# Patient Record
Sex: FEMALE | Race: WHITE | Employment: UNEMPLOYED | URBAN - METROPOLITAN AREA
[De-identification: names, ages, dates, MRNs, and addresses within clinical notes are randomized per-mention and may not be internally consistent; named-entity substitution may affect disease eponyms.]

---

## 2018-11-22 ENCOUNTER — HOSPITAL ENCOUNTER (EMERGENCY)
Age: 68
Discharge: HOME | End: 2018-11-22
Attending: EMERGENCY MEDICINE
Payer: MEDICARE

## 2018-11-22 VITALS
OXYGEN SATURATION: 97 % | RESPIRATION RATE: 16 BRPM | HEART RATE: 89 BPM | DIASTOLIC BLOOD PRESSURE: 88 MMHG | BODY MASS INDEX: 27.97 KG/M2 | SYSTOLIC BLOOD PRESSURE: 142 MMHG | WEIGHT: 152 LBS | HEIGHT: 62 IN | TEMPERATURE: 98.4 F

## 2018-11-22 DIAGNOSIS — S39.012A STRAIN OF LUMBAR REGION, INITIAL ENCOUNTER: Primary | ICD-10-CM

## 2018-11-22 DIAGNOSIS — S76.212A GROIN STRAIN, LEFT, INITIAL ENCOUNTER: ICD-10-CM

## 2018-11-22 PROCEDURE — 99283 EMERGENCY DEPT VISIT LOW MDM: CPT

## 2018-11-22 PROCEDURE — 6360000002 HC RX W HCPCS: Performed by: EMERGENCY MEDICINE

## 2018-11-22 PROCEDURE — 96372 THER/PROPH/DIAG INJ SC/IM: CPT

## 2018-11-22 PROCEDURE — 6370000000 HC RX 637 (ALT 250 FOR IP): Performed by: EMERGENCY MEDICINE

## 2018-11-22 RX ORDER — KETOROLAC TROMETHAMINE 10 MG/1
10 TABLET, FILM COATED ORAL EVERY 6 HOURS PRN
Qty: 20 TABLET | Refills: 0 | Status: SHIPPED | OUTPATIENT
Start: 2018-11-22 | End: 2021-03-30

## 2018-11-22 RX ORDER — PREDNISONE 20 MG/1
60 TABLET ORAL ONCE
Status: COMPLETED | OUTPATIENT
Start: 2018-11-22 | End: 2018-11-22

## 2018-11-22 RX ORDER — KETOROLAC TROMETHAMINE 30 MG/ML
30 INJECTION, SOLUTION INTRAMUSCULAR; INTRAVENOUS ONCE
Status: COMPLETED | OUTPATIENT
Start: 2018-11-22 | End: 2018-11-22

## 2018-11-22 RX ORDER — METHYLPREDNISOLONE 4 MG/1
TABLET ORAL
Qty: 1 KIT | Refills: 0 | Status: SHIPPED | OUTPATIENT
Start: 2018-11-22 | End: 2018-11-28

## 2018-11-22 RX ADMIN — KETOROLAC TROMETHAMINE 30 MG: 30 INJECTION, SOLUTION INTRAMUSCULAR at 13:31

## 2018-11-22 RX ADMIN — PREDNISONE 60 MG: 20 TABLET ORAL at 13:31

## 2018-11-22 ASSESSMENT — PAIN DESCRIPTION - ORIENTATION
ORIENTATION: LEFT
ORIENTATION: LEFT

## 2018-11-22 ASSESSMENT — PAIN DESCRIPTION - LOCATION
LOCATION: BACK;GROIN;LEG
LOCATION: BACK;LEG

## 2018-11-22 ASSESSMENT — PAIN SCALES - GENERAL
PAINLEVEL_OUTOF10: 8

## 2018-11-22 ASSESSMENT — PAIN DESCRIPTION - DESCRIPTORS: DESCRIPTORS: CONSTANT

## 2018-11-22 ASSESSMENT — PAIN DESCRIPTION - PAIN TYPE
TYPE: ACUTE PAIN
TYPE: ACUTE PAIN

## 2018-11-22 ASSESSMENT — PAIN DESCRIPTION - ONSET: ONSET: PROGRESSIVE

## 2018-11-22 ASSESSMENT — PAIN DESCRIPTION - PROGRESSION
CLINICAL_PROGRESSION: NOT CHANGED
CLINICAL_PROGRESSION: GRADUALLY WORSENING

## 2018-11-22 NOTE — ED PROVIDER NOTES
patients available past medical records and past encounters were reviewed. Physical exam:  Constitutional:  The patient is comfortable, well appearing, non toxic in NAD. Patient is alert and oriented x3. Head: Head is atraumatic and normocephalic. Eyes: There is no discharge from the eyes. Sclerae are normal.    Neck: Normal range of motion of the neck is present there is no JVD present no meningeal signs are present. Respiratory/chest: The chest is nontender breath sounds are normal  Cardiovascular: Regular rate and rhythm is noted. No murmurs. No rubs or gallops. Skin: Skin is warm and dry, Skin exam normal  Neurologic exam: The patient's [place] Coma Scale is 15. No focal motor deficits. There are no focal sensory deficits. Deep tendon reflexes are intact and present bilaterally in the lower extremities. Babinski is absent. Gait is normal. Symmetric strength and sensation in the lower extremities bilaterally. Back exam: The patient has reproducible tenderness to palpation in the paravertebral lumbar region on the left sacroiliac area and the left inguinal area. There is no evidence of localized erythema nor is there any focal warmth to the back. The patient has no evidence of single vertebral tenderness or any single area of interspace tenderness. There are no palpable deformities, lacerations, abrasions, or stepoffs. No midline cervical, thoracic, or lumbar spine tenderness.           -------------------------------------------------- RESULTS -------------------------------------------------  I have personally reviewed all laboratory and imaging results for this patient. Results are listed below. LABS:  No results found for this visit on 11/22/18.     RADIOLOGY:  Interpreted by Radiologist.  No orders to display           Medical decision making:   Mechanical lumbosacral strain without evidence of fracture or neurologic compromise.     Plan:  Review of past medical records for appropriate

## 2021-03-30 ENCOUNTER — HOSPITAL ENCOUNTER (INPATIENT)
Age: 71
LOS: 3 days | Discharge: HOME | DRG: 291 | End: 2021-04-02
Attending: EMERGENCY MEDICINE | Admitting: INTERNAL MEDICINE
Payer: MEDICARE

## 2021-03-30 ENCOUNTER — APPOINTMENT (OUTPATIENT)
Dept: GENERAL RADIOLOGY | Age: 71
DRG: 291 | End: 2021-03-30
Payer: MEDICARE

## 2021-03-30 DIAGNOSIS — J44.1 COPD EXACERBATION (HCC): Primary | ICD-10-CM

## 2021-03-30 DIAGNOSIS — E83.42 HYPOMAGNESEMIA: ICD-10-CM

## 2021-03-30 DIAGNOSIS — E87.6 HYPOKALEMIA: ICD-10-CM

## 2021-03-30 DIAGNOSIS — E87.1 HYPONATREMIA: ICD-10-CM

## 2021-03-30 PROBLEM — I50.9 CONGESTIVE HEART FAILURE DUE TO CARDIOMYOPATHY (HCC): Status: ACTIVE | Noted: 2021-03-30

## 2021-03-30 PROBLEM — R09.02 HYPOXIA: Status: ACTIVE | Noted: 2021-03-30

## 2021-03-30 PROBLEM — I42.9 CONGESTIVE HEART FAILURE DUE TO CARDIOMYOPATHY (HCC): Status: ACTIVE | Noted: 2021-03-30

## 2021-03-30 PROBLEM — I50.9 CONGESTIVE HEART FAILURE OF UNKNOWN ETIOLOGY (HCC): Status: ACTIVE | Noted: 2021-03-30

## 2021-03-30 LAB
ACETAMINOPHEN LEVEL: <5 MCG/ML (ref 10–30)
ALBUMIN SERPL-MCNC: 4 G/DL (ref 3.5–5.2)
ALP BLD-CCNC: 187 U/L (ref 35–104)
ALT SERPL-CCNC: 41 U/L (ref 0–32)
ANION GAP SERPL CALCULATED.3IONS-SCNC: 16 MMOL/L (ref 7–16)
ANION GAP SERPL CALCULATED.3IONS-SCNC: 16 MMOL/L (ref 7–16)
ANISOCYTOSIS: ABNORMAL
AST SERPL-CCNC: 66 U/L (ref 0–31)
BASOPHILS ABSOLUTE: 0 E9/L (ref 0–0.2)
BASOPHILS RELATIVE PERCENT: 0.1 % (ref 0–2)
BILIRUB SERPL-MCNC: 1 MG/DL (ref 0–1.2)
BUN BLDV-MCNC: 5 MG/DL (ref 8–23)
BUN BLDV-MCNC: 6 MG/DL (ref 8–23)
CALCIUM SERPL-MCNC: 8.9 MG/DL (ref 8.6–10.2)
CALCIUM SERPL-MCNC: 8.9 MG/DL (ref 8.6–10.2)
CHLORIDE BLD-SCNC: 73 MMOL/L (ref 98–107)
CHLORIDE BLD-SCNC: 75 MMOL/L (ref 98–107)
CO2: 28 MMOL/L (ref 22–29)
CO2: 32 MMOL/L (ref 22–29)
CREAT SERPL-MCNC: 0.6 MG/DL (ref 0.5–1)
CREAT SERPL-MCNC: 0.7 MG/DL (ref 0.5–1)
EKG ATRIAL RATE: 99 BPM
EKG P AXIS: 69 DEGREES
EKG P-R INTERVAL: 174 MS
EKG Q-T INTERVAL: 428 MS
EKG QRS DURATION: 104 MS
EKG QTC CALCULATION (BAZETT): 549 MS
EKG R AXIS: 62 DEGREES
EKG T AXIS: 95 DEGREES
EKG VENTRICULAR RATE: 99 BPM
EOSINOPHILS ABSOLUTE: 0 E9/L (ref 0.05–0.5)
EOSINOPHILS RELATIVE PERCENT: 0.1 % (ref 0–6)
ETHANOL: <10 MG/DL (ref 0–0.08)
GFR AFRICAN AMERICAN: >60
GFR AFRICAN AMERICAN: >60
GFR NON-AFRICAN AMERICAN: >60 ML/MIN/1.73
GFR NON-AFRICAN AMERICAN: >60 ML/MIN/1.73
GLUCOSE BLD-MCNC: 115 MG/DL (ref 74–99)
GLUCOSE BLD-MCNC: 123 MG/DL (ref 74–99)
HCT VFR BLD CALC: 42.6 % (ref 34–48)
HEMOGLOBIN: 15.3 G/DL (ref 11.5–15.5)
LACTIC ACID: 2 MMOL/L (ref 0.5–2.2)
LYMPHOCYTES ABSOLUTE: 0.32 E9/L (ref 1.5–4)
LYMPHOCYTES RELATIVE PERCENT: 3.4 % (ref 20–42)
MAGNESIUM: 1.3 MG/DL (ref 1.6–2.6)
MCH RBC QN AUTO: 30.7 PG (ref 26–35)
MCHC RBC AUTO-ENTMCNC: 35.9 % (ref 32–34.5)
MCV RBC AUTO: 85.5 FL (ref 80–99.9)
MONOCYTES ABSOLUTE: 0.52 E9/L (ref 0.1–0.95)
MONOCYTES RELATIVE PERCENT: 5.2 % (ref 2–12)
NEUTROPHILS ABSOLUTE: 9.56 E9/L (ref 1.8–7.3)
NEUTROPHILS RELATIVE PERCENT: 91.4 % (ref 43–80)
OSMOLALITY: 254 MOSM/KG (ref 285–310)
PDW BLD-RTO: 13.4 FL (ref 11.5–15)
PLATELET # BLD: 246 E9/L (ref 130–450)
PMV BLD AUTO: 9.5 FL (ref 7–12)
POLYCHROMASIA: ABNORMAL
POTASSIUM REFLEX MAGNESIUM: 2.5 MMOL/L (ref 3.5–5)
POTASSIUM SERPL-SCNC: 3.1 MMOL/L (ref 3.5–5)
PRO-BNP: 1387 PG/ML (ref 0–125)
RBC # BLD: 4.98 E12/L (ref 3.5–5.5)
REASON FOR REJECTION: NORMAL
REJECTED TEST: NORMAL
SALICYLATE, SERUM: <0.3 MG/DL (ref 0–30)
SODIUM BLD-SCNC: 119 MMOL/L (ref 132–146)
SODIUM BLD-SCNC: 121 MMOL/L (ref 132–146)
TOTAL PROTEIN: 6.8 G/DL (ref 6.4–8.3)
TRICYCLIC ANTIDEPRESSANTS SCREEN SERUM: NEGATIVE NG/ML
TROPONIN: <0.01 NG/ML (ref 0–0.03)
WBC # BLD: 10.5 E9/L (ref 4.5–11.5)

## 2021-03-30 PROCEDURE — 96374 THER/PROPH/DIAG INJ IV PUSH: CPT

## 2021-03-30 PROCEDURE — 6370000000 HC RX 637 (ALT 250 FOR IP): Performed by: EMERGENCY MEDICINE

## 2021-03-30 PROCEDURE — 82077 ASSAY SPEC XCP UR&BREATH IA: CPT

## 2021-03-30 PROCEDURE — 94640 AIRWAY INHALATION TREATMENT: CPT

## 2021-03-30 PROCEDURE — 2060000000 HC ICU INTERMEDIATE R&B

## 2021-03-30 PROCEDURE — 80053 COMPREHEN METABOLIC PANEL: CPT

## 2021-03-30 PROCEDURE — 83880 ASSAY OF NATRIURETIC PEPTIDE: CPT

## 2021-03-30 PROCEDURE — 80143 DRUG ASSAY ACETAMINOPHEN: CPT

## 2021-03-30 PROCEDURE — 85025 COMPLETE CBC W/AUTO DIFF WBC: CPT

## 2021-03-30 PROCEDURE — 87040 BLOOD CULTURE FOR BACTERIA: CPT

## 2021-03-30 PROCEDURE — 6370000000 HC RX 637 (ALT 250 FOR IP): Performed by: FAMILY MEDICINE

## 2021-03-30 PROCEDURE — 6360000002 HC RX W HCPCS: Performed by: INTERNAL MEDICINE

## 2021-03-30 PROCEDURE — 93005 ELECTROCARDIOGRAM TRACING: CPT | Performed by: INTERNAL MEDICINE

## 2021-03-30 PROCEDURE — 6360000002 HC RX W HCPCS: Performed by: FAMILY MEDICINE

## 2021-03-30 PROCEDURE — 71045 X-RAY EXAM CHEST 1 VIEW: CPT

## 2021-03-30 PROCEDURE — 80179 DRUG ASSAY SALICYLATE: CPT

## 2021-03-30 PROCEDURE — 6360000002 HC RX W HCPCS: Performed by: EMERGENCY MEDICINE

## 2021-03-30 PROCEDURE — 99285 EMERGENCY DEPT VISIT HI MDM: CPT

## 2021-03-30 PROCEDURE — 2500000003 HC RX 250 WO HCPCS: Performed by: FAMILY MEDICINE

## 2021-03-30 PROCEDURE — 83735 ASSAY OF MAGNESIUM: CPT

## 2021-03-30 PROCEDURE — 94664 DEMO&/EVAL PT USE INHALER: CPT

## 2021-03-30 PROCEDURE — 80048 BASIC METABOLIC PNL TOTAL CA: CPT

## 2021-03-30 PROCEDURE — 83605 ASSAY OF LACTIC ACID: CPT

## 2021-03-30 PROCEDURE — 84484 ASSAY OF TROPONIN QUANT: CPT

## 2021-03-30 PROCEDURE — 83930 ASSAY OF BLOOD OSMOLALITY: CPT

## 2021-03-30 PROCEDURE — 96375 TX/PRO/DX INJ NEW DRUG ADDON: CPT

## 2021-03-30 PROCEDURE — 2700000000 HC OXYGEN THERAPY PER DAY

## 2021-03-30 PROCEDURE — 80307 DRUG TEST PRSMV CHEM ANLYZR: CPT

## 2021-03-30 PROCEDURE — 2580000003 HC RX 258: Performed by: FAMILY MEDICINE

## 2021-03-30 PROCEDURE — 6360000002 HC RX W HCPCS

## 2021-03-30 PROCEDURE — 94660 CPAP INITIATION&MGMT: CPT

## 2021-03-30 RX ORDER — FLUTICASONE PROPIONATE 50 MCG
1 SPRAY, SUSPENSION (ML) NASAL DAILY
Status: DISCONTINUED | OUTPATIENT
Start: 2021-03-30 | End: 2021-03-30

## 2021-03-30 RX ORDER — SERTRALINE HYDROCHLORIDE 100 MG/1
100 TABLET, FILM COATED ORAL DAILY
Status: DISCONTINUED | OUTPATIENT
Start: 2021-03-30 | End: 2021-04-02 | Stop reason: HOSPADM

## 2021-03-30 RX ORDER — CLONAZEPAM 0.5 MG/1
1 TABLET ORAL DAILY PRN
Status: DISCONTINUED | OUTPATIENT
Start: 2021-03-30 | End: 2021-04-02 | Stop reason: HOSPADM

## 2021-03-30 RX ORDER — METOPROLOL SUCCINATE 25 MG/1
25 TABLET, EXTENDED RELEASE ORAL DAILY
Status: ON HOLD | COMMUNITY
End: 2022-09-26 | Stop reason: HOSPADM

## 2021-03-30 RX ORDER — FUROSEMIDE 10 MG/ML
20 INJECTION INTRAMUSCULAR; INTRAVENOUS ONCE
Status: COMPLETED | OUTPATIENT
Start: 2021-03-30 | End: 2021-03-30

## 2021-03-30 RX ORDER — CLONAZEPAM 1 MG/1
0.5 TABLET ORAL DAILY PRN
COMMUNITY

## 2021-03-30 RX ORDER — OMEPRAZOLE 20 MG/1
20 CAPSULE, DELAYED RELEASE ORAL DAILY
COMMUNITY

## 2021-03-30 RX ORDER — METHYLPREDNISOLONE SODIUM SUCCINATE 125 MG/2ML
125 INJECTION, POWDER, LYOPHILIZED, FOR SOLUTION INTRAMUSCULAR; INTRAVENOUS ONCE
Status: COMPLETED | OUTPATIENT
Start: 2021-03-30 | End: 2021-03-30

## 2021-03-30 RX ORDER — LEVOTHYROXINE SODIUM 0.1 MG/1
100 TABLET ORAL DAILY
Status: DISCONTINUED | OUTPATIENT
Start: 2021-03-30 | End: 2021-03-30

## 2021-03-30 RX ORDER — IPRATROPIUM BROMIDE AND ALBUTEROL SULFATE 2.5; .5 MG/3ML; MG/3ML
3 SOLUTION RESPIRATORY (INHALATION) ONCE
Status: COMPLETED | OUTPATIENT
Start: 2021-03-30 | End: 2021-03-30

## 2021-03-30 RX ORDER — BUSPIRONE HYDROCHLORIDE 10 MG/1
10 TABLET ORAL 3 TIMES DAILY
Status: DISCONTINUED | OUTPATIENT
Start: 2021-03-30 | End: 2021-04-02 | Stop reason: HOSPADM

## 2021-03-30 RX ORDER — AMLODIPINE BESYLATE 10 MG/1
10 TABLET ORAL DAILY
Status: DISCONTINUED | OUTPATIENT
Start: 2021-03-30 | End: 2021-04-02 | Stop reason: HOSPADM

## 2021-03-30 RX ORDER — POTASSIUM CHLORIDE 20 MEQ/1
40 TABLET, EXTENDED RELEASE ORAL ONCE
Status: COMPLETED | OUTPATIENT
Start: 2021-03-30 | End: 2021-03-30

## 2021-03-30 RX ORDER — ONDANSETRON 2 MG/ML
INJECTION INTRAMUSCULAR; INTRAVENOUS
Status: COMPLETED
Start: 2021-03-30 | End: 2021-03-30

## 2021-03-30 RX ORDER — TRAZODONE HYDROCHLORIDE 100 MG/1
100 TABLET ORAL NIGHTLY
COMMUNITY
End: 2022-09-01

## 2021-03-30 RX ORDER — SODIUM CHLORIDE 0.9 % (FLUSH) 0.9 %
10 SYRINGE (ML) INJECTION PRN
Status: DISCONTINUED | OUTPATIENT
Start: 2021-03-30 | End: 2021-04-02 | Stop reason: HOSPADM

## 2021-03-30 RX ORDER — IPRATROPIUM BROMIDE AND ALBUTEROL SULFATE 2.5; .5 MG/3ML; MG/3ML
3 SOLUTION RESPIRATORY (INHALATION)
Status: DISCONTINUED | OUTPATIENT
Start: 2021-03-30 | End: 2021-03-30

## 2021-03-30 RX ORDER — AMLODIPINE AND OLMESARTAN MEDOXOMIL 5; 20 MG/1; MG/1
1 TABLET ORAL DAILY
Status: DISCONTINUED | OUTPATIENT
Start: 2021-03-30 | End: 2021-03-30

## 2021-03-30 RX ORDER — ACETAMINOPHEN 650 MG/1
650 SUPPOSITORY RECTAL EVERY 6 HOURS PRN
Status: DISCONTINUED | OUTPATIENT
Start: 2021-03-30 | End: 2021-04-02 | Stop reason: HOSPADM

## 2021-03-30 RX ORDER — TRAZODONE HYDROCHLORIDE 50 MG/1
100 TABLET ORAL NIGHTLY
Status: DISCONTINUED | OUTPATIENT
Start: 2021-03-30 | End: 2021-04-02 | Stop reason: HOSPADM

## 2021-03-30 RX ORDER — LOSARTAN POTASSIUM 50 MG/1
100 TABLET ORAL DAILY
Status: DISCONTINUED | OUTPATIENT
Start: 2021-03-30 | End: 2021-04-02 | Stop reason: HOSPADM

## 2021-03-30 RX ORDER — BUDESONIDE AND FORMOTEROL FUMARATE DIHYDRATE 160; 4.5 UG/1; UG/1
2 AEROSOL RESPIRATORY (INHALATION) 2 TIMES DAILY
Status: DISCONTINUED | OUTPATIENT
Start: 2021-03-30 | End: 2021-03-30

## 2021-03-30 RX ORDER — GUAIFENESIN 100 MG/5ML
200 SOLUTION ORAL 3 TIMES DAILY PRN
Status: DISCONTINUED | OUTPATIENT
Start: 2021-03-30 | End: 2021-04-02 | Stop reason: HOSPADM

## 2021-03-30 RX ORDER — TELMISARTAN 80 MG/1
80 TABLET ORAL DAILY
Status: ON HOLD | COMMUNITY
End: 2021-04-01 | Stop reason: HOSPADM

## 2021-03-30 RX ORDER — ONDANSETRON 2 MG/ML
4 INJECTION INTRAMUSCULAR; INTRAVENOUS EVERY 6 HOURS PRN
Status: DISCONTINUED | OUTPATIENT
Start: 2021-03-30 | End: 2021-03-31

## 2021-03-30 RX ORDER — MAGNESIUM SULFATE IN WATER 40 MG/ML
2000 INJECTION, SOLUTION INTRAVENOUS ONCE
Status: COMPLETED | OUTPATIENT
Start: 2021-03-30 | End: 2021-03-30

## 2021-03-30 RX ORDER — AMLODIPINE BESYLATE 5 MG/1
5 TABLET ORAL 2 TIMES DAILY
Status: ON HOLD | COMMUNITY
End: 2022-09-26 | Stop reason: HOSPADM

## 2021-03-30 RX ORDER — POTASSIUM CHLORIDE 7.45 MG/ML
10 INJECTION INTRAVENOUS ONCE
Status: COMPLETED | OUTPATIENT
Start: 2021-03-30 | End: 2021-03-30

## 2021-03-30 RX ORDER — BUMETANIDE 0.25 MG/ML
0.5 INJECTION, SOLUTION INTRAMUSCULAR; INTRAVENOUS DAILY
Status: DISCONTINUED | OUTPATIENT
Start: 2021-03-30 | End: 2021-04-02 | Stop reason: HOSPADM

## 2021-03-30 RX ORDER — BUSPIRONE HYDROCHLORIDE 10 MG/1
10 TABLET ORAL 3 TIMES DAILY
COMMUNITY
End: 2022-09-01

## 2021-03-30 RX ORDER — FUROSEMIDE 20 MG/1
20 TABLET ORAL DAILY PRN
Status: ON HOLD | COMMUNITY
End: 2021-04-01 | Stop reason: HOSPADM

## 2021-03-30 RX ORDER — SODIUM CHLORIDE 0.9 % (FLUSH) 0.9 %
10 SYRINGE (ML) INJECTION EVERY 12 HOURS SCHEDULED
Status: DISCONTINUED | OUTPATIENT
Start: 2021-03-30 | End: 2021-04-02 | Stop reason: HOSPADM

## 2021-03-30 RX ORDER — ONDANSETRON 2 MG/ML
4 INJECTION INTRAMUSCULAR; INTRAVENOUS ONCE
Status: COMPLETED | OUTPATIENT
Start: 2021-03-30 | End: 2021-03-30

## 2021-03-30 RX ORDER — ACETAMINOPHEN 325 MG/1
650 TABLET ORAL EVERY 6 HOURS PRN
Status: DISCONTINUED | OUTPATIENT
Start: 2021-03-30 | End: 2021-04-02 | Stop reason: HOSPADM

## 2021-03-30 RX ORDER — METOPROLOL SUCCINATE 25 MG/1
100 TABLET, EXTENDED RELEASE ORAL DAILY
Status: DISCONTINUED | OUTPATIENT
Start: 2021-03-30 | End: 2021-03-30

## 2021-03-30 RX ORDER — PANTOPRAZOLE SODIUM 40 MG/1
40 TABLET, DELAYED RELEASE ORAL
Status: DISCONTINUED | OUTPATIENT
Start: 2021-03-31 | End: 2021-04-02 | Stop reason: HOSPADM

## 2021-03-30 RX ORDER — ROSUVASTATIN CALCIUM 10 MG/1
10 TABLET, COATED ORAL DAILY
Status: DISCONTINUED | OUTPATIENT
Start: 2021-03-30 | End: 2021-03-30

## 2021-03-30 RX ORDER — METOPROLOL SUCCINATE 25 MG/1
25 TABLET, EXTENDED RELEASE ORAL DAILY
Status: DISCONTINUED | OUTPATIENT
Start: 2021-03-30 | End: 2021-04-02 | Stop reason: HOSPADM

## 2021-03-30 RX ORDER — SODIUM CHLORIDE 9 MG/ML
25 INJECTION, SOLUTION INTRAVENOUS PRN
Status: DISCONTINUED | OUTPATIENT
Start: 2021-03-30 | End: 2021-04-02 | Stop reason: HOSPADM

## 2021-03-30 RX ADMIN — POTASSIUM CHLORIDE 40 MEQ: 1500 TABLET, EXTENDED RELEASE ORAL at 13:02

## 2021-03-30 RX ADMIN — ENOXAPARIN SODIUM 40 MG: 100 INJECTION SUBCUTANEOUS at 18:36

## 2021-03-30 RX ADMIN — IPRATROPIUM BROMIDE AND ALBUTEROL SULFATE 3 AMPULE: .5; 3 SOLUTION RESPIRATORY (INHALATION) at 09:16

## 2021-03-30 RX ADMIN — Medication 10 ML: at 21:09

## 2021-03-30 RX ADMIN — FUROSEMIDE 20 MG: 20 INJECTION, SOLUTION INTRAMUSCULAR; INTRAVENOUS at 10:32

## 2021-03-30 RX ADMIN — METHYLPREDNISOLONE SODIUM SUCCINATE 125 MG: 125 INJECTION, POWDER, FOR SOLUTION INTRAMUSCULAR; INTRAVENOUS at 09:40

## 2021-03-30 RX ADMIN — METOPROLOL SUCCINATE 25 MG: 25 TABLET, FILM COATED, EXTENDED RELEASE ORAL at 21:18

## 2021-03-30 RX ADMIN — TRAZODONE HYDROCHLORIDE 100 MG: 50 TABLET ORAL at 21:11

## 2021-03-30 RX ADMIN — POTASSIUM CHLORIDE 40 MEQ: 1500 TABLET, EXTENDED RELEASE ORAL at 18:36

## 2021-03-30 RX ADMIN — AMLODIPINE BESYLATE 10 MG: 10 TABLET ORAL at 20:00

## 2021-03-30 RX ADMIN — POTASSIUM CHLORIDE 10 MEQ: 10 INJECTION, SOLUTION INTRAVENOUS at 13:03

## 2021-03-30 RX ADMIN — SERTRALINE 100 MG: 100 TABLET, FILM COATED ORAL at 20:00

## 2021-03-30 RX ADMIN — ONDANSETRON HYDROCHLORIDE 4 MG: 2 SOLUTION INTRAMUSCULAR; INTRAVENOUS at 09:44

## 2021-03-30 RX ADMIN — BUMETANIDE 0.5 MG: 0.25 INJECTION INTRAMUSCULAR; INTRAVENOUS at 18:37

## 2021-03-30 RX ADMIN — BUSPIRONE HYDROCHLORIDE 10 MG: 10 TABLET ORAL at 21:11

## 2021-03-30 RX ADMIN — LOSARTAN POTASSIUM 100 MG: 50 TABLET, FILM COATED ORAL at 20:00

## 2021-03-30 RX ADMIN — MAGNESIUM SULFATE HEPTAHYDRATE 2000 MG: 40 INJECTION, SOLUTION INTRAVENOUS at 18:36

## 2021-03-30 RX ADMIN — ONDANSETRON 4 MG: 2 INJECTION INTRAMUSCULAR; INTRAVENOUS at 09:44

## 2021-03-30 NOTE — ED NOTES
Bed: 19  Expected date:   Expected time:   Means of arrival:   Comments:  ODELL Alcazar RN  03/30/21 8000

## 2021-03-30 NOTE — H&P
[address] [facility]  History and Physical      CHIEF COMPLAINT: Shortness of breath       Patient of Elizabethhenrigabi DO Joni presents with:  Congestive heart failure    History of Present Illness:   Patient is a 74-year-old female with a past medical history of HTN, thyroid disease, and bronchitis. Patient presents to the ER for shortness of breath beginning approximately 9 to 10 days ago she states. Patient states there are no relieving factors and there are no aggravating factors. Patient states her shortness of breath is persistently getting worse. Patient states she attempted to use her breathing treatments at home without success. Patient does not have home O2. Patient is currently wearing the BiPAP. Patient denies any chest pain, fever, chills, headache, and cough. Patient is using accessory muscles to breathe. Patient has bilateral lower extremity edema. Patient's lab work shows sodium of 119, potassium 3.1, and a BNP at 1,387. Patient will be admitted to a telemetry floor for further testing and treatment. REVIEW OF SYSTEMS:  Pertinent negatives are above in HPI. 10 point ROS otherwise negative. Past Medical History:   Diagnosis Date    Bronchitis     Hypertension     Thyroid disease          Past Surgical History:   Procedure Laterality Date    OTHER SURGICAL HISTORY      states had something done right neck area per dr jaeger, might have been an abcess       Medications Prior to Admission:    Not in a hospital admission. Note that the patient's home medications were reviewed and the above list is accurate to the best of my knowledge at the time of the exam.    Allergies:    Patient has no known allergies. Social History:    reports that she has been smoking cigarettes. She has been smoking about 1.00 pack per day. She has never used smokeless tobacco. She reports that she does not drink alcohol or use drugs.     Family History:     Patient does not recall at be obtained. EKG:  I've personally reviewed the patient's EKG:  Sinus rhythm with frequent PACs, nonspecific ST abnormality    Telemetry:  I've personally reviewed the patient's telemetry:  Sinus tach    ASSESSMENT/PLAN:  Principal Problem:    Congestive heart failure of unknown etiology ([facility] [address]  Active Problems:    Hypoxia  Resolved Problems:    * No resolved hospital problems. *    63-year-old female with a past medical history of bronchitis, HTN, thyroid disease admitted for congestive heart failure. 1.  Diurese cautiously so as to avoid renal's insufficiency  2. Daily weights strict I's and O's  3. Low-sodium diet  4. Supplement O2 to maintain pulse ox duration greater than 93% wean as tolerated  5. Medications for other comorbidities continue as appropriate with dose adjustments as needed. Code status: Full  Requires inpatient level of care  Betty Dean APRN-CNP  3:43 PM  3/30/2021     Admit to tele for eval of CHF - volume overload bnp 1300   On bipap on my evaluation   Obtain eco - none on chart previously   Iv diuresis   check bnp   Monitor hyponatremia - renal following   supplement lytes     I personally saw, examined and provided care for the patient. Radiographs, labs and medication list were reviewed by me independently. The case was discussed in detail and plans for care were established. Review of [address] [place] [address], documentation was conducted and revisions were made as appropriate directly by me. I agree with the above documented exam, problem list, and plan of care.      Kayla Olivo MD  9:52 PM  3/30/2021

## 2021-03-30 NOTE — ED PROVIDER NOTES
HPI:  3/30/21, Time: 9:58 AM EDT         Celina Acuna is a 78 y.o. female presenting to the ED for SOB, beginning 9 days ago. The complaint has been persistent, moderate in severity, and worsened by light exertion. No treatment prior to arrival.    Patient has a history of bronchitis, hypertension, thyroid disease. Presents today with shortness of breath that started about 9 days ago. Reports that she has occasional nonproductive cough. Reports that she has been using her breathing treatments but symptoms do not improve. She is short of breath to the point where she is in further assistance and presented to the ED. She also reports that she noticed her legs swelling. In the ED today she was initially placed on 5 to 6 L of NC but due to her respiratory distress she was later put on BiPAP. Her /90. Blood work revealed elevated proBNP around 1400. Endorses orthopnea, PND, bilateral pedal edema. Otherwise she denies any fever, chills, chest pain, colds, palpitations, NVD C, dysuria, pyuria. Review of Systems:   Review of Systems   Constitutional: Negative for chills and fatigue. HENT: Negative for congestion. Eyes: Negative for redness. Respiratory: Positive for shortness of breath and wheezing. Cardiovascular: Positive for leg swelling. Negative for chest pain. Gastrointestinal: Negative for abdominal pain, nausea and vomiting. Genitourinary: Negative for dysuria. Musculoskeletal: Negative for arthralgias. Skin: Negative for rash. Neurological: Negative for light-headedness. Psychiatric/Behavioral: Negative for confusion. All other systems reviewed and are negative.              --------------------------------------------- PAST HISTORY ---------------------------------------------  Past Medical History:  has a past medical history of Bronchitis, Hypertension, and Thyroid disease.     Past Surgical History:  has a past surgical history that includes other surgical history. Social History:  reports that she has been smoking cigarettes. She has been smoking about 1.00 pack per day. She has never used smokeless tobacco. She reports that she does not drink alcohol or use drugs. Family History: family history is not on file. The patients home medications have been reviewed. Allergies: Patient has no known allergies.     -------------------------------------------------- RESULTS -------------------------------------------------  All laboratory and radiology results have been personally reviewed by myself   LABS:  Results for orders placed or performed during the hospital encounter of 03/30/21   CBC Auto Differential   Result Value Ref Range    WBC 10.5 4.5 - 11.5 E9/L    RBC 4.98 3.50 - 5.50 E12/L    Hemoglobin 15.3 11.5 - 15.5 g/dL    Hematocrit 42.6 34.0 - 48.0 %    MCV 85.5 80.0 - 99.9 fL    MCH 30.7 26.0 - 35.0 pg    MCHC 35.9 (H) 32.0 - 34.5 %    RDW 13.4 11.5 - 15.0 fL    Platelets 975 274 - 159 E9/L    MPV 9.5 7.0 - 12.0 fL    Neutrophils % 91.4 (H) 43.0 - 80.0 %    Lymphocytes % 3.4 (L) 20.0 - 42.0 %    Monocytes % 5.2 2.0 - 12.0 %    Eosinophils % 0.1 0.0 - 6.0 %    Basophils % 0.1 0.0 - 2.0 %    Neutrophils Absolute 9.56 (H) 1.80 - 7.30 E9/L    Lymphocytes Absolute 0.32 (L) 1.50 - 4.00 E9/L    Monocytes Absolute 0.52 0.10 - 0.95 E9/L    Eosinophils Absolute 0.00 (L) 0.05 - 0.50 E9/L    Basophils Absolute 0.00 0.00 - 0.20 E9/L    Anisocytosis 1+     Polychromasia 1+    Troponin   Result Value Ref Range    Troponin <0.01 0.00 - 0.03 ng/mL   Brain Natriuretic Peptide   Result Value Ref Range    Pro-BNP 1,387 (H) 0 - 125 pg/mL   Lactic Acid, Plasma   Result Value Ref Range    Lactic Acid 2.0 0.5 - 2.2 mmol/L   SPECIMEN REJECTION   Result Value Ref Range    Rejected Test CMPX     Reason for Rejection see below    EKG 12 Lead   Result Value Ref Range    Ventricular Rate 99 BPM    Atrial Rate 99 BPM    P-R Interval 174 ms    QRS Duration 104 ms    Q-T Interval 428 ms    QTc Calculation (Bazett) 549 ms    P Axis 69 degrees    R Axis 62 degrees    T Axis 95 degrees       RADIOLOGY:  Interpreted by Radiologist.  XR CHEST PORTABLE   Final Result   1. Cardiomegaly. There are no findings of failure   2. Right upper lobe and left lung are clear. 3. Hazy appearance of the right lung base which I believe is secondary to   overlying breast tissue. If there is high suspicion pneumonia dedicated PA   and lateral views or CT of the chest can be obtained. EKG: This EKG is signed and interpreted by me. Sinus rhythm with PACs, no ST segment elevation to meet STEMI criteria, there is nonspecific ST segment changes, FL interval 174 MS,  MS,  MS    ------------------------- NURSING NOTES AND VITALS REVIEWED ---------------------------   The nursing notes within the ED encounter and vital signs as below have been reviewed. BP (!) 148/90   Pulse 92   Temp 97.1 °F (36.2 °C) (Temporal)   Resp 26   Wt 170 lb (77.1 kg)   SpO2 98%   BMI 31.09 kg/m²   Oxygen Saturation Interpretation: Normal      ---------------------------------------------------PHYSICAL EXAM--------------------------------------      Constitutional/General: Alert and oriented x3, well appearing, non toxic in NAD  Head: Normocephalic and atraumatic  Eyes: PERRL, EOMI  Mouth: Oropharynx clear, handling secretions, no trismus  Neck: Supple, full ROM,   Pulmonary: Diffuse wheezing, rhonchi, bi basilar crackles  Cardiovascular: 1+ bilateral pitting edema, regular rate and rhythm, no murmurs, gallops, or rubs. 2+ distal pulses  Abdomen: Soft, non tender, non distended,   Extremities: Moves all extremities x 4. Warm and well perfused, there is 1+ bilateral lower extremity edema  Skin: warm and dry without rash  Neurologic: GCS 15, no gross focal neurologic deficits.   Psych: Normal Affect      ------------------------------ ED COURSE/MEDICAL DECISION MAKING----------------------  Medications ipratropium-albuterol (DUONEB) nebulizer solution 3 ampule (3 ampules Inhalation Given 3/30/21 0916)   methylPREDNISolone sodium (SOLU-MEDROL) injection 125 mg (125 mg Intravenous Given 3/30/21 0940)   ondansetron (ZOFRAN) injection 4 mg (4 mg Intravenous Given 3/30/21 0944)   furosemide (LASIX) injection 20 mg (20 mg Intravenous Given 3/30/21 1032)         ED COURSE:   Lasix 20 mg X1. DuoNeb breathing treatments. Solu-Medrol 125 mg X1. Zofran 4 mg X1. Initially on NC, later on BiPAP    Medical Decision Making:    CMP, CBC, lactic acid, serum drug screen, troponin, blood cultures, proBNP, CXR, EKG were obtained. Lactic acidosis 2.0.  proBNP elevated 1387. Troponin negative. CMP revealed hyponatremia 121 (Nephrology Dr. Jermaine Cooper group consulted), hypokalemia 2.5 replaced. Alkalemia bicarb 32. Transaminitis alk phos 197, ALT 41, AST 66. CBC WNL. Serum drug screen negative. Blood cultures pending. CXR showed cardiomegaly and hazy infiltrates. EKG normal sinus rhythm. Given her acute respiratory distress requiring BiPAP and elevated proBNP and clinical findings of bibasilar clear crackles and bilateral pitting pedal edema patient was made to admit the patient. She was administered Lasix 20 mg X1. Solu-Medrol 125 mg X1. DuoNeb breathing treatments per protocol. Discussed with Dr. Colbert Anchors group covering for Dr. Fabiano Harris. Critical care:  Critical Care: Please note that the withdrawal or failure to initiate urgent interventions for this patient would likely result in a life threatening deterioration or permanent disability. Accordingly this patient received 40  minutes of critical care time, excluding separately billable procedures. Counseling: The emergency provider has spoken with the patient and family member son-in-law and discussed todays results, in addition to providing specific details for the plan of care and counseling regarding the diagnosis and prognosis.   Questions are answered at this time and they are agreeable with the plan.      --------------------------------- IMPRESSION AND DISPOSITION ---------------------------------    IMPRESSION  1. Acute congestive heart failure, unspecified heart failure type ([address]    2. Bronchitis    3. Dyspnea and respiratory abnormalities    4. Hypoxia        DISPOSITION  Disposition: Admit to med/surg floor  Patient condition is fair      NOTE: This report was transcribed using voice recognition software. Every effort was made to ensure accuracy; however, inadvertent computerized transcription errors may be present       [address], DO  Resident  03/30/21 3639 Bianca Morgan, DO  Resident  03/30/21 3639 Bianca Morgan, DO  Resident  03/30/21 1605    ATTENDING PROVIDER ATTESTATION:     Marlene Medeiros presented to the emergency department for evaluation of Shortness of Breath (started about 1 week ago, audible wheezes noted)    I have reviewed and discussed the case, including pertinent history (medical, surgical, family and social) and exam findings with the Resident and the Nurse assigned to Marlene Mala. I have personally performed and/or participated in the history, exam, medical decision making, and procedures and agree with all pertinent clinical information. I have reviewed my findings and recommendations with Marlene Medeiros and members of family present at the time of disposition. I, Dr. Sam Lim am the primary physician of record for this patient. MDM: The patient is 78 y.o. female  with a past medical history of       Diagnosis Date    Bronchitis     Hypertension     Thyroid disease      presenting to the emergency department with a chief complaint of shortness of breath. Differential diagnosis includes but not limited to, pneumonia, CHF exacerbation, COPD exacerbation. The patient did arrive in moderate respiratory distress. The patient was placed on BiPAP.   Patient did

## 2021-03-30 NOTE — PROGRESS NOTES
Date: 3/30/2021    Time: 9:42 AM    Patient Placed On BIPAP/CPAP/ Non-Invasive Ventilation? Yes    If no must comment. Facial area red/color change? No           If YES are Blister/Lesion present? No   If yes must notify nursing staff  BIPAP/CPAP skin barrier?   Yes    Skin barrier type:mepilexlite       Comments:        Reji Hughes

## 2021-03-31 ENCOUNTER — APPOINTMENT (OUTPATIENT)
Dept: GENERAL RADIOLOGY | Age: 71
DRG: 291 | End: 2021-03-31
Payer: MEDICARE

## 2021-03-31 LAB
ANION GAP SERPL CALCULATED.3IONS-SCNC: 8 MMOL/L (ref 7–16)
BUN BLDV-MCNC: 13 MG/DL (ref 8–23)
BUN BLDV-MCNC: 18 MG/DL (ref 8–23)
BUN BLDV-MCNC: 22 MG/DL (ref 8–23)
CALCIUM SERPL-MCNC: 8.4 MG/DL (ref 8.6–10.2)
CALCIUM SERPL-MCNC: 8.9 MG/DL (ref 8.6–10.2)
CALCIUM SERPL-MCNC: 9.2 MG/DL (ref 8.6–10.2)
CHLORIDE BLD-SCNC: 81 MMOL/L (ref 98–107)
CHLORIDE BLD-SCNC: 86 MMOL/L (ref 98–107)
CHLORIDE BLD-SCNC: 90 MMOL/L (ref 98–107)
CHOLESTEROL, TOTAL: 231 MG/DL (ref 0–199)
CO2: 33 MMOL/L (ref 22–29)
CO2: 33 MMOL/L (ref 22–29)
CO2: 35 MMOL/L (ref 22–29)
CREAT SERPL-MCNC: 0.9 MG/DL (ref 0.5–1)
CREAT SERPL-MCNC: 1.1 MG/DL (ref 0.5–1)
CREAT SERPL-MCNC: 1.1 MG/DL (ref 0.5–1)
GFR AFRICAN AMERICAN: 60
GFR AFRICAN AMERICAN: 60
GFR AFRICAN AMERICAN: >60
GFR NON-AFRICAN AMERICAN: 49 ML/MIN/1.73
GFR NON-AFRICAN AMERICAN: 49 ML/MIN/1.73
GFR NON-AFRICAN AMERICAN: >60 ML/MIN/1.73
GLUCOSE BLD-MCNC: 106 MG/DL (ref 74–99)
GLUCOSE BLD-MCNC: 111 MG/DL (ref 74–99)
GLUCOSE BLD-MCNC: 80 MG/DL (ref 74–99)
HDLC SERPL-MCNC: 110 MG/DL
LDL CHOLESTEROL CALCULATED: 100 MG/DL (ref 0–99)
MAGNESIUM: 2.2 MG/DL (ref 1.6–2.6)
POTASSIUM SERPL-SCNC: 2.9 MMOL/L (ref 3.5–5)
POTASSIUM SERPL-SCNC: 3.1 MMOL/L (ref 3.5–5)
POTASSIUM SERPL-SCNC: 3.8 MMOL/L (ref 3.5–5)
SODIUM BLD-SCNC: 122 MMOL/L (ref 132–146)
SODIUM BLD-SCNC: 129 MMOL/L (ref 132–146)
SODIUM BLD-SCNC: 131 MMOL/L (ref 132–146)
TRIGL SERPL-MCNC: 106 MG/DL (ref 0–149)
VLDLC SERPL CALC-MCNC: 21 MG/DL

## 2021-03-31 PROCEDURE — 80061 LIPID PANEL: CPT

## 2021-03-31 PROCEDURE — 2500000003 HC RX 250 WO HCPCS: Performed by: FAMILY MEDICINE

## 2021-03-31 PROCEDURE — 6370000000 HC RX 637 (ALT 250 FOR IP): Performed by: FAMILY MEDICINE

## 2021-03-31 PROCEDURE — 6370000000 HC RX 637 (ALT 250 FOR IP): Performed by: INTERNAL MEDICINE

## 2021-03-31 PROCEDURE — 36415 COLL VENOUS BLD VENIPUNCTURE: CPT

## 2021-03-31 PROCEDURE — 71045 X-RAY EXAM CHEST 1 VIEW: CPT

## 2021-03-31 PROCEDURE — 83735 ASSAY OF MAGNESIUM: CPT

## 2021-03-31 PROCEDURE — 6360000002 HC RX W HCPCS: Performed by: FAMILY MEDICINE

## 2021-03-31 PROCEDURE — 2060000000 HC ICU INTERMEDIATE R&B

## 2021-03-31 PROCEDURE — 80048 BASIC METABOLIC PNL TOTAL CA: CPT

## 2021-03-31 PROCEDURE — 6360000002 HC RX W HCPCS: Performed by: INTERNAL MEDICINE

## 2021-03-31 PROCEDURE — 2700000000 HC OXYGEN THERAPY PER DAY

## 2021-03-31 PROCEDURE — 6370000000 HC RX 637 (ALT 250 FOR IP): Performed by: NURSE PRACTITIONER

## 2021-03-31 PROCEDURE — 94660 CPAP INITIATION&MGMT: CPT

## 2021-03-31 PROCEDURE — 2580000003 HC RX 258: Performed by: FAMILY MEDICINE

## 2021-03-31 RX ORDER — ATORVASTATIN CALCIUM 40 MG/1
40 TABLET, FILM COATED ORAL NIGHTLY
Status: DISCONTINUED | OUTPATIENT
Start: 2021-03-31 | End: 2021-04-02 | Stop reason: HOSPADM

## 2021-03-31 RX ORDER — POTASSIUM CHLORIDE 20 MEQ/1
40 TABLET, EXTENDED RELEASE ORAL ONCE
Status: COMPLETED | OUTPATIENT
Start: 2021-03-31 | End: 2021-03-31

## 2021-03-31 RX ORDER — METHYLPREDNISOLONE SODIUM SUCCINATE 40 MG/ML
40 INJECTION, POWDER, LYOPHILIZED, FOR SOLUTION INTRAMUSCULAR; INTRAVENOUS EVERY 8 HOURS
Status: DISCONTINUED | OUTPATIENT
Start: 2021-03-31 | End: 2021-04-01

## 2021-03-31 RX ADMIN — LOSARTAN POTASSIUM 100 MG: 50 TABLET, FILM COATED ORAL at 09:13

## 2021-03-31 RX ADMIN — BUSPIRONE HYDROCHLORIDE 10 MG: 10 TABLET ORAL at 09:14

## 2021-03-31 RX ADMIN — BUSPIRONE HYDROCHLORIDE 10 MG: 10 TABLET ORAL at 18:05

## 2021-03-31 RX ADMIN — AMLODIPINE BESYLATE 10 MG: 10 TABLET ORAL at 09:13

## 2021-03-31 RX ADMIN — TRAZODONE HYDROCHLORIDE 100 MG: 50 TABLET ORAL at 20:15

## 2021-03-31 RX ADMIN — METHYLPREDNISOLONE SODIUM SUCCINATE 40 MG: 40 INJECTION, POWDER, FOR SOLUTION INTRAMUSCULAR; INTRAVENOUS at 23:40

## 2021-03-31 RX ADMIN — BUMETANIDE 0.5 MG: 0.25 INJECTION INTRAMUSCULAR; INTRAVENOUS at 09:14

## 2021-03-31 RX ADMIN — Medication 10 ML: at 20:15

## 2021-03-31 RX ADMIN — BUSPIRONE HYDROCHLORIDE 10 MG: 10 TABLET ORAL at 20:15

## 2021-03-31 RX ADMIN — METOPROLOL SUCCINATE 25 MG: 25 TABLET, FILM COATED, EXTENDED RELEASE ORAL at 09:13

## 2021-03-31 RX ADMIN — ENOXAPARIN SODIUM 40 MG: 100 INJECTION SUBCUTANEOUS at 09:14

## 2021-03-31 RX ADMIN — SERTRALINE 100 MG: 100 TABLET, FILM COATED ORAL at 09:14

## 2021-03-31 RX ADMIN — METHYLPREDNISOLONE SODIUM SUCCINATE 40 MG: 40 INJECTION, POWDER, FOR SOLUTION INTRAMUSCULAR; INTRAVENOUS at 18:55

## 2021-03-31 RX ADMIN — PANTOPRAZOLE SODIUM 40 MG: 40 TABLET, DELAYED RELEASE ORAL at 05:22

## 2021-03-31 RX ADMIN — POTASSIUM CHLORIDE 40 MEQ: 1500 TABLET, EXTENDED RELEASE ORAL at 18:05

## 2021-03-31 RX ADMIN — ATORVASTATIN CALCIUM 40 MG: 40 TABLET, FILM COATED ORAL at 20:15

## 2021-03-31 NOTE — CARE COORDINATION
Met with pt to discuss discharge planning/transition of care. Pt lives alone in a 3 story home with basement. Pt mostly on first and second floor. Pt has no DME or HHC. Pt currently on 6LO2 at 98%, none at home. Pt inquiring about a nebulizer. Physician will need to order if appropriate. Dr. Paola Crespo is PCP and [facility] in [place] is pharmacy. Plan is home at discharge, per pt daughter will transport home. Patricia HENSLEY

## 2021-03-31 NOTE — PROGRESS NOTES
Subjective: The patient is awake and alert. She is audibly wheezing otherwise no other acute complaint    Objective:    BP (!) 119/58   Pulse 89   Temp 97.8 °F (36.6 °C) (Temporal)   Resp 22   Wt 173 lb 9 oz (78.7 kg)   SpO2 98%   BMI 31.74 kg/m²     In: 560 [P.O.:560]  Out: 0   In: 560   Out: 0     General appearance: NAD, conversant  HEENT: AT/NC, MMM  Neck: FROM, supple  Lungs: Wheeze diffusely in bilateral lung fields  CV: RRR, no MRGs  Vasc: Radial pulses 2+  Abdomen: Soft, non-tender; no masses or HSM  Extremities: No peripheral edema or digital cyanosis  Skin: no rash, lesions or ulcers  Psych: Alert and oriented to person, place and time  Neuro: Alert and interactive     Recent Labs     03/30/21  0905   WBC 10.5   HGB 15.3   HCT 42.6          Recent Labs     03/30/21  1035 03/30/21  1417 03/31/21  0605   * 119* 122*   K 2.5* 3.1* 3.1*   CL 73* 75* 81*   CO2 32* 28 33*   BUN 5* 6* 13   CREATININE 0.6 0.7 0.9   CALCIUM 8.9 8.9 8.4*       Assessment:    Principal Problem:    Congestive heart failure of unknown etiology (HCC)  Active Problems:    Hypoxia  Resolved Problems:    * No resolved hospital problems.  *      Plan:  Admit to [facility] for eval of acute hypoxemic respiratory failure with hyponatremia  CHF - volume overload bnp 1300   Off BiPAP today, feeling better  Obtain echo - none on chart previously-pending  check bnp tomorrow  Monitor hyponatremia-sodium 122 today from 119 on admission  supplement lytes   Renal evaluation pending  IV Solu-Medrol 40 every 8 hours  Duo nebs every 6 scheduled  Supplement potassium  High intensity statin for elevated cholesterol and LDL    DVT Prophylaxis   PT/OT  Discharge planning           Maida Chen MD  11:46 AM  3/31/2021

## 2021-03-31 NOTE — CONSULTS
Nephrology Consult Note  Patient's Name: Geovanny Austin  8:59 AM  3/31/2021    Nephrologist: Dr. Marleni Vigil    Reason for Consult:  Hyponatremia and CHF  Requesting Physician:  Gil CorteztDO    Chief Complaint:  SOB & leg edema    History Obtained From:  Pt & medical records    History of Present Ilness:    Geovanny Austin is a 78 y.o. female with past medical history of bronchitis, HTN, HLD, and thyroid disease who developed sob about 9 days prior to presenting to ER. She also reported leg swelling and an occasional nonproductive cough for which she has been using her breathing treatments with no improvement in symptoms. She endorses orthopnea, PND, bilateral pedal edema. She denies any fever, chills, cp, palpitations, nausea, =vomiting, diarrhea, dysuria, or pyuria. She is a smoker. Initial vital signs include: T 97.1, R 24, P 99, /117 93% on room air. She had been placed on oxygen 5-6 l for resp distress when her respiratory rate climbed to 40, and eventually placed on bipap in ER. Significant labs revealed: Na+ 119, K+ 3.1, Cl 75, CO2 28, BUN 6, Cr 0.7, AG 16, Mag 1.3, Pro-BNP 1387, osm 254. She was treated with bumex 0.5 mg iv, lovenox, lasix 20 mg iv, mag 2 gm iv, solu medrol,  potassium both iv and po. Upon assessment, pt is awake, alert, and talking on the phone. She is sob while talking and audible wheezing is noted. She states she is feeling fatigued and sob with exertion. She denies any history of asthma, but has had bronchitis. She states she has intermittent bilateral leg edema. Past Medical History:   Diagnosis Date    Bronchitis     Hypertension     Thyroid disease        Past Surgical History:   Procedure Laterality Date    OTHER SURGICAL HISTORY      states had something done right neck area per dr jaeger, might have been an abcess       History reviewed. No pertinent family history. reports that she has been smoking cigarettes.  She has been smoking about 1.00 pack Component Value Date    WBC 10.5 03/30/2021    RBC 4.98 03/30/2021    HGB 15.3 03/30/2021    HCT 42.6 03/30/2021    MCV 85.5 03/30/2021    MCH 30.7 03/30/2021    MCHC 35.9 03/30/2021    RDW 13.4 03/30/2021     03/30/2021    MPV 9.5 03/30/2021     CBC with Differential:    Lab Results   Component Value Date    WBC 10.5 03/30/2021    RBC 4.98 03/30/2021    HGB 15.3 03/30/2021    HCT 42.6 03/30/2021     03/30/2021    MCV 85.5 03/30/2021    MCH 30.7 03/30/2021    MCHC 35.9 03/30/2021    RDW 13.4 03/30/2021    LYMPHOPCT 3.4 03/30/2021    MONOPCT 5.2 03/30/2021    BASOPCT 0.1 03/30/2021    MONOSABS 0.52 03/30/2021    LYMPHSABS 0.32 03/30/2021    EOSABS 0.00 03/30/2021    BASOSABS 0.00 03/30/2021     CMP:    Lab Results   Component Value Date     03/31/2021    K 3.1 03/31/2021    K 2.5 03/30/2021    CL 81 03/31/2021    CO2 33 03/31/2021    BUN 13 03/31/2021    CREATININE 0.9 03/31/2021    GFRAA >60 03/31/2021    LABGLOM >60 03/31/2021    GLUCOSE 80 03/31/2021    PROT 6.8 03/30/2021    LABALBU 4.0 03/30/2021    CALCIUM 8.4 03/31/2021    BILITOT 1.0 03/30/2021    ALKPHOS 187 03/30/2021    AST 66 03/30/2021    ALT 41 03/30/2021     BMP:    Lab Results   Component Value Date     03/31/2021    K 3.1 03/31/2021    K 2.5 03/30/2021    CL 81 03/31/2021    CO2 33 03/31/2021    BUN 13 03/31/2021    LABALBU 4.0 03/30/2021    CREATININE 0.9 03/31/2021    CALCIUM 8.4 03/31/2021    GFRAA >60 03/31/2021    LABGLOM >60 03/31/2021    GLUCOSE 80 03/31/2021     Ionized Calcium:  No results found for: IONCA  Magnesium:    Lab Results   Component Value Date    MG 2.2 03/31/2021     Phosphorus:  No results found for: PHOS  LDH:  No results found for: LDH  Uric Acid:  No results found for: LABURIC, URICACID  PT/INR:  No results found for: PROTIME, INR  Warfarin PT/INR:  No components found for: PTPATWAR, PTINRWAR  PTT:  No results found for: APTT, PTT[APTT}  Troponin:    Lab Results   Component Value Date    TROPONINI  If there is high suspicion pneumonia dedicated PA   and lateral views or CT of the chest can be obtained. rrative   EXAMINATION:   ONE XRAY VIEW OF THE CHEST       3/30/2021 11:28 pm       COMPARISON:   March 30       HISTORY:   ORDERING SYSTEM PROVIDED HISTORY: sob   TECHNOLOGIST PROVIDED HISTORY:   Reason for exam:->sob   What reading provider will be dictating this exam?->CRC       FINDINGS:   Cardiac silhouette is enlarged and unchanged.       Mildly increased vascular congestion and interstitial prominence with septal   lines seen in the left lung base suggestive of mild interstitial pulmonary   edema.  Clinical correlation recommended.       No evidence of airspace consolidation or pleural effusions.       Patient rotated to the right.           Impression   Mildly increased vascular congestion and interstitial prominence with septal   lines best seen in the left lung base suggestive of mild interstitial   pulmonary edema.  Clinical correlation recommended.  Limited examination with   significant patient rotation. Assessment & Plan:   1. Hyponatremia  likely hypervolemic in the setting of CHF, combined with SSRI  Check Ur lytes, Ur Cr, MCR, UA, Serum osm, Ur osm  Na+121-->119-->122  Follow BMP Q 8 hrs  Follow na on diuretic  Consider vaptan    2. Hypokalemia in the setting of diuretic use  Supplement ordered for K+ 3.1 today. Follow and treat prn    3. Hypertension with CKD I-IV   BP at/near goal <130/80  Continue amlodipine 10 mg, losartan 100 mg, metoprolol succinate 25 mg  Follow    4. HLD  Total cholesterol 231, , , , VLDL 21  Primary following    5.  Fluid vol overload  Pro-BNP 1387  CXR: mild interstitial pulm edema      Thank you for allowing us to participate in care of [address] [place] [address]  8:59 AM  3/31/2021     Pt seen and examined agree with above  Hyponatremia with chf  Consider Virgilio Bonilla MD

## 2021-04-01 LAB
ANION GAP SERPL CALCULATED.3IONS-SCNC: 10 MMOL/L (ref 7–16)
ANION GAP SERPL CALCULATED.3IONS-SCNC: 9 MMOL/L (ref 7–16)
BASOPHILS ABSOLUTE: 0.01 E9/L (ref 0–0.2)
BASOPHILS RELATIVE PERCENT: 0.1 % (ref 0–2)
BUN BLDV-MCNC: 19 MG/DL (ref 8–23)
BUN BLDV-MCNC: 25 MG/DL (ref 8–23)
CALCIUM SERPL-MCNC: 9.1 MG/DL (ref 8.6–10.2)
CALCIUM SERPL-MCNC: 9.2 MG/DL (ref 8.6–10.2)
CHLORIDE BLD-SCNC: 90 MMOL/L (ref 98–107)
CHLORIDE BLD-SCNC: 91 MMOL/L (ref 98–107)
CO2: 32 MMOL/L (ref 22–29)
CO2: 34 MMOL/L (ref 22–29)
CREAT SERPL-MCNC: 0.9 MG/DL (ref 0.5–1)
CREAT SERPL-MCNC: 1 MG/DL (ref 0.5–1)
EOSINOPHILS ABSOLUTE: 0 E9/L (ref 0.05–0.5)
EOSINOPHILS RELATIVE PERCENT: 0 % (ref 0–6)
GFR AFRICAN AMERICAN: >60
GFR AFRICAN AMERICAN: >60
GFR NON-AFRICAN AMERICAN: 55 ML/MIN/1.73
GFR NON-AFRICAN AMERICAN: >60 ML/MIN/1.73
GLUCOSE BLD-MCNC: 119 MG/DL (ref 74–99)
GLUCOSE BLD-MCNC: 181 MG/DL (ref 74–99)
HCT VFR BLD CALC: 41.5 % (ref 34–48)
HEMOGLOBIN: 13.4 G/DL (ref 11.5–15.5)
IMMATURE GRANULOCYTES #: 0.09 E9/L
IMMATURE GRANULOCYTES %: 0.8 % (ref 0–5)
LYMPHOCYTES ABSOLUTE: 0.26 E9/L (ref 1.5–4)
LYMPHOCYTES RELATIVE PERCENT: 2.4 % (ref 20–42)
MAGNESIUM: 2.3 MG/DL (ref 1.6–2.6)
MCH RBC QN AUTO: 30.7 PG (ref 26–35)
MCHC RBC AUTO-ENTMCNC: 32.3 % (ref 32–34.5)
MCV RBC AUTO: 95.2 FL (ref 80–99.9)
MONOCYTES ABSOLUTE: 0.3 E9/L (ref 0.1–0.95)
MONOCYTES RELATIVE PERCENT: 2.7 % (ref 2–12)
NEUTROPHILS ABSOLUTE: 10.29 E9/L (ref 1.8–7.3)
NEUTROPHILS RELATIVE PERCENT: 94 % (ref 43–80)
PDW BLD-RTO: 14 FL (ref 11.5–15)
PLATELET # BLD: 253 E9/L (ref 130–450)
PMV BLD AUTO: 9.7 FL (ref 7–12)
POTASSIUM SERPL-SCNC: 3.7 MMOL/L (ref 3.5–5)
POTASSIUM SERPL-SCNC: 3.7 MMOL/L (ref 3.5–5)
PRO-BNP: 524 PG/ML (ref 0–125)
RBC # BLD: 4.36 E12/L (ref 3.5–5.5)
RBC # BLD: NORMAL 10*6/UL
SODIUM BLD-SCNC: 132 MMOL/L (ref 132–146)
SODIUM BLD-SCNC: 134 MMOL/L (ref 132–146)
WBC # BLD: 11 E9/L (ref 4.5–11.5)

## 2021-04-01 PROCEDURE — 83880 ASSAY OF NATRIURETIC PEPTIDE: CPT

## 2021-04-01 PROCEDURE — 2500000003 HC RX 250 WO HCPCS: Performed by: FAMILY MEDICINE

## 2021-04-01 PROCEDURE — 80048 BASIC METABOLIC PNL TOTAL CA: CPT

## 2021-04-01 PROCEDURE — 6370000000 HC RX 637 (ALT 250 FOR IP): Performed by: INTERNAL MEDICINE

## 2021-04-01 PROCEDURE — 2580000003 HC RX 258: Performed by: FAMILY MEDICINE

## 2021-04-01 PROCEDURE — 85025 COMPLETE CBC W/AUTO DIFF WBC: CPT

## 2021-04-01 PROCEDURE — 2700000000 HC OXYGEN THERAPY PER DAY

## 2021-04-01 PROCEDURE — 83735 ASSAY OF MAGNESIUM: CPT

## 2021-04-01 PROCEDURE — 6360000002 HC RX W HCPCS: Performed by: INTERNAL MEDICINE

## 2021-04-01 PROCEDURE — 2060000000 HC ICU INTERMEDIATE R&B

## 2021-04-01 PROCEDURE — 6360000002 HC RX W HCPCS: Performed by: FAMILY MEDICINE

## 2021-04-01 PROCEDURE — 6370000000 HC RX 637 (ALT 250 FOR IP): Performed by: FAMILY MEDICINE

## 2021-04-01 PROCEDURE — 36415 COLL VENOUS BLD VENIPUNCTURE: CPT

## 2021-04-01 PROCEDURE — 94660 CPAP INITIATION&MGMT: CPT

## 2021-04-01 RX ORDER — METHYLPREDNISOLONE SODIUM SUCCINATE 40 MG/ML
40 INJECTION, POWDER, LYOPHILIZED, FOR SOLUTION INTRAMUSCULAR; INTRAVENOUS EVERY 12 HOURS
Status: DISCONTINUED | OUTPATIENT
Start: 2021-04-01 | End: 2021-04-02 | Stop reason: HOSPADM

## 2021-04-01 RX ORDER — BUMETANIDE 1 MG/1
1 TABLET ORAL DAILY
Qty: 30 TABLET | Refills: 3 | Status: SHIPPED | OUTPATIENT
Start: 2021-04-01 | End: 2022-09-01

## 2021-04-01 RX ORDER — LOSARTAN POTASSIUM 100 MG/1
100 TABLET ORAL DAILY
Qty: 30 TABLET | Refills: 3 | Status: SHIPPED | OUTPATIENT
Start: 2021-04-02 | End: 2022-09-01

## 2021-04-01 RX ORDER — ATORVASTATIN CALCIUM 40 MG/1
40 TABLET, FILM COATED ORAL NIGHTLY
Qty: 30 TABLET | Refills: 3 | Status: SHIPPED | OUTPATIENT
Start: 2021-04-01 | End: 2022-09-01

## 2021-04-01 RX ORDER — PREDNISONE 20 MG/1
40 TABLET ORAL DAILY
Qty: 14 TABLET | Refills: 0 | Status: SHIPPED | OUTPATIENT
Start: 2021-04-01 | End: 2021-04-08

## 2021-04-01 RX ADMIN — PANTOPRAZOLE SODIUM 40 MG: 40 TABLET, DELAYED RELEASE ORAL at 05:50

## 2021-04-01 RX ADMIN — METHYLPREDNISOLONE SODIUM SUCCINATE 40 MG: 40 INJECTION, POWDER, FOR SOLUTION INTRAMUSCULAR; INTRAVENOUS at 20:58

## 2021-04-01 RX ADMIN — BUSPIRONE HYDROCHLORIDE 10 MG: 10 TABLET ORAL at 15:43

## 2021-04-01 RX ADMIN — BUMETANIDE 0.5 MG: 0.25 INJECTION INTRAMUSCULAR; INTRAVENOUS at 08:24

## 2021-04-01 RX ADMIN — BUSPIRONE HYDROCHLORIDE 10 MG: 10 TABLET ORAL at 20:58

## 2021-04-01 RX ADMIN — ATORVASTATIN CALCIUM 40 MG: 40 TABLET, FILM COATED ORAL at 20:58

## 2021-04-01 RX ADMIN — Medication 10 ML: at 20:59

## 2021-04-01 RX ADMIN — ENOXAPARIN SODIUM 40 MG: 100 INJECTION SUBCUTANEOUS at 08:40

## 2021-04-01 RX ADMIN — METOPROLOL SUCCINATE 25 MG: 25 TABLET, FILM COATED, EXTENDED RELEASE ORAL at 08:24

## 2021-04-01 RX ADMIN — LOSARTAN POTASSIUM 100 MG: 50 TABLET, FILM COATED ORAL at 10:45

## 2021-04-01 RX ADMIN — AMLODIPINE BESYLATE 10 MG: 10 TABLET ORAL at 10:44

## 2021-04-01 RX ADMIN — Medication 10 ML: at 08:42

## 2021-04-01 RX ADMIN — BUSPIRONE HYDROCHLORIDE 10 MG: 10 TABLET ORAL at 10:45

## 2021-04-01 RX ADMIN — TRAZODONE HYDROCHLORIDE 100 MG: 50 TABLET ORAL at 20:58

## 2021-04-01 RX ADMIN — METHYLPREDNISOLONE SODIUM SUCCINATE 40 MG: 40 INJECTION, POWDER, FOR SOLUTION INTRAMUSCULAR; INTRAVENOUS at 08:24

## 2021-04-01 RX ADMIN — SERTRALINE 100 MG: 100 TABLET, FILM COATED ORAL at 10:45

## 2021-04-01 RX ADMIN — ACETAMINOPHEN 650 MG: 325 TABLET ORAL at 08:24

## 2021-04-01 ASSESSMENT — PAIN DESCRIPTION - PAIN TYPE
TYPE: ACUTE PAIN
TYPE: ACUTE PAIN

## 2021-04-01 ASSESSMENT — ENCOUNTER SYMPTOMS
NAUSEA: 0
SHORTNESS OF BREATH: 1
EYE REDNESS: 0
VOMITING: 0
WHEEZING: 1
ABDOMINAL PAIN: 0

## 2021-04-01 ASSESSMENT — PAIN DESCRIPTION - FREQUENCY
FREQUENCY: CONTINUOUS
FREQUENCY: CONTINUOUS

## 2021-04-01 ASSESSMENT — PAIN DESCRIPTION - DESCRIPTORS
DESCRIPTORS: ACHING;CONSTANT
DESCRIPTORS: ACHING;CONSTANT

## 2021-04-01 ASSESSMENT — PAIN - FUNCTIONAL ASSESSMENT: PAIN_FUNCTIONAL_ASSESSMENT: ACTIVITIES ARE NOT PREVENTED

## 2021-04-01 ASSESSMENT — PAIN SCALES - GENERAL
PAINLEVEL_OUTOF10: 0
PAINLEVEL_OUTOF10: 8

## 2021-04-01 ASSESSMENT — PAIN DESCRIPTION - LOCATION: LOCATION: MOUTH

## 2021-04-01 ASSESSMENT — PAIN DESCRIPTION - ORIENTATION: ORIENTATION: LEFT;LOWER

## 2021-04-01 NOTE — CARE COORDINATION
Pt qualifies for home O2, met with pt, my [place] [facility]. Referral to [facility]. Aditi Pantoja LSW. The Plan for Transition of Care is related to the following treatment goals: The Patient and/or patient representative was provided with a choice of provider and agrees   with the discharge plan. [x] Yes [] No    Freedom of choice list was provided with basic dialogue that supports the patient's individualized plan of care/goals, treatment preferences and shares the quality data associated with the providers.  [x] Yes [] No

## 2021-04-01 NOTE — PROGRESS NOTES
At rest on room air 94%  Ambulating on room air, 87%  Ambulating with 2 L of O2, 95%  At rest on 2L,.  96%

## 2021-04-01 NOTE — PROGRESS NOTES
Patient refused to have the o2 left in her room, she wants it brought back tmrw. Refused to listen to the instructions.

## 2021-04-01 NOTE — PROGRESS NOTES
Nephrology Progress Note  We are following Norvel Comp for hyponatremia and chf    History of Present Ilness:    Reji Leslie is a 78 y.o. female with past medical history of bronchitis, HTN, HLD, and thyroid disease who developed sob about 9 days prior to presenting to ER. She also reported leg swelling and an occasional nonproductive cough for which she has been using her breathing treatments with no improvement in symptoms. She endorses orthopnea, PND, bilateral pedal edema. She denies any fever, chills, cp, palpitations, nausea, =vomiting, diarrhea, dysuria, or pyuria. She is a smoker. Initial vital signs include: T 97.1, R 24, P 99, /117 93% on room air. She had been placed on oxygen 5-6 l for resp distress when her respiratory rate climbed to 40, and eventually placed on bipap in ER. Significant labs revealed: Na+ 119, K+ 3.1, Cl 75, CO2 28, BUN 6, Cr 0.7, AG 16, Mag 1.3, Pro-BNP 1387, osm 254. She was treated with bumex 0.5 mg iv, lovenox, lasix 20 mg iv, mag 2 gm iv, solu medrol,  potassium both iv and po. Upon assessment, pt is awake, alert, and talking on the phone. She is sob while talking and audible wheezing is noted. She states she is feeling fatigued and sob with exertion. She denies any history of asthma, but has had bronchitis. She states she has intermittent bilateral leg edema. Subjective  4/1/21:pt resting in bed, feeling better than yesterday. Past Medical History:   Diagnosis Date    Bronchitis     Hypertension     Thyroid disease        Past Surgical History:   Procedure Laterality Date    OTHER SURGICAL HISTORY      states had something done right neck area per dr jaeger, might have been an abcess       History reviewed. No pertinent family history. reports that she has been smoking cigarettes. She has been smoking about 1.00 pack per day. She has never used smokeless tobacco. She reports that she does not drink alcohol or use drugs.     Allergies:  Patient has no known allergies. Current Medications:    trimethobenzamide (TIGAN) injection 200 mg, Q6H PRN  atorvastatin (LIPITOR) tablet 40 mg, Nightly  methylPREDNISolone sodium (SOLU-MEDROL) injection 40 mg, Q8H  guaiFENesin (ROBITUSSIN) 100 MG/5ML oral solution 200 mg, TID PRN  sodium chloride flush 0.9 % injection 10 mL, 2 times per day  sodium chloride flush 0.9 % injection 10 mL, PRN  0.9 % sodium chloride infusion, PRN  magnesium hydroxide (MILK OF MAGNESIA) 400 MG/5ML suspension 30 mL, Daily PRN  acetaminophen (TYLENOL) tablet 650 mg, Q6H PRN    Or  acetaminophen (TYLENOL) suppository 650 mg, Q6H PRN  enoxaparin (LOVENOX) injection 40 mg, Daily  bumetanide (BUMEX) injection 0.5 mg, Daily  amLODIPine (NORVASC) tablet 10 mg, Daily  busPIRone (BUSPAR) tablet 10 mg, TID  clonazePAM (KLONOPIN) tablet 1 mg, Daily PRN  metoprolol succinate (TOPROL XL) extended release tablet 25 mg, Daily  pantoprazole (PROTONIX) tablet 40 mg, QAM AC  sertraline (ZOLOFT) tablet 100 mg, Daily  losartan (COZAAR) tablet 100 mg, Daily  traZODone (DESYREL) tablet 100 mg, Nightly  perflutren lipid microspheres (DEFINITY) injection 1.65 mg, ONCE PRN        Review of Systems:   Pertinent items are noted in HPI.     Physical exam:   Constitutional:    Vitals:   VITALS:  /65   Pulse 92   Temp 97.3 °F (36.3 °C) (Temporal)   Resp 18   Wt 173 lb (78.5 kg)   SpO2 98%   BMI 31.64 kg/m²   24HR INTAKE/OUTPUT:      Intake/Output Summary (Last 24 hours) at 4/1/2021 0819  Last data filed at 4/1/2021 0802  Gross per 24 hour   Intake 620 ml   Output --   Net 620 ml     URINARY CATHETER OUTPUT (Li):     Skin: no rash, turgor wnl  Heent:  eomi, mmm  Neck: no bruits or jvd noted  Cardiovascular:  S1, S2 without m/r/g  Respiratory: Scattered wheezing noted, diminished in bases  Abdomen:  +bs, soft, nt, nd  Ext: +1 lower extremity edema  Psychiatric: mood and affect appropriate  Musculoskeletal:  Rom, muscular strength intact    Data:   Labs:  CBC: Lab Results   Component Value Date    WBC 10.5 03/30/2021    RBC 4.98 03/30/2021    HGB 15.3 03/30/2021    HCT 42.6 03/30/2021    MCV 85.5 03/30/2021    MCH 30.7 03/30/2021    MCHC 35.9 03/30/2021    RDW 13.4 03/30/2021     03/30/2021    MPV 9.5 03/30/2021     CBC with Differential:    Lab Results   Component Value Date    WBC 10.5 03/30/2021    RBC 4.98 03/30/2021    HGB 15.3 03/30/2021    HCT 42.6 03/30/2021     03/30/2021    MCV 85.5 03/30/2021    MCH 30.7 03/30/2021    MCHC 35.9 03/30/2021    RDW 13.4 03/30/2021    LYMPHOPCT 3.4 03/30/2021    MONOPCT 5.2 03/30/2021    BASOPCT 0.1 03/30/2021    MONOSABS 0.52 03/30/2021    LYMPHSABS 0.32 03/30/2021    EOSABS 0.00 03/30/2021    BASOSABS 0.00 03/30/2021     CMP:    Lab Results   Component Value Date     03/31/2021    K 3.8 03/31/2021    K 2.5 03/30/2021    CL 90 03/31/2021    CO2 33 03/31/2021    BUN 22 03/31/2021    CREATININE 1.1 03/31/2021    GFRAA 60 03/31/2021    LABGLOM 49 03/31/2021    GLUCOSE 106 03/31/2021    PROT 6.8 03/30/2021    LABALBU 4.0 03/30/2021    CALCIUM 8.9 03/31/2021    BILITOT 1.0 03/30/2021    ALKPHOS 187 03/30/2021    AST 66 03/30/2021    ALT 41 03/30/2021     BMP:    Lab Results   Component Value Date     03/31/2021    K 3.8 03/31/2021    K 2.5 03/30/2021    CL 90 03/31/2021    CO2 33 03/31/2021    BUN 22 03/31/2021    LABALBU 4.0 03/30/2021    CREATININE 1.1 03/31/2021    CALCIUM 8.9 03/31/2021    GFRAA 60 03/31/2021    LABGLOM 49 03/31/2021    GLUCOSE 106 03/31/2021     Ionized Calcium:  No results found for: IONCA  Magnesium:    Lab Results   Component Value Date    MG 2.2 03/31/2021     Phosphorus:  No results found for: PHOS  LDH:  No results found for: LDH  Uric Acid:  No results found for: LABURIC, URICACID  PT/INR:  No results found for: PROTIME, INR  Warfarin PT/INR:  No components found for: PTPATWAR, PTINRWAR  PTT:  No results found for: APTT, PTT[APTT}  Troponin:    Lab Results   Component Value Date TROPONINI <0.01 03/30/2021     Last 3 Troponin:    Lab Results   Component Value Date    TROPONINI <0.01 03/30/2021     U/A:  No results found for: NITRITE, COLORU, PROTEINU, PHUR, LABCAST, WBCUA, RBCUA, MUCUS, TRICHOMONAS, YEAST, BACTERIA, CLARITYU, SPECGRAV, LEUKOCYTESUR, UROBILINOGEN, BILIRUBINUR, BLOODU, GLUCOSEU, AMORPHOUS  ABG:  No results found for: PH, PCO2, PO2, HCO3, BE, THGB, TCO2, O2SAT  HgBA1c:  No results found for: LABA1C  Microalbumen/Creatinine ratio:  No components found for: RUCREAT  FLP:    Lab Results   Component Value Date    TRIG 106 03/31/2021     03/31/2021    LDLCALC 100 03/31/2021    LABVLDL 21 03/31/2021     TSH:  No results found for: TSH  VITAMIN B12: No components found for: B12  FOLATE:  No results found for: FOLATE  IRON:  No results found for: IRON  Iron Saturation:  No components found for: PERCENTFE  TIBC:  No results found for: TIBC  FERRITIN:  No results found for: FERRITIN  AMYLASE:  No results found for: AMYLASE  LIPASE:  No results found for: LIPASE  Urine Toxicology:  No components found for: IAMMENTA, IBARBIT, IBENZO, ICOCAINE, IMARTHC, IOPIATES, IPHENCYC     Imaging:    ONE XRAY VIEW OF THE CHEST       3/30/2021 10:17 am       COMPARISON:   None.       HISTORY:   ORDERING SYSTEM PROVIDED HISTORY: Possible sepsis   TECHNOLOGIST PROVIDED HISTORY:   Reason for exam:->Possible sepsis   What reading provider will be dictating this exam?->CRC       FINDINGS:   The cardiac silhouette is mildly enlarged.  No findings of failure.       The lungs are underinflated.  The right upper lobe and left lung clear. There is hazy opacification of the right lung base which I believe is   secondary to overlying breast tissue.       There is no right or left pleural effusion.           Impression   1. Cardiomegaly. Eulalia Minus are no findings of failure   2. Right upper lobe and left lung are clear.    3. Hazy appearance of the right lung base which I believe is secondary to   overlying

## 2021-04-01 NOTE — PROGRESS NOTES
Dr. Raymonde Curling notified of the patient hallucinating and seeing clowns in a school bus outside the window. Will continue to monitor.

## 2021-04-01 NOTE — PROGRESS NOTES
Subjective: The patient is awake and alert. She is audibly wheezing otherwise no other acute complaint    Objective:    /62   Pulse 78   Temp 97.3 °F (36.3 °C) (Temporal)   Resp 18   Ht 5' 2\" (1.575 m)   Wt 173 lb (78.5 kg)   SpO2 94%   BMI 31.64 kg/m²     In: 480 [P.O.:480]  Out: -   In: 480   Out: -     General appearance: NAD, conversant  HEENT: AT/NC, MMM  Neck: FROM, supple  Lungs: Wheeze diffusely in bilateral lung fields  CV: RRR, no MRGs  Vasc: Radial pulses 2+  Abdomen: Soft, non-tender; no masses or HSM  Extremities: No peripheral edema or digital cyanosis  Skin: no rash, lesions or ulcers  Psych: Alert and oriented to person, place and time  Neuro: Alert and interactive     Recent Labs     03/30/21  0905 04/01/21  0644   WBC 10.5 11.0   HGB 15.3 13.4   HCT 42.6 41.5    253       Recent Labs     03/31/21  1341 03/31/21  2118 04/01/21  0644   * 131* 134   K 2.9* 3.8 3.7   CL 86* 90* 90*   CO2 35* 33* 34*   BUN 18 22 19   CREATININE 1.1* 1.1* 0.9   CALCIUM 9.2 8.9 9.1       Assessment:    Principal Problem:    Congestive heart failure of unknown etiology (HCC)  Active Problems:    Hypoxia  Resolved Problems:    * No resolved hospital problems.  *      Plan:  Admit to tele for eval of acute hypoxemic respiratory failure with hyponatremia  CHF - volume overload bnp 1300   Off BiPAP feeling better  Obtain echo - none on chart previously-pending  check bnp tomorrow  Monitor hyponatremia-sodium 131 today from 119 on admission  IV Solu-Medrol 40 every 8 hours- taper off   Duo nebs every 6 scheduled  Supplement potassium  High intensity statin for elevated cholesterol and LDL  Renal following       DVT Prophylaxis   PT/OT  Discharge planning           Maida Chen MD  2:31 PM  4/1/2021

## 2021-04-02 VITALS
TEMPERATURE: 97 F | SYSTOLIC BLOOD PRESSURE: 158 MMHG | OXYGEN SATURATION: 96 % | WEIGHT: 173 LBS | RESPIRATION RATE: 18 BRPM | HEART RATE: 87 BPM | DIASTOLIC BLOOD PRESSURE: 72 MMHG | HEIGHT: 62 IN | BODY MASS INDEX: 31.83 KG/M2

## 2021-04-02 LAB
ANION GAP SERPL CALCULATED.3IONS-SCNC: 11 MMOL/L (ref 7–16)
BUN BLDV-MCNC: 18 MG/DL (ref 8–23)
CALCIUM SERPL-MCNC: 9.2 MG/DL (ref 8.6–10.2)
CHLORIDE BLD-SCNC: 92 MMOL/L (ref 98–107)
CO2: 33 MMOL/L (ref 22–29)
CREAT SERPL-MCNC: 0.9 MG/DL (ref 0.5–1)
GFR AFRICAN AMERICAN: >60
GFR NON-AFRICAN AMERICAN: >60 ML/MIN/1.73
GLUCOSE BLD-MCNC: 134 MG/DL (ref 74–99)
LV EF: 58 %
LVEF MODALITY: NORMAL
MAGNESIUM: 2.2 MG/DL (ref 1.6–2.6)
POTASSIUM SERPL-SCNC: 3.3 MMOL/L (ref 3.5–5)
SODIUM BLD-SCNC: 136 MMOL/L (ref 132–146)

## 2021-04-02 PROCEDURE — 6360000002 HC RX W HCPCS: Performed by: INTERNAL MEDICINE

## 2021-04-02 PROCEDURE — 2580000003 HC RX 258: Performed by: FAMILY MEDICINE

## 2021-04-02 PROCEDURE — 83735 ASSAY OF MAGNESIUM: CPT

## 2021-04-02 PROCEDURE — 94660 CPAP INITIATION&MGMT: CPT

## 2021-04-02 PROCEDURE — 80048 BASIC METABOLIC PNL TOTAL CA: CPT

## 2021-04-02 PROCEDURE — 6370000000 HC RX 637 (ALT 250 FOR IP): Performed by: FAMILY MEDICINE

## 2021-04-02 PROCEDURE — 6370000000 HC RX 637 (ALT 250 FOR IP): Performed by: INTERNAL MEDICINE

## 2021-04-02 PROCEDURE — 36415 COLL VENOUS BLD VENIPUNCTURE: CPT

## 2021-04-02 PROCEDURE — 6360000002 HC RX W HCPCS: Performed by: FAMILY MEDICINE

## 2021-04-02 PROCEDURE — 2700000000 HC OXYGEN THERAPY PER DAY

## 2021-04-02 PROCEDURE — 93306 TTE W/DOPPLER COMPLETE: CPT

## 2021-04-02 PROCEDURE — 2500000003 HC RX 250 WO HCPCS: Performed by: FAMILY MEDICINE

## 2021-04-02 RX ORDER — POTASSIUM CHLORIDE 20 MEQ/1
40 TABLET, EXTENDED RELEASE ORAL DAILY
Status: DISCONTINUED | OUTPATIENT
Start: 2021-04-02 | End: 2021-04-02 | Stop reason: HOSPADM

## 2021-04-02 RX ORDER — POTASSIUM CHLORIDE 20 MEQ/1
20 TABLET, EXTENDED RELEASE ORAL DAILY
Qty: 60 TABLET | Refills: 3 | Status: SHIPPED | OUTPATIENT
Start: 2021-04-02

## 2021-04-02 RX ADMIN — METHYLPREDNISOLONE SODIUM SUCCINATE 40 MG: 40 INJECTION, POWDER, FOR SOLUTION INTRAMUSCULAR; INTRAVENOUS at 10:09

## 2021-04-02 RX ADMIN — POTASSIUM CHLORIDE 40 MEQ: 1500 TABLET, EXTENDED RELEASE ORAL at 15:08

## 2021-04-02 RX ADMIN — BUSPIRONE HYDROCHLORIDE 10 MG: 10 TABLET ORAL at 15:08

## 2021-04-02 RX ADMIN — ENOXAPARIN SODIUM 40 MG: 100 INJECTION SUBCUTANEOUS at 10:11

## 2021-04-02 RX ADMIN — Medication 10 ML: at 10:06

## 2021-04-02 RX ADMIN — SERTRALINE 100 MG: 100 TABLET, FILM COATED ORAL at 09:55

## 2021-04-02 RX ADMIN — LOSARTAN POTASSIUM 100 MG: 50 TABLET, FILM COATED ORAL at 09:55

## 2021-04-02 RX ADMIN — METOPROLOL SUCCINATE 25 MG: 25 TABLET, FILM COATED, EXTENDED RELEASE ORAL at 10:03

## 2021-04-02 RX ADMIN — BUMETANIDE 0.5 MG: 0.25 INJECTION INTRAMUSCULAR; INTRAVENOUS at 10:07

## 2021-04-02 RX ADMIN — PANTOPRAZOLE SODIUM 40 MG: 40 TABLET, DELAYED RELEASE ORAL at 05:09

## 2021-04-02 RX ADMIN — AMLODIPINE BESYLATE 10 MG: 10 TABLET ORAL at 09:55

## 2021-04-02 RX ADMIN — BUSPIRONE HYDROCHLORIDE 10 MG: 10 TABLET ORAL at 09:55

## 2021-04-02 ASSESSMENT — PAIN SCALES - GENERAL: PAINLEVEL_OUTOF10: 0

## 2021-04-02 NOTE — PLAN OF CARE
Problem: Skin Integrity:  Goal: Will show no infection signs and symptoms  Description: Will show no infection signs and symptoms  4/1/2021 2225 by Augusto Mccracken RN  Outcome: Met This Shift  4/1/2021 1828 by Feliciano Cisneros RN  Outcome: Met This Shift  Goal: Absence of new skin breakdown  Description: Absence of new skin breakdown  4/1/2021 2225 by Augusto Mccracken RN  Outcome: Met This Shift  4/1/2021 1828 by Feliciano Cisneros RN  Outcome: Met This Shift

## 2021-04-02 NOTE — PROGRESS NOTES
Nephrology Progress Note  We are following Clay King for hyponatremia and chf    History of Present Ilness:    Clay King is a 78 y.o. female with past medical history of bronchitis, HTN, HLD, and thyroid disease who developed sob about 9 days prior to presenting to ER. She also reported leg swelling and an occasional nonproductive cough for which she has been using her breathing treatments with no improvement in symptoms. She endorses orthopnea, PND, bilateral pedal edema. She denies any fever, chills, cp, palpitations, nausea, =vomiting, diarrhea, dysuria, or pyuria. She is a smoker. Initial vital signs include: T 97.1, R 24, P 99, /117 93% on room air. She had been placed on oxygen 5-6 l for resp distress when her respiratory rate climbed to 40, and eventually placed on bipap in ER. Significant labs revealed: Na+ 119, K+ 3.1, Cl 75, CO2 28, BUN 6, Cr 0.7, AG 16, Mag 1.3, Pro-BNP 1387, osm 254. She was treated with bumex 0.5 mg iv, lovenox, lasix 20 mg iv, mag 2 gm iv, solu medrol,  potassium both iv and po. Upon assessment, pt is awake, alert, and talking on the phone. She is sob while talking and audible wheezing is noted. She states she is feeling fatigued and sob with exertion. She denies any history of asthma, but has had bronchitis. She states she has intermittent bilateral leg edema. Subjective  4/1/21:pt resting in bed, feeling better than yesterday. 4/2/21: pt awake, alert, going home today with oxygen. Past Medical History:   Diagnosis Date    Bronchitis     Hypertension     Thyroid disease        Past Surgical History:   Procedure Laterality Date    OTHER SURGICAL HISTORY      states had something done right neck area per dr jaeger, might have been an abcess       History reviewed. No pertinent family history. reports that she has been smoking cigarettes. She has been smoking about 1.00 pack per day.  She has never used smokeless tobacco. She reports that she does not drink alcohol or use drugs. Allergies:  Patient has no known allergies. Current Medications:    methylPREDNISolone sodium (SOLU-MEDROL) injection 40 mg, Q12H  trimethobenzamide (TIGAN) injection 200 mg, Q6H PRN  atorvastatin (LIPITOR) tablet 40 mg, Nightly  guaiFENesin (ROBITUSSIN) 100 MG/5ML oral solution 200 mg, TID PRN  sodium chloride flush 0.9 % injection 10 mL, 2 times per day  sodium chloride flush 0.9 % injection 10 mL, PRN  0.9 % sodium chloride infusion, PRN  magnesium hydroxide (MILK OF MAGNESIA) 400 MG/5ML suspension 30 mL, Daily PRN  acetaminophen (TYLENOL) tablet 650 mg, Q6H PRN    Or  acetaminophen (TYLENOL) suppository 650 mg, Q6H PRN  enoxaparin (LOVENOX) injection 40 mg, Daily  bumetanide (BUMEX) injection 0.5 mg, Daily  amLODIPine (NORVASC) tablet 10 mg, Daily  busPIRone (BUSPAR) tablet 10 mg, TID  clonazePAM (KLONOPIN) tablet 1 mg, Daily PRN  metoprolol succinate (TOPROL XL) extended release tablet 25 mg, Daily  pantoprazole (PROTONIX) tablet 40 mg, QAM AC  sertraline (ZOLOFT) tablet 100 mg, Daily  losartan (COZAAR) tablet 100 mg, Daily  traZODone (DESYREL) tablet 100 mg, Nightly  perflutren lipid microspheres (DEFINITY) injection 1.65 mg, ONCE PRN        Review of Systems:   Pertinent items are noted in HPI.     Physical exam:   Constitutional:    Vitals:   VITALS:  BP (!) 150/73   Pulse 92   Temp 96.8 °F (36 °C) (Temporal)   Resp 18   Ht 5' 2\" (1.575 m)   Wt 173 lb (78.5 kg)   SpO2 92%   BMI 31.64 kg/m²   24HR INTAKE/OUTPUT:      Intake/Output Summary (Last 24 hours) at 4/2/2021 0816  Last data filed at 4/1/2021 1831  Gross per 24 hour   Intake 480 ml   Output --   Net 480 ml     URINARY CATHETER OUTPUT (Li):     Skin: no rash, turgor wnl  Heent:  eomi, mmm  Neck: no bruits or jvd noted  Cardiovascular:  S1, S2 without m/r/g  Respiratory: Scattered wheezing noted, diminished in bases  Abdomen:  +bs, soft, nt, nd  Ext: +1 lower extremity edema  Psychiatric: mood and affect No results found for: APTT, PTT[APTT}  Troponin:    Lab Results   Component Value Date    TROPONINI <0.01 03/30/2021     Last 3 Troponin:    Lab Results   Component Value Date    TROPONINI <0.01 03/30/2021     U/A:  No results found for: NITRITE, COLORU, PROTEINU, PHUR, LABCAST, WBCUA, RBCUA, MUCUS, TRICHOMONAS, YEAST, BACTERIA, CLARITYU, SPECGRAV, LEUKOCYTESUR, UROBILINOGEN, BILIRUBINUR, BLOODU, GLUCOSEU, AMORPHOUS  ABG:  No results found for: PH, PCO2, PO2, HCO3, BE, THGB, TCO2, O2SAT  HgBA1c:  No results found for: LABA1C  Microalbumen/Creatinine ratio:  No components found for: RUCREAT  FLP:    Lab Results   Component Value Date    TRIG 106 03/31/2021     03/31/2021    LDLCALC 100 03/31/2021    LABVLDL 21 03/31/2021     TSH:  No results found for: TSH  VITAMIN B12: No components found for: B12  FOLATE:  No results found for: FOLATE  IRON:  No results found for: IRON  Iron Saturation:  No components found for: PERCENTFE  TIBC:  No results found for: TIBC  FERRITIN:  No results found for: FERRITIN  AMYLASE:  No results found for: AMYLASE  LIPASE:  No results found for: LIPASE  Urine Toxicology:  No components found for: IAMMENTA, IBARBIT, IBENZO, ICOCAINE, IMARTHC, IOPIATES, IPHENCYC     Imaging:    ONE XRAY VIEW OF THE CHEST       3/30/2021 10:17 am       COMPARISON:   None.       HISTORY:   ORDERING SYSTEM PROVIDED HISTORY: Possible sepsis   TECHNOLOGIST PROVIDED HISTORY:   Reason for exam:->Possible sepsis   What reading provider will be dictating this exam?->CRC       FINDINGS:   The cardiac silhouette is mildly enlarged.  No findings of failure.       The lungs are underinflated.  The right upper lobe and left lung clear. There is hazy opacification of the right lung base which I believe is   secondary to overlying breast tissue.       There is no right or left pleural effusion.           Impression   1. Cardiomegaly. Isma Hoyles are no findings of failure   2. Right upper lobe and left lung are clear. 3. Hazy appearance of the right lung base which I believe is secondary to   overlying breast tissue.  If there is high suspicion pneumonia dedicated PA   and lateral views or CT of the chest can be obtained. rrative   EXAMINATION:   ONE XRAY VIEW OF THE CHEST       3/30/2021 11:28 pm       COMPARISON:   March 30       HISTORY:   ORDERING SYSTEM PROVIDED HISTORY: sob   TECHNOLOGIST PROVIDED HISTORY:   Reason for exam:->sob   What reading provider will be dictating this exam?->CRC       FINDINGS:   Cardiac silhouette is enlarged and unchanged.       Mildly increased vascular congestion and interstitial prominence with septal   lines seen in the left lung base suggestive of mild interstitial pulmonary   edema.  Clinical correlation recommended.       No evidence of airspace consolidation or pleural effusions.       Patient rotated to the right.           Impression   Mildly increased vascular congestion and interstitial prominence with septal   lines best seen in the left lung base suggestive of mild interstitial   pulmonary edema.  Clinical correlation recommended.  Limited examination with   significant patient rotation. Assessment & Plan:   1. Hyponatremia  likely hypervolemic in the setting of CHF, combined with SSRI  Check Ur lytes, Ur Cr, MCR, UA, Serum osm, Ur osm  Na+121-->119-->122->129->131->134->136  Follow BMP Q 12hrs  Follow Na on diuretic    2. Hypokalemia in the setting of diuretic use  K+ 3.3-started on Klor-Con 40 meq daily. Follow and treat    3. Hypertension with CKD I-IV   BP at/near goal <130/80  Continue amlodipine 10 mg, losartan 100 mg,   metoprolol succinate 25 mg  Follow    4. HLD  Total cholesterol 231, , , , VLDL 21  Primary following    5. Fluid vol overload  Pro-BNP 1387--> 524  CXR: mild interstitial pulm edema      Thank you for allowing us to participate in care of [facility]. Pt is OK to discharge home from renal point of view.        Clyde Zaldivar Scott, FELIPA-CNP  8:16 AM  4/2/2021     Pt seen and examined agree with above  Na at 136  Follow on diuretic  Oscar Zaidi MD

## 2021-04-02 NOTE — CARE COORDINATION
Pt dischaging home today, pt accepted home O2 through [facility]. Sid HENSLEY

## 2021-04-02 NOTE — PROGRESS NOTES
CLINICAL PHARMACY NOTE: MEDS TO [address] Select Patient?: No  Total # of Prescriptions Filled: 5   The following medications were delivered to the patient:  · Potassium ER 20 mcg  · bumex 1 mg  · Prednisone 20 mg  · Losartan 100 mg  · Atorvastatin 40 mg  Total # of Interventions Completed: 3  Time Spent (min): 30    Additional Documentation:

## 2021-04-02 NOTE — DISCHARGE SUMMARY
Physician Discharge Summary     Patient ID:  Mia Diaz  [id number]  78 y.o.  1950    Admit date: 3/30/2021    Discharge date and time: 4/2/2021    Admission Diagnoses:   Patient Active Problem List   Diagnosis    Congestive [facility] failure of unknown etiology ([facility] [address]    Congestive heart failure due to cardiomyopathy ([facility] [address]    Hypoxia       Discharge Diagnoses: COPD exacerbation, mild volume overload, hyponatremia    Consults: Renal    Procedures: None    Hospital Course:     Admit to [facility] for eval of acute hypoxemic respiratory failure with hyponatremia  CHF - volume overload bnp 1300   Required BiPAP initially during inpatient stay-has been weaned off of it, does require home O2 on discharge  Obtain echo - none on chart previously-completed not yet read at the time of discharge  Monitor hyponatremia-sodium 136 within normal range at discharge  IV Solu-Medrol 40 every 8 hours-transition to p.o. steroids at discharge  Duo nebs as needed at home  Supplement potassium  High intensity statin for elevated cholesterol and LDL    Patient will be going home with oxygen  Continue prednisone at home  Bumex and potassium supplementation at discharge      Recent Labs     04/01/21  0644   WBC 11.0   HGB 13.4   HCT 41.5          Recent Labs     04/01/21  0644 04/01/21  1816 04/02/21  0746    132 136   K 3.7 3.7 3.3*   CL 90* 91* 92*   CO2 34* 32* 33*   BUN 19 25* 18   CREATININE 0.9 1.0 0.9   CALCIUM 9.1 9.2 9.2       Xr Chest Portable    Result Date: 3/31/2021  EXAMINATION: ONE XRAY VIEW OF THE CHEST 3/30/2021 11:28 pm COMPARISON: March 30 HISTORY: ORDERING SYSTEM PROVIDED HISTORY: sob TECHNOLOGIST PROVIDED HISTORY: Reason for exam:->sob What reading provider will be dictating this exam?->CRC FINDINGS: Cardiac silhouette is enlarged and unchanged. Mildly increased vascular congestion and interstitial prominence with septal lines seen in the left lung base suggestive of mild interstitial pulmonary edema.   Clinical correlation recommended. No evidence of airspace consolidation or pleural effusions. Patient rotated to the right. Mildly increased vascular congestion and interstitial prominence with septal lines best seen in the left lung base suggestive of mild interstitial pulmonary edema. Clinical correlation recommended. Limited examination with significant patient rotation. Xr Chest Portable    Result Date: 3/30/2021  EXAMINATION: ONE XRAY VIEW OF THE CHEST 3/30/2021 10:17 am COMPARISON: None. HISTORY: ORDERING SYSTEM PROVIDED HISTORY: Possible sepsis TECHNOLOGIST PROVIDED HISTORY: Reason for exam:->Possible sepsis What reading provider will be dictating this exam?->CRC FINDINGS: The cardiac silhouette is mildly enlarged. No findings of failure. The lungs are underinflated. The right upper lobe and left lung clear. There is hazy opacification of the right lung base which I believe is secondary to overlying breast tissue. There is no right or left pleural effusion. 1. Cardiomegaly. There are no findings of failure 2. Right upper lobe and left lung are clear. 3. Hazy appearance of the right lung base which I believe is secondary to overlying breast tissue. If there is high suspicion pneumonia dedicated PA and lateral views or CT of the chest can be obtained. Discharge Exam:    HEENT: NCAT,  PERRLA, No JVD  Heart:  RRR, no murmurs, gallops, or rubs.   Lungs:  CTA bilaterally, no wheeze, rales or rhonchi  Abd: bowel sounds present, nontender, nondistended, no masses  Extrem:  No clubbing, cyanosis, or edema    Disposition: home     Patient Condition at Discharge: Stable    Patient Instructions:      Medication List      START taking these medications    atorvastatin 40 MG tablet  Commonly known as: LIPITOR  Take 1 tablet by mouth nightly     bumetanide 1 MG tablet  Commonly known as: Bumex  Take 1 tablet by mouth daily     losartan 100 MG tablet  Commonly known as: COZAAR  Take 1 tablet by mouth daily  Replaces: Micardis 80 MG tablet     potassium chloride 20 MEQ extended release tablet  Commonly known as: KLOR-CON M  Take 1 tablet by mouth daily     predniSONE 20 MG tablet  Commonly known as: DELTASONE  Take 2 tablets by mouth daily for 7 days        CONTINUE taking these medications    amLODIPine 10 MG tablet  Commonly known as: NORVASC     Breo Ellipta 200-25 MCG/INH Aepb inhaler  Generic drug: Fluticasone furoate-vilanterol     busPIRone 10 MG tablet  Commonly known as: BUSPAR     clonazePAM 1 MG tablet  Commonly known as: KLONOPIN     levothyroxine 137 MCG tablet  Commonly known as: SYNTHROID     metoprolol succinate 25 MG extended release tablet  Commonly known as: TOPROL XL     omeprazole 20 MG delayed release capsule  Commonly known as: PRILOSEC     sertraline 100 MG tablet  Commonly known as: ZOLOFT     traZODone 100 MG tablet  Commonly known as: DESYREL        STOP taking these medications    furosemide 20 MG tablet  Commonly known as: LASIX     Micardis 80 MG tablet  Generic drug: telmisartan  Replaced by: losartan 100 MG tablet           Where to Get Your Medications      These medications were sent to [facility] [address] [address]    Phone: [phone]   · atorvastatin 40 MG tablet  · bumetanide 1 MG tablet  · losartan 100 MG tablet  · potassium chloride 20 MEQ extended release tablet  · predniSONE 20 MG tablet       Activity: activity as tolerated  Diet: regular diet    Pt has been advised to: Follow-up with Jesus Gage DO in 1 week.   Follow-up with consultants as recommended by them    Note that over 30 minutes was spent in preparing discharge papers, discussing discharge with patient, medication review, etc.    Signed:  Don Rojas MD  4/2/2021  2:15 PM

## 2021-04-02 NOTE — PROGRESS NOTES
[facility]   Department of Pharmacy   Pharmacist Transition of Care Services         Patient Demographics  Name:  Ajay Velasquez   Medical Record Number:  [id number]  Gender:  female   Age:  78 y.o. YOB: 1950    Readmission Risk: 14 %       Pharmacist Review and Summary of Medications     Date of last reviewed/update: 4/2/21     Category Comments   New Medication Started   Atorvastatin 40mg PO daily   Losartan 100mg PO daily   Prednisone 40mg PO daily x 7 days  bumex 1mg PO daily   Klor-Con 20mEq ER daily    Change in Outpatient Medication      Discontinued/On hold Outpatient Medication       Other          Pharmacist Patient Education:    Date  Person Educated Content of Education             Documentation of Pharmacist Interventions and Follow-up Plan:     The following Pharmacist Transition of Care Services were completed:   [x]  Reviewed and summarized medication changes  []  Entire home medication list was reviewed for accuracy  []  Home medication list was updated or corrected    [x]  Reviewed discharge medication reconciliation  []  Discharge medication list was updated or corrected    []  Added information to AVS   []  Patient education was provided on new medications  []  Patient education was provided on medication changes  []  Reviewed the AVS with patient    Additional Interventions:  []  Inpatient prescriber was contacted and the following pharmacy recommendations        were accepted: **     [] Other interventions: **        Pharmacist: Dewey Webster PharmD, [facility]  Date:  4/2/2021 1:14 PM

## 2021-04-04 LAB
BLOOD CULTURE, ROUTINE: NORMAL
CULTURE, BLOOD 2: NORMAL

## 2021-04-19 NOTE — PROGRESS NOTES
Physician Progress Note      PATIENT:               Ish Garcia  CSN #:                  [id number]  :                       1950  ADMIT DATE:       3/30/2021 8:41 AM  DISCH DATE:        2021 5:02 PM  RESPONDING  PROVIDER #:        GREG Kelley MD          QUERY TEXT:    Pt admitted has CHF documented. ECHO results now available in chart. If   possible, please document in progress notes and discharge summary further   specificity regarding the type and acuity of CHF:    The medical record reflects the following:  Risk Factors: CHF  Clinical Indicators: Per notes, with worsening SOB, nonproductive cough, and   leg swelling - \"admitted for congestive heart failure. Diurese cautiously,   Daily weights strict I's and O's, Low-sodium diet\". The DC Summary notes   \"Congestive heart failure due to cardiomyopathy. Obtain echo - none on chart   previously-completed not yet read at the time of discharge. \" ECHO results   \"Normal left ventricle size and systolic function. Ejection fraction is   visually estimated at 55-60%. 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 Mild concentric left ventricular hyp  Treatment: ECHO, CXR, BNP, IV Bumex and Lasix, I&Os, weights, low sodium diet    Thank you,  Melba Noonan RN, BSN, CCDS, Clinical Documentation Improvement  [phone]  Options provided:  -- Acute on Chronic Systolic CHF/HFrEF  -- Acute on Chronic Diastolic CHF/HFpEF  -- Acute on Chronic Systolic and Diastolic CHF  -- Acute Systolic CHF/HFrEF  -- Acute Diastolic CHF/HFpEF  -- Acute Systolic and Diastolic CHF  -- Chronic Systolic CHF/HFrEF  -- Chronic Diastolic CHF/HFpEF  -- Chronic Systolic and Diastolic CHF  -- Other - I will add my own diagnosis  -- Disagree - Not applicable / Not valid  -- Disagree - Clinically unable to determine / Unknown  -- Refer to Clinical Documentation Reviewer    PROVIDER RESPONSE TEXT:    This patient is in acute on chronic systolic CHF/HFrEF.     Query created

## 2021-09-16 ENCOUNTER — HOSPITAL ENCOUNTER (EMERGENCY)
Age: 71
Discharge: HOME | End: 2021-09-16
Attending: FAMILY MEDICINE
Payer: MEDICARE

## 2021-09-16 ENCOUNTER — APPOINTMENT (OUTPATIENT)
Dept: GENERAL RADIOLOGY | Age: 71
End: 2021-09-16
Payer: MEDICARE

## 2021-09-16 VITALS
RESPIRATION RATE: 18 BRPM | HEIGHT: 62 IN | DIASTOLIC BLOOD PRESSURE: 61 MMHG | HEART RATE: 90 BPM | OXYGEN SATURATION: 95 % | SYSTOLIC BLOOD PRESSURE: 126 MMHG | BODY MASS INDEX: 31.83 KG/M2 | TEMPERATURE: 98.6 F | WEIGHT: 173 LBS

## 2021-09-16 DIAGNOSIS — E83.42 HYPOMAGNESEMIA: ICD-10-CM

## 2021-09-16 DIAGNOSIS — E87.6 HYPOKALEMIA: Primary | ICD-10-CM

## 2021-09-16 LAB
ANION GAP SERPL CALCULATED.3IONS-SCNC: 10 MMOL/L (ref 7–16)
ANION GAP SERPL CALCULATED.3IONS-SCNC: 12 MMOL/L (ref 7–16)
BASOPHILS ABSOLUTE: 0.08 E9/L (ref 0–0.2)
BASOPHILS RELATIVE PERCENT: 1.1 % (ref 0–2)
BUN BLDV-MCNC: 7 MG/DL (ref 6–23)
BUN BLDV-MCNC: 8 MG/DL (ref 6–23)
CALCIUM SERPL-MCNC: 9 MG/DL (ref 8.6–10.2)
CALCIUM SERPL-MCNC: 9.5 MG/DL (ref 8.6–10.2)
CHLORIDE BLD-SCNC: 85 MMOL/L (ref 98–107)
CHLORIDE BLD-SCNC: 87 MMOL/L (ref 98–107)
CO2: 38 MMOL/L (ref 22–29)
CO2: 38 MMOL/L (ref 22–29)
CREAT SERPL-MCNC: 0.8 MG/DL (ref 0.5–1)
CREAT SERPL-MCNC: 0.9 MG/DL (ref 0.5–1)
EKG ATRIAL RATE: 88 BPM
EKG P AXIS: 77 DEGREES
EKG P-R INTERVAL: 150 MS
EKG Q-T INTERVAL: 374 MS
EKG QRS DURATION: 86 MS
EKG QTC CALCULATION (BAZETT): 452 MS
EKG R AXIS: 78 DEGREES
EKG T AXIS: 129 DEGREES
EKG VENTRICULAR RATE: 88 BPM
EOSINOPHILS ABSOLUTE: 0.1 E9/L (ref 0.05–0.5)
EOSINOPHILS RELATIVE PERCENT: 1.3 % (ref 0–6)
GFR AFRICAN AMERICAN: >60
GFR AFRICAN AMERICAN: >60
GFR NON-AFRICAN AMERICAN: >60 ML/MIN/1.73
GFR NON-AFRICAN AMERICAN: >60 ML/MIN/1.73
GLUCOSE BLD-MCNC: 100 MG/DL (ref 74–99)
GLUCOSE BLD-MCNC: 102 MG/DL (ref 74–99)
HCT VFR BLD CALC: 43.4 % (ref 34–48)
HEMOGLOBIN: 14.9 G/DL (ref 11.5–15.5)
IMMATURE GRANULOCYTES #: 0.05 E9/L
IMMATURE GRANULOCYTES %: 0.7 % (ref 0–5)
LYMPHOCYTES ABSOLUTE: 0.86 E9/L (ref 1.5–4)
LYMPHOCYTES RELATIVE PERCENT: 11.4 % (ref 20–42)
MAGNESIUM: 1.5 MG/DL (ref 1.6–2.6)
MCH RBC QN AUTO: 31.1 PG (ref 26–35)
MCHC RBC AUTO-ENTMCNC: 34.3 % (ref 32–34.5)
MCV RBC AUTO: 90.6 FL (ref 80–99.9)
MONOCYTES ABSOLUTE: 1.03 E9/L (ref 0.1–0.95)
MONOCYTES RELATIVE PERCENT: 13.7 % (ref 2–12)
NEUTROPHILS ABSOLUTE: 5.4 E9/L (ref 1.8–7.3)
NEUTROPHILS RELATIVE PERCENT: 71.8 % (ref 43–80)
PDW BLD-RTO: 14.2 FL (ref 11.5–15)
PLATELET # BLD: 271 E9/L (ref 130–450)
PMV BLD AUTO: 8.6 FL (ref 7–12)
POTASSIUM SERPL-SCNC: 2.6 MMOL/L (ref 3.5–5)
POTASSIUM SERPL-SCNC: 3.1 MMOL/L (ref 3.5–5)
PRO-BNP: 728 PG/ML (ref 0–125)
RBC # BLD: 4.79 E12/L (ref 3.5–5.5)
SODIUM BLD-SCNC: 135 MMOL/L (ref 132–146)
SODIUM BLD-SCNC: 135 MMOL/L (ref 132–146)
TROPONIN, HIGH SENSITIVITY: 27 NG/L (ref 0–9)
TROPONIN, HIGH SENSITIVITY: 27 NG/L (ref 0–9)
WBC # BLD: 7.5 E9/L (ref 4.5–11.5)

## 2021-09-16 PROCEDURE — 96366 THER/PROPH/DIAG IV INF ADDON: CPT

## 2021-09-16 PROCEDURE — 36415 COLL VENOUS BLD VENIPUNCTURE: CPT

## 2021-09-16 PROCEDURE — 80048 BASIC METABOLIC PNL TOTAL CA: CPT

## 2021-09-16 PROCEDURE — 99285 EMERGENCY DEPT VISIT HI MDM: CPT

## 2021-09-16 PROCEDURE — 71045 X-RAY EXAM CHEST 1 VIEW: CPT

## 2021-09-16 PROCEDURE — 84484 ASSAY OF TROPONIN QUANT: CPT

## 2021-09-16 PROCEDURE — 85025 COMPLETE CBC W/AUTO DIFF WBC: CPT

## 2021-09-16 PROCEDURE — 83735 ASSAY OF MAGNESIUM: CPT

## 2021-09-16 PROCEDURE — 83880 ASSAY OF NATRIURETIC PEPTIDE: CPT

## 2021-09-16 PROCEDURE — 6360000002 HC RX W HCPCS: Performed by: NURSE PRACTITIONER

## 2021-09-16 PROCEDURE — 96365 THER/PROPH/DIAG IV INF INIT: CPT

## 2021-09-16 PROCEDURE — 93005 ELECTROCARDIOGRAM TRACING: CPT | Performed by: NURSE PRACTITIONER

## 2021-09-16 PROCEDURE — 93010 ELECTROCARDIOGRAM REPORT: CPT | Performed by: INTERNAL MEDICINE

## 2021-09-16 PROCEDURE — 6370000000 HC RX 637 (ALT 250 FOR IP): Performed by: NURSE PRACTITIONER

## 2021-09-16 PROCEDURE — 96367 TX/PROPH/DG ADDL SEQ IV INF: CPT

## 2021-09-16 RX ORDER — POTASSIUM CHLORIDE 7.45 MG/ML
10 INJECTION INTRAVENOUS ONCE
Status: COMPLETED | OUTPATIENT
Start: 2021-09-16 | End: 2021-09-16

## 2021-09-16 RX ORDER — IPRATROPIUM BROMIDE AND ALBUTEROL SULFATE 2.5; .5 MG/3ML; MG/3ML
1 SOLUTION RESPIRATORY (INHALATION) ONCE
Status: COMPLETED | OUTPATIENT
Start: 2021-09-16 | End: 2021-09-16

## 2021-09-16 RX ORDER — MAGNESIUM SULFATE IN WATER 40 MG/ML
2000 INJECTION, SOLUTION INTRAVENOUS ONCE
Status: COMPLETED | OUTPATIENT
Start: 2021-09-16 | End: 2021-09-16

## 2021-09-16 RX ADMIN — POTASSIUM BICARBONATE 40 MEQ: 782 TABLET, EFFERVESCENT ORAL at 14:51

## 2021-09-16 RX ADMIN — MAGNESIUM SULFATE HEPTAHYDRATE 2000 MG: 40 INJECTION, SOLUTION INTRAVENOUS at 16:00

## 2021-09-16 RX ADMIN — IPRATROPIUM BROMIDE AND ALBUTEROL SULFATE 1 AMPULE: .5; 2.5 SOLUTION RESPIRATORY (INHALATION) at 13:38

## 2021-09-16 RX ADMIN — POTASSIUM BICARBONATE 40 MEQ: 782 TABLET, EFFERVESCENT ORAL at 18:46

## 2021-09-16 RX ADMIN — POTASSIUM CHLORIDE 10 MEQ: 7.46 INJECTION, SOLUTION INTRAVENOUS at 14:51

## 2021-09-16 NOTE — ED PROVIDER NOTES
ED Attending shared visit  CC: No         [address] [facility]  Department of Emergency Medicine   ED  Encounter Note  Admit Date/RoomTime: 2021 12:23 PM  ED Room: ANTONIA/ANTONIA  NAME: Bella Young  : 1950  MRN: [id number]     Chief Complaint:  Other (Sent in by PCP for low potassium. Lab was drawn yesterday.)    HISTORY OF PRESENT ILLNESS        Bella Young is a 73 y.o. female who presents to the ED by private vehicle for IV potassium infusion. Patient states she had blood drawn yesterday at her primary care appointment and was called this morning and advised to go to the emergency department for replacement. She states she was told it was low but not told the actual number. She states she felt fine yesterday but today she just does not feel well but cannot describe it. She denies any syncope, fever, chills, URI symptoms, chest pain, increasing shortness of breath, productive cough, abdominal pain, nausea, vomiting, diarrhea, UTI symptoms, back pain or increased leg swelling. She admits to longstanding shortness of breath with bronchitis and supposed to wear oxygen at home. She is not vaccinated for COVID-19 and has no known exposure or concern for COVID-19. There is no loss of taste or smell. She is asking how long she will be here as she needs to go home soon with her daughter to  the kids. The complaint has been stable and are mild in severity. ROS   Pertinent positives and negatives are stated within HPI, all other systems reviewed and are negative. Past Medical History:  has a past medical history of Bronchitis, Hypertension, and Thyroid disease. Surgical History:  has a past surgical history that includes other surgical history. Social History:  reports that she has been smoking cigarettes. She has been smoking about 1.00 pack per day. She has never used smokeless tobacco. She reports that she does not drink alcohol and does not use drugs.     Family History: family history is not on file. Allergies: Patient has no known allergies. PHYSICAL EXAM   Oxygen Saturation Interpretation: Normal on room air analysis. ED Triage Vitals [09/16/21 1224]   BP Temp Temp Source Pulse Resp SpO2 Height Weight   136/88 98.6 °F (37 °C) Temporal 94 16 96 % 5' 2\" (1.575 m) 173 lb (78.5 kg)       Physical Exam  Constitutional/General: Alert and oriented x3, well appearing, non toxic  HEENT:  NC/NT. PERRLA,  Airway patent. Oral mucosa moist. Hard of hearing. Neck: Supple, full ROM, non tender to palpation in the midline, no stridor, no crepitus, no meningeal signs  Respiratory: Lung sounds with expiratory wheezing throughout. No increased work of breathing. Patient talking in full sentences. Patient on room air and refuses O2 therapy. CV:  Regular rate. Regular rhythm. No murmurs, gallops, or rubs. 2+ distal pulses  Chest: No chest wall tenderness to palpation. GI:  Abdomen Soft, Non tender, Non distended. +BS. No rebound, guarding, or rigidity. No pulsatile masses. Musculoskeletal: Moves all extremities x 4. Warm and well perfused, no clubbing, cyanosis. Bilateral 2+ pitting edema without palpable cords or calf tenderness bilaterally. Capillary refill <3 seconds  Integument: skin warm and dry. No rashes. Lymphatic: no lymphadenopathy noted  Neurologic: GCS 15, no focal deficits, symmetric strength 5/5 in the upper and lower extremities bilaterally. Hand grasps, pushes, pulls, dorsiflexion and plantar flexion strong and equal bilaterally. No arm or leg drift. Clear speech. No facial asymmetry.   Psychiatric: Normal Affect    Lab / Imaging Results   (All laboratory and radiology results have been personally reviewed by myself)  Labs:  Results for orders placed or performed during the hospital encounter of 83/13/98   Basic Metabolic Panel   Result Value Ref Range    Sodium 135 132 - 146 mmol/L    Potassium 2.6 (LL) 3.5 - 5.0 mmol/L    Chloride 85 (L) 98 - 107 mmol/L CO2 38 (H) 22 - 29 mmol/L    Anion Gap 12 7 - 16 mmol/L    Glucose 100 (H) 74 - 99 mg/dL    BUN 8 6 - 23 mg/dL    CREATININE 0.9 0.5 - 1.0 mg/dL    GFR Non-African American >60 >=60 mL/min/1.73    GFR African American >60     Calcium 9.5 8.6 - 10.2 mg/dL   Magnesium   Result Value Ref Range    Magnesium 1.5 (L) 1.6 - 2.6 mg/dL   CBC Auto Differential   Result Value Ref Range    WBC 7.5 4.5 - 11.5 E9/L    RBC 4.79 3.50 - 5.50 E12/L    Hemoglobin 14.9 11.5 - 15.5 g/dL    Hematocrit 43.4 34.0 - 48.0 %    MCV 90.6 80.0 - 99.9 fL    MCH 31.1 26.0 - 35.0 pg    MCHC 34.3 32.0 - 34.5 %    RDW 14.2 11.5 - 15.0 fL    Platelets 486 209 - 437 E9/L    MPV 8.6 7.0 - 12.0 fL    Neutrophils % 71.8 43.0 - 80.0 %    Immature Granulocytes % 0.7 0.0 - 5.0 %    Lymphocytes % 11.4 (L) 20.0 - 42.0 %    Monocytes % 13.7 (H) 2.0 - 12.0 %    Eosinophils % 1.3 0.0 - 6.0 %    Basophils % 1.1 0.0 - 2.0 %    Neutrophils Absolute 5.40 1.80 - 7.30 E9/L    Immature Granulocytes # 0.05 E9/L    Lymphocytes Absolute 0.86 (L) 1.50 - 4.00 E9/L    Monocytes Absolute 1.03 (H) 0.10 - 0.95 E9/L    Eosinophils Absolute 0.10 0.05 - 0.50 E9/L    Basophils Absolute 0.08 0.00 - 0.20 E9/L   Troponin   Result Value Ref Range    Troponin, High Sensitivity 27 (H) 0 - 9 ng/L   Brain Natriuretic Peptide   Result Value Ref Range    Pro- (H) 0 - 125 pg/mL   Basic Metabolic Panel   Result Value Ref Range    Sodium 135 132 - 146 mmol/L    Potassium 3.1 (L) 3.5 - 5.0 mmol/L    Chloride 87 (L) 98 - 107 mmol/L    CO2 38 (H) 22 - 29 mmol/L    Anion Gap 10 7 - 16 mmol/L    Glucose 102 (H) 74 - 99 mg/dL    BUN 7 6 - 23 mg/dL    CREATININE 0.8 0.5 - 1.0 mg/dL    GFR Non-African American >60 >=60 mL/min/1.73    GFR African American >60     Calcium 9.0 8.6 - 10.2 mg/dL   Troponin   Result Value Ref Range    Troponin, High Sensitivity 27 (H) 0 - 9 ng/L   EKG 12 Lead   Result Value Ref Range    Ventricular Rate 88 BPM    Atrial Rate 88 BPM    P-R Interval 150 ms    QRS Duration 86 ms    Q-T Interval 374 ms    QTc Calculation (Bazett) 452 ms    P Axis 77 degrees    R Axis 78 degrees    T Axis 129 degrees     Imaging: All Radiology results interpreted by Radiologist unless otherwise noted. XR CHEST PORTABLE   Final Result   Borderline cardiomegaly. No acute pulmonary disease. EKG #1:  Interpreted by emergency department attending physician unless otherwise noted. 9/16/21  Time: 1232    Rhythm: normal sinus   Rate: normal  Axis: normal  Conduction: normal  ST Segments: nonspecific changes, nonacute  T Waves: Nonspecific, nonacute    Clinical Impression: Normal sinus rhythm with nonspecific ST and T wave changes with no STEMI as interpreted by Dr. Tomas Ham. Comparison to Prior tracings: Improved compared to previous on March 30, 2021 per Dr. Tomas Ham. Given to Dr. Janet Rachel for review as well. ED Course / Medical Decision Making     Medications   ipratropium-albuterol (DUONEB) nebulizer solution 1 ampule (1 ampule Inhalation Given 9/16/21 1338)   potassium bicarb-citric acid (EFFER-K) effervescent tablet 40 mEq (40 mEq Oral Given 9/16/21 1451)   magnesium sulfate 2000 mg in 50 mL IVPB premix (0 mg IntraVENous Stopped 9/16/21 1800)   potassium chloride 10 mEq/100 mL IVPB (Peripheral Line) (0 mEq IntraVENous Stopped 9/16/21 1550)   potassium bicarb-citric acid (EFFER-K) effervescent tablet 50 mEq (40 mEq Oral Given 9/16/21 1846)        Re-examination:  9/16/21       Time: 1430  Patients condition is improving after treatment and remains stable. Patient updated on lab values and pending infusions. Time: 1630  Ambulatory to the restroom with a steady gait and minimal dyspnea on exertion. [place]ounds She was able to push her own IV poles and maneuver herself without assistance. IV electrolytes infusing.   Time: 1815  Patient updated on results, need for repeat potassium oral and she is verbalizing readiness for discharge home and wanting to call her daughter to come get her. She states she does not want to stay in the hospital and she will call her PCP tomorrow for follow-up. Consult(s):   None    Procedure(s):   none    MDM:   Patient evaluated by Dr. Jose Francisco Vaughn and myself. EKG as above with 2 troponins with no change in the delta. Her original potassium was 2.6 with a magnesium of 1.5. She did increase her potassium to 3.1 after 50 mEq p.o. and 10 mEq IV potassium, therefore she was given 50 mEq of p.o. potassium and discharged home as requested. She reports her breathing to be at baseline and she has no hypoxia. Chest x-ray does show some borderline cardiomegaly but the pulmonary vasculature is with in normal limits with her BNP at 728. Plan is for continued outpatient treatment and management with her PCP and specialists as the patient prefers. She is encouraged to call tomorrow to arrange. She is aware of signs and symptoms indicative of reevaluation in the emergency department setting. Patient departed in stable condition with her daughter. Plan of Care/Counseling:  FELIPA Carcamo CNP and EM Attending Physician reviewed today's visit with the patient in addition to providing specific details for the plan of care and counseling regarding the diagnosis and prognosis. Questions are answered at this time and are agreeable with the plan. ASSESSMENT     1. Hypokalemia    2. Hypomagnesemia      PLAN   Discharged home. Patient condition is stable    New Medications     Discharge Medication List as of 9/16/2021  6:20 PM        Electronically signed by FELIPA Carcamo CNP   DD: 9/16/21  **This report was transcribed using voice recognition software. Every effort was made to ensure accuracy; however, inadvertent computerized transcription errors may be present.   END OF ED PROVIDER NOTE       FELIPA Carcamo CNP  09/16/21 1211

## 2021-11-11 PROBLEM — J41.0 SIMPLE CHRONIC BRONCHITIS (HCC): Status: ACTIVE | Noted: 2021-11-11

## 2022-03-15 NOTE — ED NOTES
Called patient no answer      Ingrid Roldan  09/16/21 7716
Called patient no answer      Ingrid Roldan  09/16/21 8756
Placed patient on monitor w/ pulse oximetry      Ingrid Roldan  09/16/21 5795
Adult

## 2022-04-25 ENCOUNTER — HOSPITAL ENCOUNTER (EMERGENCY)
Age: 72
Discharge: HOME | End: 2022-04-25
Attending: EMERGENCY MEDICINE
Payer: MEDICARE

## 2022-04-25 VITALS
TEMPERATURE: 97.8 F | SYSTOLIC BLOOD PRESSURE: 115 MMHG | DIASTOLIC BLOOD PRESSURE: 74 MMHG | WEIGHT: 160 LBS | HEIGHT: 62 IN | BODY MASS INDEX: 29.44 KG/M2 | OXYGEN SATURATION: 97 % | HEART RATE: 104 BPM | RESPIRATION RATE: 16 BRPM

## 2022-04-25 DIAGNOSIS — H66.91 RIGHT OTITIS MEDIA, UNSPECIFIED OTITIS MEDIA TYPE: Primary | ICD-10-CM

## 2022-04-25 PROCEDURE — 99283 EMERGENCY DEPT VISIT LOW MDM: CPT

## 2022-04-25 RX ORDER — AMOXICILLIN AND CLAVULANATE POTASSIUM 875; 125 MG/1; MG/1
1 TABLET, FILM COATED ORAL 2 TIMES DAILY
Qty: 20 TABLET | Refills: 0 | Status: SHIPPED | OUTPATIENT
Start: 2022-04-25 | End: 2022-05-05

## 2022-04-25 ASSESSMENT — PAIN SCALES - GENERAL: PAINLEVEL_OUTOF10: 8

## 2022-04-25 ASSESSMENT — PAIN DESCRIPTION - DESCRIPTORS: DESCRIPTORS: ACHING

## 2022-04-25 ASSESSMENT — PAIN DESCRIPTION - LOCATION: LOCATION: EAR

## 2022-04-25 ASSESSMENT — PAIN - FUNCTIONAL ASSESSMENT: PAIN_FUNCTIONAL_ASSESSMENT: 0-10

## 2022-04-25 ASSESSMENT — PAIN DESCRIPTION - ORIENTATION: ORIENTATION: RIGHT

## 2022-04-25 NOTE — ED PROVIDER NOTES
HPI:  4/25/22, Time: 10:56 AM EDT         Rebecca Mcrae is a 73 y.o. female presenting to the ED for right ear pain and muffled hearing, beginning some time ago. She reports the pain sometimes radiates into her right jaw and teeth, particularly when she lays on her right side. She had ear tubes placed back in the fall for diminished hearing which helped with the diminished hearing and drained some fluid out from her ears. She has an appointment coming up with her ENT on May 4 but cannot get in with him until then. States she also reported she has been dizziness intermittently but this predates the tympanostomy tubes and has been seen by ENT for this as well. She currently denies any dizziness. She denies drainage from the ear, headache, difficulty breathing or swallowing, fever or chills or other complaints. The complaint has been persistent, moderate in severity, and worsened by nothing. Patient denies fever/chills, sore throat, cough, congestion, chest pain, shortness of breath, edema, headache, visual disturbances, focal paresthesias, focal weakness, abdominal pain, nausea, vomiting, diarrhea, constipation, dysuria, hematuria, trauma, neck or back pain, rash or other complaints. ROS:   A complete review of systems was performed and all pertinent positives and negatives are stated within HPI, all other systems reviewed and are negative.      --------------------------------------------- PAST HISTORY ---------------------------------------------  Past Medical History:  has a past medical history of Bronchitis, Hypertension, and Thyroid disease. Past Surgical History:  has a past surgical history that includes other surgical history and Tympanostomy tube placement. Social History:  reports that she has been smoking cigarettes. She started smoking about 50 years ago. She has a 48.00 pack-year smoking history.  She has never used smokeless tobacco. She reports that she does not drink alcohol and does not use drugs. Family History: family history is not on file. The patients home medications have been reviewed. Allergies: Patient has no known allergies. ----------------------------------------PHYSICAL EXAM--------------------------------------  Constitutional:  Well developed, well nourished, no acute distress, non-toxic appearance   Eyes:  PERRL, conjunctiva normal, EOMI  HENT:  Atraumatic, external ears normal, nose normal, oropharynx moist, no pharyngeal exudates, redness or swelling. Tubes in place in both TMs. No fluid in either ear canal.  No tenderness to palpation of either pinna. No mastoid tenderness to palpation. Viscous cloudy fluid noted behind right tympanic membrane. No TMJ tenderness. Right tympanic membrane red. Neck- normal range of motion, no nuchal rigidity   Respiratory:  No respiratory distress, normal breath sounds, no rales, no wheezing   Cardiovascular:  Normal rate, normal rhythm. Radial  pulses 2+ bilaterally. Musculoskeletal:  No edema, no tenderness, no deformities. Integument:  Well hydrated, no visible rash. Adequate perfusion. Lymphatic:  No cervical lymphadenopathy noted   Neurologic:  Alert & oriented x 3, CN 2-12 normal,  no focal deficits noted. Normal gait. Psychiatric:  Speech and behavior appropriate       -------------------------------------------------- RESULTS -------------------------------------------------  I have personally reviewed all laboratory and imaging results for this patient. Results are listed below. LABS:  No results found for this visit on 04/25/22. RADIOLOGY:  Interpreted by Radiologist.  No orders to display       ------------------------- NURSING NOTES AND VITALS REVIEWED ---------------------------  The nursing notes within the ED encounter and vital signs as below have been reviewed by myself.     /74   Pulse 104   Temp 97.8 °F (36.6 °C)   Resp 16   Ht 5' 2\" (1.575 m)   Wt 160 lb (72.6 kg) SpO2 97%   BMI 29.26 kg/m²   Oxygen Saturation Interpretation: Normal      The patients available past medical records and past encounters were reviewed. ------------------------------ ED COURSE/MEDICAL DECISION MAKING----------------------  Medications - No data to display        Procedures:   none      Medical Decision Making: Will give prescription for Augmentin, states she tolerates antibiotics including penicillins. We will follow-up with ENT. She appears well, nontoxic, neurovascularly intact and ambulatory upon discharge. Patient was explicitly instructed on specific signs and symptoms on which to return to the emergency room for. Patient was instructed to return to the ER for any new or worsening symptoms. Additional discharge instructions were given verbally. All questions were answered. Patient is comfortable and agreeable with discharge plan. Patient in no acute distress and non-toxic in appearance. This patient's ED course included: a personal history and physicial eaxmination    This patient has remained hemodynamically stable during their ED course. Consultations:   none    Critical Care: none    Parminder FONSECA, , am the Primary Provider of Record    Counseling: The emergency provider has spoken with the patient and discussed todays results, in addition to providing specific details for the plan of care and counseling regarding the diagnosis and prognosis. Questions are answered at this time and they are agreeable with the plan.    --------------------------- IMPRESSION AND DISPOSITION ---------------------------------    IMPRESSION  1.  Right otitis media, unspecified otitis media type        DISPOSITION  Disposition: Discharge to home  Patient condition is stable              Eedl Tello DO  04/25/22 [id number]

## 2022-09-01 ENCOUNTER — HOSPITAL ENCOUNTER (INPATIENT)
Age: 72
LOS: 6 days | Discharge: HOME | DRG: 181 | End: 2022-09-07
Attending: EMERGENCY MEDICINE | Admitting: INTERNAL MEDICINE
Payer: MEDICARE

## 2022-09-01 ENCOUNTER — APPOINTMENT (OUTPATIENT)
Dept: CT IMAGING | Age: 72
DRG: 181 | End: 2022-09-01
Payer: MEDICARE

## 2022-09-01 ENCOUNTER — APPOINTMENT (OUTPATIENT)
Dept: GENERAL RADIOLOGY | Age: 72
DRG: 181 | End: 2022-09-01
Payer: MEDICARE

## 2022-09-01 DIAGNOSIS — E87.1 HYPONATREMIA: Primary | ICD-10-CM

## 2022-09-01 LAB
ACETAMINOPHEN LEVEL: <5 MCG/ML (ref 10–30)
ALBUMIN SERPL-MCNC: 4.4 G/DL (ref 3.5–5.2)
ALP BLD-CCNC: 131 U/L (ref 35–104)
ALT SERPL-CCNC: 6 U/L (ref 0–32)
AMPHETAMINE SCREEN, URINE: NOT DETECTED
ANION GAP SERPL CALCULATED.3IONS-SCNC: 12 MMOL/L (ref 7–16)
ANION GAP SERPL CALCULATED.3IONS-SCNC: 13 MMOL/L (ref 7–16)
AST SERPL-CCNC: 14 U/L (ref 0–31)
BACTERIA: NORMAL /HPF
BARBITURATE SCREEN URINE: NOT DETECTED
BASOPHILS ABSOLUTE: 0.05 E9/L (ref 0–0.2)
BASOPHILS RELATIVE PERCENT: 0.7 % (ref 0–2)
BENZODIAZEPINE SCREEN, URINE: NOT DETECTED
BILIRUB SERPL-MCNC: 0.3 MG/DL (ref 0–1.2)
BILIRUBIN URINE: NEGATIVE
BLOOD, URINE: NEGATIVE
BUN BLDV-MCNC: 6 MG/DL (ref 6–23)
BUN BLDV-MCNC: 6 MG/DL (ref 6–23)
CALCIUM SERPL-MCNC: 8.8 MG/DL (ref 8.6–10.2)
CALCIUM SERPL-MCNC: 9.3 MG/DL (ref 8.6–10.2)
CANNABINOID SCREEN URINE: NOT DETECTED
CHLORIDE BLD-SCNC: 85 MMOL/L (ref 98–107)
CHLORIDE BLD-SCNC: 87 MMOL/L (ref 98–107)
CHLORIDE URINE RANDOM: 26 MMOL/L
CLARITY: CLEAR
CO2: 22 MMOL/L (ref 22–29)
CO2: 26 MMOL/L (ref 22–29)
COCAINE METABOLITE SCREEN URINE: NOT DETECTED
COLOR: YELLOW
CREAT SERPL-MCNC: 0.6 MG/DL (ref 0.5–1)
CREAT SERPL-MCNC: 0.7 MG/DL (ref 0.5–1)
EKG ATRIAL RATE: 86 BPM
EKG P AXIS: 76 DEGREES
EKG P-R INTERVAL: 170 MS
EKG Q-T INTERVAL: 392 MS
EKG QRS DURATION: 104 MS
EKG QTC CALCULATION (BAZETT): 469 MS
EKG R AXIS: 89 DEGREES
EKG T AXIS: 23 DEGREES
EKG VENTRICULAR RATE: 86 BPM
EOSINOPHILS ABSOLUTE: 0.08 E9/L (ref 0.05–0.5)
EOSINOPHILS RELATIVE PERCENT: 1.2 % (ref 0–6)
ETHANOL: <10 MG/DL (ref 0–0.08)
FENTANYL SCREEN, URINE: NOT DETECTED
GFR AFRICAN AMERICAN: >60
GFR AFRICAN AMERICAN: >60
GFR NON-AFRICAN AMERICAN: >60 ML/MIN/1.73
GFR NON-AFRICAN AMERICAN: >60 ML/MIN/1.73
GLUCOSE BLD-MCNC: 168 MG/DL (ref 74–99)
GLUCOSE BLD-MCNC: 94 MG/DL (ref 74–99)
GLUCOSE URINE: NEGATIVE MG/DL
HCT VFR BLD CALC: 39.2 % (ref 34–48)
HEMOGLOBIN: 13.8 G/DL (ref 11.5–15.5)
IMMATURE GRANULOCYTES #: 0.02 E9/L
IMMATURE GRANULOCYTES %: 0.3 % (ref 0–5)
KETONES, URINE: NEGATIVE MG/DL
LEUKOCYTE ESTERASE, URINE: ABNORMAL
LYMPHOCYTES ABSOLUTE: 0.7 E9/L (ref 1.5–4)
LYMPHOCYTES RELATIVE PERCENT: 10.5 % (ref 20–42)
Lab: NORMAL
MAGNESIUM: 1.8 MG/DL (ref 1.6–2.6)
MCH RBC QN AUTO: 28.9 PG (ref 26–35)
MCHC RBC AUTO-ENTMCNC: 35.2 % (ref 32–34.5)
MCV RBC AUTO: 82.2 FL (ref 80–99.9)
METHADONE SCREEN, URINE: NOT DETECTED
MONOCYTES ABSOLUTE: 0.7 E9/L (ref 0.1–0.95)
MONOCYTES RELATIVE PERCENT: 10.5 % (ref 2–12)
NEUTROPHILS ABSOLUTE: 5.14 E9/L (ref 1.8–7.3)
NEUTROPHILS RELATIVE PERCENT: 76.8 % (ref 43–80)
NITRITE, URINE: NEGATIVE
OPIATE SCREEN URINE: NOT DETECTED
OSMOLALITY URINE: 194 MOSM/KG (ref 300–900)
OSMOLALITY: 249 MOSM/KG (ref 285–310)
OXYCODONE URINE: NOT DETECTED
PDW BLD-RTO: 14.8 FL (ref 11.5–15)
PH UA: 6.5 (ref 5–9)
PHENCYCLIDINE SCREEN URINE: NOT DETECTED
PLATELET # BLD: 278 E9/L (ref 130–450)
PMV BLD AUTO: 8.7 FL (ref 7–12)
POTASSIUM SERPL-SCNC: 3.2 MMOL/L (ref 3.5–5)
POTASSIUM SERPL-SCNC: 3.9 MMOL/L (ref 3.5–5)
POTASSIUM, UR: 15.5 MMOL/L
PROTEIN UA: NEGATIVE MG/DL
RBC # BLD: 4.77 E12/L (ref 3.5–5.5)
RBC UA: NORMAL /HPF (ref 0–2)
SALICYLATE, SERUM: 0.4 MG/DL (ref 0–30)
SODIUM BLD-SCNC: 122 MMOL/L (ref 132–146)
SODIUM BLD-SCNC: 123 MMOL/L (ref 132–146)
SODIUM URINE: 35 MMOL/L
SPECIFIC GRAVITY UA: <=1.005 (ref 1–1.03)
TOTAL PROTEIN: 6.9 G/DL (ref 6.4–8.3)
TRICYCLIC ANTIDEPRESSANTS SCREEN SERUM: NEGATIVE NG/ML
TROPONIN, HIGH SENSITIVITY: 11 NG/L (ref 0–9)
UROBILINOGEN, URINE: 0.2 E.U./DL
WBC # BLD: 6.7 E9/L (ref 4.5–11.5)
WBC UA: NORMAL /HPF (ref 0–5)

## 2022-09-01 PROCEDURE — 99285 EMERGENCY DEPT VISIT HI MDM: CPT

## 2022-09-01 PROCEDURE — 80143 DRUG ASSAY ACETAMINOPHEN: CPT

## 2022-09-01 PROCEDURE — 80048 BASIC METABOLIC PNL TOTAL CA: CPT

## 2022-09-01 PROCEDURE — 6370000000 HC RX 637 (ALT 250 FOR IP): Performed by: NURSE PRACTITIONER

## 2022-09-01 PROCEDURE — 85025 COMPLETE CBC W/AUTO DIFF WBC: CPT

## 2022-09-01 PROCEDURE — 83735 ASSAY OF MAGNESIUM: CPT

## 2022-09-01 PROCEDURE — 6360000002 HC RX W HCPCS: Performed by: INTERNAL MEDICINE

## 2022-09-01 PROCEDURE — 80053 COMPREHEN METABOLIC PANEL: CPT

## 2022-09-01 PROCEDURE — 36415 COLL VENOUS BLD VENIPUNCTURE: CPT

## 2022-09-01 PROCEDURE — 83930 ASSAY OF BLOOD OSMOLALITY: CPT

## 2022-09-01 PROCEDURE — 70450 CT HEAD/BRAIN W/O DYE: CPT

## 2022-09-01 PROCEDURE — 93005 ELECTROCARDIOGRAM TRACING: CPT | Performed by: PHYSICIAN ASSISTANT

## 2022-09-01 PROCEDURE — 80179 DRUG ASSAY SALICYLATE: CPT

## 2022-09-01 PROCEDURE — 81001 URINALYSIS AUTO W/SCOPE: CPT

## 2022-09-01 PROCEDURE — 80307 DRUG TEST PRSMV CHEM ANLYZR: CPT

## 2022-09-01 PROCEDURE — 82436 ASSAY OF URINE CHLORIDE: CPT

## 2022-09-01 PROCEDURE — 84133 ASSAY OF URINE POTASSIUM: CPT

## 2022-09-01 PROCEDURE — 94640 AIRWAY INHALATION TREATMENT: CPT

## 2022-09-01 PROCEDURE — 84300 ASSAY OF URINE SODIUM: CPT

## 2022-09-01 PROCEDURE — 2140000000 HC CCU INTERMEDIATE R&B

## 2022-09-01 PROCEDURE — 82077 ASSAY SPEC XCP UR&BREATH IA: CPT

## 2022-09-01 PROCEDURE — 83935 ASSAY OF URINE OSMOLALITY: CPT

## 2022-09-01 PROCEDURE — 71045 X-RAY EXAM CHEST 1 VIEW: CPT

## 2022-09-01 PROCEDURE — 6370000000 HC RX 637 (ALT 250 FOR IP): Performed by: INTERNAL MEDICINE

## 2022-09-01 PROCEDURE — 84484 ASSAY OF TROPONIN QUANT: CPT

## 2022-09-01 RX ORDER — CLONAZEPAM 0.5 MG/1
1 TABLET ORAL DAILY PRN
Status: DISCONTINUED | OUTPATIENT
Start: 2022-09-01 | End: 2022-09-07 | Stop reason: HOSPADM

## 2022-09-01 RX ORDER — TRAZODONE HYDROCHLORIDE 50 MG/1
100 TABLET ORAL NIGHTLY
Status: DISCONTINUED | OUTPATIENT
Start: 2022-09-01 | End: 2022-09-01

## 2022-09-01 RX ORDER — DOCUSATE SODIUM 100 MG/1
100 CAPSULE, LIQUID FILLED ORAL NIGHTLY
Status: DISCONTINUED | OUTPATIENT
Start: 2022-09-01 | End: 2022-09-07 | Stop reason: HOSPADM

## 2022-09-01 RX ORDER — FLUTICASONE FUROATE AND VILANTEROL 200; 25 UG/1; UG/1
1 POWDER RESPIRATORY (INHALATION) DAILY
Status: DISCONTINUED | OUTPATIENT
Start: 2022-09-01 | End: 2022-09-01

## 2022-09-01 RX ORDER — BUDESONIDE 0.25 MG/2ML
0.25 INHALANT ORAL 2 TIMES DAILY
Status: DISCONTINUED | OUTPATIENT
Start: 2022-09-01 | End: 2022-09-07 | Stop reason: HOSPADM

## 2022-09-01 RX ORDER — FLUTICASONE FUROATE AND VILANTEROL 100; 25 UG/1; UG/1
1 POWDER RESPIRATORY (INHALATION) DAILY
COMMUNITY

## 2022-09-01 RX ORDER — MONTELUKAST SODIUM 10 MG/1
10 TABLET ORAL NIGHTLY
Status: DISCONTINUED | OUTPATIENT
Start: 2022-09-01 | End: 2022-09-07 | Stop reason: HOSPADM

## 2022-09-01 RX ORDER — ALBUTEROL SULFATE 2.5 MG/3ML
2.5 SOLUTION RESPIRATORY (INHALATION) EVERY 4 HOURS PRN
Status: DISCONTINUED | OUTPATIENT
Start: 2022-09-01 | End: 2022-09-07 | Stop reason: HOSPADM

## 2022-09-01 RX ORDER — LOSARTAN POTASSIUM 50 MG/1
100 TABLET ORAL DAILY
Status: DISCONTINUED | OUTPATIENT
Start: 2022-09-01 | End: 2022-09-01

## 2022-09-01 RX ORDER — ENOXAPARIN SODIUM 100 MG/ML
40 INJECTION SUBCUTANEOUS DAILY
Status: DISCONTINUED | OUTPATIENT
Start: 2022-09-01 | End: 2022-09-07 | Stop reason: HOSPADM

## 2022-09-01 RX ORDER — ATORVASTATIN CALCIUM 40 MG/1
40 TABLET, FILM COATED ORAL NIGHTLY
Status: DISCONTINUED | OUTPATIENT
Start: 2022-09-01 | End: 2022-09-01

## 2022-09-01 RX ORDER — ARFORMOTEROL TARTRATE 15 UG/2ML
15 SOLUTION RESPIRATORY (INHALATION) 2 TIMES DAILY
Status: DISCONTINUED | OUTPATIENT
Start: 2022-09-01 | End: 2022-09-07 | Stop reason: HOSPADM

## 2022-09-01 RX ORDER — LEVOTHYROXINE SODIUM 137 UG/1
137 TABLET ORAL DAILY
Status: DISCONTINUED | OUTPATIENT
Start: 2022-09-01 | End: 2022-09-03

## 2022-09-01 RX ORDER — GUAIFENESIN 100 MG/5ML
200 SOLUTION ORAL EVERY 6 HOURS PRN
Status: DISCONTINUED | OUTPATIENT
Start: 2022-09-01 | End: 2022-09-07 | Stop reason: SDUPTHER

## 2022-09-01 RX ORDER — FLUTICASONE PROPIONATE 50 MCG
1 SPRAY, SUSPENSION (ML) NASAL DAILY
Status: DISCONTINUED | OUTPATIENT
Start: 2022-09-01 | End: 2022-09-07 | Stop reason: HOSPADM

## 2022-09-01 RX ORDER — BUSPIRONE HYDROCHLORIDE 10 MG/1
10 TABLET ORAL 3 TIMES DAILY
Status: DISCONTINUED | OUTPATIENT
Start: 2022-09-01 | End: 2022-09-01

## 2022-09-01 RX ORDER — GUAIFENESIN 100 MG/5ML
200 SOLUTION ORAL EVERY 4 HOURS PRN
Status: DISCONTINUED | OUTPATIENT
Start: 2022-09-01 | End: 2022-09-01

## 2022-09-01 RX ORDER — LANOLIN ALCOHOL/MO/W.PET/CERES
400 CREAM (GRAM) TOPICAL DAILY
Status: DISCONTINUED | OUTPATIENT
Start: 2022-09-01 | End: 2022-09-07 | Stop reason: HOSPADM

## 2022-09-01 RX ORDER — ONDANSETRON 2 MG/ML
4 INJECTION INTRAMUSCULAR; INTRAVENOUS EVERY 6 HOURS PRN
Status: DISCONTINUED | OUTPATIENT
Start: 2022-09-01 | End: 2022-09-07 | Stop reason: HOSPADM

## 2022-09-01 RX ORDER — ACETAMINOPHEN 325 MG/1
650 TABLET ORAL EVERY 4 HOURS PRN
Status: DISCONTINUED | OUTPATIENT
Start: 2022-09-01 | End: 2022-09-06 | Stop reason: SDUPTHER

## 2022-09-01 RX ORDER — METOPROLOL SUCCINATE 25 MG/1
25 TABLET, EXTENDED RELEASE ORAL DAILY
Status: DISCONTINUED | OUTPATIENT
Start: 2022-09-01 | End: 2022-09-07 | Stop reason: HOSPADM

## 2022-09-01 RX ORDER — PANTOPRAZOLE SODIUM 40 MG/1
40 TABLET, DELAYED RELEASE ORAL
Status: DISCONTINUED | OUTPATIENT
Start: 2022-09-02 | End: 2022-09-07 | Stop reason: HOSPADM

## 2022-09-01 RX ORDER — TRAZODONE HYDROCHLORIDE 50 MG/1
50 TABLET ORAL NIGHTLY
Status: DISCONTINUED | OUTPATIENT
Start: 2022-09-02 | End: 2022-09-07 | Stop reason: HOSPADM

## 2022-09-01 RX ORDER — AMLODIPINE BESYLATE 10 MG/1
10 TABLET ORAL DAILY
Status: DISCONTINUED | OUTPATIENT
Start: 2022-09-01 | End: 2022-09-07 | Stop reason: HOSPADM

## 2022-09-01 RX ORDER — SODIUM CHLORIDE 9 MG/ML
INJECTION, SOLUTION INTRAVENOUS CONTINUOUS
Status: DISCONTINUED | OUTPATIENT
Start: 2022-09-02 | End: 2022-09-04

## 2022-09-01 RX ADMIN — FLUTICASONE PROPIONATE 1 SPRAY: 50 SPRAY, METERED NASAL at 22:20

## 2022-09-01 RX ADMIN — BUDESONIDE 250 MCG: 0.25 SUSPENSION RESPIRATORY (INHALATION) at 21:24

## 2022-09-01 RX ADMIN — MONTELUKAST 10 MG: 10 TABLET, FILM COATED ORAL at 22:21

## 2022-09-01 RX ADMIN — DOCUSATE SODIUM 100 MG: 100 CAPSULE, LIQUID FILLED ORAL at 22:21

## 2022-09-01 RX ADMIN — GUAIFENESIN 200 MG: 200 SOLUTION ORAL at 22:21

## 2022-09-01 RX ADMIN — ARFORMOTEROL TARTRATE 15 MCG: 15 SOLUTION RESPIRATORY (INHALATION) at 21:24

## 2022-09-01 NOTE — ED NOTES
Patient states to nurse, \" I want to the doctor to know I stopped drinking alcohol 4 months ago\". Dr Shelton Members is aware. \"     Madison Hatchet, RN  09/01/22 3301

## 2022-09-01 NOTE — ED PROVIDER NOTES
Department of Emergency Medicine   ED  Provider Note  Admit Date/RoomTime: 9/1/2022 12:01 PM  ED Room: 01/01          History of Present Illness:  9/1/22, Time: 1:22 PM EDT  Chief Complaint   Patient presents with    Other     Told to come in for low sodium, states she \"feels drunk all the time\"                Dilia Anderson is a 81 y.o. female presenting to the ED for hyponatremia. Came on suddenly, nothing makes it better or worse, no associated pain. Patient was found to be hyponatremic on the outpatient. She was getting labs drawn as she states she feels drunk at all times. She cannot specify otherwise. She states she used to be an alcoholic, states has been sober for 4 months. She denies any new medications. She denies any paresthesias or weakness. Denies any fever, chills, cough, sputum, lethargy, neck pain or stiffness, or any other symptoms or complaints. Review of Systems:   Pertinent positives and negatives are stated within HPI, all other systems reviewed and are negative.        --------------------------------------------- PAST HISTORY ---------------------------------------------  Past Medical History:  has a past medical history of Bronchitis, Hypertension, and Thyroid disease. Past Surgical History:  has a past surgical history that includes other surgical history and Tympanostomy tube placement. Social History:  reports that she has been smoking cigarettes. She started smoking about 50 years ago. She has a 48.00 pack-year smoking history. She has never used smokeless tobacco. She reports that she does not drink alcohol and does not use drugs. Family History: family history is not on file. . Unless otherwise noted, family history is non contributory    The patients home medications have been reviewed. Allergies: Patient has no known allergies.         ---------------------------------------------------PHYSICAL EXAM--------------------------------------    Constitutional/General: Alert and oriented x3  Head: Normocephalic and atraumatic  Eyes: PERRL, EOMI, sclera non icteric  Mouth: Oropharynx clear, handling secretions, no trismus, no asymmetry of the posterior oropharynx or uvular edema  Neck: Supple, full ROM, no stridor, no meningeal signs  Respiratory: Lungs clear to auscultation bilaterally, no wheezes, rales, or rhonchi. Not in respiratory distress  Cardiovascular:  Regular rate. Regular rhythm. 2+ distal pulses. Equal extremity pulses. Chest: No chest wall tenderness  GI:  Abdomen Soft, Non tender, Non distended. No rebound, guarding, or rigidity. No pulsatile masses. Musculoskeletal: Moves all extremities x 4. Warm and well perfused, no clubbing, cyanosis, or edema. Capillary refill <3 seconds  Integument: skin warm and dry. No rashes. Neurologic: GCS 15, no focal deficits, symmetric strength 5/5 in the upper and lower extremities bilaterally  Psychiatric: Normal Affect          -------------------------------------------------- RESULTS -------------------------------------------------  I have personally reviewed all laboratory and imaging results for this patient. Results are listed below.      LABS: (Lab results interpreted by me)  Results for orders placed or performed during the hospital encounter of 09/01/22   CBC with Auto Differential   Result Value Ref Range    WBC 6.7 4.5 - 11.5 E9/L    RBC 4.77 3.50 - 5.50 E12/L    Hemoglobin 13.8 11.5 - 15.5 g/dL    Hematocrit 39.2 34.0 - 48.0 %    MCV 82.2 80.0 - 99.9 fL    MCH 28.9 26.0 - 35.0 pg    MCHC 35.2 (H) 32.0 - 34.5 %    RDW 14.8 11.5 - 15.0 fL    Platelets 531 690 - 927 E9/L    MPV 8.7 7.0 - 12.0 fL    Neutrophils % 76.8 43.0 - 80.0 %    Immature Granulocytes % 0.3 0.0 - 5.0 %    Lymphocytes % 10.5 (L) 20.0 - 42.0 %    Monocytes % 10.5 2.0 - 12.0 %    Eosinophils % 1.2 0.0 - 6.0 %    Basophils % 0.7 0.0 - 2.0 %    Neutrophils Absolute 5.14 1.80 - 7.30 E9/L    Immature Granulocytes # 0.02 E9/L    Lymphocytes Absolute 0.70 (L) 1.50 - 4.00 E9/L    Monocytes Absolute 0.70 0.10 - 0.95 E9/L    Eosinophils Absolute 0.08 0.05 - 0.50 E9/L    Basophils Absolute 0.05 0.00 - 0.20 E9/L   Comprehensive Metabolic Panel   Result Value Ref Range    Sodium 123 (L) 132 - 146 mmol/L    Potassium 3.9 3.5 - 5.0 mmol/L    Chloride 85 (L) 98 - 107 mmol/L    CO2 26 22 - 29 mmol/L    Anion Gap 12 7 - 16 mmol/L    Glucose 94 74 - 99 mg/dL    BUN 6 6 - 23 mg/dL    Creatinine 0.7 0.5 - 1.0 mg/dL    GFR Non-African American >60 >=60 mL/min/1.73    GFR African American >60     Calcium 9.3 8.6 - 10.2 mg/dL    Total Protein 6.9 6.4 - 8.3 g/dL    Albumin 4.4 3.5 - 5.2 g/dL    Total Bilirubin 0.3 0.0 - 1.2 mg/dL    Alkaline Phosphatase 131 (H) 35 - 104 U/L    ALT 6 0 - 32 U/L    AST 14 0 - 31 U/L   Troponin   Result Value Ref Range    Troponin, High Sensitivity 11 (H) 0 - 9 ng/L   Magnesium   Result Value Ref Range    Magnesium 1.8 1.6 - 2.6 mg/dL   Urinalysis   Result Value Ref Range    Color, UA Yellow Straw/Yellow    Clarity, UA Clear Clear    Glucose, Ur Negative Negative mg/dL    Bilirubin Urine Negative Negative    Ketones, Urine Negative Negative mg/dL    Specific Gravity, UA <=1.005 1.005 - 1.030    Blood, Urine Negative Negative    pH, UA 6.5 5.0 - 9.0    Protein, UA Negative Negative mg/dL    Urobilinogen, Urine 0.2 <2.0 E.U./dL    Nitrite, Urine Negative Negative    Leukocyte Esterase, Urine TRACE (A) Negative   Urine Drug Screen   Result Value Ref Range    Drug Screen Comment: see below    EKG 12 Lead   Result Value Ref Range    Ventricular Rate 86 BPM    Atrial Rate 86 BPM    P-R Interval 170 ms    QRS Duration 104 ms    Q-T Interval 392 ms    QTc Calculation (Bazett) 469 ms    P Axis 76 degrees    R Axis 89 degrees    T Axis 23 degrees   ,       RADIOLOGY:  Interpreted by Radiologist unless otherwise specified  CT HEAD WO CONTRAST   Final Result   No acute intracranial abnormality. Periventricular leukomalacia. Suspect bilateral mastoiditis. XR CHEST PORTABLE   Final Result   Mild cardiomegaly. Mild right pleural effusion. EKG Interpretation  Interpreted by emergency department physician, Dr. Rena Pryor     Sinus, rate 86, no STEMI, no ischemic change         ------------------------- NURSING NOTES AND VITALS REVIEWED ---------------------------   The nursing notes within the ED encounter and vital signs as below have been reviewed by myself  BP (!) 167/88   Pulse 88   Temp 98.4 °F (36.9 °C) (Oral)   Resp 22   SpO2 100%     Oxygen Saturation Interpretation: Normal    The patients available past medical records and past encounters were reviewed. ------------------------------ ED COURSE/MEDICAL DECISION MAKING----------------------  Medications - No data to display        The cardiac monitor revealed sinus with a heart rate in the 80s as interpreted by me. The cardiac monitor was ordered secondary to the patient's hyponatremia and to monitor the patient for dysrhythmia. CPT 46174         Medical Decision Making:    Labs and imaging reviewed. Patient be admitted for hyponatremia. Counseling: The emergency provider has spoken with the patient and discussed todays results, in addition to providing specific details for the plan of care and counseling regarding the diagnosis and prognosis. Questions are answered at this time and they are agreeable with the plan.       --------------------------------- IMPRESSION AND DISPOSITION ---------------------------------    IMPRESSION  1. Hyponatremia        DISPOSITION  Disposition: Admit to med/surg floor  Patient condition is stable        NOTE: This report was transcribed using voice recognition software.  Every effort was made to ensure accuracy; however, inadvertent computerized transcription errors may be present       Phoebe Da Silva MD  09/01/22 2005

## 2022-09-01 NOTE — ED NOTES
Department of Emergency Medicine  FIRST PROVIDER TRIAGE NOTE             [facility]           9/1/22  10:05 AM EDT    Date of Encounter: 9/1/22   MRN: [id number]      HPI: Gerald Cornejo is a 81 y.o. female who presents to the ED for No chief complaint on file. Patient is a 11year-old that presented to her family doctors this week and had blood work drawn. Patient says she got a call from her PCP that she had extremely low sodium and to come in. Patient states lately she is just felt like she was \"drunk all the time. \"    ROS: Negative for cp, sob, abd pain, or back pain. PE: Gen Appearance/Constitutional: alert  HEENT: NC/NT. PERRLA,  Airway patent. Neck: supple     Initial Plan of Care: All treatment areas with department are currently occupied. Plan to order/Initiate the following while awaiting opening in ED: labs, EKG, and imaging studies.   Initiate Treatment-Testing, Proceed toTreatment Area When Bed Available for ED Attending/MLP to Continue Care    Electronically signed by Ese Ugarte PA-C   DD: 9/1/22       Ese Ugarte PA-C  09/01/22 1001

## 2022-09-01 NOTE — ED NOTES
Patient ambulated to bathroom without assistance. Patient provided urine sample. Specimen sent down to lab.      Kaveh Judd RN  09/01/22 3270

## 2022-09-01 NOTE — ED NOTES
Report called to [facility]. Questions answered. Pt updated on plan of care.      Rachna Patricia RN  09/01/22 3394

## 2022-09-02 LAB
ANION GAP SERPL CALCULATED.3IONS-SCNC: 12 MMOL/L (ref 7–16)
ANION GAP SERPL CALCULATED.3IONS-SCNC: 9 MMOL/L (ref 7–16)
BUN BLDV-MCNC: 5 MG/DL (ref 6–23)
BUN BLDV-MCNC: 7 MG/DL (ref 6–23)
CALCIUM SERPL-MCNC: 9 MG/DL (ref 8.6–10.2)
CALCIUM SERPL-MCNC: 9.1 MG/DL (ref 8.6–10.2)
CHLORIDE BLD-SCNC: 87 MMOL/L (ref 98–107)
CHLORIDE BLD-SCNC: 92 MMOL/L (ref 98–107)
CO2: 22 MMOL/L (ref 22–29)
CO2: 29 MMOL/L (ref 22–29)
CREAT SERPL-MCNC: 0.6 MG/DL (ref 0.5–1)
CREAT SERPL-MCNC: 0.7 MG/DL (ref 0.5–1)
GFR AFRICAN AMERICAN: >60
GFR AFRICAN AMERICAN: >60
GFR NON-AFRICAN AMERICAN: >60 ML/MIN/1.73
GFR NON-AFRICAN AMERICAN: >60 ML/MIN/1.73
GLUCOSE BLD-MCNC: 88 MG/DL (ref 74–99)
GLUCOSE BLD-MCNC: 95 MG/DL (ref 74–99)
HCT VFR BLD CALC: 38.2 % (ref 34–48)
HEMOGLOBIN: 13.7 G/DL (ref 11.5–15.5)
MAGNESIUM: 1.6 MG/DL (ref 1.6–2.6)
MCH RBC QN AUTO: 30.2 PG (ref 26–35)
MCHC RBC AUTO-ENTMCNC: 35.9 % (ref 32–34.5)
MCV RBC AUTO: 84.3 FL (ref 80–99.9)
OSMOLALITY: 259 MOSM/KG (ref 285–310)
PDW BLD-RTO: 14.6 FL (ref 11.5–15)
PHOSPHORUS: 3.5 MG/DL (ref 2.5–4.5)
PLATELET # BLD: 271 E9/L (ref 130–450)
PMV BLD AUTO: 8.7 FL (ref 7–12)
POTASSIUM SERPL-SCNC: 3.5 MMOL/L (ref 3.5–5)
POTASSIUM SERPL-SCNC: 3.8 MMOL/L (ref 3.5–5)
RBC # BLD: 4.53 E12/L (ref 3.5–5.5)
SODIUM BLD-SCNC: 125 MMOL/L (ref 132–146)
SODIUM BLD-SCNC: 126 MMOL/L (ref 132–146)
TSH SERPL DL<=0.05 MIU/L-ACNC: 8.65 UIU/ML (ref 0.27–4.2)
WBC # BLD: 5.5 E9/L (ref 4.5–11.5)

## 2022-09-02 PROCEDURE — 80048 BASIC METABOLIC PNL TOTAL CA: CPT

## 2022-09-02 PROCEDURE — 84100 ASSAY OF PHOSPHORUS: CPT

## 2022-09-02 PROCEDURE — 2580000003 HC RX 258: Performed by: INTERNAL MEDICINE

## 2022-09-02 PROCEDURE — 2140000000 HC CCU INTERMEDIATE R&B

## 2022-09-02 PROCEDURE — 94640 AIRWAY INHALATION TREATMENT: CPT

## 2022-09-02 PROCEDURE — 6370000000 HC RX 637 (ALT 250 FOR IP): Performed by: NURSE PRACTITIONER

## 2022-09-02 PROCEDURE — 36415 COLL VENOUS BLD VENIPUNCTURE: CPT

## 2022-09-02 PROCEDURE — 97535 SELF CARE MNGMENT TRAINING: CPT

## 2022-09-02 PROCEDURE — 83930 ASSAY OF BLOOD OSMOLALITY: CPT

## 2022-09-02 PROCEDURE — 6360000002 HC RX W HCPCS: Performed by: INTERNAL MEDICINE

## 2022-09-02 PROCEDURE — 2700000000 HC OXYGEN THERAPY PER DAY

## 2022-09-02 PROCEDURE — 83735 ASSAY OF MAGNESIUM: CPT

## 2022-09-02 PROCEDURE — 97165 OT EVAL LOW COMPLEX 30 MIN: CPT

## 2022-09-02 PROCEDURE — 84443 ASSAY THYROID STIM HORMONE: CPT

## 2022-09-02 PROCEDURE — 6370000000 HC RX 637 (ALT 250 FOR IP): Performed by: INTERNAL MEDICINE

## 2022-09-02 PROCEDURE — 97161 PT EVAL LOW COMPLEX 20 MIN: CPT

## 2022-09-02 PROCEDURE — 85027 COMPLETE CBC AUTOMATED: CPT

## 2022-09-02 RX ADMIN — METOPROLOL SUCCINATE 25 MG: 25 TABLET, EXTENDED RELEASE ORAL at 08:17

## 2022-09-02 RX ADMIN — LEVOTHYROXINE SODIUM 137 MCG: 0.14 TABLET ORAL at 05:32

## 2022-09-02 RX ADMIN — SERTRALINE HYDROCHLORIDE 50 MG: 50 TABLET ORAL at 08:18

## 2022-09-02 RX ADMIN — ARFORMOTEROL TARTRATE 15 MCG: 15 SOLUTION RESPIRATORY (INHALATION) at 09:18

## 2022-09-02 RX ADMIN — Medication 400 MG: at 08:17

## 2022-09-02 RX ADMIN — MONTELUKAST 10 MG: 10 TABLET, FILM COATED ORAL at 21:10

## 2022-09-02 RX ADMIN — AMLODIPINE BESYLATE 10 MG: 10 TABLET ORAL at 08:18

## 2022-09-02 RX ADMIN — SODIUM CHLORIDE: 9 INJECTION, SOLUTION INTRAVENOUS at 00:18

## 2022-09-02 RX ADMIN — GUAIFENESIN 200 MG: 200 SOLUTION ORAL at 21:32

## 2022-09-02 RX ADMIN — ENOXAPARIN SODIUM 40 MG: 100 INJECTION SUBCUTANEOUS at 08:18

## 2022-09-02 RX ADMIN — PANTOPRAZOLE SODIUM 40 MG: 40 TABLET, DELAYED RELEASE ORAL at 05:33

## 2022-09-02 RX ADMIN — DOCUSATE SODIUM 100 MG: 100 CAPSULE, LIQUID FILLED ORAL at 21:10

## 2022-09-02 RX ADMIN — BUDESONIDE 250 MCG: 0.25 SUSPENSION RESPIRATORY (INHALATION) at 09:18

## 2022-09-02 RX ADMIN — FLUTICASONE PROPIONATE 1 SPRAY: 50 SPRAY, METERED NASAL at 08:18

## 2022-09-02 RX ADMIN — SODIUM CHLORIDE: 9 INJECTION, SOLUTION INTRAVENOUS at 14:35

## 2022-09-02 RX ADMIN — ARFORMOTEROL TARTRATE 15 MCG: 15 SOLUTION RESPIRATORY (INHALATION) at 20:30

## 2022-09-02 RX ADMIN — TRAZODONE HYDROCHLORIDE 50 MG: 50 TABLET ORAL at 21:10

## 2022-09-02 RX ADMIN — BUDESONIDE 250 MCG: 0.25 SUSPENSION RESPIRATORY (INHALATION) at 20:30

## 2022-09-02 ASSESSMENT — PAIN SCALES - GENERAL
PAINLEVEL_OUTOF10: 0
PAINLEVEL_OUTOF10: 0

## 2022-09-02 NOTE — PLAN OF CARE
Problem: Chronic Conditions and Co-morbidities  Goal: Patient's chronic conditions and co-morbidity symptoms are monitored and maintained or improved  9/2/2022 1026 by Fernandez Huizar RN  Outcome: Progressing  9/2/2022 0216 by Candance Deer, RN  Outcome: Progressing     Problem: Discharge Planning  Goal: Discharge to home or other facility with appropriate resources  9/2/2022 1026 by Fernandez Huizar RN  Outcome: Progressing  9/2/2022 0216 by Candance Deer, RN  Outcome: Progressing     Problem: Safety - Adult  Goal: Free from fall injury  9/2/2022 1026 by Fernandez Huizar RN  Outcome: Progressing  Flowsheets (Taken 9/2/2022 0820)  Free From Fall Injury: Instruct family/caregiver on patient safety  9/2/2022 0216 by Candance Deer, RN  Outcome: Progressing

## 2022-09-02 NOTE — CONSULTS
[facility] MD Gary Grant MD Ezequiel Peers, MD Deanne Rubens, FIDEL Mcqueen, ESTER  Consultation  Patient's Name: Hunter Sousa  5:02 PM  9/2/2022    Nephrologist: Gary Mccabe MD    Reason for Consult: Hyponatremia  Requesting Physician:  Kayla Cárdenas DO    Chief Complaint: Abnormal labs    History Obtained From: Patient, chart    History of Present Ilness:    Ms. Raquel Torres is a 81-year-old woman with past medical history notable for COPD and chronic bronchitis in setting of longstanding smoking, alcoholism, having become abstinent roughly 4 months ago, depression on Zoloft chronically started several months ago, diastolic heart failure (HFpEF), chronic lower extremity edema on both loop and thiazide diuretic 1 of which was stopped 1 month ago though she is not sure which one, and hypothyroidism. Over the past several days prior to presentation she notes that she felt, \"drunk all the time, \"meaning unsteady on her feet, \"fuzzy\" mentation, just not feeling well. Has had intermittent nausea and ongoing anorexia with inconsistent intake of late. Has been drinking a lot of water recently. Does not think she has lost any weight. Denies returning to drinking alcoholic beverages. No NSAIDs. No dyspnea. No chest pain. Does have intermittent wheezing and a chronic cough intermittently productive of \"phlegm,\" without hemoptysis. Denies peripheral swelling. No new skin rash or lesion. No new medications. He does still smoke. Sodium on presentation 9/1 was 123 mmol/L, which compares with 135 mmol/L on 9/16/2021. Sodium worsened as of last evening to 122. She was started on normal saline at 75 cc/h. This morning serum sodium improved to 125 mmol/L, improving again to 126 mmol/L as of 1:30 PM this afternoon.     She tells me she is feeling a little bit better since her arrival.    Past Medical History:   Diagnosis Date    Bronchitis     Hypertension Thyroid disease        Past Surgical History:   Procedure Laterality Date    OTHER SURGICAL HISTORY      states had something done right neck area per dr jaeger, might have been an abcess    TYMPANOSTOMY TUBE PLACEMENT         History reviewed. No pertinent family history. reports that she has been smoking cigarettes. She started smoking about 50 years ago. She has a 48.00 pack-year smoking history. She has never used smokeless tobacco. She reports that she does not drink alcohol and does not use drugs. Allergies:  Patient has no known allergies. Current Medications:    albuterol (PROVENTIL) nebulizer solution 2.5 mg, Q4H PRN  amLODIPine (NORVASC) tablet 10 mg, Daily  clonazePAM (KLONOPIN) tablet 1 mg, Daily PRN  fluticasone (FLONASE) 50 MCG/ACT nasal spray 1 spray, Daily  levothyroxine (SYNTHROID) tablet 137 mcg, Daily  magnesium oxide (MAG-OX) tablet 400 mg, Daily  metoprolol succinate (TOPROL XL) extended release tablet 25 mg, Daily  montelukast (SINGULAIR) tablet 10 mg, Nightly  sertraline (ZOLOFT) tablet 50 mg, Daily  docusate sodium (COLACE) capsule 100 mg, Nightly  enoxaparin (LOVENOX) injection 40 mg, Daily  pantoprazole (PROTONIX) tablet 40 mg, QAM AC  ondansetron (ZOFRAN) injection 4 mg, Q6H PRN  acetaminophen (TYLENOL) tablet 650 mg, Q4H PRN  budesonide (PULMICORT) nebulizer suspension 250 mcg, BID   And  Arformoterol Tartrate (BROVANA) nebulizer solution 15 mcg, BID  guaiFENesin (ROBITUSSIN) 100 MG/5ML oral solution 200 mg, Q6H PRN  traZODone (DESYREL) tablet 50 mg, Nightly  0.9 % sodium chloride infusion, Continuous        Review of Systems:   Pertinent items are noted in HPI.     Physical exam:   /69   Pulse 80   Temp 97.4 °F (36.3 °C) (Temporal)   Resp 18   Ht 5' 2\" (1.575 m)   Wt 161 lb (73 kg)   SpO2 96%   BMI 29.45 kg/m²   Age-appropriate white woman in no apparent distress  NC/AT EOMI sclera and conjunctiva clear and anicteric mucous membranes are moist  Neck soft supple trachea midline no bruit  Coarse breath sounds throughout all lung fields, mildly prolonged expiratory phase, occasional scattered wheeze  Regular rhythm normal S1-S2 no murmur no rub  Abdomen soft nontender nondistended normal active bowel sounds  Distal extremities are without edema  Pulses 1-2+ x4  No rash or lesion on visible portions of integument  Moves all 4 extremities  Cranial nerves II through XII grossly intact except she is hard of hearing  Awake, alert, interactive and appropriate    Data:   Labs:  CBC with Differential:    Lab Results   Component Value Date/Time    WBC 5.5 09/02/2022 05:39 AM    RBC 4.53 09/02/2022 05:39 AM    HGB 13.7 09/02/2022 05:39 AM    HCT 38.2 09/02/2022 05:39 AM     09/02/2022 05:39 AM    MCV 84.3 09/02/2022 05:39 AM    MCH 30.2 09/02/2022 05:39 AM    MCHC 35.9 09/02/2022 05:39 AM    RDW 14.6 09/02/2022 05:39 AM    LYMPHOPCT 10.5 09/01/2022 10:23 AM    MONOPCT 10.5 09/01/2022 10:23 AM    BASOPCT 0.7 09/01/2022 10:23 AM    MONOSABS 0.70 09/01/2022 10:23 AM    LYMPHSABS 0.70 09/01/2022 10:23 AM    EOSABS 0.08 09/01/2022 10:23 AM    BASOSABS 0.05 09/01/2022 10:23 AM     CMP:    Lab Results   Component Value Date/Time     09/02/2022 01:32 PM    K 3.8 09/02/2022 01:32 PM    K 2.5 03/30/2021 10:35 AM    CL 92 09/02/2022 01:32 PM    CO2 22 09/02/2022 01:32 PM    BUN 7 09/02/2022 01:32 PM    CREATININE 0.6 09/02/2022 01:32 PM    GFRAA >60 09/02/2022 01:32 PM    LABGLOM >60 09/02/2022 01:32 PM    GLUCOSE 95 09/02/2022 01:32 PM    PROT 6.9 09/01/2022 10:23 AM    LABALBU 4.4 09/01/2022 10:23 AM    CALCIUM 9.1 09/02/2022 01:32 PM    BILITOT 0.3 09/01/2022 10:23 AM    ALKPHOS 131 09/01/2022 10:23 AM    AST 14 09/01/2022 10:23 AM    ALT 6 09/01/2022 10:23 AM     Ionized Calcium:  No results found for: IONCA  Magnesium:    Lab Results   Component Value Date/Time    MG 1.6 09/02/2022 05:39 AM     Phosphorus:    Lab Results   Component Value Date/Time    PHOS 3.5 09/02/2022 05:39 AM     U/A:    Lab Results   Component Value Date/Time    COLORU Yellow 09/01/2022 12:45 PM    PHUR 6.5 09/01/2022 12:45 PM    WBCUA 1-3 09/01/2022 12:45 PM    RBCUA NONE 09/01/2022 12:45 PM    BACTERIA NONE SEEN 09/01/2022 12:45 PM    CLARITYU Clear 09/01/2022 12:45 PM    SPECGRAV <=1.005 09/01/2022 12:45 PM    LEUKOCYTESUR TRACE 09/01/2022 12:45 PM    UROBILINOGEN 0.2 09/01/2022 12:45 PM    BILIRUBINUR Negative 09/01/2022 12:45 PM    BLOODU Negative 09/01/2022 12:45 PM    GLUCOSEU Negative 09/01/2022 12:45 PM         Imaging:  CT HEAD WO CONTRAST   Final Result   No acute intracranial abnormality. Periventricular leukomalacia. Suspect bilateral mastoiditis. XR CHEST PORTABLE   Final Result   Mild cardiomegaly. Mild right pleural effusion. Assessment  Hyponatremia, hypoosmolar. [place] well below 1%, urinary indices are consistent with hypovolemic hyponatremia and serum sodium is improving with conservative-rate IV normal saline. Etiology is likely multifactorial, acutely due to poor intake and recent water load. Complicating circumstances are intrinsic lung disease due to COPD, treatment with an SSRI, likely beer drinkers [place] janel, though I would expect that to be improving with 4 months of abstinence, probably exacerbated in part by diastolic dysfunction uses a thiazide diuretic, that has also been discontinued. Recommendations  Continue IV normal saline at conservative rate  Follow labs, UO  I would not stop the Zoloft as she has been on it but in the 6 months and it seems to be helping her depression, though notably seem to some of her symptoms started not long after starting the Zoloft  Minimize use of NSAIDs, would advised none at all  Stop thiazide diuretic, use loop for edema        Thank you for the opportunity to participate in the care of your pleasant patient. We look forward to following along with you.         Electronically signed by Sanjiv Be MD on 9/2/2022

## 2022-09-02 NOTE — PROGRESS NOTES
[place] [address] [address]      Date:2022                                                  Patient Name: Francisca Merlin  MRN: [id number]  : 1950  Room: [address] [place] [address]    Evaluating OT: BIA Diaz, OTR/L  # [id number]    Referring Provider:  Anali Medina DO  Specific Provider Orders:  Philip Green and Treat\"  22    Diagnosis: Hyponatremia [E87.1]    Pt was admitted d/t abnormal labs - Low Sodium    Pertinent Medical History:  Pt has a past medical history of Bronchitis, Hypertension, and Thyroid disease.,  has a past surgical history that includes other surgical history and Tympanostomy tube placement.     Surgeries this admission: None     Precautions:  Fall Risk  [place]  2L O2    Assessment of current deficits   [x] Functional mobility  [x]ADLs  [x] Strength               []Cognition   [x] Functional transfers   [x] IADLs         [x] Safety Awareness   [x]Endurance   [] Fine Coordination              [x] Balance     [] Vision/perception   []Sensation    []Gross Motor Coordination  [] ROM  [] Delirium                  [] Motor Control       OT PLAN OF CARE   OT POC based on physician orders, patient diagnosis and results of clinical assessment    Frequency/Duration 1-3 days/wk for 2 weeks PRN   Specific OT Treatment to include:     * Instruction/training on adapted ADL techniques and AE recommendations to increase functional independence within precautions       * Training on energy conservation strategies, correct breathing pattern and techniques to improve independence/tolerance for self-care routine  * Functional transfer/mobility training/DME recommendations for increased independence, safety, and fall prevention  * Patient/Family education to increase follow through with safety techniques and functional independence  * Recommendation of environmental modifications for increased safety with functional transfers/mobility and ADLs  * Cognitive retraining/development of therapeutic activities to improve problem solving, judgement, memory, and attention for increased safety/participation in ADL/IADL tasks  * Therapeutic exercise to improve motor endurance, ROM, and functional strength for ADLs/functional transfers  * Therapeutic activities to facilitate/challenge dynamic balance, stand tolerance for increased safety and independence with ADLs  * Therapeutic activities to facilitate gross/fine motor skills for increased independence with ADLs  * Neuro-muscular re-education: facilitation of righting/equilibrium reactions  * Positioning to improve skin integrity, interaction with environment and functional independence  * Delirium prevention/treatment  * Manual techniques for edema management  Other:    Recommended Adaptive Equipment: TBD as pt progresses       Home Living:  Pt lives alone in a 2-story house, 5 [address]. Bed/bath on the 2nd floor, half bath on 1st floor.  (+) Basement. Bathroom setup:  Tub-Shower, Standard-height Commode   Equipment owned:  None    Available Family Assist:  Family members live locally - provide assist PRN    Prior Level of Function:  Pt reported being IND with all ADLs, Light IADLs, Transfers and Mobility using No AD for ambulation. Driving:  Yes - errands, outings - Family provides assist for shopping  Occupation:  None reported    Pain Level:  denied pain    Additional Complaints:  c/o Congestion    Cognition: A & O x 3 - cues for day/date   Able to Follow Multi-Step Commands w/ Min VCs   Memory:  fair (+)   Sequencing:  fair (+)   Problem solving:  fair (+)   Judgement/safety:  fair (+)  Additional Comments:  Pt was pleasant and cooperative.       Vitals/Lab Values:  Found on Room air seated EOB O2 sats prior to initiation of ax = 95%  O2 sats w/ standing ax/mobility = 88% on room air  O2 sats w/ 2L O2 = 96%        Functional Assessment:  AM-PAC Daily Activity Raw Score: 18/24     Initial Eval Status  Date: 9/2/22   Treatment Status  Date: STGs = LTGs  Time frame: 10-14 days   Feeding IND      NA   Grooming SUP/Set up    Able to complete tasks after set up w/ SUP for safety standing at sink - O2 sats 88%, no SOB    IND  Standing at the Sink   UB Dressing SUP/Set up    Mikala gilman seated EOB  Unable to tolerate item retrieval for activities    IND     LB Dressing Min A/Set up    Seated/standing EOB  Pt ed for safe/adaptive techs, use of adaptive equip    IND     Bathing NT    Pt ed re: Benefits of use of Shower chair/Tub Bench, resources for obtaining equip    Mod I      Toileting NT    Pt declined    IND     Bed Mobility  Supine to sit: NT   Sit to supine:  NT     Seated EOB    Supine to sit: IND  Sit to supine: IND     Functional Transfers Close SUP    EOB 3x  Pt ed for safety/hand placement    IND     Functional Mobility SUP w/o AD    Short distances at b/s, household distances in room bed<>Bathroom  Pt ed for safety/improved safety awareness, walker safety    IND     Balance Sitting:     Static:  IND EOB    Dynamic:  SUP w/ functional ax EOB     Standing:     Static:  Close SUP w/o AD    Dynamic:  Close SUP w/ functional ax/mobility w/o AD    Sitting:     Static:  IND    Dynamic:  IND w/ functional ax    Standing:     Static:  IND w/ AD PRN    Dynamic:  IND w/ functional ax/mobility w/ AD PRN   Activity Tolerance Fair(+)    Able to tolerate sitting EOB w/ light ax for an extended time  Able to tolerate ~ 5 mins standing ax - O2 sats 88% on room air    Good   Visual/  Perceptual    Hearing:  WNL   Glasses: Reading    [place]  Hearing Aids:  No               Hand Dominance: Right   AROM Strength Additional Info:    RUE  WFL WFL Good ;   Good FMC/dexterity noted during ADL tasks     [facility] WFL Good ;    Good FMC/dexterity noted during ADL tasks       Sensation:  Denies numbness or tingling Wojciech UEs   Tone: WFL Wojciech UEs   Edema: None Noted Wojciech UEs     Comments: Upon arrival, patient was found seated EOB, NC removed. She was agreeable to participate in therapeutic ax. No Family present during session. Received permission from RN prior to engaging pt in OT services. Educated pt on role of OT services. At the end of the session, patient requested to return to sitting EOB - declined chair. Call light and phone within reach, all lines and tubes intact. Oriented pt to call bell. Made all appropriate Environmental Modifications to facilitate pt's level of IND and safety. All needs met. Bed Alarm activated. Overall patient demonstrated decreased independence and safety during completion of ADL/functional transfer/mobility tasks. Pt would benefit from continued skilled OT to increase safety and independence with completion of ADL/IADL tasks for functional independence and quality of life. Treatment: OT treatment provided this date includes:   Instruction/training on safety and adapted techniques for completion of ADLs, use of DME/AD/Adaptive equip:    Instruction/training on safe functional mobility/transfer techniques, use of DME/AD:    Instruction/training on energy conservation techs (EC)/Pursed-Lip Breathing (PLB)/work simplification for completion of ADLs:     Neuromuscular Reeducation to facilitate balance/righting reactions for increased function with ADLs:   Activity tolerance - Sitting/Standing to improve endurance w/ functional ax   Cognitive retraining -  Oriented pt to current Date, Place and Situation; Cues for safety/safety awareness, sequencing, problem solving    Skilled monitoring of Vitals during session and pt's response to tx ax      Consulted RN    Made all appropriate Environmental Modifications to facilitate pt's level of IND and safety. Recommendations for Continued Participation in OT services during Hospitalization     Pt and/or Family verbalized/demonstrated a Fair(+) understanding of education provided. Will Review PRN. Rehab Potential: Good for established goals     Patient / Family Goal: Not stated      Patient and/or family were instructed on functional diagnosis, prognosis/goals and OT plan of care. Demonstrated Fair(+) understanding. Eval Complexity: Low    Time In: 0856  Time Out: 0919  Total Treatment Time: 8 minutes    Min Units   OT Eval Low 97165  X  1   OT Eval Medium 48906      OT Eval High 52083      OT Re-Eval [id number]       Therapeutic Ex 52395       Therapeutic Activities 03144       ADL/Self Care 93420  8  1   Orthotic Management 57694       Manual [id number]     Neuro Re-Ed 59994       Non-Billable Time              Evaluation Time additionally includes thorough review of current medical information, gathering information on past medical history/social history and prior level of function, completion of standardized testing/informal observation of tasks, assessment of data and education on plan of care and goals.             BIA Mulligan, OTR/L  # [id number]

## 2022-09-02 NOTE — PROGRESS NOTES
Physical Therapy  Physical Therapy Initial Assessment     Name: Corie Worthy  : 1950  MRN: [id number]      Date of Service: 2022    Evaluating PT:  [place], PT, DPT [id number]    Room #:  4772/8947-X  Diagnosis:  Hyponatremia [E87.1]  PMHx/PSHx:    Past Medical History:   Diagnosis Date    Bronchitis     Hypertension     Thyroid disease      Procedure/Surgery:  None  Precautions:  Falls  Equipment Needs:  None    SUBJECTIVE:    Pt lives alone in a 2 story home with 5 stairs to enter and no rail. Full flight of steps and 1 rail to bedroom. Pt ambulated without device and was independent PTA. OBJECTIVE:   Initial Evaluation  Date: 22 Treatment Short Term/ Long Term   Goals   AM-PAC 6 Clicks      Was pt agreeable to Eval/treatment? Yes     Does pt have pain?  No c/o pain     Bed Mobility  Rolling: NT  Supine to sit: NT  Sit to supine: NT  Scooting: NT  Mod Independent   Transfers Sit to stand: SBA  Stand to sit: SBA  Stand pivot: SBA no device  Independent   Ambulation   90 feet with SBA no device  >400 feet Independently   Stair negotiation: ascended and descended NT - declined  >10 steps with 1 rail Mod Independent   ROM BUE:  WFL  BLE:  WFL     Strength BUE:  WFL  BLE:  4/5  Increase by 1/3 MMT grade   Balance Sitting EOB:  Independent  Dynamic Standing:  SBA no device  Sitting EOB:  Independent  Dynamic Standing:  Independent     Pt is A & O x 4  Sensation:  No reported paresthesias  Edema:  None    Therapeutic Exercises:  NA    Patient education  Pt educated on safety    Patient response to education:   Pt verbalized understanding Pt demonstrated skill Pt requires further education in this area   x x x     ASSESSMENT:    Conditions Requiring Skilled Therapeutic Intervention:    []Decreased strength     []Decreased ROM  [x]Decreased functional mobility  [x]Decreased balance   [x]Decreased endurance   []Decreased posture  []Decreased sensation  []Decreased coordination   []Decreased vision  []Decreased safety awareness   []Increased pain       Comments:  Pt was sitting EOB upon arrival, agreeable to initial evaluation. Pt ambulated with decreased gait speed and slight unsteadiness but no LOB noted. Mild SOB noted with activity. Pt declined stair negotiation at this time. Pt was left sitting EOB, per request, with all needs met and call light in reach. Will add gait team.    Treatment:  Patient practiced and was instructed in the following treatment:    Transfer training - pt was given verbal cues to facilitate proper hand placement, technique and safety during sit to stand, stand to sit and stand pivot transfers. Gait training- pt was given verbal cues to facilitate safety during ambulation. Pt's/ family goals   1. Return home    Prognosis is good for reaching above PT goals. Patient and or family understand(s) diagnosis, prognosis, and plan of care.   yes    PHYSICAL THERAPY PLAN OF CARE:    PT POC is established based on physician order and patient diagnosis     Referring provider/PT Order:  Frida Guerrero, DO /09/01/22 1700 PT eval and treat  Diagnosis:  Hyponatremia [E87.1]  Specific instructions for next treatment:  Progress activity    Current Treatment Recommendations:     [x] Strengthening to improve independence with functional mobility   [] ROM to improve independence with functional mobility   [x] Balance Training to improve static/dynamic balance and to reduce fall risk  [x] Endurance Training to improve activity tolerance during functional mobility   [x] Transfer Training to improve safety and independence with all functional transfers   [x] Gait Training to improve gait mechanics, endurance and asses need for appropriate assistive device  [x] Stair Training in preparation for safe discharge home and/or into the community   [] Positioning to prevent skin breakdown and contractures  [x] Safety and Education Training   [x] Patient/Caregiver Education   [] HEP  [] Other     PT long term treatment goals are located in above grid    Frequency of treatments: 2-5x/week x 1-2 weeks. Time in  0738  Time out  0750    Total Treatment Time  - minutes     Evaluation Time includes thorough review of current medical information, gathering information on past medical history/social history and prior level of function, completion of standardized testing/informal observation of tasks, assessment of data and education on plan of care and goals.     CPT codes:  [x] Low Complexity PT evaluation 73604  [] Moderate Complexity PT evaluation 53785  [] High Complexity PT evaluation 21200  [] PT Re-evaluation 19607  [] Gait training 92876 - minutes  [] Manual therapy 92932 - minutes  [] Therapeutic activities 94929 -- minutes  [] Therapeutic exercises 95992 - minutes  [] Neuromuscular reeducation 15441 - minutes     Fady Ornelas, PT, DPT  [id number]

## 2022-09-02 NOTE — CARE COORDINATION
Attempted to meet with pt who was asleep, unable to awaken, will follow up later today. Marino Bains, MSW, LSW

## 2022-09-02 NOTE — H&P
[facility]  History and Physical      CHIEF COMPLAINT:    Chief Complaint   Patient presents with    Other     Told to come in for low sodium, states she \"feels drunk all the time\"        Patient of Stevie Walters DO presents with:  Hyponatremia    History of Present Illness:   Patient is a 73-year-old female with a past medical history of bronchitis, hypertension, hypothyroidism. Patient went to see years PCP due to feeling drunk all the time. He ordered outpatient lab work to be done patient received phone call from her PCPs office and told her to come into the ED as her sodium level was critically low. Patient admits to being an alcoholic however she has been sober for the past 4 months. Patient admits there are no aggravating factors or relieving factors. Patient states that she has been eating and drinking however she has been drinking a lot more water than she normally has. Patient is alert and oriented on examination. Patient denies any chest pain, shortness of breath, fever, chills, headache, dizziness, blurred vision, and abdominal pain. Patient is currently utilizing 3 L O2 and her baseline is room air at home. Patient does admit to smoking. REVIEW OF SYSTEMS:  Pertinent negatives are above in HPI. 10 point ROS otherwise negative.       Past Medical History:   Diagnosis Date    Bronchitis     Hypertension     Thyroid disease          Past Surgical History:   Procedure Laterality Date    OTHER SURGICAL HISTORY      states had something done right neck area per dr jaeger, might have been an abcess    TYMPANOSTOMY TUBE PLACEMENT         Medications Prior to Admission:    Medications Prior to Admission: fluticasone-vilanterol (BREO ELLIPTA) 100-25 MCG/INH AEPB inhaler, Inhale 1 puff into the lungs daily  albuterol (PROVENTIL) (2.5 MG/3ML) 0.083% nebulizer solution, Take 2.5 mg by nebulization 3 times daily as needed  fluticasone (FLONASE) 50 MCG/ACT nasal spray, 1 spray by Nasal route daily  montelukast (SINGULAIR) 10 MG tablet, Take 10 mg by mouth every morning  albuterol sulfate  (90 Base) MCG/ACT inhaler, Inhale 2 puffs into the lungs every 6 hours as needed for Wheezing  potassium chloride (KLOR-CON M) 20 MEQ extended release tablet, Take 1 tablet by mouth daily  sertraline (ZOLOFT) 50 MG tablet, Take 50 mg by mouth daily  metoprolol succinate (TOPROL XL) 25 MG extended release tablet, Take 25 mg by mouth daily  amLODIPine (NORVASC) 5 MG tablet, Take 5 mg by mouth in the morning and at bedtime  omeprazole (PRILOSEC) 20 MG delayed release capsule, Take 20 mg by mouth daily  clonazePAM (KLONOPIN) 1 MG tablet, Take 0.5 mg by mouth daily as needed for Anxiety. levothyroxine (SYNTHROID) 137 MCG tablet, Take 137 mcg by mouth Daily     Note that the patient's home medications were reviewed and the above list is accurate to the best of my knowledge at the time of the exam.    Allergies:    Patient has no known allergies. Social History:    reports that she has been smoking cigarettes. She started smoking about 50 years ago. She has a 48.00 pack-year smoking history. She has never used smokeless tobacco. She reports that she does not drink alcohol and does not use drugs. Family History:   Unknown to patient at this time      PHYSICAL EXAM:    Vitals:  /69   Pulse 80   Temp 97.4 °F (36.3 °C) (Temporal)   Resp 18   Ht 5' 2\" (1.575 m)   Wt 161 lb (73 kg)   SpO2 96%   BMI 29.45 kg/m²       General appearance: NAD, conversant  Eyes: Sclerae anicteric, PERRLA  HEENT: AT/NC, MMM  Neck: FROM, supple, no thyromegaly  Lymph: No cervical / supraclavicular lymphadenopathy  Lungs: Rhonchorous in the bases, 3 L O2  CV: RRR, no MRGs, no lower extremity edema  Abdomen: Soft, non-tender; no masses or HSM, +BS  Extremities: FROM without synovitis. No clubbing or cyanosis of the hands. Skin: no rash, induration, lesions, or ulcers  Psych: Calm and cooperative.   Normal judgement and insight. Normal mood and affect. Neuro: Alert and interactive, face symmetric, speech fluent. LABS:  All labs reviewed. Of note:  CBC with Differential:    Lab Results   Component Value Date/Time    WBC 5.5 09/02/2022 05:39 AM    RBC 4.53 09/02/2022 05:39 AM    HGB 13.7 09/02/2022 05:39 AM    HCT 38.2 09/02/2022 05:39 AM     09/02/2022 05:39 AM    MCV 84.3 09/02/2022 05:39 AM    MCH 30.2 09/02/2022 05:39 AM    MCHC 35.9 09/02/2022 05:39 AM    RDW 14.6 09/02/2022 05:39 AM    LYMPHOPCT 10.5 09/01/2022 10:23 AM    MONOPCT 10.5 09/01/2022 10:23 AM    BASOPCT 0.7 09/01/2022 10:23 AM    MONOSABS 0.70 09/01/2022 10:23 AM    LYMPHSABS 0.70 09/01/2022 10:23 AM    EOSABS 0.08 09/01/2022 10:23 AM    BASOSABS 0.05 09/01/2022 10:23 AM     CMP:    Lab Results   Component Value Date/Time     09/02/2022 01:32 PM    K 3.8 09/02/2022 01:32 PM    K 2.5 03/30/2021 10:35 AM    CL 92 09/02/2022 01:32 PM    CO2 22 09/02/2022 01:32 PM    BUN 7 09/02/2022 01:32 PM    CREATININE 0.6 09/02/2022 01:32 PM    GFRAA >60 09/02/2022 01:32 PM    LABGLOM >60 09/02/2022 01:32 PM    GLUCOSE 95 09/02/2022 01:32 PM    PROT 6.9 09/01/2022 10:23 AM    LABALBU 4.4 09/01/2022 10:23 AM    CALCIUM 9.1 09/02/2022 01:32 PM    BILITOT 0.3 09/01/2022 10:23 AM    ALKPHOS 131 09/01/2022 10:23 AM    AST 14 09/01/2022 10:23 AM    ALT 6 09/01/2022 10:23 AM       Imaging:  CT head: No intracranial abnormality. Periventricular leukomalacia. Suspect bilateral mastoiditis. CXR: Mild right pleural effusion    EKG:  I've personally reviewed the patient's EKG:  NSR    Telemetry:  I've personally reviewed the patient's telemetry:  NSR    ASSESSMENT/PLAN:  Principal Problem:    Hyponatremia  Resolved Problems:    * No resolved hospital problems.  *    53-year-old female with past medical history of hypertension hypothyroidism presents to the ED with abnormal labs and is admitted to telemetry unit with    Hyponatremia EKG normal status and

## 2022-09-02 NOTE — PATIENT CARE CONFERENCE
[facility] Quality Flow/Interdisciplinary Rounds Progress Note        Quality Flow Rounds held on September 2, 2022    Disciplines Attending:  Bedside Nurse, , , and Nursing Unit Leadership    Farzana Luciano was admitted on 9/1/2022 12:01 PM    Anticipated Discharge Date:  Expected Discharge Date: 09/03/22    Disposition:    Franklin Score:  Franklin Scale Score: 20    Readmission Risk              Risk of Unplanned Readmission:  13           Discussed patient goal for the day, patient clinical progression, and barriers to discharge.   The following Goal(s) of the Day/Commitment(s) have been identified:  Labs - Report Results      Uriel Cook RN  September 2, 2022

## 2022-09-03 ENCOUNTER — APPOINTMENT (OUTPATIENT)
Dept: CT IMAGING | Age: 72
DRG: 181 | End: 2022-09-03
Payer: MEDICARE

## 2022-09-03 LAB
ANION GAP SERPL CALCULATED.3IONS-SCNC: 12 MMOL/L (ref 7–16)
BUN BLDV-MCNC: 4 MG/DL (ref 6–23)
CALCIUM SERPL-MCNC: 9.2 MG/DL (ref 8.6–10.2)
CHLORIDE BLD-SCNC: 91 MMOL/L (ref 98–107)
CO2: 27 MMOL/L (ref 22–29)
CREAT SERPL-MCNC: 0.6 MG/DL (ref 0.5–1)
GFR AFRICAN AMERICAN: >60
GFR NON-AFRICAN AMERICAN: >60 ML/MIN/1.73
GLUCOSE BLD-MCNC: 85 MG/DL (ref 74–99)
POTASSIUM SERPL-SCNC: 3.7 MMOL/L (ref 3.5–5)
SODIUM BLD-SCNC: 130 MMOL/L (ref 132–146)

## 2022-09-03 PROCEDURE — 80048 BASIC METABOLIC PNL TOTAL CA: CPT

## 2022-09-03 PROCEDURE — 6360000002 HC RX W HCPCS: Performed by: INTERNAL MEDICINE

## 2022-09-03 PROCEDURE — 2140000000 HC CCU INTERMEDIATE R&B

## 2022-09-03 PROCEDURE — 71250 CT THORAX DX C-: CPT

## 2022-09-03 PROCEDURE — 6370000000 HC RX 637 (ALT 250 FOR IP): Performed by: NURSE PRACTITIONER

## 2022-09-03 PROCEDURE — 6370000000 HC RX 637 (ALT 250 FOR IP): Performed by: INTERNAL MEDICINE

## 2022-09-03 PROCEDURE — 2580000003 HC RX 258: Performed by: INTERNAL MEDICINE

## 2022-09-03 PROCEDURE — 36415 COLL VENOUS BLD VENIPUNCTURE: CPT

## 2022-09-03 RX ORDER — LEVOTHYROXINE SODIUM 0.1 MG/1
150 TABLET ORAL DAILY
Status: DISCONTINUED | OUTPATIENT
Start: 2022-09-04 | End: 2022-09-07 | Stop reason: HOSPADM

## 2022-09-03 RX ADMIN — ENOXAPARIN SODIUM 40 MG: 100 INJECTION SUBCUTANEOUS at 07:47

## 2022-09-03 RX ADMIN — LEVOTHYROXINE SODIUM 137 MCG: 0.14 TABLET ORAL at 06:50

## 2022-09-03 RX ADMIN — SODIUM CHLORIDE: 9 INJECTION, SOLUTION INTRAVENOUS at 18:40

## 2022-09-03 RX ADMIN — GUAIFENESIN 200 MG: 200 SOLUTION ORAL at 18:42

## 2022-09-03 RX ADMIN — FLUTICASONE PROPIONATE 1 SPRAY: 50 SPRAY, METERED NASAL at 07:48

## 2022-09-03 RX ADMIN — METOPROLOL SUCCINATE 25 MG: 25 TABLET, EXTENDED RELEASE ORAL at 07:47

## 2022-09-03 RX ADMIN — DOCUSATE SODIUM 100 MG: 100 CAPSULE, LIQUID FILLED ORAL at 20:38

## 2022-09-03 RX ADMIN — TRAZODONE HYDROCHLORIDE 50 MG: 50 TABLET ORAL at 20:38

## 2022-09-03 RX ADMIN — SERTRALINE HYDROCHLORIDE 50 MG: 50 TABLET ORAL at 07:47

## 2022-09-03 RX ADMIN — AMLODIPINE BESYLATE 10 MG: 10 TABLET ORAL at 07:47

## 2022-09-03 RX ADMIN — Medication 400 MG: at 07:47

## 2022-09-03 RX ADMIN — PANTOPRAZOLE SODIUM 40 MG: 40 TABLET, DELAYED RELEASE ORAL at 06:50

## 2022-09-03 RX ADMIN — MONTELUKAST 10 MG: 10 TABLET, FILM COATED ORAL at 20:38

## 2022-09-03 RX ADMIN — ACETAMINOPHEN 650 MG: 325 TABLET, FILM COATED ORAL at 06:49

## 2022-09-03 ASSESSMENT — PAIN SCALES - GENERAL
PAINLEVEL_OUTOF10: 8
PAINLEVEL_OUTOF10: 0

## 2022-09-03 ASSESSMENT — PAIN DESCRIPTION - DESCRIPTORS: DESCRIPTORS: ACHING;DISCOMFORT;SORE

## 2022-09-03 ASSESSMENT — PAIN DESCRIPTION - LOCATION: LOCATION: BACK

## 2022-09-03 ASSESSMENT — PAIN DESCRIPTION - ORIENTATION: ORIENTATION: LOWER

## 2022-09-03 NOTE — PLAN OF CARE
Problem: Chronic Conditions and Co-morbidities  Goal: Patient's chronic conditions and co-morbidity symptoms are monitored and maintained or improved  9/3/2022 0753 by Becky Mccarthy RN  Outcome: Progressing  9/2/2022 2220 by Oscar Gustafson RN  Outcome: Progressing     Problem: Discharge Planning  Goal: Discharge to home or other facility with appropriate resources  9/3/2022 0753 by Becky Mccarthy RN  Outcome: Progressing  9/2/2022 2220 by Oscar Gustafson RN  Outcome: Progressing     Problem: Safety - Adult  Goal: Free from fall injury  9/3/2022 0753 by Becky Mccarthy RN  Outcome: Progressing  Flowsheets (Taken 9/3/2022 0750)  Free From Fall Injury: Instruct family/caregiver on patient safety  9/2/2022 2220 by Oscar Gustafson RN  Outcome: Progressing     Problem: ABCDS Injury Assessment  Goal: Absence of physical injury  9/3/2022 0753 by Becky Mccarthy RN  Outcome: Progressing  Flowsheets (Taken 9/3/2022 0750)  Absence of Physical Injury: Implement safety measures based on patient assessment  9/2/2022 2220 by Oscar Gustafson RN  Outcome: Progressing

## 2022-09-03 NOTE — PATIENT CARE CONFERENCE
[facility] Quality Flow/Interdisciplinary Rounds Progress Note        Quality Flow Rounds held on September 3, 2022    Disciplines Attending:  Bedside Nurse, , , and Nursing Unit Leadership    Evan Cifuentes was admitted on 9/1/2022 12:01 PM    Anticipated Discharge Date:  Expected Discharge Date: 09/03/22    Disposition:    Franklin Score:  Franklin Scale Score: 19    Readmission Risk              Risk of Unplanned Readmission:  15           Discussed patient goal for the day, patient clinical progression, and barriers to discharge.   The following Goal(s) of the Day/Commitment(s) have been identified:  Diagnostics - Report Results and Labs - Report Results      Bethanne Cabot, RN  September 3, 2022

## 2022-09-03 NOTE — PROGRESS NOTES
Internal Medicine   Progress Note    Admit Date: 9/1/2022  Hospital day:    Hospital Day: 3  SUBJECTIVE:  Was seen at the radiology suite. Waiting to get a CT done. No new acute complaints. She states she continues to have cough. Feels weak. No chest pain or palpitations. No nausea or vomiting. No abdominal pain. No fever. Has history of tobacco use and recent weight loss. OBJECTIVE:     /62   Pulse 85   Temp (!) 96.7 °F (35.9 °C) (Temporal)   Resp 17   Ht 5' 2\" (1.575 m)   Wt 161 lb (73 kg)   SpO2 100%   BMI 29.45 kg/m²   Patient Vitals for the past 24 hrs:   BP Temp Temp src Pulse Resp SpO2   09/03/22 0751 131/62 (!) 96.7 °F (35.9 °C) Temporal 85 17 100 %   09/03/22 0315 (!) 151/86 97 °F (36.1 °C) Temporal 84 20 99 %   09/02/22 2345 129/65 97.2 °F (36.2 °C) Temporal 83 16 95 %   09/02/22 2100 139/65 (!) 96.4 °F (35.8 °C) Temporal 72 14 95 %   09/02/22 2030 -- -- -- 69 25 99 %   09/02/22 1500 136/69 97.4 °F (36.3 °C) Temporal 80 18 96 %     24HR INTAKE/OUTPUT:    Intake/Output Summary (Last 24 hours) at 9/3/2022 1119  Last data filed at 9/3/2022 0751  Gross per 24 hour   Intake 1461.22 ml   Output --   Net 1461.22 ml      GENERAL:  Alert,breathing easily, not in apparent acute distress. EYES:  No pallor, no icterus. ENT:Oral mucosa moist. Neck supple, JVD not appreciated  LUNGS: Coarse breath sounds with wheezing  CARDIOVASCULAR:  S1 and S2 regular to auscultate. ABDOMEN:  Soft, non-tender. Bowel sounds present  NEUROLOGIC:  Alert, and oriented.   Generally weak    Data      Recent Labs     09/01/22  1023 09/02/22  0539   WBC 6.7 5.5   HGB 13.8 13.7    271     Recent Labs     09/02/22  0539 09/02/22  1332 09/03/22  0421   * 126* 130*   K 3.5 3.8 3.7   CL 87* 92* 91*   CO2 29 22 27   PHOS 3.5  --   --    BUN 5* 7 4*   CREATININE 0.7 0.6 0.6   GLUCOSE 88 95 85     Recent Labs     09/01/22  1023   AST 14   ALT 6   BILITOT 0.3   ALKPHOS 131*     Lab Results   Component Value Date    TSH 8.650 (H) 09/02/2022       CT HEAD WO CONTRAST    Result Date: 9/1/2022  EXAMINATION: CT OF THE HEAD WITHOUT CONTRAST  9/1/2022 2:28 pm TECHNIQUE: CT of the head was performed without the administration of intravenous contrast. Automated exposure control, iterative reconstruction, and/or weight based adjustment of the mA/kV was utilized to reduce the radiation dose to as low as reasonably achievable. COMPARISON: None. HISTORY: ORDERING SYSTEM PROVIDED HISTORY: dizzy TECHNOLOGIST PROVIDED HISTORY: Has a \"code stroke\" or \"stroke alert\" been called? ->No Reason for exam:->dizzy Decision Support Exception - unselect if not a suspected or confirmed emergency medical condition->Emergency Medical Condition (MA) What reading provider will be dictating this exam?->CRC FINDINGS: BRAIN/VENTRICLES: There is no acute intracranial hemorrhage, mass effect or midline shift. No abnormal extra-axial fluid collection. The gray-white differentiation is maintained without evidence of an acute infarct. There is no evidence of hydrocephalus. ORBITS: The visualized portion of the orbits demonstrate no acute abnormality. SINUSES: The visualized paranasal sinuses demonstrate no acute abnormality. There is partial opacification of bilateral mastoid air cells. SOFT TISSUES/SKULL:  No acute abnormality of the visualized skull or soft tissues. No acute intracranial abnormality. Periventricular leukomalacia. Suspect bilateral mastoiditis. XR CHEST PORTABLE    Result Date: 9/1/2022  EXAMINATION: ONE XRAY VIEW OF THE CHEST 9/1/2022 1:04 pm COMPARISON: None. HISTORY: ORDERING SYSTEM PROVIDED HISTORY: Shortness of breath TECHNOLOGIST PROVIDED HISTORY: Reason for exam:->Shortness of breath What reading provider will be dictating this exam?->CRC FINDINGS: Lungs are clear. The heart is mildly enlarged. There is no pneumothorax. This blunting of the right costophrenic angle. Mild scoliosis of the thoracic spine.      Mild cardiomegaly. Mild right pleural effusion. Medications     sodium chloride 80 mL/hr at 09/02/22 1820      amLODIPine  10 mg Oral Daily    fluticasone  1 spray Each Nostril Daily    levothyroxine  137 mcg Oral Daily    magnesium oxide  400 mg Oral Daily    metoprolol succinate  25 mg Oral Daily    montelukast  10 mg Oral Nightly    sertraline  50 mg Oral Daily    docusate sodium  100 mg Oral Nightly    enoxaparin  40 mg SubCUTAneous Daily    pantoprazole  40 mg Oral QAM AC    budesonide  0.25 mg Nebulization BID    And    Arformoterol Tartrate  15 mcg Nebulization BID    traZODone  50 mg Oral Nightly      ADULT DIET; Regular    ASSESSMENT/PLAN:  *Hyponatremia, continue IV fluids  *Shortness of breath/chronic bronchitis. Continues to feel short of breath. On inhalers. Will consult pulmonary  *Hypertension blood pressure stable on current medication  *History of CHF  *Tobacco use  *GERD on omeprazole  *Hypothyroidism TSH been high. Will increase Synthroid to 150 mcg  Called regarding CT chest report which showed a lesion -had already consulted pulm  PLAN:  Renal following. Consult pulmonary  Continue nebulizers. IV fluids. Increase Synthroid as above. DVT  prophylaxis on Lovenox.   PT and OT      Electronically signed by Adalid Arreguin MD on 9/3/2022 at 11:19 AM

## 2022-09-03 NOTE — PROGRESS NOTES
expiratory phase, occasional scattered wheeze  Regular rhythm normal S1-S2 no murmur no rub  Abdomen soft nontender nondistended normal active bowel sounds  Distal extremities are without edema  Pulses 1-2+ x4  No rash or lesion on visible portions of integument  Moves all 4 extremities  Cranial nerves II through XII grossly intact except she is hard of hearing  Awake, alert, interactive and appropriate    Data:   Labs:  CBC with Differential:    Lab Results   Component Value Date/Time    WBC 5.5 09/02/2022 05:39 AM    RBC 4.53 09/02/2022 05:39 AM    HGB 13.7 09/02/2022 05:39 AM    HCT 38.2 09/02/2022 05:39 AM     09/02/2022 05:39 AM    MCV 84.3 09/02/2022 05:39 AM    MCH 30.2 09/02/2022 05:39 AM    MCHC 35.9 09/02/2022 05:39 AM    RDW 14.6 09/02/2022 05:39 AM    LYMPHOPCT 10.5 09/01/2022 10:23 AM    MONOPCT 10.5 09/01/2022 10:23 AM    BASOPCT 0.7 09/01/2022 10:23 AM    MONOSABS 0.70 09/01/2022 10:23 AM    LYMPHSABS 0.70 09/01/2022 10:23 AM    EOSABS 0.08 09/01/2022 10:23 AM    BASOSABS 0.05 09/01/2022 10:23 AM     CMP:    Lab Results   Component Value Date/Time     09/03/2022 04:21 AM    K 3.7 09/03/2022 04:21 AM    K 2.5 03/30/2021 10:35 AM    CL 91 09/03/2022 04:21 AM    CO2 27 09/03/2022 04:21 AM    BUN 4 09/03/2022 04:21 AM    CREATININE 0.6 09/03/2022 04:21 AM    GFRAA >60 09/03/2022 04:21 AM    LABGLOM >60 09/03/2022 04:21 AM    GLUCOSE 85 09/03/2022 04:21 AM    PROT 6.9 09/01/2022 10:23 AM    LABALBU 4.4 09/01/2022 10:23 AM    CALCIUM 9.2 09/03/2022 04:21 AM    BILITOT 0.3 09/01/2022 10:23 AM    ALKPHOS 131 09/01/2022 10:23 AM    AST 14 09/01/2022 10:23 AM    ALT 6 09/01/2022 10:23 AM     Ionized Calcium:  No results found for: IONCA  Magnesium:    Lab Results   Component Value Date/Time    MG 1.6 09/02/2022 05:39 AM     Phosphorus:    Lab Results   Component Value Date/Time    PHOS 3.5 09/02/2022 05:39 AM     U/A:    Lab Results   Component Value Date/Time    COLORU Yellow 09/01/2022 12:45 PM PHUR 6.5 09/01/2022 12:45 PM    WBCUA 1-3 09/01/2022 12:45 PM    RBCUA NONE 09/01/2022 12:45 PM    BACTERIA NONE SEEN 09/01/2022 12:45 PM    CLARITYU Clear 09/01/2022 12:45 PM    SPECGRAV <=1.005 09/01/2022 12:45 PM    LEUKOCYTESUR TRACE 09/01/2022 12:45 PM    UROBILINOGEN 0.2 09/01/2022 12:45 PM    BILIRUBINUR Negative 09/01/2022 12:45 PM    BLOODU Negative 09/01/2022 12:45 PM    GLUCOSEU Negative 09/01/2022 12:45 PM         Imaging:  CT CHEST WO CONTRAST   Final Result   1. Right paratracheal lymph node mass measuring 2.8 x 1.8 cm. There is a   medial right upper lobe nodular parenchymal lung opacity measuring 12 x 11   mm. There is a small right pleural effusion. Mild upper mediastinal   adenopathy. Findings are compatible with malignancy. 2.  Probable metastatic lesion in the right hepatic lobe measuring 2 cm. 3.  Small pericardial effusion. 4.  Small left kidney. 5.  Thyromegaly with nodular isodense calcified lesion in the thyroid   isthmus. Lesion measures 14 mm AP. CT HEAD WO CONTRAST   Final Result   No acute intracranial abnormality. Periventricular leukomalacia. Suspect bilateral mastoiditis. XR CHEST PORTABLE   Final Result   Mild cardiomegaly. Mild right pleural effusion. Assessment  Hyponatremia, hypoosmolar. Belenda  well below 1%, urinary indices are consistent with hypovolemic hyponatremia and serum sodium is improving with conservative-rate IV normal saline. Etiology is likely multifactorial, acutely due to poor intake and recent water load. Complicating circumstances are intrinsic lung disease due to COPD, treatment with an SSRI, likely beer drinkers [place] janel, though I would expect that to be improving with 4 months of abstinence, probably exacerbated in part by diastolic dysfunction uses a thiazide diuretic, that has also been discontinued.     Recommendations  Continue IV normal saline at conservative rate  Follow labs, UO  I would not stop the Zoloft as she has been on it but in the 6 months and it seems to be helping her depression, though notably seem to some of her symptoms started not long after starting the Zoloft  Minimize use of NSAIDs, would advised none at all  Stop thiazide diuretic, use loop for edema    BMP LATER ON TODAY IF NA >132 THEN OK TO DC WITH REPEAT BMP IN 2-3 DAYS   CONTINUE TO HOLD HCT ON D/C           Electronically signed by Morales Ahumada MD on 9/3/2022

## 2022-09-03 NOTE — PLAN OF CARE
Problem: Chronic Conditions and Co-morbidities  Goal: Patient's chronic conditions and co-morbidity symptoms are monitored and maintained or improved  9/2/2022 2220 by Srinivasan Carmichael RN  Outcome: Progressing     Problem: Discharge Planning  Goal: Discharge to home or other facility with appropriate resources  9/2/2022 2220 by Srinivasan Carmichael RN  Outcome: Progressing     Problem: Safety - Adult  Goal: Free from fall injury  9/2/2022 2220 by Srinivasan Carmichael RN  Outcome: Progressing     Problem: ABCDS Injury Assessment  Goal: Absence of physical injury  Outcome: Progressing

## 2022-09-04 LAB
ANION GAP SERPL CALCULATED.3IONS-SCNC: 11 MMOL/L (ref 7–16)
BUN BLDV-MCNC: 6 MG/DL (ref 6–23)
CALCIUM SERPL-MCNC: 9.1 MG/DL (ref 8.6–10.2)
CHLORIDE BLD-SCNC: 92 MMOL/L (ref 98–107)
CO2: 26 MMOL/L (ref 22–29)
CREAT SERPL-MCNC: 0.5 MG/DL (ref 0.5–1)
GFR AFRICAN AMERICAN: >60
GFR NON-AFRICAN AMERICAN: >60 ML/MIN/1.73
GLUCOSE BLD-MCNC: 94 MG/DL (ref 74–99)
POTASSIUM SERPL-SCNC: 3.7 MMOL/L (ref 3.5–5)
SODIUM BLD-SCNC: 129 MMOL/L (ref 132–146)

## 2022-09-04 PROCEDURE — 2700000000 HC OXYGEN THERAPY PER DAY

## 2022-09-04 PROCEDURE — 6370000000 HC RX 637 (ALT 250 FOR IP): Performed by: INTERNAL MEDICINE

## 2022-09-04 PROCEDURE — 2140000000 HC CCU INTERMEDIATE R&B

## 2022-09-04 PROCEDURE — 6360000002 HC RX W HCPCS: Performed by: INTERNAL MEDICINE

## 2022-09-04 PROCEDURE — 36415 COLL VENOUS BLD VENIPUNCTURE: CPT

## 2022-09-04 PROCEDURE — 80048 BASIC METABOLIC PNL TOTAL CA: CPT

## 2022-09-04 PROCEDURE — 2580000003 HC RX 258: Performed by: INTERNAL MEDICINE

## 2022-09-04 RX ADMIN — SODIUM CHLORIDE: 9 INJECTION, SOLUTION INTRAVENOUS at 18:00

## 2022-09-04 RX ADMIN — TRAZODONE HYDROCHLORIDE 50 MG: 50 TABLET ORAL at 20:16

## 2022-09-04 RX ADMIN — Medication 400 MG: at 08:25

## 2022-09-04 RX ADMIN — SERTRALINE HYDROCHLORIDE 50 MG: 50 TABLET ORAL at 08:26

## 2022-09-04 RX ADMIN — METOPROLOL SUCCINATE 25 MG: 25 TABLET, EXTENDED RELEASE ORAL at 08:25

## 2022-09-04 RX ADMIN — DOCUSATE SODIUM 100 MG: 100 CAPSULE, LIQUID FILLED ORAL at 20:16

## 2022-09-04 RX ADMIN — PANTOPRAZOLE SODIUM 40 MG: 40 TABLET, DELAYED RELEASE ORAL at 05:37

## 2022-09-04 RX ADMIN — FLUTICASONE PROPIONATE 1 SPRAY: 50 SPRAY, METERED NASAL at 08:26

## 2022-09-04 RX ADMIN — ENOXAPARIN SODIUM 40 MG: 100 INJECTION SUBCUTANEOUS at 08:26

## 2022-09-04 RX ADMIN — MONTELUKAST 10 MG: 10 TABLET, FILM COATED ORAL at 20:16

## 2022-09-04 RX ADMIN — SODIUM CHLORIDE: 9 INJECTION, SOLUTION INTRAVENOUS at 05:02

## 2022-09-04 RX ADMIN — LEVOTHYROXINE SODIUM 150 MCG: 100 TABLET ORAL at 05:45

## 2022-09-04 RX ADMIN — AMLODIPINE BESYLATE 10 MG: 10 TABLET ORAL at 08:26

## 2022-09-04 NOTE — PROGRESS NOTES
Appears this patient has been seen by Dr. Sav Leon in the past please change pulmonary consult     Bret Delcid [facility]

## 2022-09-04 NOTE — PLAN OF CARE
Problem: Chronic Conditions and Co-morbidities  Goal: Patient's chronic conditions and co-morbidity symptoms are monitored and maintained or improved  9/4/2022 1007 by Kayla Fisher RN  Outcome: Progressing  Flowsheets (Taken 9/4/2022 0820)  Care Plan - Patient's Chronic Conditions and Co-Morbidity Symptoms are Monitored and Maintained or Improved: Monitor and assess patient's chronic conditions and comorbid symptoms for stability, deterioration, or improvement  9/3/2022 2037 by Naye Holder RN  Outcome: Progressing     Problem: Discharge Planning  Goal: Discharge to home or other facility with appropriate resources  9/4/2022 1007 by Kayla Fisher RN  Outcome: Progressing  Flowsheets (Taken 9/4/2022 0820)  Discharge to home or other facility with appropriate resources: Identify barriers to discharge with patient and caregiver  9/3/2022 2037 by Naye Holder RN  Outcome: Progressing     Problem: Safety - Adult  Goal: Free from fall injury  9/4/2022 1007 by Kayla Fisher RN  Outcome: Progressing  [address] (Taken 9/4/2022 0820)  Free From Fall Injury: Instruct family/caregiver on patient safety  9/3/2022 2037 by Naye Holder RN  Outcome: Progressing     Problem: ABCDS Injury Assessment  Goal: Absence of physical injury  9/4/2022 1007 by Kayla Fisher RN  Outcome: Progressing  Flowsheets (Taken 9/4/2022 0820)  Absence of Physical Injury: Implement safety measures based on patient assessment  9/3/2022 2037 by Naye Holder RN  Outcome: Progressing

## 2022-09-04 NOTE — PATIENT CARE CONFERENCE
[facility] Quality Flow/Interdisciplinary Rounds Progress Note        Quality Flow Rounds held on September 4, 2022    Disciplines Attending:  Bedside Nurse, , , and Nursing Unit Leadership    Edward Santiago was admitted on 9/1/2022 12:01 PM    Anticipated Discharge Date:  Expected Discharge Date: 09/03/22    Disposition:    Franklin Score:  Franklin Scale Score: 20    Readmission Risk              Risk of Unplanned Readmission:  15           Discussed patient goal for the day, patient clinical progression, and barriers to discharge.   The following Goal(s) of the Day/Commitment(s) have been identified:  Diagnostics - Report Results and Labs - Report Results      Brodie Joaquin RN  September 4, 2022

## 2022-09-04 NOTE — PROGRESS NOTES
Internal Medicine   Progress Note    Admit Date: 9/1/2022  Hospital day:    Hospital Day: 4  SUBJECTIVE:  No new acute problems overnight. Doing OK. No chest pain. Continues to be shortness of breath on and off. No nausea or vomiting. No fever. No abdominal pain. OBJECTIVE:     BP (!) 140/63   Pulse 99   Temp 96.8 °F (36 °C) (Temporal)   Resp 20   Ht 5' 2\" (1.575 m)   Wt 161 lb (73 kg)   SpO2 97%   BMI 29.45 kg/m²   Patient Vitals for the past 24 hrs:   BP Temp Temp src Pulse Resp SpO2   09/04/22 0824 (!) 140/63 96.8 °F (36 °C) Temporal 99 20 97 %   09/04/22 0337 127/60 98.7 °F (37.1 °C) Temporal 91 21 92 %   09/03/22 2322 134/73 97.1 °F (36.2 °C) Temporal 88 21 97 %   09/03/22 1950 (!) 123/59 97.8 °F (36.6 °C) Oral 89 18 96 %   09/03/22 1501 119/61 98.1 °F (36.7 °C) Temporal 91 17 96 %   09/03/22 1145 (!) 150/65 96.9 °F (36.1 °C) Temporal 83 18 97 %     24HR INTAKE/OUTPUT:    Intake/Output Summary (Last 24 hours) at 9/4/2022 1103  Last data filed at 9/4/2022 0648  Gross per 24 hour   Intake 1804.88 ml   Output 1050 ml   Net 754.88 ml      GENERAL:  Alert,breathing easily, not in apparent acute distress. EYES:  No pallor, no icterus. ENT:Oral mucosa moist. Neck supple, JVD not appreciated  LUNGS: Scattered Rales/wheezes heard  CARDIOVASCULAR:  S1 and S2 regular to auscultate. ABDOMEN:  Soft, non-tender. Bowel sounds present  NEUROLOGIC:  Alert, and oriented.   No gross focal deficit    Data    Recent Labs     09/02/22  0539   WBC 5.5   HGB 13.7        Recent Labs     09/02/22  0539 09/02/22  1332 09/03/22  0421 09/04/22  0527   * 126* 130* 129*   K 3.5 3.8 3.7 3.7   CL 87* 92* 91* 92*   CO2 29 22 27 26   PHOS 3.5  --   --   --    BUN 5* 7 4* 6   CREATININE 0.7 0.6 0.6 0.5   GLUCOSE 88 95 85 94       CT HEAD WO CONTRAST    Result Date: 9/1/2022  EXAMINATION: CT OF THE HEAD WITHOUT CONTRAST  9/1/2022 2:28 pm TECHNIQUE: CT of the head was performed without the administration of intravenous contrast. Automated exposure control, iterative reconstruction, and/or weight based adjustment of the mA/kV was utilized to reduce the radiation dose to as low as reasonably achievable. COMPARISON: None. HISTORY: ORDERING SYSTEM PROVIDED HISTORY: dizzy TECHNOLOGIST PROVIDED HISTORY: Has a \"code stroke\" or \"stroke alert\" been called? ->No Reason for exam:->dizzy Decision Support Exception - unselect if not a suspected or confirmed emergency medical condition->Emergency Medical Condition (MA) What reading provider will be dictating this exam?->CRC FINDINGS: BRAIN/VENTRICLES: There is no acute intracranial hemorrhage, mass effect or midline shift. No abnormal extra-axial fluid collection. The gray-white differentiation is maintained without evidence of an acute infarct. There is no evidence of hydrocephalus. ORBITS: The visualized portion of the orbits demonstrate no acute abnormality. SINUSES: The visualized paranasal sinuses demonstrate no acute abnormality. There is partial opacification of bilateral mastoid air cells. SOFT TISSUES/SKULL:  No acute abnormality of the visualized skull or soft tissues. No acute intracranial abnormality. Periventricular leukomalacia. Suspect bilateral mastoiditis. XR CHEST PORTABLE    Result Date: 9/1/2022  EXAMINATION: ONE XRAY VIEW OF THE CHEST 9/1/2022 1:04 pm COMPARISON: None. HISTORY: ORDERING SYSTEM PROVIDED HISTORY: Shortness of breath TECHNOLOGIST PROVIDED HISTORY: Reason for exam:->Shortness of breath What reading provider will be dictating this exam?->CRC FINDINGS: Lungs are clear. The heart is mildly enlarged. There is no pneumothorax. This blunting of the right costophrenic angle. Mild scoliosis of the thoracic spine. Mild cardiomegaly. Mild right pleural effusion.        Medications     sodium chloride 80 mL/hr at 09/04/22 0502      levothyroxine  150 mcg Oral Daily    amLODIPine  10 mg Oral Daily    fluticasone  1 spray Each Nostril Daily magnesium oxide  400 mg Oral Daily    metoprolol succinate  25 mg Oral Daily    montelukast  10 mg Oral Nightly    sertraline  50 mg Oral Daily    docusate sodium  100 mg Oral Nightly    enoxaparin  40 mg SubCUTAneous Daily    pantoprazole  40 mg Oral QAM AC    budesonide  0.25 mg Nebulization BID    And    Arformoterol Tartrate  15 mcg Nebulization BID    traZODone  50 mg Oral Nightly      ADULT DIET; Regular    ASSESSMENT/PLAN:  *Hypernatremia, continue IV fluids. Renal following  *Shortness of breath/chronic bronchitis/recent finding on the CT chest suspicious for malignancy  *CA lung-right upper lobe lesion, with lesion on the right hepatic lobe, right paratracheal lymph node pulmonary has been consulted. *Thyromegaly on CT with nodular calcified lesion, monitor as outpatient  *Tobacco use,  *Hypothyroidism, Synthroid increased recently. Pulmonology consulted. Continue current management.   DVT prophylaxis on Lovenox    Electronically signed by Stella Mason MD on 9/4/2022 at 11:03 AM

## 2022-09-05 PROCEDURE — 2140000000 HC CCU INTERMEDIATE R&B

## 2022-09-05 PROCEDURE — 6360000002 HC RX W HCPCS: Performed by: INTERNAL MEDICINE

## 2022-09-05 PROCEDURE — 6370000000 HC RX 637 (ALT 250 FOR IP): Performed by: INTERNAL MEDICINE

## 2022-09-05 PROCEDURE — 94640 AIRWAY INHALATION TREATMENT: CPT

## 2022-09-05 PROCEDURE — 6370000000 HC RX 637 (ALT 250 FOR IP): Performed by: NURSE PRACTITIONER

## 2022-09-05 RX ADMIN — ARFORMOTEROL TARTRATE 15 MCG: 15 SOLUTION RESPIRATORY (INHALATION) at 08:52

## 2022-09-05 RX ADMIN — METOPROLOL SUCCINATE 25 MG: 25 TABLET, EXTENDED RELEASE ORAL at 08:59

## 2022-09-05 RX ADMIN — Medication 400 MG: at 08:59

## 2022-09-05 RX ADMIN — FLUTICASONE PROPIONATE 1 SPRAY: 50 SPRAY, METERED NASAL at 08:58

## 2022-09-05 RX ADMIN — AMLODIPINE BESYLATE 10 MG: 10 TABLET ORAL at 08:59

## 2022-09-05 RX ADMIN — ARFORMOTEROL TARTRATE 15 MCG: 15 SOLUTION RESPIRATORY (INHALATION) at 19:41

## 2022-09-05 RX ADMIN — MONTELUKAST 10 MG: 10 TABLET, FILM COATED ORAL at 20:33

## 2022-09-05 RX ADMIN — TRAZODONE HYDROCHLORIDE 50 MG: 50 TABLET ORAL at 20:33

## 2022-09-05 RX ADMIN — BUDESONIDE 250 MCG: 0.25 SUSPENSION RESPIRATORY (INHALATION) at 19:41

## 2022-09-05 RX ADMIN — LEVOTHYROXINE SODIUM 150 MCG: 100 TABLET ORAL at 06:14

## 2022-09-05 RX ADMIN — GUAIFENESIN 200 MG: 200 SOLUTION ORAL at 18:50

## 2022-09-05 RX ADMIN — SERTRALINE HYDROCHLORIDE 50 MG: 50 TABLET ORAL at 08:59

## 2022-09-05 RX ADMIN — BUDESONIDE 250 MCG: 0.25 SUSPENSION RESPIRATORY (INHALATION) at 08:52

## 2022-09-05 RX ADMIN — PANTOPRAZOLE SODIUM 40 MG: 40 TABLET, DELAYED RELEASE ORAL at 06:14

## 2022-09-05 NOTE — PLAN OF CARE
Problem: Chronic Conditions and Co-morbidities  Goal: Patient's chronic conditions and co-morbidity symptoms are monitored and maintained or improved  9/4/2022 2014 by Frannie Espinoza RN  Outcome: Progressing  9/4/2022 1007 by Sheila Wilkerson RN  Outcome: Progressing  Flowsheets (Taken 9/4/2022 0820)  Care Plan - Patient's Chronic Conditions and Co-Morbidity Symptoms are Monitored and Maintained or Improved: Monitor and assess patient's chronic conditions and comorbid symptoms for stability, deterioration, or improvement

## 2022-09-05 NOTE — PROGRESS NOTES
Internal Medicine   Progress Note    Admit Date: 9/1/2022  Hospital day:    Hospital Day: 5  SUBJECTIVE:  No new acute problems overnight. Doing OK. No chest pain. Shortness of breath present. No nausea or vomiting. No fever. No abdominal pain. Awaiting pulm consult  OBJECTIVE:     /63   Pulse 82   Temp 98.8 °F (37.1 °C) (Temporal)   Resp 18   Ht 5' 2\" (1.575 m)   Wt 161 lb (73 kg)   SpO2 95%   BMI 29.45 kg/m²   Patient Vitals for the past 24 hrs:   BP Temp Temp src Pulse Resp SpO2   09/04/22 2321 102/63 98.8 °F (37.1 °C) Temporal 82 18 95 %   09/04/22 2022 (!) 147/87 96.8 °F (36 °C) Temporal 88 16 97 %   09/04/22 1515 138/79 97.7 °F (36.5 °C) Oral 84 18 96 %   09/04/22 1215 (!) 140/69 97.7 °F (36.5 °C) Oral 89 18 92 %   09/04/22 0824 (!) 140/63 96.8 °F (36 °C) Temporal 99 20 97 %   09/04/22 0337 127/60 98.7 °F (37.1 °C) Temporal 91 21 92 %     24HR INTAKE/OUTPUT:    Intake/Output Summary (Last 24 hours) at 9/5/2022 0113  Last data filed at 9/4/2022 1837  Gross per 24 hour   Intake 2021.51 ml   Output 750 ml   Net 1271.51 ml      GENERAL:  Alert,breathing easily, not in apparent acute distress. LUNGS:  No obvious increased work of breathing, clear to auscultation bilaterally. Coarse breath sounds  CARDIOVASCULAR:  S1 and S2 regular to auscultate. ABDOMEN:  Soft, non-tender. Bowel sounds present  NEUROLOGIC:  Alert, and oriented.   No gross focal deficit    Data      Recent Labs     09/02/22  0539   WBC 5.5   HGB 13.7        Recent Labs     09/02/22  0539 09/02/22  1332 09/03/22  0421 09/04/22  0527   * 126* 130* 129*   K 3.5 3.8 3.7 3.7   CL 87* 92* 91* 92*   CO2 29 22 27 26   PHOS 3.5  --   --   --    BUN 5* 7 4* 6   CREATININE 0.7 0.6 0.6 0.5   GLUCOSE 88 95 85 94       CT HEAD WO CONTRAST    Result Date: 9/1/2022  EXAMINATION: CT OF THE HEAD WITHOUT CONTRAST  9/1/2022 2:28 pm TECHNIQUE: CT of the head was performed without the administration of intravenous contrast. Automated exposure control, iterative reconstruction, and/or weight based adjustment of the mA/kV was utilized to reduce the radiation dose to as low as reasonably achievable. COMPARISON: None. HISTORY: ORDERING SYSTEM PROVIDED HISTORY: dizzy TECHNOLOGIST PROVIDED HISTORY: Has a \"code stroke\" or \"stroke alert\" been called? ->No Reason for exam:->dizzy Decision Support Exception - unselect if not a suspected or confirmed emergency medical condition->Emergency Medical Condition (MA) What reading provider will be dictating this exam?->CRC FINDINGS: BRAIN/VENTRICLES: There is no acute intracranial hemorrhage, mass effect or midline shift. No abnormal extra-axial fluid collection. The gray-white differentiation is maintained without evidence of an acute infarct. There is no evidence of hydrocephalus. ORBITS: The visualized portion of the orbits demonstrate no acute abnormality. SINUSES: The visualized paranasal sinuses demonstrate no acute abnormality. There is partial opacification of bilateral mastoid air cells. SOFT TISSUES/SKULL:  No acute abnormality of the visualized skull or soft tissues. No acute intracranial abnormality. Periventricular leukomalacia. Suspect bilateral mastoiditis. XR CHEST PORTABLE    Result Date: 9/1/2022  EXAMINATION: ONE XRAY VIEW OF THE CHEST 9/1/2022 1:04 pm COMPARISON: None. HISTORY: ORDERING SYSTEM PROVIDED HISTORY: Shortness of breath TECHNOLOGIST PROVIDED HISTORY: Reason for exam:->Shortness of breath What reading provider will be dictating this exam?->CRC FINDINGS: Lungs are clear. The heart is mildly enlarged. There is no pneumothorax. This blunting of the right costophrenic angle. Mild scoliosis of the thoracic spine. Mild cardiomegaly. Mild right pleural effusion.        Medications       levothyroxine  150 mcg Oral Daily    amLODIPine  10 mg Oral Daily    fluticasone  1 spray Each Nostril Daily    magnesium oxide  400 mg Oral Daily    metoprolol succinate  25 mg Oral Daily montelukast  10 mg Oral Nightly    sertraline  50 mg Oral Daily    docusate sodium  100 mg Oral Nightly    enoxaparin  40 mg SubCUTAneous Daily    pantoprazole  40 mg Oral QAM AC    budesonide  0.25 mg Nebulization BID    And    Arformoterol Tartrate  15 mcg Nebulization BID    traZODone  50 mg Oral Nightly      ADULT DIET; Regular    ASSESSMENT/PLAN:  *Hyponatremia probably due to lung cancer, renal following. *Shortness of breath/chronic bronchitis/lung malignancy pulmonary has been consulted  *Thyromegaly follow-up, follow-up as an outpatient  *Tobacco use, has not smoked since admission  *Hypothyroidism, Synthroid increased at this admission.   Awaiting, pulmonary consult  DVT prophylaxis on Lovenox  PT and OT  Discharge planning      Electronically signed by Damon Jones MD on 9/5/2022 at 1:13 AM

## 2022-09-05 NOTE — CONSULTS
[facility]  [address] [facility], [address] [place] [address], [address] [place] [address]    Pulmonary Consultation      Reason for Consult:  lung nodule    Requesting Physician:  Eather Nissen, DO    CHIEF COMPLAINT:  dizziness/weakness    History Obtained From:  patient    HISTORY OF PRESENT ILLNESS:                The patient is a 81 y.o. female with significant past medical history of COPD who presents with feeling weak/dizzy as if \"drunk\". This had apparently been going on for the past month, (?) getting worse or not, doesn't think any change with respiratory function and cough. Went to PCP office where blood work done and was called to go to ER due to hyponatremia. Since here, feeling some better, thinks breathing may be slightly better. Supposedly compliant with medications, still smoking, inconsistent with office visits, claims not on oxygen but suppose to be using with activity and at night. On RA currently sitting up at side of bed, minimal cough/sputum production.     Past Medical History:        Diagnosis Date    Bronchitis     Hypertension     Thyroid disease        Past Surgical History:        Procedure Laterality Date    OTHER SURGICAL HISTORY      states had something done right neck area per dr jaeger, might have been an abcess    TYMPANOSTOMY TUBE PLACEMENT         Current Medications:    Current Facility-Administered Medications: levothyroxine (SYNTHROID) tablet 150 mcg, 150 mcg, Oral, Daily  albuterol (PROVENTIL) nebulizer solution 2.5 mg, 2.5 mg, Nebulization, Q4H PRN  amLODIPine (NORVASC) tablet 10 mg, 10 mg, Oral, Daily  clonazePAM (KLONOPIN) tablet 1 mg, 1 mg, Oral, Daily PRN  fluticasone (FLONASE) 50 MCG/ACT nasal spray 1 spray, 1 spray, Each Nostril, Daily  magnesium oxide (MAG-OX) tablet 400 mg, 400 mg, Oral, Daily  metoprolol succinate (TOPROL XL) extended release tablet 25 mg, 25 mg, Oral, Daily  montelukast (SINGULAIR) tablet 10 mg, 10 mg, Oral, Nightly  sertraline (ZOLOFT) tablet 50 mg, 50 mg, Oral, Daily  docusate sodium (COLACE) capsule 100 mg, 100 mg, Oral, Nightly  enoxaparin (LOVENOX) injection 40 mg, 40 mg, SubCUTAneous, Daily  pantoprazole (PROTONIX) tablet 40 mg, 40 mg, Oral, QAM AC  ondansetron (ZOFRAN) injection 4 mg, 4 mg, IntraVENous, Q6H PRN  acetaminophen (TYLENOL) tablet 650 mg, 650 mg, Oral, Q4H PRN  budesonide (PULMICORT) nebulizer suspension 250 mcg, 0.25 mg, Nebulization, BID **AND** Arformoterol Tartrate (BROVANA) nebulizer solution 15 mcg, 15 mcg, Nebulization, BID  guaiFENesin (ROBITUSSIN) 100 MG/5ML oral solution 200 mg, 200 mg, Oral, Q6H PRN  traZODone (DESYREL) tablet 50 mg, 50 mg, Oral, Nightly    Allergies:  Patient has no known allergies. Social History:    TOBACCO:   reports that she has been smoking cigarettes. She started smoking about 50 years ago. She has a 50.00 pack-year smoking history. She has never used smokeless tobacco.    Family History:   History reviewed. No pertinent family history. REVIEW OF SYSTEMS:    RESPIRATORY:  minimal sob and cough  NEUROLOGICAL:  weak and dizzy  Remainder of complete ROS is negative. PHYSICAL EXAM:      Vitals:    BP (!) 147/89   Pulse 89   Temp 97.6 °F (36.4 °C) (Temporal)   Resp 18   Ht 5' 2\" (1.575 m)   Wt 161 lb (73 kg)   SpO2 97%   BMI 29.45 kg/m²     EYES:  Lids and lashes normal, pupils equal, round and reactive to light, extra ocular muscles intact, sclera clear, conjunctiva normal  ENT:  Normocephalic, without obvious abnormality, atraumatic, sinuses nontender on palpation, external ears without lesions, oral pharynx with moist mucus membranes, tonsils without erythema or exudates, gums normal and good dentition.   LUNGS:  bilateral ronchi with cough  CARDIOVASCULAR:  Normal apical impulse, regular rate and rhythm, normal S1 and S2, no S3 or S4, and no murmur noted  ABDOMEN:  No scars, normal bowel sounds, soft, non-distended, non-tender, no masses palpated, no hepatosplenomegally  MUSCULOSKELETAL:  There is no redness, warmth, or swelling of the joints. Full range of motion noted. NEUROLOGIC:  Awake, alert, oriented to name, place and time. Cranial nerves II-XII are grossly intact. DATA:    CBC: No results for input(s): WBC, HGB, HCT, MCV, PLT in the last 72 hours. BMP:  Recent Labs     09/02/22  1332 09/03/22  0421 09/04/22  0527   * 130* 129*   K 3.8 3.7 3.7   CL 92* 91* 92*   CO2 22 27 26   BUN 7 4* 6   CREATININE 0.6 0.6 0.5    ALB:3,BILIDIR:3,BILITOT:3,ALKPHOS:3)@    PT/INR: No results for input(s): PROTIME, INR in the last 72 hours. ABG:   No results for input(s): PH, PO2, PCO2, HCO3, BE, O2SAT, METHB, O2HB, COHB, O2CON, HHB, THB in the last 72 hours. Radiology Review:  CT chest reviewed with peripheral lung nodule right upper lobe and mediastinal and (?) right hilar lymphadenopathy. IMPRESSION/RECOMMENDATIONS:      Lung mass  Hyponatremia  COPD    Will need CT biopsy of nodule, either liver or lung, whichever will be easier  Cont with nebs, observe respiratory function, can resume usual medications as out-pt  Watch fluid balance as per Renal  Need to work on smoking cessation upon discharge      Time at the bedside, reviewing labs and radiographs, reviewing notes and consultations, discussing with staff and family was more than 55 minutes. Thanks for letting us see this patient in consultation. Please contact us with any questions. Office [phone] or after hours through [employer], [phone].

## 2022-09-05 NOTE — PROGRESS NOTES
[facility] Quality Flow/Interdisciplinary Rounds Progress Note        Quality Flow Rounds held on September 5, 2022    Disciplines Attending:  Bedside Nurse and Nursing Unit Leadership    Peter Hines was admitted on 9/1/2022 12:01 PM    Anticipated Discharge Date:  Expected Discharge Date: 09/03/22    Disposition:    Franklin Score:  Franklin Scale Score: 20    Readmission Risk              Risk of Unplanned Readmission:  15           Discussed patient goal for the day, patient clinical progression, and barriers to discharge.   The following Goal(s) of the Day/Commitment(s) have been identified:  Diagnostics - Report Results and Labs - Report Results      Nichol Daniel RN  September 5, 2022

## 2022-09-06 ENCOUNTER — APPOINTMENT (OUTPATIENT)
Dept: CT IMAGING | Age: 72
DRG: 181 | End: 2022-09-06
Payer: MEDICARE

## 2022-09-06 LAB
ANION GAP SERPL CALCULATED.3IONS-SCNC: 14 MMOL/L (ref 7–16)
BUN BLDV-MCNC: 14 MG/DL (ref 6–23)
CALCIUM SERPL-MCNC: 9.5 MG/DL (ref 8.6–10.2)
CHLORIDE BLD-SCNC: 93 MMOL/L (ref 98–107)
CO2: 22 MMOL/L (ref 22–29)
CREAT SERPL-MCNC: 0.7 MG/DL (ref 0.5–1)
GFR AFRICAN AMERICAN: >60
GFR NON-AFRICAN AMERICAN: >60 ML/MIN/1.73
GLUCOSE BLD-MCNC: 159 MG/DL (ref 74–99)
HCT VFR BLD CALC: 39.6 % (ref 34–48)
HEMOGLOBIN: 13.4 G/DL (ref 11.5–15.5)
INR BLD: 1.1
MAGNESIUM: 1.7 MG/DL (ref 1.6–2.6)
MCH RBC QN AUTO: 29.5 PG (ref 26–35)
MCHC RBC AUTO-ENTMCNC: 33.8 % (ref 32–34.5)
MCV RBC AUTO: 87 FL (ref 80–99.9)
PDW BLD-RTO: 14.9 FL (ref 11.5–15)
PLATELET # BLD: 362 E9/L (ref 130–450)
PMV BLD AUTO: 8.7 FL (ref 7–12)
POTASSIUM REFLEX MAGNESIUM: 3.3 MMOL/L (ref 3.5–5)
PROTHROMBIN TIME: 12.1 SEC (ref 9.3–12.4)
RBC # BLD: 4.55 E12/L (ref 3.5–5.5)
SODIUM BLD-SCNC: 129 MMOL/L (ref 132–146)
WBC # BLD: 5.8 E9/L (ref 4.5–11.5)

## 2022-09-06 PROCEDURE — 2500000003 HC RX 250 WO HCPCS: Performed by: RADIOLOGY

## 2022-09-06 PROCEDURE — 36415 COLL VENOUS BLD VENIPUNCTURE: CPT

## 2022-09-06 PROCEDURE — 6370000000 HC RX 637 (ALT 250 FOR IP): Performed by: INTERNAL MEDICINE

## 2022-09-06 PROCEDURE — 0FB13ZX EXCISION OF RIGHT LOBE LIVER, PERCUTANEOUS APPROACH, DIAGNOSTIC: ICD-10-PCS | Performed by: INTERNAL MEDICINE

## 2022-09-06 PROCEDURE — 88342 IMHCHEM/IMCYTCHM 1ST ANTB: CPT

## 2022-09-06 PROCEDURE — 83735 ASSAY OF MAGNESIUM: CPT

## 2022-09-06 PROCEDURE — 77012 CT SCAN FOR NEEDLE BIOPSY: CPT

## 2022-09-06 PROCEDURE — 2709999900 CT NEEDLE BIOPSY LIVER PERCUTANEOUS

## 2022-09-06 PROCEDURE — 85027 COMPLETE CBC AUTOMATED: CPT

## 2022-09-06 PROCEDURE — 2140000000 HC CCU INTERMEDIATE R&B

## 2022-09-06 PROCEDURE — 80048 BASIC METABOLIC PNL TOTAL CA: CPT

## 2022-09-06 PROCEDURE — 88341 IMHCHEM/IMCYTCHM EA ADD ANTB: CPT

## 2022-09-06 PROCEDURE — 88307 TISSUE EXAM BY PATHOLOGIST: CPT

## 2022-09-06 PROCEDURE — 6360000002 HC RX W HCPCS: Performed by: RADIOLOGY

## 2022-09-06 PROCEDURE — 85610 PROTHROMBIN TIME: CPT

## 2022-09-06 RX ORDER — LIDOCAINE HYDROCHLORIDE 20 MG/ML
INJECTION, SOLUTION INFILTRATION; PERINEURAL
Status: COMPLETED | OUTPATIENT
Start: 2022-09-06 | End: 2022-09-06

## 2022-09-06 RX ORDER — MIDAZOLAM HYDROCHLORIDE 2 MG/2ML
INJECTION, SOLUTION INTRAMUSCULAR; INTRAVENOUS
Status: COMPLETED | OUTPATIENT
Start: 2022-09-06 | End: 2022-09-06

## 2022-09-06 RX ORDER — FENTANYL CITRATE 50 UG/ML
INJECTION, SOLUTION INTRAMUSCULAR; INTRAVENOUS
Status: COMPLETED | OUTPATIENT
Start: 2022-09-06 | End: 2022-09-06

## 2022-09-06 RX ORDER — ACETAMINOPHEN 325 MG/1
650 TABLET ORAL EVERY 4 HOURS PRN
Status: DISCONTINUED | OUTPATIENT
Start: 2022-09-06 | End: 2022-09-07 | Stop reason: HOSPADM

## 2022-09-06 RX ADMIN — FENTANYL CITRATE 50 MCG: 50 INJECTION, SOLUTION INTRAMUSCULAR; INTRAVENOUS at 10:23

## 2022-09-06 RX ADMIN — LEVOTHYROXINE SODIUM 150 MCG: 100 TABLET ORAL at 05:54

## 2022-09-06 RX ADMIN — MONTELUKAST 10 MG: 10 TABLET, FILM COATED ORAL at 20:19

## 2022-09-06 RX ADMIN — METOPROLOL SUCCINATE 25 MG: 25 TABLET, EXTENDED RELEASE ORAL at 08:13

## 2022-09-06 RX ADMIN — PANTOPRAZOLE SODIUM 40 MG: 40 TABLET, DELAYED RELEASE ORAL at 05:54

## 2022-09-06 RX ADMIN — FLUTICASONE PROPIONATE 1 SPRAY: 50 SPRAY, METERED NASAL at 08:13

## 2022-09-06 RX ADMIN — SERTRALINE HYDROCHLORIDE 50 MG: 50 TABLET ORAL at 08:13

## 2022-09-06 RX ADMIN — Medication 400 MG: at 08:13

## 2022-09-06 RX ADMIN — AMLODIPINE BESYLATE 10 MG: 10 TABLET ORAL at 08:13

## 2022-09-06 RX ADMIN — MIDAZOLAM HYDROCHLORIDE 1 MG: 1 INJECTION, SOLUTION INTRAMUSCULAR; INTRAVENOUS at 10:23

## 2022-09-06 RX ADMIN — LIDOCAINE HYDROCHLORIDE 10 ML: 20 INJECTION, SOLUTION INFILTRATION; PERINEURAL at 10:33

## 2022-09-06 RX ADMIN — TRAZODONE HYDROCHLORIDE 50 MG: 50 TABLET ORAL at 20:19

## 2022-09-06 NOTE — PROGRESS NOTES
[facility] MD Arin Falcon, MD Mariano Collier Ed, FIDEL Cristina, ESTER  Progress note  Patient's Name: Desiree Caal  2:14 PM  9/6/2022        9/4comfortable on RA euvolemic appear in NAD    9/5 on RA euvolemic she is telling me she is going to OR in am     9/6 stable vitals for IR guided biopsy today     History of Present Ilness:    Ms. Merida is a 44-year-old woman with past medical history notable for COPD and chronic bronchitis in setting of longstanding smoking, alcoholism, having become abstinent roughly 4 months ago, depression on Zoloft chronically started several months ago, diastolic heart failure (HFpEF), chronic lower extremity edema on both loop and thiazide diuretic 1 of which was stopped 1 month ago though she is not sure which one, and hypothyroidism. Over the past several days prior to presentation she notes that she felt, \"drunk all the time, \"meaning unsteady on her feet, \"fuzzy\" mentation, just not feeling well. Has had intermittent nausea and ongoing anorexia with inconsistent intake of late. Has been drinking a lot of water recently. Does not think she has lost any weight. Denies returning to drinking alcoholic beverages. No NSAIDs. No dyspnea. No chest pain. Does have intermittent wheezing and a chronic cough intermittently productive of \"phlegm,\" without hemoptysis. Denies peripheral swelling. No new skin rash or lesion. No new medications. He does still smoke. Sodium on presentation 9/1 was 123 mmol/L, which compares with 135 mmol/L on 9/16/2021. Sodium worsened as of last evening to 122. She was started on normal saline at 75 cc/h. This morning serum sodium improved to 125 mmol/L, improving again to 126 mmol/L as of 1:30 PM this afternoon.     She tells me she is feeling a little bit better since her arrival.    Past Medical History:   Diagnosis Date    Bronchitis     Hypertension     Thyroid disease        Past Surgical History:   Procedure Laterality Date    OTHER SURGICAL HISTORY      states had something done right neck area per dr jaeger, might have been an abcess    TYMPANOSTOMY TUBE PLACEMENT         History reviewed. No pertinent family history. reports that she has been smoking cigarettes. She started smoking about 50 years ago. She has a 50.00 pack-year smoking history. She has never used smokeless tobacco. She reports that she does not drink alcohol and does not use drugs. Allergies:  Patient has no known allergies. Current Medications:    acetaminophen (TYLENOL) tablet 650 mg, Q4H PRN  levothyroxine (SYNTHROID) tablet 150 mcg, Daily  albuterol (PROVENTIL) nebulizer solution 2.5 mg, Q4H PRN  amLODIPine (NORVASC) tablet 10 mg, Daily  clonazePAM (KLONOPIN) tablet 1 mg, Daily PRN  fluticasone (FLONASE) 50 MCG/ACT nasal spray 1 spray, Daily  magnesium oxide (MAG-OX) tablet 400 mg, Daily  metoprolol succinate (TOPROL XL) extended release tablet 25 mg, Daily  montelukast (SINGULAIR) tablet 10 mg, Nightly  sertraline (ZOLOFT) tablet 50 mg, Daily  docusate sodium (COLACE) capsule 100 mg, Nightly  [Held by provider] enoxaparin (LOVENOX) injection 40 mg, Daily  pantoprazole (PROTONIX) tablet 40 mg, QAM AC  ondansetron (ZOFRAN) injection 4 mg, Q6H PRN  budesonide (PULMICORT) nebulizer suspension 250 mcg, BID   And  Arformoterol Tartrate (BROVANA) nebulizer solution 15 mcg, BID  guaiFENesin (ROBITUSSIN) 100 MG/5ML oral solution 200 mg, Q6H PRN  traZODone (DESYREL) tablet 50 mg, Nightly      Review of Systems:   Pertinent items are noted in HPI.     Physical exam:   /88   Pulse 85   Temp 97.6 °F (36.4 °C) (Temporal)   Resp 16   Ht 5' 2\" (1.575 m)   Wt 161 lb (73 kg)   SpO2 98%   BMI 29.45 kg/m²   Age-appropriate white woman in no apparent distress  NC/AT EOMI sclera and conjunctiva clear and anicteric mucous membranes are moist  Neck soft supple trachea midline no bruit  Coarse breath sounds throughout all lung fields, mildly prolonged expiratory phase, occasional scattered wheeze  Regular rhythm normal S1-S2 no murmur no rub  Abdomen soft nontender nondistended normal active bowel sounds  Distal extremities are without edema  Pulses 1-2+ x4  No rash or lesion on visible portions of integument  Moves all 4 extremities  Cranial nerves II through XII grossly intact except she is hard of hearing  Awake, alert, interactive and appropriate    Data:   Labs:  CBC with Differential:    Lab Results   Component Value Date/Time    WBC 5.8 09/06/2022 01:00 PM    RBC 4.55 09/06/2022 01:00 PM    HGB 13.4 09/06/2022 01:00 PM    HCT 39.6 09/06/2022 01:00 PM     09/06/2022 01:00 PM    MCV 87.0 09/06/2022 01:00 PM    MCH 29.5 09/06/2022 01:00 PM    MCHC 33.8 09/06/2022 01:00 PM    RDW 14.9 09/06/2022 01:00 PM    LYMPHOPCT 10.5 09/01/2022 10:23 AM    MONOPCT 10.5 09/01/2022 10:23 AM    BASOPCT 0.7 09/01/2022 10:23 AM    MONOSABS 0.70 09/01/2022 10:23 AM    LYMPHSABS 0.70 09/01/2022 10:23 AM    EOSABS 0.08 09/01/2022 10:23 AM    BASOSABS 0.05 09/01/2022 10:23 AM     CMP:    Lab Results   Component Value Date/Time     09/04/2022 05:27 AM    K 3.7 09/04/2022 05:27 AM    K 2.5 03/30/2021 10:35 AM    CL 92 09/04/2022 05:27 AM    CO2 26 09/04/2022 05:27 AM    BUN 6 09/04/2022 05:27 AM    CREATININE 0.5 09/04/2022 05:27 AM    GFRAA >60 09/04/2022 05:27 AM    LABGLOM >60 09/04/2022 05:27 AM    GLUCOSE 94 09/04/2022 05:27 AM    PROT 6.9 09/01/2022 10:23 AM    LABALBU 4.4 09/01/2022 10:23 AM    CALCIUM 9.1 09/04/2022 05:27 AM    BILITOT 0.3 09/01/2022 10:23 AM    ALKPHOS 131 09/01/2022 10:23 AM    AST 14 09/01/2022 10:23 AM    ALT 6 09/01/2022 10:23 AM     Ionized Calcium:  No results found for: IONCA  Magnesium:    Lab Results   Component Value Date/Time    MG 1.6 09/02/2022 05:39 AM     Phosphorus:    Lab Results   Component Value Date/Time    PHOS 3.5 09/02/2022 05:39 AM     U/A:    Lab Results   Component Value Date/Time COLORU Yellow 09/01/2022 12:45 PM    PHUR 6.5 09/01/2022 12:45 PM    WBCUA 1-3 09/01/2022 12:45 PM    RBCUA NONE 09/01/2022 12:45 PM    BACTERIA NONE SEEN 09/01/2022 12:45 PM    CLARITYU Clear 09/01/2022 12:45 PM    SPECGRAV <=1.005 09/01/2022 12:45 PM    LEUKOCYTESUR TRACE 09/01/2022 12:45 PM    UROBILINOGEN 0.2 09/01/2022 12:45 PM    BILIRUBINUR Negative 09/01/2022 12:45 PM    BLOODU Negative 09/01/2022 12:45 PM    GLUCOSEU Negative 09/01/2022 12:45 PM         Imaging:  CT CHEST WO CONTRAST   Final Result   1. Right paratracheal lymph node mass measuring 2.8 x 1.8 cm. There is a   medial right upper lobe nodular parenchymal lung opacity measuring 12 x 11   mm. There is a small right pleural effusion. Mild upper mediastinal   adenopathy. Findings are compatible with malignancy. 2.  Probable metastatic lesion in the right hepatic lobe measuring 2 cm. 3.  Small pericardial effusion. 4.  Small left kidney. 5.  Thyromegaly with nodular isodense calcified lesion in the thyroid   isthmus. Lesion measures 14 mm AP. CT HEAD WO CONTRAST   Final Result   No acute intracranial abnormality. Periventricular leukomalacia. Suspect bilateral mastoiditis. XR CHEST PORTABLE   Final Result   Mild cardiomegaly. Mild right pleural effusion. CT NEEDLE BIOPSY LIVER PERCUTANEOUS    (Results Pending)   CT GUIDED NEEDLE PLACEMENT    (Results Pending)       Assessment  Hyponatremia, hypoosmolar. Virgil Ore well below 1%, urinary indices are consistent with hypovolemic hyponatremia and serum sodium is improving with conservative-rate IV normal saline. Etiology is likely multifactorial, acutely due to poor intake and recent water load.   Complicating circumstances are intrinsic lung disease due to COPD, treatment with an SSRI, likely beer drinkers Poto janel, though I would expect that to be improving with 4 months of abstinence, probably exacerbated in part by diastolic dysfunction uses a thiazide diuretic, that has also been discontinued.     2.    Lung nodule/liver nodule    Recommendations  Continue fluid restricted diet   Follow labs, UO  I would not stop the Zoloft as she has been on it but in the 6 months and it seems to be helping her depression, though notably seem to some of her symptoms started not long after starting the Zoloft  Minimize use of NSAIDs, would advised none at all  Stop thiazide diuretic, use loop for edema if diuretics needed              Electronically signed by Lisandra Valdez MD on 9/6/2022

## 2022-09-06 NOTE — CARE COORDINATION
Met with pt to discuss discharge planning/transition of care. Pt lives alone in a 3 story home, with 3 steps to enter. Pt is an active , pt has home 2L O2, cpap and nebulizer through [facility]. Plan is home at discharge, family to transport. Dr. Fariba Farr is PCP and [facility] is pharmacy. No needs/concerns for discharge, will follow for any needs. Irene Rosasr, MSW, LSW

## 2022-09-06 NOTE — PLAN OF CARE
Problem: Chronic Conditions and Co-morbidities  Goal: Patient's chronic conditions and co-morbidity symptoms are monitored and maintained or improved  9/6/2022 1034 by Mirna Nicholson RN  Outcome: Progressing  Flowsheets (Taken 9/6/2022 0810)  Care Plan - Patient's Chronic Conditions and Co-Morbidity Symptoms are Monitored and Maintained or Improved: Monitor and assess patient's chronic conditions and comorbid symptoms for stability, deterioration, or improvement  9/5/2022 2047 by Oscar Hess RN  Outcome: Progressing     Problem: Discharge Planning  Goal: Discharge to home or other facility with appropriate resources  9/6/2022 1034 by Mirna Nicholson RN  Outcome: Progressing  Flowsheets (Taken 9/6/2022 0810)  Discharge to home or other facility with appropriate resources: Identify barriers to discharge with patient and caregiver  9/5/2022 2047 by Oscar Hess RN  Outcome: Progressing     Problem: Safety - Adult  Goal: Free from fall injury  9/6/2022 1034 by Mirna Nicholson RN  Outcome: Progressing  Flowsheets (Taken 9/6/2022 0810)  Free From Fall Injury: Instruct family/caregiver on patient safety  9/5/2022 2047 by Oscar Hess RN  Outcome: Progressing     Problem: ABCDS Injury Assessment  Goal: Absence of physical injury  9/6/2022 1034 by Mirna Nicholson RN  Outcome: Progressing  Flowsheets (Taken 9/6/2022 0810)  Absence of Physical Injury: Implement safety measures based on patient assessment  9/5/2022 2047 by Oscar Hess RN  Outcome: Progressing

## 2022-09-06 NOTE — PROGRESS NOTES
[facility]  [address] [facility], [address], [address] [facility]      Pulmonary Progress Note      SUBJECTIVE:  sitting up at side of bed on RA, claims doing ok with breathing. Had biopsy of liver lesion done today.     OBJECTIVE    Medications    Continuous Infusions:    Scheduled Meds:   levothyroxine  150 mcg Oral Daily    amLODIPine  10 mg Oral Daily    fluticasone  1 spray Each Nostril Daily    magnesium oxide  400 mg Oral Daily    metoprolol succinate  25 mg Oral Daily    montelukast  10 mg Oral Nightly    sertraline  50 mg Oral Daily    docusate sodium  100 mg Oral Nightly    [Held by provider] enoxaparin  40 mg SubCUTAneous Daily    pantoprazole  40 mg Oral QAM AC    budesonide  0.25 mg Nebulization BID    And    Arformoterol Tartrate  15 mcg Nebulization BID    traZODone  50 mg Oral Nightly       PRN Meds:acetaminophen, albuterol, clonazePAM, ondansetron, guaiFENesin    Physical    VITALS:  /88   Pulse 85   Temp 97.6 °F (36.4 °C) (Temporal)   Resp 16   Ht 5' 2\" (1.575 m)   Wt 161 lb (73 kg)   SpO2 98%   BMI 29.45 kg/m²     24HR INTAKE/OUTPUT:      Intake/Output Summary (Last 24 hours) at 2022 1423  Last data filed at 2022 2307  Gross per 24 hour   Intake 600 ml   Output --   Net 600 ml       24HR PULSE OXIMETRY RANGE:    SpO2  Av %  Min: 96 %  Max: 100 %    General appearance: alert, appears stated age, and cooperative  Lungs: rhonchi bilaterally minimal  Heart: regular rate and rhythm, S1, S2 normal, no murmur, click, rub or gallop  Abdomen: soft, non-tender; bowel sounds normal; no masses,  no organomegaly  Extremities: extremities normal, atraumatic, no cyanosis or edema  Neurologic: Mental status: Alert, oriented, thought content appropriate    Data    CBC:   Recent Labs     22  1300   WBC 5.8   HGB 13.4   HCT 39.6   MCV 87.0          BMP:  Recent Labs     22  0527   *   K 3.7   CL 92*   CO2 26   BUN 6   CREATININE 0.5    ALB:3,BILIDIR:3,BILITOT:3,ALKPHOS:3)@    PT/INR:   Recent Labs     09/06/22  0429   PROTIME 12.1   INR 1.1       ABG:   No results for input(s): PH, PO2, PCO2, HCO3, BE, O2SAT, METHB, O2HB, COHB, O2CON, HHB, THB in the last 72 hours. Radiology/Other tests reviewed: none    Assessment:     Principal Problem:    Hyponatremia  Resolved Problems:    * No resolved hospital problems. *  Lung and liver nodules  COPD      Plan:       Await pathology of biopsy from liver, may come back either Friday or more likely next week  Hyponatremia as per Renal  Cont with nebs, should be able to resume usual medications as out-pt  Needs to stop smoking  Can be discharged from pulmonary pov, ffup in office in 2 weeks. Time at the bedside, reviewing labs and radiographs, reviewing notes and consultations, discussing with staff and family was more than 35 minutes. Thanks for letting us see this patient in consultation. Please contact us with any questions. Office [phone] or after hours through [employer], x [phone].

## 2022-09-06 NOTE — PATIENT CARE CONFERENCE
[facility] Quality Flow/Interdisciplinary Rounds Progress Note        Quality Flow Rounds held on September 6, 2022    Disciplines Attending:  Bedside Nurse, , , and Nursing Unit Leadership    Dilia Anderson was admitted on 9/1/2022 12:01 PM    Anticipated Discharge Date:  Expected Discharge Date: 09/03/22    Disposition:    Franklin Score:  Franklin Scale Score: 20    Readmission Risk              Risk of Unplanned Readmission:  14           Discussed patient goal for the day, patient clinical progression, and barriers to discharge.   The following Goal(s) of the Day/Commitment(s) have been identified:  Diagnostics - Report Results and Labs - Report Results      Ara Echols RN  September 6, 2022

## 2022-09-06 NOTE — PROGRESS NOTES
Called floor and spoke to RN. Confirmed that patient has been NPO and all blood thinners have been held. Will continue to keep floor updated when sending for patient for procedure.

## 2022-09-06 NOTE — PROGRESS NOTES
[facility]                                Progress note    Subjective: The patient is awake and alert. Resting comfortably in bed in no apparent acute distress  Just came back from CT guided liver lesion biopsy  Daughter at bedside, case discussed  Objective:    /88   Pulse 85   Temp 97.6 °F (36.4 °C) (Temporal)   Resp 16   Ht 5' 2\" (1.575 m)   Wt 161 lb (73 kg)   SpO2 98%   BMI 29.45 kg/m²     In: 720 [P.O.:720]  Out: -   In: 720   Out: -     General appearance: NAD, conversant  HEENT: AT/NC, MMM  Neck: FROM, supple  Lungs: Clear to auscultation  CV: RRR, no MRGs  Vasc: Radial pulses 2+  Abdomen: Soft, non-tender; no masses or HSM  Extremities: No peripheral edema or digital cyanosis  Skin: no rash, lesions or ulcers  Psych: Alert and oriented to person, place and time  Neuro: Alert and interactive     No results for input(s): WBC, HGB, HCT, PLT in the last 72 hours. Recent Labs     09/04/22  0527   *   K 3.7   CL 92*   CO2 26   BUN 6   CREATININE 0.5   CALCIUM 9.1       Assessment:    Principal Problem:    Hyponatremia  Resolved Problems:    * No resolved hospital problems. *      Plan:  24-year-old female with past medical history of hypertension hypothyroidism presents to the ED with abnormal labs and is admitted to telemetry unit with     Hyponatremia in longtime smoker, rule out malignancy  Status post liver lesion biopsy 9/6/2022-await pathology results  Profound hyponatremia -no hemoptysis, positive small amount of weight loss unintentional  Check CT chest  Monitor labs-sodium pending baseline 135  IV hydration to be managed by renal service-sodium improved to 129 today, last checked it was 124 2 days ago  Nephrology following-input appreciated  Check TSH  Fluid restriction  Nicotine patch       Pulmonary nodule / Pleural effusion  Supplement O2 to keep saturation greater than 93%.   Patient is utilizing 3 L for saturation of 97%  Caution diuretics due to hyponatremia  Pulmonary following - appreciate input  Smoking cessation  Pulmonary nodule - IR procedure biopsy  CT Chest:  RUL nodular density - metastatic lesion in the right hepatic lobe measuring 2 cm.   Thyroid lesion       Hypothyroidism  Uptitrate/initiate levothyroxine      Case discussed with daughter at bedside  Monitor PT OT evaluation, home versus subacute rehab at discharge  DVT Prophylaxis - hold for procedure  PT/OT  Discharge planning     Nara Perez MD  10:50 AM  9/6/2022

## 2022-09-06 NOTE — PROGRESS NOTES
[facility] MD Enoch Davila, MD Patricia Avalos, CNP   Herberth Reyes, ANP  Progress note  Patient's Name: Francisca Merlin  8:41 PM  9/5/2022        9/4comfortable on RA euvolemic appear in NAD    9/5 on RA euvolemic she is telling me she is going to OR in am     History of Present Ilness:    Ms. Shena Jonas is a 30-year-old woman with past medical history notable for COPD and chronic bronchitis in setting of longstanding smoking, alcoholism, having become abstinent roughly 4 months ago, depression on Zoloft chronically started several months ago, diastolic heart failure (HFpEF), chronic lower extremity edema on both loop and thiazide diuretic 1 of which was stopped 1 month ago though she is not sure which one, and hypothyroidism. Over the past several days prior to presentation she notes that she felt, \"drunk all the time, \"meaning unsteady on her feet, \"fuzzy\" mentation, just not feeling well. Has had intermittent nausea and ongoing anorexia with inconsistent intake of late. Has been drinking a lot of water recently. Does not think she has lost any weight. Denies returning to drinking alcoholic beverages. No NSAIDs. No dyspnea. No chest pain. Does have intermittent wheezing and a chronic cough intermittently productive of \"phlegm,\" without hemoptysis. Denies peripheral swelling. No new skin rash or lesion. No new medications. He does still smoke. Sodium on presentation 9/1 was 123 mmol/L, which compares with 135 mmol/L on 9/16/2021. Sodium worsened as of last evening to 122. She was started on normal saline at 75 cc/h. This morning serum sodium improved to 125 mmol/L, improving again to 126 mmol/L as of 1:30 PM this afternoon.     She tells me she is feeling a little bit better since her arrival.    Past Medical History:   Diagnosis Date    Bronchitis     Hypertension     Thyroid disease        Past Surgical History:   Procedure Laterality Date phase, occasional scattered wheeze  Regular rhythm normal S1-S2 no murmur no rub  Abdomen soft nontender nondistended normal active bowel sounds  Distal extremities are without edema  Pulses 1-2+ x4  No rash or lesion on visible portions of integument  Moves all 4 extremities  Cranial nerves II through XII grossly intact except she is hard of hearing  Awake, alert, interactive and appropriate    Data:   Labs:  CBC with Differential:    Lab Results   Component Value Date/Time    WBC 5.5 09/02/2022 05:39 AM    RBC 4.53 09/02/2022 05:39 AM    HGB 13.7 09/02/2022 05:39 AM    HCT 38.2 09/02/2022 05:39 AM     09/02/2022 05:39 AM    MCV 84.3 09/02/2022 05:39 AM    MCH 30.2 09/02/2022 05:39 AM    MCHC 35.9 09/02/2022 05:39 AM    RDW 14.6 09/02/2022 05:39 AM    LYMPHOPCT 10.5 09/01/2022 10:23 AM    MONOPCT 10.5 09/01/2022 10:23 AM    BASOPCT 0.7 09/01/2022 10:23 AM    MONOSABS 0.70 09/01/2022 10:23 AM    LYMPHSABS 0.70 09/01/2022 10:23 AM    EOSABS 0.08 09/01/2022 10:23 AM    BASOSABS 0.05 09/01/2022 10:23 AM     CMP:    Lab Results   Component Value Date/Time     09/04/2022 05:27 AM    K 3.7 09/04/2022 05:27 AM    K 2.5 03/30/2021 10:35 AM    CL 92 09/04/2022 05:27 AM    CO2 26 09/04/2022 05:27 AM    BUN 6 09/04/2022 05:27 AM    CREATININE 0.5 09/04/2022 05:27 AM    GFRAA >60 09/04/2022 05:27 AM    LABGLOM >60 09/04/2022 05:27 AM    GLUCOSE 94 09/04/2022 05:27 AM    PROT 6.9 09/01/2022 10:23 AM    LABALBU 4.4 09/01/2022 10:23 AM    CALCIUM 9.1 09/04/2022 05:27 AM    BILITOT 0.3 09/01/2022 10:23 AM    ALKPHOS 131 09/01/2022 10:23 AM    AST 14 09/01/2022 10:23 AM    ALT 6 09/01/2022 10:23 AM     Ionized Calcium:  No results found for: IONCA  Magnesium:    Lab Results   Component Value Date/Time    MG 1.6 09/02/2022 05:39 AM     Phosphorus:    Lab Results   Component Value Date/Time    PHOS 3.5 09/02/2022 05:39 AM     U/A:    Lab Results   Component Value Date/Time    COLORU Yellow 09/01/2022 12:45 PM    PHUR 6.5 09/01/2022 12:45 PM    WBCUA 1-3 09/01/2022 12:45 PM    RBCUA NONE 09/01/2022 12:45 PM    BACTERIA NONE SEEN 09/01/2022 12:45 PM    CLARITYU Clear 09/01/2022 12:45 PM    SPECGRAV <=1.005 09/01/2022 12:45 PM    LEUKOCYTESUR TRACE 09/01/2022 12:45 PM    UROBILINOGEN 0.2 09/01/2022 12:45 PM    BILIRUBINUR Negative 09/01/2022 12:45 PM    BLOODU Negative 09/01/2022 12:45 PM    GLUCOSEU Negative 09/01/2022 12:45 PM         Imaging:  CT CHEST WO CONTRAST   Final Result   1. Right paratracheal lymph node mass measuring 2.8 x 1.8 cm. There is a   medial right upper lobe nodular parenchymal lung opacity measuring 12 x 11   mm. There is a small right pleural effusion. Mild upper mediastinal   adenopathy. Findings are compatible with malignancy. 2.  Probable metastatic lesion in the right hepatic lobe measuring 2 cm. 3.  Small pericardial effusion. 4.  Small left kidney. 5.  Thyromegaly with nodular isodense calcified lesion in the thyroid   isthmus. Lesion measures 14 mm AP. CT HEAD WO CONTRAST   Final Result   No acute intracranial abnormality. Periventricular leukomalacia. Suspect bilateral mastoiditis. XR CHEST PORTABLE   Final Result   Mild cardiomegaly. Mild right pleural effusion. IR INTERVENTIONAL RADIOLOGY PROCEDURE REQUEST    (Results Pending)       Assessment  Hyponatremia, hypoosmolar. [place] well below 1%, urinary indices are consistent with hypovolemic hyponatremia and serum sodium is improving with conservative-rate IV normal saline. Etiology is likely multifactorial, acutely due to poor intake and recent water load. Complicating circumstances are intrinsic lung disease due to COPD, treatment with an SSRI, likely beer drinkers [place] janel, though I would expect that to be improving with 4 months of abstinence, probably exacerbated in part by diastolic dysfunction uses a thiazide diuretic, that has also been discontinued.     2.    Lung nodule/liver nodule    Recommendations  Continue fluid restricted diet   Follow labs, UO  I would not stop the Zoloft as she has been on it but in the 6 months and it seems to be helping her depression, though notably seem to some of her symptoms started not long after starting the Zoloft  Minimize use of NSAIDs, would advised none at all  Stop thiazide diuretic, use loop for edema if diuretics needed              Electronically signed by Scott Graham MD on 9/5/2022

## 2022-09-07 VITALS
WEIGHT: 161 LBS | TEMPERATURE: 98.2 F | OXYGEN SATURATION: 94 % | DIASTOLIC BLOOD PRESSURE: 74 MMHG | SYSTOLIC BLOOD PRESSURE: 127 MMHG | RESPIRATION RATE: 18 BRPM | HEART RATE: 80 BPM | BODY MASS INDEX: 29.63 KG/M2 | HEIGHT: 62 IN

## 2022-09-07 LAB
ANION GAP SERPL CALCULATED.3IONS-SCNC: 13 MMOL/L (ref 7–16)
BUN BLDV-MCNC: 14 MG/DL (ref 6–23)
CALCIUM SERPL-MCNC: 9.3 MG/DL (ref 8.6–10.2)
CHLORIDE BLD-SCNC: 91 MMOL/L (ref 98–107)
CO2: 24 MMOL/L (ref 22–29)
CREAT SERPL-MCNC: 0.7 MG/DL (ref 0.5–1)
GFR AFRICAN AMERICAN: >60
GFR NON-AFRICAN AMERICAN: >60 ML/MIN/1.73
GLUCOSE BLD-MCNC: 79 MG/DL (ref 74–99)
POTASSIUM SERPL-SCNC: 3.5 MMOL/L (ref 3.5–5)
SODIUM BLD-SCNC: 128 MMOL/L (ref 132–146)

## 2022-09-07 PROCEDURE — 6370000000 HC RX 637 (ALT 250 FOR IP): Performed by: INTERNAL MEDICINE

## 2022-09-07 PROCEDURE — 94640 AIRWAY INHALATION TREATMENT: CPT

## 2022-09-07 PROCEDURE — 36415 COLL VENOUS BLD VENIPUNCTURE: CPT

## 2022-09-07 PROCEDURE — 6360000002 HC RX W HCPCS: Performed by: INTERNAL MEDICINE

## 2022-09-07 PROCEDURE — 80048 BASIC METABOLIC PNL TOTAL CA: CPT

## 2022-09-07 RX ORDER — GUAIFENESIN 400 MG/1
400 TABLET ORAL 4 TIMES DAILY PRN
Status: DISCONTINUED | OUTPATIENT
Start: 2022-09-07 | End: 2022-09-07 | Stop reason: HOSPADM

## 2022-09-07 RX ADMIN — ARFORMOTEROL TARTRATE 15 MCG: 15 SOLUTION RESPIRATORY (INHALATION) at 10:31

## 2022-09-07 RX ADMIN — AMLODIPINE BESYLATE 10 MG: 10 TABLET ORAL at 08:48

## 2022-09-07 RX ADMIN — METOPROLOL SUCCINATE 25 MG: 25 TABLET, EXTENDED RELEASE ORAL at 08:48

## 2022-09-07 RX ADMIN — Medication 400 MG: at 08:48

## 2022-09-07 RX ADMIN — LEVOTHYROXINE SODIUM 150 MCG: 100 TABLET ORAL at 05:54

## 2022-09-07 RX ADMIN — SERTRALINE HYDROCHLORIDE 50 MG: 50 TABLET ORAL at 08:48

## 2022-09-07 RX ADMIN — PANTOPRAZOLE SODIUM 40 MG: 40 TABLET, DELAYED RELEASE ORAL at 05:54

## 2022-09-07 RX ADMIN — BUDESONIDE 250 MCG: 0.25 SUSPENSION RESPIRATORY (INHALATION) at 10:31

## 2022-09-07 RX ADMIN — FLUTICASONE PROPIONATE 1 SPRAY: 50 SPRAY, METERED NASAL at 10:44

## 2022-09-07 RX ADMIN — GUAIFENESIN 400 MG: 400 TABLET ORAL at 10:43

## 2022-09-07 ASSESSMENT — PAIN SCALES - GENERAL: PAINLEVEL_OUTOF10: 0

## 2022-09-13 NOTE — DISCHARGE SUMMARY
[facility]   Discharge summary   Patient ID:  Amarilis Lisa  [id number]  81 y.o.  1950    Admit date: 9/1/2022    Discharge date and time: 9/13/2022    Admission Diagnoses:   Patient Active Problem List   Diagnosis    Congestive heart failure of unknown etiology ([address]    Congestive heart failure due to cardiomyopathy ([address]    Hypoxia    Simple chronic bronchitis ([address]    Hyponatremia       Discharge Diagnoses: Hyponatremia     Consults: pulmonary/intensive care and nephrology    Procedures: none    Hospital Course:     81-year-old female with past medical history of hypertension hypothyroidism presents to the ED with abnormal labs and is admitted to telemetry unit with     Hyponatremia in longtime smoker, rule out malignancy  Status post liver lesion biopsy 9/6/2022-await pathology results  Profound hyponatremia -no hemoptysis, positive small amount of weight loss unintentional  Check CT chest  Monitor labs-sodium pending baseline 135  IV hydration to be managed by renal service-sodium improved to 129 today, last checked it was 124 2 days ago  Nephrology following-input appreciated  Check TSH  Fluid restriction  Nicotine patch        Pulmonary nodule / Pleural effusion  Supplement O2 to keep saturation greater than 93%. Patient is utilizing 3 L for saturation of 97%  Caution diuretics due to hyponatremia  Pulmonary following - appreciate input  Smoking cessation  Pulmonary nodule - IR procedure biopsy  CT Chest:  RUL nodular density - metastatic lesion in the right hepatic lobe measuring 2 cm. Thyroid lesion        Hypothyroidism  Uptitrate/initiate levothyroxine  No results for input(s): WBC, HGB, HCT, PLT in the last 72 hours. No results for input(s): NA, K, CL, CO2, BUN, CREATININE, GLU, CALCIUM in the last 72 hours.     CT HEAD WO CONTRAST    Result Date: 9/1/2022  EXAMINATION: CT OF THE HEAD WITHOUT CONTRAST  9/1/2022 2:28 pm TECHNIQUE: CT of the head was performed without the administration of intravenous contrast. Automated exposure control, iterative reconstruction, and/or weight based adjustment of the mA/kV was utilized to reduce the radiation dose to as low as reasonably achievable. COMPARISON: None. HISTORY: ORDERING SYSTEM PROVIDED HISTORY: dizzy TECHNOLOGIST PROVIDED HISTORY: Has a \"code stroke\" or \"stroke alert\" been called? ->No Reason for exam:->dizzy Decision Support Exception - unselect if not a suspected or confirmed emergency medical condition->Emergency Medical Condition (MA) What reading provider will be dictating this exam?->CRC FINDINGS: BRAIN/VENTRICLES: There is no acute intracranial hemorrhage, mass effect or midline shift. No abnormal extra-axial fluid collection. The gray-white differentiation is maintained without evidence of an acute infarct. There is no evidence of hydrocephalus. ORBITS: The visualized portion of the orbits demonstrate no acute abnormality. SINUSES: The visualized paranasal sinuses demonstrate no acute abnormality. There is partial opacification of bilateral mastoid air cells. SOFT TISSUES/SKULL:  No acute abnormality of the visualized skull or soft tissues. No acute intracranial abnormality. Periventricular leukomalacia. Suspect bilateral mastoiditis. XR CHEST PORTABLE    Result Date: 9/1/2022  EXAMINATION: ONE XRAY VIEW OF THE CHEST 9/1/2022 1:04 pm COMPARISON: None. HISTORY: ORDERING SYSTEM PROVIDED HISTORY: Shortness of breath TECHNOLOGIST PROVIDED HISTORY: Reason for exam:->Shortness of breath What reading provider will be dictating this exam?->CRC FINDINGS: Lungs are clear. The heart is mildly enlarged. There is no pneumothorax. This blunting of the right costophrenic angle. Mild scoliosis of the thoracic spine. Mild cardiomegaly. Mild right pleural effusion. Discharge Exam:    HEENT: NCAT,  PERRLA, No JVD  Heart:  RRR, no murmurs, gallops, or rubs.   Lungs:  CTA bilaterally, no wheeze, rales or rhonchi  Abd: bowel sounds present, nontender, nondistended, no masses  Extrem:  No clubbing, cyanosis, or edema    Disposition: home     Patient Condition at Discharge: Stable     Patient Instructions:      Medication List        CONTINUE taking these medications      * albuterol (2.5 MG/3ML) 0.083% nebulizer solution  Commonly known as: PROVENTIL     * albuterol sulfate  (90 Base) MCG/ACT inhaler  Commonly known as: PROVENTIL;VENTOLIN;PROAIR  Inhale 2 puffs into the lungs every 6 hours as needed for Wheezing     amLODIPine 5 MG tablet  Commonly known as: NORVASC     clonazePAM 1 MG tablet  Commonly known as: KLONOPIN     fluticasone 50 MCG/ACT nasal spray  Commonly known as: FLONASE     fluticasone-vilanterol 100-25 MCG/INH Aepb inhaler  Commonly known as: BREO ELLIPTA     levothyroxine 137 MCG tablet  Commonly known as: SYNTHROID     metoprolol succinate 25 MG extended release tablet  Commonly known as: TOPROL XL     montelukast 10 MG tablet  Commonly known as: SINGULAIR     omeprazole 20 MG delayed release capsule  Commonly known as: PRILOSEC     potassium chloride 20 MEQ extended release tablet  Commonly known as: KLOR-CON M  Take 1 tablet by mouth daily     sertraline 50 MG tablet  Commonly known as: ZOLOFT           * This list has 2 medication(s) that are the same as other medications prescribed for you. Read the directions carefully, and ask your doctor or other care provider to review them with you. Activity: activity as tolerated  Diet: renal diet    Pt has been advised to: Follow-up with Yuki Shelton DO in 1 week.   Follow-up with consultants as recommended by them    Note that over 30 minutes was spent in preparing discharge papers, discussing discharge with patient, medication review, etc.    Signed:  FELIPA Junior CNP  9/13/2022  12:27 PM

## 2022-09-20 ENCOUNTER — APPOINTMENT (OUTPATIENT)
Dept: GENERAL RADIOLOGY | Age: 72
DRG: 844 | End: 2022-09-20
Payer: MEDICARE

## 2022-09-20 ENCOUNTER — HOSPITAL ENCOUNTER (INPATIENT)
Age: 72
LOS: 6 days | Discharge: HOME HEALTH | DRG: 844 | End: 2022-09-26
Attending: EMERGENCY MEDICINE | Admitting: INTERNAL MEDICINE
Payer: MEDICARE

## 2022-09-20 ENCOUNTER — APPOINTMENT (OUTPATIENT)
Dept: CT IMAGING | Age: 72
DRG: 844 | End: 2022-09-20
Payer: MEDICARE

## 2022-09-20 DIAGNOSIS — R57.9 SHOCK (HCC): ICD-10-CM

## 2022-09-20 DIAGNOSIS — R06.03 RESPIRATORY DISTRESS: ICD-10-CM

## 2022-09-20 DIAGNOSIS — R91.8 LUNG MASS: ICD-10-CM

## 2022-09-20 DIAGNOSIS — E87.1 HYPONATREMIA: Primary | ICD-10-CM

## 2022-09-20 PROBLEM — I10 PRIMARY HYPERTENSION: Status: ACTIVE | Noted: 2022-09-20

## 2022-09-20 PROBLEM — E03.9 HYPOTHYROIDISM: Status: ACTIVE | Noted: 2022-09-20

## 2022-09-20 PROBLEM — F32.A DEPRESSION: Status: ACTIVE | Noted: 2022-09-20

## 2022-09-20 PROBLEM — K21.9 GERD (GASTROESOPHAGEAL REFLUX DISEASE): Status: ACTIVE | Noted: 2022-09-20

## 2022-09-20 LAB
ALBUMIN SERPL-MCNC: 4.3 G/DL (ref 3.5–5.2)
ALP BLD-CCNC: 137 U/L (ref 35–104)
ALT SERPL-CCNC: 8 U/L (ref 0–32)
ANION GAP SERPL CALCULATED.3IONS-SCNC: 13 MMOL/L (ref 7–16)
AST SERPL-CCNC: 15 U/L (ref 0–31)
B.E.: -11.5 MMOL/L (ref -3–3)
BASOPHILS ABSOLUTE: 0.02 E9/L (ref 0–0.2)
BASOPHILS RELATIVE PERCENT: 0.2 % (ref 0–2)
BILIRUB SERPL-MCNC: 0.2 MG/DL (ref 0–1.2)
BUN BLDV-MCNC: 11 MG/DL (ref 6–23)
BURR CELLS: ABNORMAL
CALCIUM SERPL-MCNC: 9.3 MG/DL (ref 8.6–10.2)
CHLORIDE BLD-SCNC: 77 MMOL/L (ref 98–107)
CHLORIDE URINE RANDOM: 58 MMOL/L
CO2: 20 MMOL/L (ref 22–29)
COHB: 1.9 % (ref 0–1.5)
CREAT SERPL-MCNC: 0.6 MG/DL (ref 0.5–1)
CRITICAL: ABNORMAL
DATE ANALYZED: ABNORMAL
DATE OF COLLECTION: ABNORMAL
EOSINOPHILS ABSOLUTE: 0.03 E9/L (ref 0.05–0.5)
EOSINOPHILS RELATIVE PERCENT: 0.4 % (ref 0–6)
FIO2: 100 %
GFR AFRICAN AMERICAN: >60
GFR NON-AFRICAN AMERICAN: >60 ML/MIN/1.73
GLUCOSE BLD-MCNC: 117 MG/DL (ref 74–99)
HCO3: 16.9 MMOL/L (ref 22–26)
HCT VFR BLD CALC: 38.5 % (ref 34–48)
HEMOGLOBIN: 13.8 G/DL (ref 11.5–15.5)
HHB: 0.6 % (ref 0–5)
IMMATURE GRANULOCYTES #: 0.05 E9/L
IMMATURE GRANULOCYTES %: 0.6 % (ref 0–5)
LAB: ABNORMAL
LACTIC ACID: 5.3 MMOL/L (ref 0.5–2.2)
LYMPHOCYTES ABSOLUTE: 0.39 E9/L (ref 1.5–4)
LYMPHOCYTES RELATIVE PERCENT: 4.7 % (ref 20–42)
Lab: ABNORMAL
MAGNESIUM: 1.6 MG/DL (ref 1.6–2.6)
MCH RBC QN AUTO: 28.6 PG (ref 26–35)
MCHC RBC AUTO-ENTMCNC: 35.8 % (ref 32–34.5)
MCV RBC AUTO: 79.9 FL (ref 80–99.9)
METHB: 0.5 % (ref 0–1.5)
MODE: ABNORMAL
MONOCYTES ABSOLUTE: 0.63 E9/L (ref 0.1–0.95)
MONOCYTES RELATIVE PERCENT: 7.6 % (ref 2–12)
NEUTROPHILS ABSOLUTE: 7.15 E9/L (ref 1.8–7.3)
NEUTROPHILS RELATIVE PERCENT: 86.5 % (ref 43–80)
O2 CONTENT: 24.6 ML/DL
O2 SATURATION: 99.4 % (ref 92–98.5)
O2HB: 97 % (ref 94–97)
OPERATOR ID: 2593
OSMOLALITY URINE: 455 MOSM/KG (ref 300–900)
OVALOCYTES: ABNORMAL
PATIENT TEMP: 37 C
PCO2: 46.9 MMHG (ref 35–45)
PDW BLD-RTO: 13.4 FL (ref 11.5–15)
PEEP/CPAP: 8 CMH2O
PFO2: 2.24 MMHG/%
PH BLOOD GAS: 7.17 (ref 7.35–7.45)
PIP: 15 CMH2O
PLATELET # BLD: 369 E9/L (ref 130–450)
PMV BLD AUTO: 8.9 FL (ref 7–12)
PO2: 223.8 MMHG (ref 75–100)
POIKILOCYTES: ABNORMAL
POTASSIUM REFLEX MAGNESIUM: 4 MMOL/L (ref 3.5–5)
POTASSIUM, UR: 41.9 MMOL/L
PRO-BNP: 1284 PG/ML (ref 0–125)
RBC # BLD: 4.82 E12/L (ref 3.5–5.5)
SODIUM BLD-SCNC: 110 MMOL/L (ref 132–146)
SODIUM URINE: 53 MMOL/L
SOURCE, BLOOD GAS: ABNORMAL
THB: 17.7 G/DL (ref 11.5–16.5)
TIME ANALYZED: 2123
TOTAL PROTEIN: 7.1 G/DL (ref 6.4–8.3)
TROPONIN, HIGH SENSITIVITY: 7 NG/L (ref 0–9)
TROPONIN, HIGH SENSITIVITY: 7 NG/L (ref 0–9)
WBC # BLD: 8.3 E9/L (ref 4.5–11.5)

## 2022-09-20 PROCEDURE — 83735 ASSAY OF MAGNESIUM: CPT

## 2022-09-20 PROCEDURE — 84484 ASSAY OF TROPONIN QUANT: CPT

## 2022-09-20 PROCEDURE — 93005 ELECTROCARDIOGRAM TRACING: CPT | Performed by: PHYSICIAN ASSISTANT

## 2022-09-20 PROCEDURE — 74174 CTA ABD&PLVS W/CONTRAST: CPT

## 2022-09-20 PROCEDURE — 99285 EMERGENCY DEPT VISIT HI MDM: CPT

## 2022-09-20 PROCEDURE — 87040 BLOOD CULTURE FOR BACTERIA: CPT

## 2022-09-20 PROCEDURE — 84133 ASSAY OF URINE POTASSIUM: CPT

## 2022-09-20 PROCEDURE — 36600 WITHDRAWAL OF ARTERIAL BLOOD: CPT

## 2022-09-20 PROCEDURE — 84300 ASSAY OF URINE SODIUM: CPT

## 2022-09-20 PROCEDURE — 96365 THER/PROPH/DIAG IV INF INIT: CPT

## 2022-09-20 PROCEDURE — 6360000002 HC RX W HCPCS

## 2022-09-20 PROCEDURE — 51702 INSERT TEMP BLADDER CATH: CPT

## 2022-09-20 PROCEDURE — 80053 COMPREHEN METABOLIC PANEL: CPT

## 2022-09-20 PROCEDURE — 2000000000 HC ICU R&B

## 2022-09-20 PROCEDURE — 94660 CPAP INITIATION&MGMT: CPT

## 2022-09-20 PROCEDURE — 2500000003 HC RX 250 WO HCPCS: Performed by: EMERGENCY MEDICINE

## 2022-09-20 PROCEDURE — 6360000004 HC RX CONTRAST MEDICATION: Performed by: RADIOLOGY

## 2022-09-20 PROCEDURE — 71045 X-RAY EXAM CHEST 1 VIEW: CPT

## 2022-09-20 PROCEDURE — 82436 ASSAY OF URINE CHLORIDE: CPT

## 2022-09-20 PROCEDURE — 36556 INSERT NON-TUNNEL CV CATH: CPT

## 2022-09-20 PROCEDURE — 6360000002 HC RX W HCPCS: Performed by: EMERGENCY MEDICINE

## 2022-09-20 PROCEDURE — 85025 COMPLETE CBC W/AUTO DIFF WBC: CPT

## 2022-09-20 PROCEDURE — 71275 CT ANGIOGRAPHY CHEST: CPT

## 2022-09-20 PROCEDURE — 83605 ASSAY OF LACTIC ACID: CPT

## 2022-09-20 PROCEDURE — 94640 AIRWAY INHALATION TREATMENT: CPT

## 2022-09-20 PROCEDURE — 96375 TX/PRO/DX INJ NEW DRUG ADDON: CPT

## 2022-09-20 PROCEDURE — 6370000000 HC RX 637 (ALT 250 FOR IP): Performed by: EMERGENCY MEDICINE

## 2022-09-20 PROCEDURE — 2500000003 HC RX 250 WO HCPCS

## 2022-09-20 PROCEDURE — 96374 THER/PROPH/DIAG INJ IV PUSH: CPT

## 2022-09-20 PROCEDURE — 2580000003 HC RX 258: Performed by: EMERGENCY MEDICINE

## 2022-09-20 PROCEDURE — 83935 ASSAY OF URINE OSMOLALITY: CPT

## 2022-09-20 PROCEDURE — 82805 BLOOD GASES W/O2 SATURATION: CPT

## 2022-09-20 PROCEDURE — 94664 DEMO&/EVAL PT USE INHALER: CPT

## 2022-09-20 PROCEDURE — 83880 ASSAY OF NATRIURETIC PEPTIDE: CPT

## 2022-09-20 RX ORDER — IPRATROPIUM BROMIDE AND ALBUTEROL SULFATE 2.5; .5 MG/3ML; MG/3ML
1 SOLUTION RESPIRATORY (INHALATION)
Status: COMPLETED | OUTPATIENT
Start: 2022-09-20 | End: 2022-09-20

## 2022-09-20 RX ORDER — METHYLPREDNISOLONE SODIUM SUCCINATE 125 MG/2ML
125 INJECTION, POWDER, LYOPHILIZED, FOR SOLUTION INTRAMUSCULAR; INTRAVENOUS ONCE
Status: COMPLETED | OUTPATIENT
Start: 2022-09-20 | End: 2022-09-20

## 2022-09-20 RX ORDER — MAGNESIUM SULFATE IN WATER 40 MG/ML
INJECTION, SOLUTION INTRAVENOUS
Status: COMPLETED
Start: 2022-09-20 | End: 2022-09-20

## 2022-09-20 RX ORDER — 0.9 % SODIUM CHLORIDE 0.9 %
1000 INTRAVENOUS SOLUTION INTRAVENOUS ONCE
Status: COMPLETED | OUTPATIENT
Start: 2022-09-20 | End: 2022-09-21

## 2022-09-20 RX ORDER — 0.9 % SODIUM CHLORIDE 0.9 %
2000 INTRAVENOUS SOLUTION INTRAVENOUS ONCE
Status: DISCONTINUED | OUTPATIENT
Start: 2022-09-20 | End: 2022-09-20

## 2022-09-20 RX ADMIN — SODIUM CHLORIDE 1000 ML: 9 INJECTION, SOLUTION INTRAVENOUS at 22:17

## 2022-09-20 RX ADMIN — IPRATROPIUM BROMIDE AND ALBUTEROL SULFATE 1 AMPULE: .5; 3 SOLUTION RESPIRATORY (INHALATION) at 21:17

## 2022-09-20 RX ADMIN — IOPAMIDOL 90 ML: 755 INJECTION, SOLUTION INTRAVENOUS at 22:45

## 2022-09-20 RX ADMIN — METHYLPREDNISOLONE SODIUM SUCCINATE 125 MG: 125 INJECTION, POWDER, FOR SOLUTION INTRAMUSCULAR; INTRAVENOUS at 20:45

## 2022-09-20 RX ADMIN — MAGNESIUM SULFATE 2 MG: 2 INJECTION INTRAVENOUS at 20:59

## 2022-09-20 RX ADMIN — IPRATROPIUM BROMIDE AND ALBUTEROL SULFATE 1 AMPULE: .5; 3 SOLUTION RESPIRATORY (INHALATION) at 21:15

## 2022-09-20 RX ADMIN — SODIUM BICARBONATE 100 MEQ: 84 INJECTION, SOLUTION INTRAVENOUS at 22:26

## 2022-09-20 RX ADMIN — CEFEPIME 2000 MG: 2 INJECTION, POWDER, FOR SOLUTION INTRAVENOUS at 23:09

## 2022-09-20 RX ADMIN — Medication 10 MCG/MIN: at 22:13

## 2022-09-20 RX ADMIN — IPRATROPIUM BROMIDE AND ALBUTEROL SULFATE 1 AMPULE: .5; 3 SOLUTION RESPIRATORY (INHALATION) at 21:16

## 2022-09-20 ASSESSMENT — PAIN - FUNCTIONAL ASSESSMENT: PAIN_FUNCTIONAL_ASSESSMENT: NONE - DENIES PAIN

## 2022-09-20 NOTE — ED NOTES
Department of Emergency Medicine  FIRST PROVIDER TRIAGE NOTE             Independent MLP           9/20/22  6:27 PM EDT    Date of Encounter: 9/20/22   MRN: [id number]      HPI: Ming Stewart is a 81 y.o. female who presents to the ED for Other (Labs done two days ago. States low sodium. States dizziness. Denies CP or SOB. Denies n/v/d.)     Pt called to come in for low sodium per OP labs 2 days ago. Pt reports \"I feel drunk. \" Recent admission for the same. ROS: Negative for cp or sob. PE: Gen Appearance/Constitutional: alert  Musculoskeletal: moves all extremities x 4     Initial Plan of Care: All treatment areas with department are currently occupied. Plan to order/Initiate the following while awaiting opening in ED: labs and EKG.   Initiate Treatment-Testing, Proceed toTreatment Area When Bed Available for ED Attending/MLP to Continue Care    Electronically signed by Pushpa Reid PA-C   DD: 9/20/22       Pushpa Reid PA-C  09/20/22 [id number]

## 2022-09-21 PROBLEM — Z51.5 PALLIATIVE CARE BY SPECIALIST: Status: ACTIVE | Noted: 2022-09-21

## 2022-09-21 PROBLEM — Z71.89 GOALS OF CARE, COUNSELING/DISCUSSION: Status: ACTIVE | Noted: 2022-09-21

## 2022-09-21 LAB
ANION GAP SERPL CALCULATED.3IONS-SCNC: 12 MMOL/L (ref 7–16)
ANION GAP SERPL CALCULATED.3IONS-SCNC: 13 MMOL/L (ref 7–16)
ANION GAP SERPL CALCULATED.3IONS-SCNC: 14 MMOL/L (ref 7–16)
ANION GAP SERPL CALCULATED.3IONS-SCNC: 15 MMOL/L (ref 7–16)
ANION GAP SERPL CALCULATED.3IONS-SCNC: 9 MMOL/L (ref 7–16)
ANISOCYTOSIS: ABNORMAL
B.E.: 0.3 MMOL/L (ref -3–3)
BACTERIA: ABNORMAL /HPF
BACTERIA: ABNORMAL /HPF
BASOPHILS ABSOLUTE: 0 E9/L (ref 0–0.2)
BASOPHILS RELATIVE PERCENT: 0.1 % (ref 0–2)
BILIRUBIN URINE: NEGATIVE
BILIRUBIN URINE: NEGATIVE
BLOOD, URINE: ABNORMAL
BLOOD, URINE: ABNORMAL
BUN BLDV-MCNC: 14 MG/DL (ref 6–23)
BUN BLDV-MCNC: 14 MG/DL (ref 6–23)
BUN BLDV-MCNC: 15 MG/DL (ref 6–23)
BUN BLDV-MCNC: 17 MG/DL (ref 6–23)
BUN BLDV-MCNC: 18 MG/DL (ref 6–23)
BUN BLDV-MCNC: 20 MG/DL (ref 6–23)
BUN BLDV-MCNC: 20 MG/DL (ref 6–23)
BUN BLDV-MCNC: 21 MG/DL (ref 6–23)
CALCIUM SERPL-MCNC: 8.1 MG/DL (ref 8.6–10.2)
CALCIUM SERPL-MCNC: 8.3 MG/DL (ref 8.6–10.2)
CALCIUM SERPL-MCNC: 8.5 MG/DL (ref 8.6–10.2)
CALCIUM SERPL-MCNC: 8.7 MG/DL (ref 8.6–10.2)
CALCIUM SERPL-MCNC: 8.7 MG/DL (ref 8.6–10.2)
CALCIUM SERPL-MCNC: 8.8 MG/DL (ref 8.6–10.2)
CALCIUM SERPL-MCNC: 8.8 MG/DL (ref 8.6–10.2)
CALCIUM SERPL-MCNC: 8.9 MG/DL (ref 8.6–10.2)
CALCIUM SERPL-MCNC: 8.9 MG/DL (ref 8.6–10.2)
CALCIUM SERPL-MCNC: 9.1 MG/DL (ref 8.6–10.2)
CHLORIDE BLD-SCNC: 75 MMOL/L (ref 98–107)
CHLORIDE BLD-SCNC: 78 MMOL/L (ref 98–107)
CHLORIDE BLD-SCNC: 78 MMOL/L (ref 98–107)
CHLORIDE BLD-SCNC: 79 MMOL/L (ref 98–107)
CHLORIDE BLD-SCNC: 80 MMOL/L (ref 98–107)
CHLORIDE BLD-SCNC: 80 MMOL/L (ref 98–107)
CHLORIDE BLD-SCNC: 81 MMOL/L (ref 98–107)
CHLORIDE BLD-SCNC: 82 MMOL/L (ref 98–107)
CHLORIDE BLD-SCNC: 82 MMOL/L (ref 98–107)
CHLORIDE BLD-SCNC: 83 MMOL/L (ref 98–107)
CHLORIDE URINE RANDOM: <20 MMOL/L
CHOLESTEROL, TOTAL: 180 MG/DL (ref 0–199)
CLARITY: CLEAR
CLARITY: CLEAR
CO2: 18 MMOL/L (ref 22–29)
CO2: 21 MMOL/L (ref 22–29)
CO2: 21 MMOL/L (ref 22–29)
CO2: 22 MMOL/L (ref 22–29)
CO2: 22 MMOL/L (ref 22–29)
CO2: 23 MMOL/L (ref 22–29)
CO2: 24 MMOL/L (ref 22–29)
CO2: 26 MMOL/L (ref 22–29)
COHB: 0.9 % (ref 0–1.5)
COLOR: YELLOW
COLOR: YELLOW
CORTISOL TOTAL: 16.01 MCG/DL (ref 2.68–18.4)
CREAT SERPL-MCNC: 0.8 MG/DL (ref 0.5–1)
CREAT SERPL-MCNC: 0.9 MG/DL (ref 0.5–1)
CREAT SERPL-MCNC: 1 MG/DL (ref 0.5–1)
CRITICAL: NORMAL
DATE ANALYZED: NORMAL
DATE OF COLLECTION: NORMAL
EKG ATRIAL RATE: 89 BPM
EKG P AXIS: 68 DEGREES
EKG P-R INTERVAL: 154 MS
EKG Q-T INTERVAL: 398 MS
EKG QRS DURATION: 94 MS
EKG QTC CALCULATION (BAZETT): 484 MS
EKG R AXIS: 82 DEGREES
EKG T AXIS: 77 DEGREES
EKG VENTRICULAR RATE: 89 BPM
EOSINOPHILS ABSOLUTE: 0 E9/L (ref 0.05–0.5)
EOSINOPHILS RELATIVE PERCENT: 0 % (ref 0–6)
FOLATE: 10.4 NG/ML (ref 4.8–24.2)
GFR AFRICAN AMERICAN: >60
GFR NON-AFRICAN AMERICAN: 55 ML/MIN/1.73
GFR NON-AFRICAN AMERICAN: >60 ML/MIN/1.73
GLUCOSE BLD-MCNC: 121 MG/DL (ref 74–99)
GLUCOSE BLD-MCNC: 133 MG/DL (ref 74–99)
GLUCOSE BLD-MCNC: 140 MG/DL (ref 74–99)
GLUCOSE BLD-MCNC: 141 MG/DL (ref 74–99)
GLUCOSE BLD-MCNC: 152 MG/DL (ref 74–99)
GLUCOSE BLD-MCNC: 155 MG/DL (ref 74–99)
GLUCOSE BLD-MCNC: 160 MG/DL (ref 74–99)
GLUCOSE BLD-MCNC: 179 MG/DL (ref 74–99)
GLUCOSE BLD-MCNC: 255 MG/DL (ref 74–99)
GLUCOSE BLD-MCNC: 435 MG/DL (ref 74–99)
GLUCOSE URINE: NEGATIVE MG/DL
GLUCOSE URINE: NEGATIVE MG/DL
HBA1C MFR BLD: 5.6 % (ref 4–5.6)
HCO3: 23.9 MMOL/L (ref 22–26)
HCT VFR BLD CALC: 40.3 % (ref 34–48)
HDLC SERPL-MCNC: 69 MG/DL
HEMOGLOBIN: 14.6 G/DL (ref 11.5–15.5)
HHB: 2.7 % (ref 0–5)
KETONES, URINE: NEGATIVE MG/DL
KETONES, URINE: NEGATIVE MG/DL
LAB: NORMAL
LACTIC ACID: 1.3 MMOL/L (ref 0.5–2.2)
LACTIC ACID: 3.1 MMOL/L (ref 0.5–2.2)
LACTIC ACID: 4.2 MMOL/L (ref 0.5–2.2)
LDL CHOLESTEROL CALCULATED: 92 MG/DL (ref 0–99)
LEUKOCYTE ESTERASE, URINE: NEGATIVE
LEUKOCYTE ESTERASE, URINE: NEGATIVE
LYMPHOCYTES ABSOLUTE: 0.15 E9/L (ref 1.5–4)
LYMPHOCYTES RELATIVE PERCENT: 0.8 % (ref 20–42)
Lab: NORMAL
MAGNESIUM: 2 MG/DL (ref 1.6–2.6)
MCH RBC QN AUTO: 29.4 PG (ref 26–35)
MCHC RBC AUTO-ENTMCNC: 36.2 % (ref 32–34.5)
MCV RBC AUTO: 81.1 FL (ref 80–99.9)
METER GLUCOSE: 123 MG/DL (ref 74–99)
METER GLUCOSE: 143 MG/DL (ref 74–99)
METER GLUCOSE: 172 MG/DL (ref 74–99)
METHB: 0.4 % (ref 0–1.5)
MODE: NORMAL
MONOCYTES ABSOLUTE: 0.15 E9/L (ref 0.1–0.95)
MONOCYTES RELATIVE PERCENT: 0.9 % (ref 2–12)
MYELOCYTE PERCENT: 0.9 % (ref 0–0)
NEUTROPHILS ABSOLUTE: 14.5 E9/L (ref 1.8–7.3)
NEUTROPHILS RELATIVE PERCENT: 97.4 % (ref 43–80)
NITRITE, URINE: NEGATIVE
NITRITE, URINE: NEGATIVE
O2 SATURATION: 97.2 % (ref 92–98.5)
O2HB: 96 % (ref 94–97)
OPERATOR ID: 1926
OSMOLALITY URINE: 590 MOSM/KG (ref 300–900)
OSMOLALITY: 254 MOSM/KG (ref 285–310)
OVALOCYTES: ABNORMAL
PATIENT TEMP: 37 C
PCO2: 35.3 MMHG (ref 35–45)
PDW BLD-RTO: 13.3 FL (ref 11.5–15)
PH BLOOD GAS: 7.45 (ref 7.35–7.45)
PH UA: 6 (ref 5–9)
PH UA: 6 (ref 5–9)
PLATELET # BLD: 353 E9/L (ref 130–450)
PMV BLD AUTO: 9.3 FL (ref 7–12)
PO2: 89.1 MMHG (ref 75–100)
POIKILOCYTES: ABNORMAL
POTASSIUM REFLEX MAGNESIUM: 3.3 MMOL/L (ref 3.5–5)
POTASSIUM REFLEX MAGNESIUM: 3.7 MMOL/L (ref 3.5–5)
POTASSIUM REFLEX MAGNESIUM: 3.7 MMOL/L (ref 3.5–5)
POTASSIUM REFLEX MAGNESIUM: 3.8 MMOL/L (ref 3.5–5)
POTASSIUM REFLEX MAGNESIUM: 3.8 MMOL/L (ref 3.5–5)
POTASSIUM REFLEX MAGNESIUM: 3.9 MMOL/L (ref 3.5–5)
POTASSIUM REFLEX MAGNESIUM: 3.9 MMOL/L (ref 3.5–5)
POTASSIUM REFLEX MAGNESIUM: 4 MMOL/L (ref 3.5–5)
POTASSIUM REFLEX MAGNESIUM: 4 MMOL/L (ref 3.5–5)
POTASSIUM REFLEX MAGNESIUM: 4.2 MMOL/L (ref 3.5–5)
POTASSIUM, UR: 43.4 MMOL/L
PROTEIN UA: 30 MG/DL
PROTEIN UA: ABNORMAL MG/DL
RBC # BLD: 4.97 E12/L (ref 3.5–5.5)
RBC UA: ABNORMAL /HPF (ref 0–2)
RBC UA: ABNORMAL /HPF (ref 0–2)
SARS-COV-2, NAAT: NOT DETECTED
SODIUM BLD-SCNC: 108 MMOL/L (ref 132–146)
SODIUM BLD-SCNC: 112 MMOL/L (ref 132–146)
SODIUM BLD-SCNC: 113 MMOL/L (ref 132–146)
SODIUM BLD-SCNC: 114 MMOL/L (ref 132–146)
SODIUM BLD-SCNC: 115 MMOL/L (ref 132–146)
SODIUM BLD-SCNC: 116 MMOL/L (ref 132–146)
SODIUM BLD-SCNC: 117 MMOL/L (ref 132–146)
SODIUM BLD-SCNC: 118 MMOL/L (ref 132–146)
SODIUM BLD-SCNC: 119 MMOL/L (ref 132–146)
SODIUM BLD-SCNC: 120 MMOL/L (ref 132–146)
SODIUM URINE: <20 MMOL/L
SODIUM URINE: <20 MMOL/L
SOURCE, BLOOD GAS: NORMAL
SPECIFIC GRAVITY UA: 1.01 (ref 1–1.03)
SPECIFIC GRAVITY UA: 1.01 (ref 1–1.03)
T4 FREE: 2.15 NG/DL (ref 0.93–1.7)
THB: 13.8 G/DL (ref 11.5–16.5)
TIME ANALYZED: 1019
TRIGL SERPL-MCNC: 96 MG/DL (ref 0–149)
TSH SERPL DL<=0.05 MIU/L-ACNC: 3.97 UIU/ML (ref 0.27–4.2)
UROBILINOGEN, URINE: 0.2 E.U./DL
UROBILINOGEN, URINE: 0.2 E.U./DL
VITAMIN B-12: 445 PG/ML (ref 211–946)
VLDLC SERPL CALC-MCNC: 19 MG/DL
WBC # BLD: 14.8 E9/L (ref 4.5–11.5)
WBC UA: ABNORMAL /HPF (ref 0–5)
WBC UA: ABNORMAL /HPF (ref 0–5)

## 2022-09-21 PROCEDURE — 99233 SBSQ HOSP IP/OBS HIGH 50: CPT | Performed by: INTERNAL MEDICINE

## 2022-09-21 PROCEDURE — 2580000003 HC RX 258: Performed by: INTERNAL MEDICINE

## 2022-09-21 PROCEDURE — 85025 COMPLETE CBC W/AUTO DIFF WBC: CPT

## 2022-09-21 PROCEDURE — 6370000000 HC RX 637 (ALT 250 FOR IP): Performed by: INTERNAL MEDICINE

## 2022-09-21 PROCEDURE — 87040 BLOOD CULTURE FOR BACTERIA: CPT

## 2022-09-21 PROCEDURE — 99222 1ST HOSP IP/OBS MODERATE 55: CPT | Performed by: CLINICAL NURSE SPECIALIST

## 2022-09-21 PROCEDURE — 94640 AIRWAY INHALATION TREATMENT: CPT

## 2022-09-21 PROCEDURE — 6360000002 HC RX W HCPCS: Performed by: INTERNAL MEDICINE

## 2022-09-21 PROCEDURE — 81001 URINALYSIS AUTO W/SCOPE: CPT

## 2022-09-21 PROCEDURE — 84300 ASSAY OF URINE SODIUM: CPT

## 2022-09-21 PROCEDURE — 87635 SARS-COV-2 COVID-19 AMP PRB: CPT

## 2022-09-21 PROCEDURE — 84443 ASSAY THYROID STIM HORMONE: CPT

## 2022-09-21 PROCEDURE — 82805 BLOOD GASES W/O2 SATURATION: CPT

## 2022-09-21 PROCEDURE — 84133 ASSAY OF URINE POTASSIUM: CPT

## 2022-09-21 PROCEDURE — 2000000000 HC ICU R&B

## 2022-09-21 PROCEDURE — 82607 VITAMIN B-12: CPT

## 2022-09-21 PROCEDURE — 83735 ASSAY OF MAGNESIUM: CPT

## 2022-09-21 PROCEDURE — 2580000003 HC RX 258: Performed by: EMERGENCY MEDICINE

## 2022-09-21 PROCEDURE — 84439 ASSAY OF FREE THYROXINE: CPT

## 2022-09-21 PROCEDURE — 83935 ASSAY OF URINE OSMOLALITY: CPT

## 2022-09-21 PROCEDURE — 36600 WITHDRAWAL OF ARTERIAL BLOOD: CPT

## 2022-09-21 PROCEDURE — 82436 ASSAY OF URINE CHLORIDE: CPT

## 2022-09-21 PROCEDURE — 82533 TOTAL CORTISOL: CPT

## 2022-09-21 PROCEDURE — 83605 ASSAY OF LACTIC ACID: CPT

## 2022-09-21 PROCEDURE — 80061 LIPID PANEL: CPT

## 2022-09-21 PROCEDURE — 82962 GLUCOSE BLOOD TEST: CPT

## 2022-09-21 PROCEDURE — 80048 BASIC METABOLIC PNL TOTAL CA: CPT

## 2022-09-21 PROCEDURE — 36415 COLL VENOUS BLD VENIPUNCTURE: CPT

## 2022-09-21 PROCEDURE — 2700000000 HC OXYGEN THERAPY PER DAY

## 2022-09-21 PROCEDURE — 36592 COLLECT BLOOD FROM PICC: CPT

## 2022-09-21 PROCEDURE — 6360000002 HC RX W HCPCS: Performed by: EMERGENCY MEDICINE

## 2022-09-21 PROCEDURE — 82746 ASSAY OF FOLIC ACID SERUM: CPT

## 2022-09-21 PROCEDURE — 83930 ASSAY OF BLOOD OSMOLALITY: CPT

## 2022-09-21 PROCEDURE — 83036 HEMOGLOBIN GLYCOSYLATED A1C: CPT

## 2022-09-21 RX ORDER — MONTELUKAST SODIUM 10 MG/1
10 TABLET ORAL EVERY MORNING
Status: DISCONTINUED | OUTPATIENT
Start: 2022-09-21 | End: 2022-09-26 | Stop reason: HOSPADM

## 2022-09-21 RX ORDER — IBUPROFEN 400 MG/1
200 TABLET ORAL EVERY 6 HOURS PRN
Status: DISCONTINUED | OUTPATIENT
Start: 2022-09-21 | End: 2022-09-22

## 2022-09-21 RX ORDER — ALBUTEROL SULFATE 2.5 MG/3ML
2.5 SOLUTION RESPIRATORY (INHALATION)
Status: DISCONTINUED | OUTPATIENT
Start: 2022-09-21 | End: 2022-09-21

## 2022-09-21 RX ORDER — SODIUM CHLORIDE 9 MG/ML
INJECTION, SOLUTION INTRAVENOUS PRN
Status: DISCONTINUED | OUTPATIENT
Start: 2022-09-21 | End: 2022-09-26 | Stop reason: HOSPADM

## 2022-09-21 RX ORDER — HYDROXYZINE PAMOATE 25 MG/1
25 CAPSULE ORAL 3 TIMES DAILY PRN
Status: DISCONTINUED | OUTPATIENT
Start: 2022-09-21 | End: 2022-09-25

## 2022-09-21 RX ORDER — ONDANSETRON 4 MG/1
4 TABLET, ORALLY DISINTEGRATING ORAL EVERY 8 HOURS PRN
Status: DISCONTINUED | OUTPATIENT
Start: 2022-09-21 | End: 2022-09-26 | Stop reason: HOSPADM

## 2022-09-21 RX ORDER — ENOXAPARIN SODIUM 100 MG/ML
40 INJECTION SUBCUTANEOUS DAILY
Status: DISCONTINUED | OUTPATIENT
Start: 2022-09-21 | End: 2022-09-26 | Stop reason: HOSPADM

## 2022-09-21 RX ORDER — DEXTROSE MONOHYDRATE 100 MG/ML
INJECTION, SOLUTION INTRAVENOUS CONTINUOUS PRN
Status: DISCONTINUED | OUTPATIENT
Start: 2022-09-21 | End: 2022-09-26 | Stop reason: HOSPADM

## 2022-09-21 RX ORDER — DEXAMETHASONE SODIUM PHOSPHATE 10 MG/ML
6 INJECTION, SOLUTION INTRAMUSCULAR; INTRAVENOUS DAILY
Status: DISCONTINUED | OUTPATIENT
Start: 2022-09-21 | End: 2022-09-22

## 2022-09-21 RX ORDER — DEXTROSE MONOHYDRATE 50 MG/ML
INJECTION, SOLUTION INTRAVENOUS CONTINUOUS
Status: DISCONTINUED | OUTPATIENT
Start: 2022-09-21 | End: 2022-09-22

## 2022-09-21 RX ORDER — POLYETHYLENE GLYCOL 3350 17 G/17G
17 POWDER, FOR SOLUTION ORAL DAILY PRN
Status: DISCONTINUED | OUTPATIENT
Start: 2022-09-21 | End: 2022-09-26 | Stop reason: HOSPADM

## 2022-09-21 RX ORDER — SODIUM CHLORIDE 0.9 % (FLUSH) 0.9 %
5-40 SYRINGE (ML) INJECTION PRN
Status: DISCONTINUED | OUTPATIENT
Start: 2022-09-21 | End: 2022-09-26 | Stop reason: HOSPADM

## 2022-09-21 RX ORDER — LEVOTHYROXINE SODIUM 137 UG/1
137 TABLET ORAL DAILY
Status: DISCONTINUED | OUTPATIENT
Start: 2022-09-21 | End: 2022-09-26 | Stop reason: HOSPADM

## 2022-09-21 RX ORDER — INSULIN LISPRO 100 [IU]/ML
0-4 INJECTION, SOLUTION INTRAVENOUS; SUBCUTANEOUS NIGHTLY
Status: DISCONTINUED | OUTPATIENT
Start: 2022-09-21 | End: 2022-09-22

## 2022-09-21 RX ORDER — SODIUM CHLORIDE 0.9 % (FLUSH) 0.9 %
5-40 SYRINGE (ML) INJECTION EVERY 12 HOURS SCHEDULED
Status: DISCONTINUED | OUTPATIENT
Start: 2022-09-21 | End: 2022-09-26 | Stop reason: HOSPADM

## 2022-09-21 RX ORDER — IPRATROPIUM BROMIDE AND ALBUTEROL SULFATE 2.5; .5 MG/3ML; MG/3ML
1 SOLUTION RESPIRATORY (INHALATION)
Status: DISCONTINUED | OUTPATIENT
Start: 2022-09-21 | End: 2022-09-26 | Stop reason: HOSPADM

## 2022-09-21 RX ORDER — ARFORMOTEROL TARTRATE 15 UG/2ML
15 SOLUTION RESPIRATORY (INHALATION) 2 TIMES DAILY
Status: DISCONTINUED | OUTPATIENT
Start: 2022-09-21 | End: 2022-09-22

## 2022-09-21 RX ORDER — ONDANSETRON 2 MG/ML
4 INJECTION INTRAMUSCULAR; INTRAVENOUS EVERY 6 HOURS PRN
Status: DISCONTINUED | OUTPATIENT
Start: 2022-09-21 | End: 2022-09-26 | Stop reason: HOSPADM

## 2022-09-21 RX ORDER — INSULIN LISPRO 100 [IU]/ML
0-4 INJECTION, SOLUTION INTRAVENOUS; SUBCUTANEOUS
Status: DISCONTINUED | OUTPATIENT
Start: 2022-09-21 | End: 2022-09-22

## 2022-09-21 RX ORDER — DEXTROSE AND SODIUM CHLORIDE 5; .9 G/100ML; G/100ML
INJECTION, SOLUTION INTRAVENOUS CONTINUOUS
Status: DISCONTINUED | OUTPATIENT
Start: 2022-09-21 | End: 2022-09-21

## 2022-09-21 RX ORDER — ACETAMINOPHEN 650 MG/1
650 SUPPOSITORY RECTAL EVERY 6 HOURS PRN
Status: DISCONTINUED | OUTPATIENT
Start: 2022-09-21 | End: 2022-09-26 | Stop reason: HOSPADM

## 2022-09-21 RX ORDER — ACETAMINOPHEN 325 MG/1
650 TABLET ORAL EVERY 6 HOURS PRN
Status: DISCONTINUED | OUTPATIENT
Start: 2022-09-21 | End: 2022-09-26 | Stop reason: HOSPADM

## 2022-09-21 RX ORDER — BUDESONIDE 0.5 MG/2ML
0.5 INHALANT ORAL 2 TIMES DAILY
Status: DISCONTINUED | OUTPATIENT
Start: 2022-09-21 | End: 2022-09-26 | Stop reason: HOSPADM

## 2022-09-21 RX ADMIN — SODIUM CHLORIDE, PRESERVATIVE FREE 10 ML: 5 INJECTION INTRAVENOUS at 08:04

## 2022-09-21 RX ADMIN — ENOXAPARIN SODIUM 40 MG: 100 INJECTION SUBCUTANEOUS at 08:03

## 2022-09-21 RX ADMIN — DEXAMETHASONE SODIUM PHOSPHATE 6 MG: 10 INJECTION, SOLUTION INTRAMUSCULAR; INTRAVENOUS at 11:37

## 2022-09-21 RX ADMIN — HYDROXYZINE PAMOATE 25 MG: 25 CAPSULE ORAL at 16:05

## 2022-09-21 RX ADMIN — ARFORMOTEROL TARTRATE 15 MCG: 15 SOLUTION RESPIRATORY (INHALATION) at 20:53

## 2022-09-21 RX ADMIN — IPRATROPIUM BROMIDE AND ALBUTEROL SULFATE 1 AMPULE: .5; 2.5 SOLUTION RESPIRATORY (INHALATION) at 08:54

## 2022-09-21 RX ADMIN — POTASSIUM BICARBONATE 20 MEQ: 782 TABLET, EFFERVESCENT ORAL at 08:03

## 2022-09-21 RX ADMIN — IBUPROFEN 200 MG: 400 TABLET, FILM COATED ORAL at 03:38

## 2022-09-21 RX ADMIN — IPRATROPIUM BROMIDE AND ALBUTEROL SULFATE 1 AMPULE: .5; 2.5 SOLUTION RESPIRATORY (INHALATION) at 20:53

## 2022-09-21 RX ADMIN — IPRATROPIUM BROMIDE AND ALBUTEROL SULFATE 1 AMPULE: .5; 2.5 SOLUTION RESPIRATORY (INHALATION) at 17:31

## 2022-09-21 RX ADMIN — ARFORMOTEROL TARTRATE 15 MCG: 15 SOLUTION RESPIRATORY (INHALATION) at 09:06

## 2022-09-21 RX ADMIN — MONTELUKAST 10 MG: 10 TABLET, FILM COATED ORAL at 08:03

## 2022-09-21 RX ADMIN — POTASSIUM BICARBONATE 20 MEQ: 782 TABLET, EFFERVESCENT ORAL at 03:38

## 2022-09-21 RX ADMIN — VANCOMYCIN HYDROCHLORIDE 1500 MG: 10 INJECTION, POWDER, LYOPHILIZED, FOR SOLUTION INTRAVENOUS at 00:25

## 2022-09-21 RX ADMIN — DEXTROSE MONOHYDRATE: 50 INJECTION, SOLUTION INTRAVENOUS at 16:43

## 2022-09-21 RX ADMIN — BUDESONIDE 500 MCG: 0.5 SUSPENSION RESPIRATORY (INHALATION) at 20:53

## 2022-09-21 RX ADMIN — SODIUM CHLORIDE, PRESERVATIVE FREE 10 ML: 5 INJECTION INTRAVENOUS at 11:37

## 2022-09-21 RX ADMIN — BUDESONIDE 500 MCG: 0.5 SUSPENSION RESPIRATORY (INHALATION) at 09:06

## 2022-09-21 RX ADMIN — IPRATROPIUM BROMIDE AND ALBUTEROL SULFATE 1 AMPULE: .5; 2.5 SOLUTION RESPIRATORY (INHALATION) at 12:04

## 2022-09-21 RX ADMIN — LEVOTHYROXINE SODIUM 137 MCG: 0.14 TABLET ORAL at 06:57

## 2022-09-21 RX ADMIN — DEXTROSE AND SODIUM CHLORIDE: 5; 900 INJECTION, SOLUTION INTRAVENOUS at 10:02

## 2022-09-21 ASSESSMENT — ENCOUNTER SYMPTOMS
COUGH: 1
GASTROINTESTINAL NEGATIVE: 1
EYES NEGATIVE: 1

## 2022-09-21 ASSESSMENT — PAIN DESCRIPTION - DESCRIPTORS: DESCRIPTORS: ACHING

## 2022-09-21 ASSESSMENT — PAIN - FUNCTIONAL ASSESSMENT
PAIN_FUNCTIONAL_ASSESSMENT: ACTIVITIES ARE NOT PREVENTED
PAIN_FUNCTIONAL_ASSESSMENT: ACTIVITIES ARE NOT PREVENTED

## 2022-09-21 ASSESSMENT — PAIN SCALES - GENERAL
PAINLEVEL_OUTOF10: 0
PAINLEVEL_OUTOF10: 0
PAINLEVEL_OUTOF10: 7
PAINLEVEL_OUTOF10: 0

## 2022-09-21 ASSESSMENT — PAIN DESCRIPTION - PAIN TYPE: TYPE: ACUTE PAIN

## 2022-09-21 NOTE — ED PROVIDER NOTES
Department of Emergency Medicine   ED  Provider Note  Admit Date/RoomTime: 9/20/2022  8:02 PM  ED Room: 23/23          History of Present Illness:  9/20/22, Time: 8:57 PM EDT  Chief Complaint   Patient presents with    Other     Labs done two days ago. States low sodium. States dizziness. Denies CP or SOB. Denies n/v/d. Rohini Julian is a 81 y.o. female presenting to the ED for abnormal labs. Came on suddenly, the makes it better or worse, no associated pain. Patient was sent in by her PCP as she had labs done 2 days ago. She found to be hyponatremic. She is not sure how low it is. She is had this problem the past, she is not sure why she keeps getting this problem. She does mildly dizzy intermittently. She is not dizzy now. Denies any chest pain or shortness of breath. Denies any change in bowel or bladder. She says she is eating and drinking fine. Denies any fever, chills, cough, sputum, change in bowel or bladder, lethargy, neck pain or stiffness, or any other symptoms or complaints. Review of Systems:   Pertinent positives and negatives are stated within HPI, all other systems reviewed and are negative.        --------------------------------------------- PAST HISTORY ---------------------------------------------  Past Medical History:  has a past medical history of Bronchitis, Hypertension, and Thyroid disease. Past Surgical History:  has a past surgical history that includes other surgical history; Tympanostomy tube placement; and CT NEEDLE BIOPSY LIVER PERCUTANEOUS (9/6/2022). Social History:  reports that she has been smoking cigarettes. She started smoking about 50 years ago. She has a 50.00 pack-year smoking history. She has never used smokeless tobacco. She reports that she does not drink alcohol and does not use drugs. Family History: family history is not on file. . Unless otherwise noted, family history is non contributory    The patients home medications have been reviewed. Allergies: Patient has no known allergies. ---------------------------------------------------PHYSICAL EXAM--------------------------------------    Constitutional/General: Alert and oriented x3  Head: Normocephalic and atraumatic  Eyes: PERRL, EOMI, sclera non icteric  Mouth: Oropharynx clear, handling secretions, no trismus, no asymmetry of the posterior oropharynx or uvular edema  Neck: Supple, full ROM, no stridor, no meningeal signs  Respiratory: Diffuse wheezing in all lung fields  Cardiovascular:  Regular rate. Regular rhythm. 2+ distal pulses. Equal extremity pulses. Chest: No chest wall tenderness  GI:  Abdomen Soft, Non tender, Non distended. No rebound, guarding, or rigidity. No pulsatile masses. Musculoskeletal: Moves all extremities x 4. Warm and well perfused, no clubbing, cyanosis, or edema. Capillary refill <3 seconds  Integument: skin warm and dry. No rashes. Neurologic: GCS 15, no focal deficits, symmetric strength 5/5 in the upper and lower extremities bilaterally  Psychiatric: Normal Affect          -------------------------------------------------- RESULTS -------------------------------------------------  I have personally reviewed all laboratory and imaging results for this patient. Results are listed below.      LABS: (Lab results interpreted by me)  Results for orders placed or performed during the hospital encounter of 09/20/22   CBC with Auto Differential   Result Value Ref Range    WBC 8.3 4.5 - 11.5 E9/L    RBC 4.82 3.50 - 5.50 E12/L    Hemoglobin 13.8 11.5 - 15.5 g/dL    Hematocrit 38.5 34.0 - 48.0 %    MCV 79.9 (L) 80.0 - 99.9 fL    MCH 28.6 26.0 - 35.0 pg    MCHC 35.8 (H) 32.0 - 34.5 %    RDW 13.4 11.5 - 15.0 fL    Platelets 738 282 - 445 E9/L    MPV 8.9 7.0 - 12.0 fL    Neutrophils % 86.5 (H) 43.0 - 80.0 %    Immature Granulocytes % 0.6 0.0 - 5.0 %    Lymphocytes % 4.7 (L) 20.0 - 42.0 %    Monocytes % 7.6 2.0 - 12.0 %    Eosinophils % 0.4 0.0 - 6.0 % Basophils % 0.2 0.0 - 2.0 %    Neutrophils Absolute 7.15 1.80 - 7.30 E9/L    Immature Granulocytes # 0.05 E9/L    Lymphocytes Absolute 0.39 (L) 1.50 - 4.00 E9/L    Monocytes Absolute 0.63 0.10 - 0.95 E9/L    Eosinophils Absolute 0.03 (L) 0.05 - 0.50 E9/L    Basophils Absolute 0.02 0.00 - 0.20 E9/L    Poikilocytes 1+     [place] Cells 1+     Ovalocytes 1+    Comprehensive Metabolic Panel w/ Reflex to MG   Result Value Ref Range    Sodium 110 (LL) 132 - 146 mmol/L    Potassium reflex Magnesium 4.0 3.5 - 5.0 mmol/L    Chloride 77 (L) 98 - 107 mmol/L    CO2 20 (L) 22 - 29 mmol/L    Anion Gap 13 7 - 16 mmol/L    Glucose 117 (H) 74 - 99 mg/dL    BUN 11 6 - 23 mg/dL    Creatinine 0.6 0.5 - 1.0 mg/dL    GFR Non-African American >60 >=60 mL/min/1.73    GFR African American >60     Calcium 9.3 8.6 - 10.2 mg/dL    Total Protein 7.1 6.4 - 8.3 g/dL    Albumin 4.3 3.5 - 5.2 g/dL    Total Bilirubin 0.2 0.0 - 1.2 mg/dL    Alkaline Phosphatase 137 (H) 35 - 104 U/L    ALT 8 0 - 32 U/L    AST 15 0 - 31 U/L   Troponin   Result Value Ref Range    Troponin, High Sensitivity 7 0 - 9 ng/L   Magnesium   Result Value Ref Range    Magnesium 1.6 1.6 - 2.6 mg/dL   URINE ELECTROLYTES   Result Value Ref Range    Sodium, Ur 53 Not Established mmol/L    Potassium, Ur 41.9 Not Established mmol/L    Chloride 58 Not Established mmol/L   Troponin   Result Value Ref Range    Troponin, High Sensitivity 7 0 - 9 ng/L   Brain Natriuretic Peptide   Result Value Ref Range    Pro-BNP 1,284 (H) 0 - 125 pg/mL   Blood Gas, Arterial   Result Value Ref Range    Date Analyzed [id number]     Time Analyzed 2123     Source: Blood Arterial     pH, Blood Gas 7.175 (LL) 7.350 - 7.450    PCO2 46.9 (H) 35.0 - 45.0 mmHg    PO2 223.8 (H) 75.0 - 100.0 mmHg    HCO3 16.9 (L) 22.0 - 26.0 mmol/L    B.E. -11.5 (L) -3.0 - 3.0 mmol/L    O2 Sat 99.4 (H) 92.0 - 98.5 %    PO2/FIO2 2.24 mmHg/%    O2Hb 97.0 94.0 - 97.0 %    COHb 1.9 (H) 0.0 - 1.5 %    MetHb 0.5 0.0 - 1.5 %    O2 Content 24.6 mL/dL    HHb 0.6 0.0 - 5.0 %    tHb (est) 17.7 (H) 11.5 - 16.5 g/dL    Mode bipap     FIO2 100.0 %    Peep/Cpap 8.0 cmH2O    PIP 15.0 cmH2O    Date Of Collection      Time Collected      Pt Temp 37.0 C     ID [id number]     Lab [id number]     Critical(s) Notified Dr present at Team/RRT    EKG 12 Lead   Result Value Ref Range    Ventricular Rate 89 BPM    Atrial Rate 89 BPM    P-R Interval 154 ms    QRS Duration 94 ms    Q-T Interval 398 ms    QTc Calculation (Bazett) 484 ms    P Axis 68 degrees    R Axis 82 degrees    T Axis 77 degrees   ,       RADIOLOGY:  Interpreted by Radiologist unless otherwise specified  CTA CHEST W CONTRAST   Final Result   No evidence of pulmonary embolism. Enlarged pulmonary arteries, correlate clinically for pulmonary artery   hypertension. Infiltrative right mediastinal mass with lymphadenopathy or a large   conglomerate of lymph nodes involving the right aspect of the mediastinum and   right hilum with narrowing of the right mainstem bronchus. This finding   significantly worsened from the prior examination dated September 3, 2022. Partial atelectasis in the right middle lobe. CTA ABDOMEN PELVIS W CONTRAST   Final Result   No acute intraabdominal or intrapelvic pathology. Atrophy of the upper pole of the left kidney. Chronic vascular changes as described above. XR CHEST PORTABLE   Final Result   No acute process.                EKG Interpretation  Interpreted by emergency department physician, Dr. Alberto Camarillo     Sinus rhythm, 89, no STEMI, no ischemic change        ------------------------- NURSING NOTES AND VITALS REVIEWED ---------------------------   The nursing notes within the ED encounter and vital signs as below have been reviewed by myself  BP (!) 60/33   Pulse (!) 114   Temp (!) 96.4 °F (35.8 °C) (Core)   Resp (!) 34   Ht 5' 2\" (1.575 m)   Wt 160 lb (72.6 kg)   SpO2 94%   BMI 29.26 kg/m²     Oxygen Saturation Interpretation: Normal    The patients available past medical records and past encounters were reviewed. ------------------------------ ED COURSE/MEDICAL DECISION MAKING----------------------  Medications   cefTRIAXone (ROCEPHIN) 1,000 mg in sterile water 10 mL IV syringe (has no administration in time range)   doxycycline (VIBRAMYCIN) 100 mg in dextrose 5 % 100 mL IVPB ([id number]) (has no administration in time range)   norepinephrine (LEVOPHED) 16 mg in dextrose 5% 250 mL infusion (10 mcg/min IntraVENous New Bag 9/20/22 2213)   sodium bicarbonate 8.4 % injection 100 mEq (has no administration in time range)   0.9 % sodium chloride bolus (1,000 mLs IntraVENous New Bag 9/20/22 2217)   ipratropium-albuterol (DUONEB) nebulizer solution 1 ampule (1 ampule Inhalation Given 9/20/22 2117)   methylPREDNISolone sodium (SOLU-MEDROL) injection 125 mg (125 mg IntraVENous Given 9/20/22 2045)   magnesium sulfate 2 GM/50ML infusion (2 mg  New Bag 9/20/22 2059)           The cardiac monitor revealed sinus with a heart rate in the 80s as interpreted by me. The cardiac monitor was ordered secondary to the patient's hyponatremia and to monitor the patient for dysrhythmia. CPT 13631     PROCEDURE  9/20/22       Time: 2300    CENTRAL LINE INSERTION  Risks, benefits and alternatives (for applicable procedures below) described. Performed By: EM Resident. Indication: centrally administered medications. Informed consent: Verbal consent obtained. The patient was counseled regarding the procedure in person, it's indications, risks, potential complications and alternatives and any questions were answered. Verbal consent was obtained. Procedure: After routine sterile preparation, local anesthesia obtained by infiltration using 1% Lidocaine without epinephrine. A right 3-Lumen 7F Central Venous Catheter was placed by femoral vein approach and secured by standard fashion. Ultrasound Guidance:   used.   Number of Attempts: 1  Post-procedure Findings: A post procedural chest x-ray  was not indicated. Patient tolerated the procedure well. Medical Decision Making:    Patient was wheezing on arrival.  Received DuoNeb x3. Labs reviewed. Reevaluation, patient's breathing status became worse. She did require BiPAP, Solu-Medrol, magnesium, along with additional DuoNeb's. This did control her breathing. Labs and imaging reviewed. During evaluation, patient declined. She became hypotensive, worsening respiratory distress. ABG obtained and reviewed. She was placed on BiPAP. I did discuss with Dr. Leandra Balbuena covering for Dr. Bogdan Valladares. Recommends 2 A of bicarb along with a bolus of IV fluids as she became hypotensive. She was also started on Levophed. On reevaluation, she is hemodynamically improved, we were able to de-escalate to nasal cannula. Discussed with the intensivist along with the hospitalist, patient will be admitted. Please note that the withdrawal or failure to initiate urgent interventions for this patient would likely result in a life threatening deterioration or permanent disability. Accordingly this patient received 30 minutes of critical care time, excluding separately billable procedures. Counseling: The emergency provider has spoken with the patient and discussed todays results, in addition to providing specific details for the plan of care and counseling regarding the diagnosis and prognosis. Questions are answered at this time and they are agreeable with the plan.       --------------------------------- IMPRESSION AND DISPOSITION ---------------------------------    IMPRESSION  1. Hyponatremia    2. Respiratory distress    3. Lung mass    4. Shock ([address]        DISPOSITION  Disposition: Admit to CCU/ICU  Patient condition is serious        NOTE: This report was transcribed using voice recognition software.  Every effort was made to ensure accuracy; however, inadvertent computerized transcription errors may be

## 2022-09-21 NOTE — PROGRESS NOTES
Lukas served Dr. Coco Briceno patient's NA level of 119, orders received to continue D5W at 120 ml/hr and this will be reevaluated after the next lab draw at 2015. Pharmacy has been updated as well.

## 2022-09-21 NOTE — CONSULTS
[facility]  Internal Medicine Residency Program  [facility] Consult    Patient:  Emmy Mancini 81 y.o. female MRN: [id number]     Date of Service: 9/21/2022    Hospital Day: 2      Chief complaint: I feel like I am drunk, dizzy     History Obtained From:  patient, hard to get a proper history because patient has trouble hearing     History of Present Illness   Emmy Mancini is a 81 y.o. female with past medical history of COPD on 2L at home at night and with activity and on breathing treatment, chronic alcohol abuse but recently quit 4 to 6 months ago, depression on Zoloft, diastolic heart failure, chronic lower extremity edema, HTN, HLD, hypothyroidism who presented to the ED due to the sensation of feeling drunk which is not new for her. She has come to the ED in the past for the same complaint. She was in her usual state of health until 2 days ago she went for a normal visit to her primary care physician, and BMP performed at that time disclosed low sodium she is not sure how low her sodium was back then. She also has cough which is nonproductive, no fever, no recent exposure to someone who is sick, no myalgias, no congestion per se. She also reports that she feels dizzy, she does not feel like she is going to pass out, no sensation of syncope or presyncope. She also endorses shortness of breath that has been going on for some time now and has been also losing weight over the last 7 months, can't tell how much and it is involuntary. The shortness of breath is worse when she tries to do strenuous activities. No nausea, no vomiting, no diarrhea, no abdominal pain, no recent urinary issues reported. Her appetite is fine. Has headache every once a while. She also reports chest pain in the center of her chest which is not new for her. She describes the pain as a dull chest pain, with a 7/10 on a 10-point scale. It radiates to her right jaw. No diaphoresis and other symptom reported.  Nothing makes it worse or better. No palpitations reported during the episode of the chest pain. ED Course: In the emergency department the patient was hemodynamically unstable, BP 60/33 mmHg, pulse 114, temperature 96.4, respiratory rate 34, SPO2 94%. Work-up performed, CBC, WBC 8.3, Hb 13.8, . BMP disclosed Na 110, K 4.0, chloride 77, normal AST and ALT, troponin 7, mag 1.6, ABG showed pH 7.175, PCO2 46.9, PO2 223.8, bicarb 16.9, PO2/FiO2 2.4 mmHg/percent, FiO2 100%. CTA chest disclosed right mediastinal mass with lymphadenopathy which is worsened compared to prior exam on September 3, 2022. CT abdomen and pelvis with contrast showed no acute intra-abdominal or intrapelvic pathology, chest x-ray showed no acute process. In the ED, central line was placed because patient was hypotensive. ED Meds: Patient was given cefepime 2 g, duo nebs, Solu-Medrol 125 mg, 10 mcg uptitrated to 15 of Levophed, and sodium bicarb 8.4% injection     ED Fluids: 1 L of normal saline    Previous Hospital Admission: Discharged ago 8 days ago for hyponatremia. Past Medical History:       Diagnosis Date    Bronchitis     Hypertension     Thyroid disease        Past Surgical History:        Procedure Laterality Date    CT NEEDLE BIOPSY LIVER PERCUTANEOUS  9/6/2022    CT NEEDLE BIOPSY LIVER PERCUTANEOUS 9/6/2022 Renata Reid MD [facility]    OTHER SURGICAL HISTORY      states had something done right neck area per dr jaeger, might have been an abcess    TYMPANOSTOMY TUBE PLACEMENT         Medications Prior to Admission:    Prior to Admission medications    Medication Sig Start Date End Date Taking?  Authorizing Provider   fluticasone-vilanterol (BREO ELLIPTA) 100-25 MCG/INH AEPB inhaler Inhale 1 puff into the lungs daily    Historical Provider, MD   albuterol (PROVENTIL) (2.5 MG/3ML) 0.083% nebulizer solution Take 2.5 mg by nebulization 3 times daily as needed 10/1/21   Historical Provider, MD   fluticasone (FLONASE) 50 MCG/ACT nasal spray 1 spray by Nasal route daily 9/17/21   Historical Provider, MD   montelukast (SINGULAIR) 10 MG tablet Take 10 mg by mouth every morning 10/22/21   Historical Provider, MD   albuterol sulfate  (90 Base) MCG/ACT inhaler Inhale 2 puffs into the lungs every 6 hours as needed for Wheezing 11/11/21   Serg Colon MD   potassium chloride (KLOR-CON M) 20 MEQ extended release tablet Take 1 tablet by mouth daily 4/2/21   Manuel Wells MD   sertraline (ZOLOFT) 50 MG tablet Take 50 mg by mouth daily    Historical Provider, MD   metoprolol succinate (TOPROL XL) 25 MG extended release tablet Take 25 mg by mouth daily    Historical Provider, MD   amLODIPine (NORVASC) 5 MG tablet Take 5 mg by mouth in the morning and at bedtime    Historical Provider, MD   omeprazole (PRILOSEC) 20 MG delayed release capsule Take 20 mg by mouth daily    Historical Provider, MD   clonazePAM (KLONOPIN) 1 MG tablet Take 0.5 mg by mouth daily as needed for Anxiety. Historical Provider, MD   levothyroxine (SYNTHROID) 137 MCG tablet Take 137 mcg by mouth Daily     Historical Provider, MD       Allergies:  Patient has no known allergies. Social History:   TOBACCO: 1 pack per day for many years   ETOH:  Stopped drinking alcohol 4-6 months ago   OCCUPATION: Unemployed, used to work in vending machines     Family History:   Mom -- dead -- stroke   Dad --- dead -- drug use     REVIEW OF SYSTEMS:    Review of Systems   Constitutional: Negative. HENT: Negative. Eyes: Negative. Respiratory:  Positive for cough. Gastrointestinal: Negative. Endocrine: Negative. Genitourinary: Negative. Musculoskeletal: Negative. Skin: Negative. Neurological:  Positive for dizziness and weakness. Negative for seizures, syncope, speech difficulty and light-headedness. Psychiatric/Behavioral: Negative.               Physical Exam   VITAL SIGNS:  BP (!) 115/48   Pulse (!) 101   Temp (!) 96.4 °F (35.8 °C) (Bladder)   Resp 16   Ht 5' 2\" (1.575 m)   Wt 155 lb 10.3 oz (70.6 kg)   SpO2 100%   BMI 28.47 kg/m²   Tmax over 24 hours:  Temp (24hrs), Av.5 °F (35.8 °C), Min:95.5 °F (35.3 °C), Max:97.4 °F (36.3 °C)      Patient Vitals for the past 6 hrs:   BP Temp Temp src Pulse Resp SpO2 Weight   22 0030 (!) 115/48 -- -- (!) 101 16 100 % --   22 0000 (!) 142/115 (!) 96.4 °F (35.8 °C) Bladder (!) 104 16 97 % 155 lb 10.3 oz (70.6 kg)   22 2338 115/69 (!) 95.5 °F (35.3 °C) Bladder (!) 114 28 94 % --   225 -- -- -- (!) 107 23 96 % --   22 2300 (!) 146/94 -- -- (!) 106 26 94 % --   22 2249 (!) 145/103 -- -- (!) 107 (!) 31 97 % --   221 106/75 -- -- (!) 119 26 97 % --   220 (!) 69/53 -- -- (!) 122 28 98 % --   22 (!) 69/53 97.3 °F (36.3 °C) CORE 70 (!) 34 (!) 86 % --   22 (!) 60/33 (!) 96.4 °F (35.8 °C) CORE (!) 114 (!) 34 94 % --   22 -- (!) 95.9 °F (35.5 °C) CORE (!) 128 25 94 % --   22 -- -- -- (!) 128 29 -- --   22 -- -- -- (!) 129 26 -- --   22 -- -- -- -- (!) 34 -- --   22 -- -- -- (!) 147 26 92 % --   22 -- -- -- -- (!) 32 -- --   22 -- -- -- (!) 140 26 -- --   22 (!) 142/98 -- -- 93 24 99 % --         Intake/Output Summary (Last 24 hours) at 2022 0048  Last data filed at 2022 0041  Gross per 24 hour   Intake --   Output 125 ml   Net -125 ml     Wt Readings from Last 2 Encounters:   22 155 lb 10.3 oz (70.6 kg)   22 161 lb (73 kg)     Body mass index is 28.47 kg/m². PHYSICAL EXAMINATION:  Physical Exam  Constitutional:       Appearance: She is overweight. She is ill-appearing. She is not diaphoretic. HENT:      Head: Normocephalic and atraumatic. Mouth/Throat:      Mouth: Mucous membranes are dry. Cardiovascular:      Rate and Rhythm: Normal rate and regular rhythm. Heart sounds: Normal heart sounds. No murmur heard. No friction rub. No gallop. Pulmonary:      Effort: No respiratory distress. Breath sounds: No stridor. Examination of the right-upper field reveals rhonchi. Examination of the left-upper field reveals rhonchi. Examination of the right-middle field reveals rhonchi. Examination of the left-middle field reveals rhonchi. Rhonchi present. No wheezing or rales. Chest:      Chest wall: No tenderness. Abdominal:      General: Abdomen is protuberant. Bowel sounds are normal.   Skin:     General: Skin is dry. Neurological:      General: No focal deficit present. Mental Status: She is alert. Psychiatric:         Mood and Affect: Mood normal.         Behavior: Behavior normal. Behavior is cooperative. Thought Content: Thought content normal.         Judgment: Judgment normal.         Any additional physical findings:    Lines     site day    Art line   None    TLC R Fem    PICC None    Hemoaccess None      VENT SETTINGS (Comprehensive) (if applicable):      Additional Respiratory Assessments  Heart Rate: (!) 101  Resp: 16  SpO2: 100 %    ABGs:   Recent Labs     09/20/22 2123   PH 7.175*   PCO2 46.9*   PO2 223.8*   HCO3 16.9*   BE -11.5*   O2SAT 99.4*       Laboratory findings:  Complete Blood Count:   Recent Labs     09/20/22  1843   WBC 8.3   HGB 13.8   HCT 38.5           Last 3 Blood Glucose:   Recent Labs     09/20/22  1843   GLUCOSE 117*        PT/INR:    Lab Results   Component Value Date/Time    PROTIME 12.1 09/06/2022 04:29 AM    INR 1.1 09/06/2022 04:29 AM     PTT:  No results found for: APTT, PTT    Comprehensive Metabolic Profile:   Recent Labs     09/20/22  1843   *   K 4.0   CL 77*   CO2 20*   BUN 11   CREATININE 0.6   GLUCOSE 117*   CALCIUM 9.3   PROT 7.1   LABALBU 4.3   BILITOT 0.2   ALKPHOS 137*   AST 15   ALT 8      Magnesium:   Lab Results   Component Value Date/Time    MG 1.6 09/20/2022 06:43 PM     Phosphorus:   Lab Results   Component Value Date/Time    PHOS 3.5 09/02/2022 05:39 AM Ionized Calcium: No results found for: CAION   Troponin: No results for input(s): TROPONINI in the last 72 hours. Imaging Studies        CT HEAD WO CONTRAST    Result Date: 9/1/2022  EXAMINATION: CT OF THE HEAD WITHOUT CONTRAST  9/1/2022 2:28 pm TECHNIQUE: CT of the head was performed without the administration of intravenous contrast. Automated exposure control, iterative reconstruction, and/or weight based adjustment of the mA/kV was utilized to reduce the radiation dose to as low as reasonably achievable. COMPARISON: None. HISTORY: ORDERING SYSTEM PROVIDED HISTORY: dizzy TECHNOLOGIST PROVIDED HISTORY: Has a \"code stroke\" or \"stroke alert\" been called? ->No Reason for exam:->dizzy Decision Support Exception - unselect if not a suspected or confirmed emergency medical condition->Emergency Medical Condition (MA) What reading provider will be dictating this exam?->CRC FINDINGS: BRAIN/VENTRICLES: There is no acute intracranial hemorrhage, mass effect or midline shift. No abnormal extra-axial fluid collection. The gray-white differentiation is maintained without evidence of an acute infarct. There is no evidence of hydrocephalus. ORBITS: The visualized portion of the orbits demonstrate no acute abnormality. SINUSES: The visualized paranasal sinuses demonstrate no acute abnormality. There is partial opacification of bilateral mastoid air cells. SOFT TISSUES/SKULL:  No acute abnormality of the visualized skull or soft tissues. No acute intracranial abnormality. Periventricular leukomalacia. Suspect bilateral mastoiditis. CT CHEST WO CONTRAST    Result Date: 9/3/2022  EXAMINATION: CT OF THE CHEST WITHOUT CONTRAST 9/3/2022 9:27 am TECHNIQUE: CT of the chest was performed without the administration of intravenous contrast. Multiplanar reformatted images are provided for review.  Automated exposure control, iterative reconstruction, and/or weight based adjustment of the mA/kV was utilized to reduce the radiation dose to as low as reasonably achievable. COMPARISON: None. HISTORY: ORDERING SYSTEM PROVIDED HISTORY: Rule out malignancy/mass TECHNOLOGIST PROVIDED HISTORY: Reason for exam:->Rule out malignancy/mass What reading provider will be dictating this exam?->CRC FINDINGS: Mediastinum: There is superior mediastinal adenopathy and a prominent right paratracheal lymph node mass. Right paratracheal lymph node mass measures 2.8 x 1.8 cm. Upper anterior mediastinal lymph nodes measure approximately 7-8 mm in short axis. Thyromegaly is noted with coarse calcification in the enlarged thyroid isthmus. Pulmonary artery is prominent in size. Aorta is nonaneurysmal.  There is ASVD of the coronary vessels. There is a small pericardial effusion. Lungs/pleura: There is a focal nodular consolidation in the peripheral aspect of the medial right upper lobe measuring 12 x 11 mm. There is slight volume loss extending to the right suprahilar region. There is a small right pleural effusion. No endobronchial masses. No other lung lesions identified. Upper Abdomen: Images of the upper abdomen demonstrate a 2 cm low-density mass in the right hepatic lobe suspicious for metastatic disease. The adrenal glands appear normal.  There is a small left kidney. Soft Tissues/Bones: There appears to be a chronic fracture of the manubrium best seen on sagittal reconstruction views. Degenerative changes are noted in the thoracic and lumbar spine. No lytic or blastic lesions detected. 1.  Right paratracheal lymph node mass measuring 2.8 x 1.8 cm. There is a medial right upper lobe nodular parenchymal lung opacity measuring 12 x 11 mm. There is a small right pleural effusion. Mild upper mediastinal adenopathy. Findings are compatible with malignancy. 2.  Probable metastatic lesion in the right hepatic lobe measuring 2 cm. 3.  Small pericardial effusion. 4.  Small left kidney.  5.  Thyromegaly with nodular isodense calcified lesion in the thyroid isthmus. Lesion measures 14 mm AP. CT GUIDED NEEDLE PLACEMENT    Result Date: 9/6/2022  PROCEDURE: CT NEEDLE BIOPSY LIVER PERCUTANEOUS; CT GUIDED NDL PLACEMENT MODERATE CONSCIOUS SEDATION 9/6/2022 HISTORY: ORDERING SYSTEM PROVIDED HISTORY: lung and liver nodule TECHNOLOGIST PROVIDED HISTORY: Biopsy of either right lung nodule or liver nodule, whichever is easiest to get to Reason for exam:->lung and liver nodule What reading provider will be dictating this exam?->CRC; ORDERING SYSTEM PROVIDED HISTORY: liver lesion TECHNOLOGIST PROVIDED HISTORY: Biopsy of either right lung nodule or liver nodule, whichever is easiest to get to Reason for exam:->liver lesion What reading provider will be dictating this exam?->CRC TECHNIQUE: Automated exposure control, iterative reconstruction, and/or weight based adjustment of the mA/kV was utilized to reduce the radiation dose to as low as reasonably achievable. CONTRAST: None SEDATION: 1 mgversed and 50 mcg fentanyl were titrated intravenously for moderate sedation monitored under my direction. Total intraservice time of sedation was 20 minutes. The patient's vital signs were monitored throughout the procedure and recorded in the patient's medical record by the nurse. Mallapati score 2 ASA 2 FLUOROSCOPY DOSE AND TYPE OR TIME AND EXPOSURES: CT DESCRIPTION OF PROCEDURE: Informed consent was obtained after a detailed explanation of the procedure including risks, benefits, and alternatives. Universal protocol was observed. Sterile gowns, masks, hats and gloves utilized for maximal sterile barrier. After informed consent patient is placed supine on the table. Liver mass right lobe was localized with CT scanning. Patient prepped draped sterile fashion. 1% lidocaine was infiltrated for local anesthesia. 20 gauge coaxial needle was advanced into the lesion. Multiple 20 gauge cores were obtained and submitted to pathology.   Post biopsy imaging shows air within the lesion suggesting good position of the biopsy needle. No immediate postoperative complication was seen. Dressing was applied. Patient was returned to holding stable condition. FINDINGS: Needle tip located in the right lobe liver mass. Successful CT-guided core biopsy right liver mass. XR CHEST PORTABLE    Result Date: 9/20/2022  EXAMINATION: ONE XRAY VIEW OF THE CHEST 9/20/2022 9:06 pm COMPARISON: 09/01/2022 HISTORY: ORDERING SYSTEM PROVIDED HISTORY: Shortness of breath TECHNOLOGIST PROVIDED HISTORY: Reason for exam:->Shortness of breath What reading provider will be dictating this exam?->CRC FINDINGS: The lungs are without acute focal process. There is no effusion or pneumothorax. The cardiomediastinal silhouette is without acute process. The osseous structures are without acute process. No acute process. XR CHEST PORTABLE    Result Date: 9/1/2022  EXAMINATION: ONE XRAY VIEW OF THE CHEST 9/1/2022 1:04 pm COMPARISON: None. HISTORY: ORDERING SYSTEM PROVIDED HISTORY: Shortness of breath TECHNOLOGIST PROVIDED HISTORY: Reason for exam:->Shortness of breath What reading provider will be dictating this exam?->CRC FINDINGS: Lungs are clear. The heart is mildly enlarged. There is no pneumothorax. This blunting of the right costophrenic angle. Mild scoliosis of the thoracic spine. Mild cardiomegaly. Mild right pleural effusion.      CT NEEDLE BIOPSY LIVER PERCUTANEOUS    Result Date: 9/6/2022  PROCEDURE: CT NEEDLE BIOPSY LIVER PERCUTANEOUS; CT GUIDED NDL PLACEMENT MODERATE CONSCIOUS SEDATION 9/6/2022 HISTORY: ORDERING SYSTEM PROVIDED HISTORY: lung and liver nodule TECHNOLOGIST PROVIDED HISTORY: Biopsy of either right lung nodule or liver nodule, whichever is easiest to get to Reason for exam:->lung and liver nodule What reading provider will be dictating this exam?->CRC; ORDERING SYSTEM PROVIDED HISTORY: liver lesion TECHNOLOGIST PROVIDED HISTORY: Biopsy of either right lung nodule or liver nodule, whichever is easiest to get to Reason for exam:->liver lesion What reading provider will be dictating this exam?->CRC TECHNIQUE: Automated exposure control, iterative reconstruction, and/or weight based adjustment of the mA/kV was utilized to reduce the radiation dose to as low as reasonably achievable. CONTRAST: None SEDATION: 1 mgversed and 50 mcg fentanyl were titrated intravenously for moderate sedation monitored under my direction. Total intraservice time of sedation was 20 minutes. The patient's vital signs were monitored throughout the procedure and recorded in the patient's medical record by the nurse. Mallapati score 2 ASA 2 FLUOROSCOPY DOSE AND TYPE OR TIME AND EXPOSURES: CT DESCRIPTION OF PROCEDURE: Informed consent was obtained after a detailed explanation of the procedure including risks, benefits, and alternatives. Universal protocol was observed. Sterile gowns, masks, hats and gloves utilized for maximal sterile barrier. After informed consent patient is placed supine on the table. Liver mass right lobe was localized with CT scanning. Patient prepped draped sterile fashion. 1% lidocaine was infiltrated for local anesthesia. 20 gauge coaxial needle was advanced into the lesion. Multiple 20 gauge cores were obtained and submitted to pathology. Post biopsy imaging shows air within the lesion suggesting good position of the biopsy needle. No immediate postoperative complication was seen. Dressing was applied. Patient was returned to holding stable condition. FINDINGS: Needle tip located in the right lobe liver mass. Successful CT-guided core biopsy right liver mass. EKG:     Resident's Assessment and Plan     Elijah Raya is a 81 y.o. female came with   has a past medical history of Bronchitis, Hypertension, and Thyroid disease.   CC of Hyponatremia and feeling like she is drunk     Assessment:    Pulmonology   Newly diagnosed lung malignancy with mets to the liver  Sept 02, 2022 CT chest w/o contrast performed to rule out malignancy  Lungs/pleural findings : Focal nodular consolidation in the peripheral right medial RUL measure 12 x 11 mm.  Superior mediastinal adenopathy, findings consistent with malignancy     Plan   Heme-Onc consult   Palliative consult   Follow recs     Hx of COPD  Uses 2L of oxygen at home   On breathing treatment, proventil, flonase and singulair at home   Currently on NC, PFT ordered but never performed     Nephrology   Hyponatremia likely 2/2 SIADH vs hypothyroidism vs SSRI   Extensive smoking history   Recently diagnosed with small cell lung cancer with mets to the liver  Low sodium chronically   Not on sodium tablets   Feels like she is altered, no loss of consciousness, no falls, no syncope or presyncope   Continues to smoke 1 pack per day for a long time     Plan   Serum osmolality 254 low   Urine osmolality   Consult nephrology, and follow their recs   Fluid restriction due to possible SIADH     Hypokalemia likely 2/2 poor oral intake   K 3.3, replaced  Monitor BMP   On potassium chloride PO daily     Hypochloremic metabolic acidosis   HCO3 21 trending up, chloride 80   Continue to monitor BMP     Cardiology   Hypotension  Found to be have low BP during admission 60/33 mmHg,    On Levophed to maintain pressure support   No source of infection     Elevate Pro-BNP likely 2/2 pulmonary disease vs heart failure   Has history of diastolic heart failure   Recently diagnosed with lung cancer with mets to the liver     Hx of diastolic heart failure   Most recent echo in March 2021 showed stage I diastolic dysfunction   Mild left concentric left ventricular hypertrophy   EF 50-60% in March 2021   On amlodine and toprol 25mg   Continue current treatment     Chronic lower extremity edema likely 2/2 heart failure  No edema present during physical exam today   Furosemide if fluid overload and monitor     Hx of HTN on amlodipine and toprol     Hx of hypothyroidism   Most recent TSH 3.970 (normal), T4 2.15 high   Takes Levothyroxine 137 mcg tablet   Continue to monitor   Recent CT chest shows thyromegaly with nodular isodense calcified lesion in the thyroid isthmus     GI   Liver mets likely 2/2 lungs s/p core biopsy of liver mass in early Sept   2cm low-density mass in the right hepatic lobe suspicious for metastatic disease   Hematology - oncology consult   Extensive history of alcohol abuse  Decided to quit 6 months ago     Chronic alcohol abuse, quit drinking 6 months ago   Hx of depression on Zoloft     PT/OT: Not indicated at the moment   DVT ppx: Lovenox   GI ppx:     Code Status:   Full code       Mimi Burdick MD, PGY-1   Attending physician: Dr. Sherwin Santa     NOTE: This report was transcribed using voice recognition software. Every effort was made to ensure accuracy; however, inadvertent computerized transcription errors may be present. [facility]  Department of Pulmonary, Critical Care and Sleep Medicine  [address] [facility]  Department of Internal Medicine      During multidisciplinary team rounds Fermín Rene is a 81 y.o. female was seen, examined and discussed. This is confirmation that I have personally seen and examined the patient and that the key elements of the encounter were performed by me (> 85 % time). The medications & laboratory data was discussed and adjusted where necessary. The radiographic images were reviewed or with radiologist or consultant if felt dis-concordant with the exam or history. The above findings were corroborated, plans confirmed and changes made if needed. Family is updated at the bedside as available. Key issues of the case were discussed among consultants. Critical Care time is documented if appropriate.       Juno Ocasio DO, FACP, FCCP, Melanie bournescrome,

## 2022-09-21 NOTE — ED NOTES
Pt In respiratory distress during aerosol treatment -NRB Placed,      Elgin Ferreira RN  09/20/22 2055

## 2022-09-21 NOTE — CARE COORDINATION
9/21:  Transition of care:  Pt presented to the ER after her PCP called with her lab work results showed her Na level critically low. Pt was found to be hypotensive, tachycardic & Na level 110. Pt was transferred to MICU. Pt is on 3L/NC at 93% & Iv Decadron. Nephrology & Oncology consulted. 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 MRI brain needed. CM spoke with pt bedside & her brother in [employer] over the phone. Pt's PCP is  & uses [facility] [place]. Pt lives alone in a house with 4 steps to enter. The bed/bathroom are on the 2nd fl with 8 steps. PTA pt independent who normally gets around using the walls & furniture. Pt has 02 2L/NC continuous , nebulizer via [facility]. Pt has a hx of [facility] & is agreeable to use them again. She will need Nursing/PT/OT/NA. Will need HHC order. Sw/SCAR will continue to follow for dc planning. Electronically signed by Tressa Villeda RN on 9/21/2022 at 3:14 PM      The Plan for Transition of Care is related to the following treatment goals:HHC/DME    The Patient and/or patient representative  was provided with a choice of provider and agrees   with the discharge plan. [x] Yes [] No    Freedom of choice list was provided with basic dialogue that supports the patient's individualized plan of care/goals, treatment preferences and shares the quality data associated with the providers.  [x] Yes [] No

## 2022-09-21 NOTE — PLAN OF CARE
Problem: Chronic Conditions and Co-morbidities  Goal: Patient's chronic conditions and co-morbidity symptoms are monitored and maintained or improved  Outcome: Progressing     Problem: Discharge Planning  Goal: Discharge to home or other facility with appropriate resources  9/21/2022 1508 by Erin Spears RN  Outcome: Progressing  Flowsheets (Taken 9/21/2022 0419 by Miguelina Solano RN)  Discharge to home or other facility with appropriate resources:   Identify barriers to discharge with patient and caregiver   Arrange for needed discharge resources and transportation as appropriate   Identify discharge learning needs (meds, wound care, etc)  9/21/2022 0329 by Miguelina Solano RN  Outcome: Progressing     Problem: Safety - Adult  Goal: Free from fall injury  9/21/2022 1508 by Erin Spears RN  Outcome: Progressing  Flowsheets (Taken 9/21/2022 1025)  Free From Fall Injury: Instruct family/caregiver on patient safety  9/21/2022 0329 by Miguelina Solano RN  Outcome: Progressing  Flowsheets (Taken 9/21/2022 0323)  Free From Fall Injury: Instruct family/caregiver on patient safety     Problem: Skin/Tissue Integrity  Goal: Absence of new skin breakdown  Description: 1. Monitor for areas of redness and/or skin breakdown  2. Assess vascular access sites hourly  3. Every 4-6 hours minimum:  Change oxygen saturation probe site  4. Every 4-6 hours:  If on nasal continuous positive airway pressure, respiratory therapy assess nares and determine need for appliance change or resting period.   9/21/2022 1508 by Erin Spears RN  Outcome: Progressing  9/21/2022 0329 by Miguelina Solano RN  Outcome: Progressing     Problem: Pain  Goal: Verbalizes/displays adequate comfort level or baseline comfort level  9/21/2022 1508 by Erin Spears RN  Outcome: Progressing  9/21/2022 0329 by Miguelina Solano RN  Outcome: Progressing     Problem: ABCDS Injury Assessment  Goal: Absence of physical injury  Outcome: Progressing  Flowsheets  Taken 9/21/2022 1025 by Erin Spears RN  Absence of Physical Injury: Implement safety measures based on patient assessment  Taken 9/21/2022 0424 by Miguelina Solano RN  Absence of Physical Injury: Implement safety measures based on patient assessment  Taken 9/21/2022 0323 by Miguelina Solano RN  Absence of Physical Injury: Implement safety measures based on patient assessment

## 2022-09-21 NOTE — PROGRESS NOTES
Date: 9/20/2022    Time: 9:58 PM    Patient Placed On BIPAP/CPAP/ Non-Invasive Ventilation? Yes    If no must comment. Facial area red/color change? No           If YES are Blister/Lesion present? No   If yes must notify nursing staff  BIPAP/CPAP skin barrier? No    Skin barrier type: N/A emergency    Comments: placed on in ED 15/8/100%    Sheryl Acevedo

## 2022-09-21 NOTE — ED NOTES
Report called to [facility] [place]     Meka Ann RN  09/20/22 9379

## 2022-09-21 NOTE — PLAN OF CARE
Problem: Discharge Planning  Goal: Discharge to home or other facility with appropriate resources  Outcome: Progressing     Problem: Safety - Adult  Goal: Free from fall injury  Outcome: Progressing  Flowsheets (Taken 9/21/2022 0323)  Free From Fall Injury: Instruct family/caregiver on patient safety     Problem: Skin/Tissue Integrity  Goal: Absence of new skin breakdown  Description: 1. Monitor for areas of redness and/or skin breakdown  2. Assess vascular access sites hourly  3. Every 4-6 hours minimum:  Change oxygen saturation probe site  4. Every 4-6 hours:  If on nasal continuous positive airway pressure, respiratory therapy assess nares and determine need for appliance change or resting period.   Outcome: Progressing     Problem: Pain  Goal: Verbalizes/displays adequate comfort level or baseline comfort level  Outcome: Progressing

## 2022-09-21 NOTE — PROGRESS NOTES
Palliative Medicine Social Work     Patient Name: Elijah Raya    Age: 81 y.o. Marital Status:      Status: no    Next of Kin: Ge Gill brother in law [phone]                Additional Support: niece Daphene Rubinstein, as well as adult grandchildren  one son , she does not know where her daughter is  Minor Children: no    Advanced Directives: pt states yes, that her brother in law Dereje Thompson is her HCPOA    Confirm Code Status: full, to be reviewed     Mental Health History: depression     Substance Abuse:chronic alcohol abuse but quit 4-6 months ago    Indications of Abuse/Neglect: no    Financial Concerns: no    Living Situation: lives alone but has help of family. Uses o2 2lnc at hs    Physical Care Needs Met: yes    Emotional Needs Met: time spent exploring pts thoughts and feelings, providing support through active listening and validation of feelings. Future Care Needs/Goals/Anticipatory Guidance: pt not yet aware of the extent of her diagnosis    Assessment: 80 yo  female admitted from home due to sensation of feeling drunk. She has newly diagnosed lung malignancy with mets to the liver but is not aware of extent of diagnosis yet. LSW will follow for psychosocial support.

## 2022-09-21 NOTE — CONSULTS
[facility] Roberto A. Sharmaine Cull, MD Darlean Bowie, MD Lindsay Moritz, DO, Justice Felder MD .  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 Patient was sent in after having lab work done outpatient to find that she had a low sodium. Upon arrival to emergency patient was found to have a sodium level critically low at 110 and a chloride level at 77. Patient had a CTA chest revealing of an infiltrative right mediastinal mass with lymphadenopathy or a large conglomerate of lymph nodes involving the right aspect of the mediastinum and right hilum with narrowing of the right mainstem bronchus which have significantly worsened from previous exam on the third of this month      Pt seen in ICU she is alert orietned she is on o2/nc she appear euvolemic she reports no n/v/d/ no recent diuretic usage . She reports no alcohol drinking     Past Medical History:   Diagnosis Date    Bronchitis     Hypertension     Thyroid disease        Past Surgical History:   Procedure Laterality Date    CT NEEDLE BIOPSY LIVER PERCUTANEOUS  9/6/2022    CT NEEDLE BIOPSY LIVER PERCUTANEOUS 9/6/2022 Siri Pollock MD [facility]    OTHER SURGICAL HISTORY      states had something done right neck area per dr jaeger, might have been an abcess    TYMPANOSTOMY TUBE PLACEMENT         History reviewed. No pertinent family history. reports that she has been smoking cigarettes. She started smoking about 50 years ago. She has a 50.00 pack-year smoking history. She has never used smokeless tobacco. She reports that she does not drink alcohol and does not use drugs. Allergies:  Patient has no known allergies. Current Medications:    sodium chloride flush 0.9 % injection 5-40 mL, 2 times per day  sodium chloride flush 0.9 % injection 5-40 mL, PRN  0.9 % sodium chloride infusion, PRN  enoxaparin (LOVENOX) injection 40 mg, Daily  ondansetron (ZOFRAN-ODT) disintegrating tablet 4 mg, Q8H PRN   Or  ondansetron (ZOFRAN) injection 4 mg, Q6H PRN  polyethylene glycol (GLYCOLAX) packet 17 g, Daily PRN  acetaminophen (TYLENOL) tablet 650 mg, Q6H PRN   Or  acetaminophen (TYLENOL) suppository 650 mg, Q6H PRN  glucose chewable tablet 16 g, PRN  dextrose bolus 10% 125 mL, PRN   Or  dextrose bolus 10% 250 mL, PRN  glucagon (rDNA) injection 1 mg, PRN  dextrose 10 % infusion, Continuous PRN  levothyroxine (SYNTHROID) tablet 137 mcg, Daily  montelukast (SINGULAIR) tablet 10 mg, QAM  ibuprofen (ADVIL;MOTRIN) tablet 200 mg, Q6H PRN  insulin lispro (HUMALOG) injection vial 0-4 Units, TID WC  insulin lispro (HUMALOG) injection vial 0-4 Units, Nightly  Arformoterol Tartrate (BROVANA) nebulizer solution 15 mcg, BID  budesonide (PULMICORT) nebulizer suspension 500 mcg, BID  ipratropium-albuterol (DUONEB) nebulizer solution 1 ampule, Q4H WA  dexamethasone (PF) (DECADRON) injection 6 mg, Daily  hydrOXYzine pamoate (VISTARIL) capsule 25 mg, TID PRN  dextrose 5 % solution, Continuous        Review of Systems:   Constitutional: no fevers , no chills , feels ok   Eyes: no eye pain , no itching , no drainage  Ears, nose, mouth, throat, and face: no ear ,nose pain , hearing is ok ,no nasal drainage   Respiratory: no sob ,no cough ,no wheezing . Cardiovascular: no chest pain , no palpitation ,no sob .    Gastrointestinal: no nausea, vomiting , constipation , no abdominal pain Maxi Awad Genitourinary:no urinary retention , no burning , dysuria . No polyuria   Hematologic/lymphatic: no bleeding , no cougulation issues . Musculoskeletal:no joint pain , no swelling . Neurological: no headaches ,no weakness , no numbness . Endocrine: no thirst , no weight issues . Physical exam:   Vital signs BP (!) 137/108   Pulse (!) 120   Temp 98.8 °F (37.1 °C) (Oral)   Resp 21   Ht 5' 2\" (1.575 m)   Wt 155 lb (70.3 kg)   SpO2 100%   BMI 28.35 kg/m²   Gen : NAD , appropriate for stated age . Head : at , nc   Neck : supple , no thyromegaly noted . Eyes : EOMI , PERRLA   CV : RRR , No M/R/G . Lungs: CTAB , no wheezing , good flow heard b/l   Abd : soft , NT , BS + , No Organomegaly appreciated . Skin : soft, dry . Neuro : CN  II-XII grossly intact , no focal neurologic deficit . Psych : cooperative .      Data:   Labs:  CBC with Differential:    Lab Results   Component Value Date/Time    WBC 14.8 09/21/2022 05:00 AM    RBC 4.97 09/21/2022 05:00 AM    HGB 14.6 09/21/2022 05:00 AM    HCT 40.3 09/21/2022 05:00 AM     09/21/2022 05:00 AM    MCV 81.1 09/21/2022 05:00 AM    MCH 29.4 09/21/2022 05:00 AM    MCHC 36.2 09/21/2022 05:00 AM    RDW 13.3 09/21/2022 05:00 AM    LYMPHOPCT 0.8 09/21/2022 05:00 AM    MONOPCT 0.9 09/21/2022 05:00 AM    MYELOPCT 0.9 09/21/2022 05:00 AM    BASOPCT 0.1 09/21/2022 05:00 AM    MONOSABS 0.15 09/21/2022 05:00 AM    LYMPHSABS 0.15 09/21/2022 05:00 AM    EOSABS 0.00 09/21/2022 05:00 AM    BASOSABS 0.00 09/21/2022 05:00 AM     CMP:    Lab Results   Component Value Date/Time     09/21/2022 04:16 PM    K 4.0 09/21/2022 04:16 PM    CL 82 09/21/2022 04:16 PM    CO2 23 09/21/2022 04:16 PM    BUN 21 09/21/2022 04:16 PM    CREATININE 0.9 09/21/2022 04:16 PM    GFRAA >60 09/21/2022 04:16 PM    LABGLOM >60 09/21/2022 04:16 PM    GLUCOSE 141 09/21/2022 04:16 PM    PROT 7.1 09/20/2022 06:43 PM    LABALBU 4.3 09/20/2022 06:43 PM    CALCIUM 9.1 09/21/2022 04:16 PM BILITOT 0.2 09/20/2022 06:43 PM    ALKPHOS 137 09/20/2022 06:43 PM    AST 15 09/20/2022 06:43 PM    ALT 8 09/20/2022 06:43 PM     Ionized Calcium:  No results found for: IONCA  Magnesium:    Lab Results   Component Value Date/Time    MG 2.0 09/21/2022 12:32 AM     Phosphorus:    Lab Results   Component Value Date/Time    PHOS 3.5 09/02/2022 05:39 AM     U/A:    Lab Results   Component Value Date/Time    COLORU Yellow 09/21/2022 06:42 AM    COLORU Yellow 09/21/2022 06:42 AM    PHUR 6.0 09/21/2022 06:42 AM    PHUR 6.0 09/21/2022 06:42 AM    WBCUA NONE 09/21/2022 06:42 AM    WBCUA NONE 09/21/2022 06:42 AM    RBCUA 2-5 09/21/2022 06:42 AM    RBCUA 2-5 09/21/2022 06:42 AM    BACTERIA NONE SEEN 09/21/2022 06:42 AM    BACTERIA NONE SEEN 09/21/2022 06:42 AM    CLARITYU Clear 09/21/2022 06:42 AM    CLARITYU Clear 09/21/2022 06:42 AM    SPECGRAV 1.010 09/21/2022 06:42 AM    SPECGRAV 1.010 09/21/2022 06:42 AM    LEUKOCYTESUR Negative 09/21/2022 06:42 AM    LEUKOCYTESUR Negative 09/21/2022 06:42 AM    UROBILINOGEN 0.2 09/21/2022 06:42 AM    UROBILINOGEN 0.2 09/21/2022 06:42 AM    BILIRUBINUR Negative 09/21/2022 06:42 AM    BILIRUBINUR Negative 09/21/2022 06:42 AM    BLOODU SMALL 09/21/2022 06:42 AM    BLOODU SMALL 09/21/2022 06:42 AM    GLUCOSEU Negative 09/21/2022 06:42 AM    GLUCOSEU Negative 09/21/2022 06:42 AM     Microalbumen/Creatinine ratio:  No components found for: RUCREAT  Iron Saturation:  No components found for: PERCENTFE  TIBC:  No results found for: TIBC  FERRITIN:  No results found for: FERRITIN     Imaging:  CTA CHEST W CONTRAST   Final Result   No evidence of pulmonary embolism. Enlarged pulmonary arteries, correlate clinically for pulmonary artery   hypertension. Infiltrative right mediastinal mass with lymphadenopathy or a large   conglomerate of lymph nodes involving the right aspect of the mediastinum and   right hilum with narrowing of the right mainstem bronchus.   This finding significantly worsened from the prior examination dated September 3, 2022. Partial atelectasis in the right middle lobe. CTA ABDOMEN PELVIS W CONTRAST   Final Result   No acute intraabdominal or intrapelvic pathology. Atrophy of the upper pole of the left kidney. Chronic vascular changes as described above. XR CHEST PORTABLE   Final Result   No acute process.          MRI BRAIN WO CONTRAST    (Results Pending)   NM BONE SCAN WHOLE BODY    (Results Pending)       Assessment    Hyponateremia euvolemic :    Etiology of hyponateremia felt to be related  volume depletion along with  SIADH likely related to her underlying lung cancer along with COPD     Above supported by her urine lytes     Na up to 120 from 110 on admission     Recommendations   -start D5w at 120 cc/hr to slow rate of correction   -serial Na numbers  -goal for correction no more than 6-8 meq in 1th day and no more than 12-16 meq in 48 hours       Electronically signed by Foster Flor MD on 9/21/2022 at 7:35 PM

## 2022-09-21 NOTE — CONSULTS
Palliative Care Department  [phone]  Palliative Care Initial Consult  Jayla Foy APRN-CNS, [address]  [id number]  Hospital Day: 2    Date of Initial Consult: 9/21/22  Referring Provider: Dr. Lew Burgess was consulted for assistance with: code status discussion, goals of care    HPI:   Coco Hamlin is a 81 y.o. with a past medical history of bronchitis, hypertension, hypothyroidism, COPD (on 2 L at night at home), chronic alcohol abuse (reports stopping 4-6 mo ago), depression, diastolic heart failure who presented to the ED with CHIEF COMPLAINT of weakness, dizziness. She states has feeling of being drunk. Lab work done from her PCPs noting critically low sodium level so was instructed to come to ED. States she has been drinking a lot more water than she normal. She did report cough which is non productive and chest pain which is not new. BP Workup in ED noting BP 60/33. Labs noting; Na 110; ABG- pH 7.175; ProBNP elevated. Imaging including  CTA chest disclosed right mediastinal mass with lymphadenopathy which is worsened compared to prior exam on September 3, 2022. CT abdomen and pelvis with contrast showed no acute intra-abdominal or intrapelvic pathology, chest x-ray noting no acute process. A central line was placed and pt was started on cefepime; nebulizers; pressors and solumedrol. Pt had recent admission for hyponatremia 9/1- 9/13; found with pulmonary nodule s/p biopsy; also with likely metastatic lesion in right hepatic lobe; Liver, right lobe, needle biopsy: pathology noting metastatic high-grade neuroendocrine carcinoma, consistent with small cell carcinoma of pulmonary origin. Pt was admitted for further care and management on 9/20/2022. Pulmonology, Hem/Onc, and Nephrology services were consulted.      ASSESSMENT/PLAN:     Pertinent Hospital Diagnoses   Hyponatremia- Nephrology following  New diagnosis Lung cancer with mets to liver- Hem/Onc consulted  Hx of diastolic heart failure  Hypotension-wean pressors as able      Palliative Care Encounter / Counseling Regarding Goals of An Keenan Schütt 56, Does have capacity for medical decision-making. Capacity is time limited and situation/question specific  During encounter no was surrogate medical decision-maker was present  Outcome of goals of care meeting:   Continue current care  Awaiting input from Hem/Onc  Hoping she can return home  Code status Full Code- discussed options; wants to consider options; information left at bedside  Advanced Directives: no HCPOA or Living Will noted in chart but states brother in law CATRACHITO is HCPOA; request copy when able  Surrogate/Legal NOK:  Brother in law (HCPOA)- Anjelica Yang @ Primary Phone: [phone] (H  Niramón Stephens @ [phone] (M)    Spiritual assessment: no spiritual distress identified  Bereavement and grief: to be determined  Referrals to: none today    SUBJECTIVE:     Details of Conversation:     22 Chart reviewed. Pt seen. Pt on oxygen 3l n/c; SPO2 96%. SBP 76-94. Now off levophed; BP 92/67. Sodium trending up. Explained role of Palliative medicine. Pt is very [place]; is moderately SOB during conversation. Coughing non productive. Wheezing noted during conversation. Pt states she lives alone but has family checking frequently on her at home. States she had biopsy of liver (pt not yet aware of results per staff). States she does have HCPOA-brother in law Emilia Garica but also ok to speak with and share information with niece Lidia Tsai. She does have grand children who visit; states adult son is  and has adult daughter with unknown whereabouts. We talked about code status; just providing information and pt states she will consider options once she has more information about Hem/Onc. Emotional support provided. Explained will continue to meet once more information is known and will assist with plan of care. Spoke with staff nurse. Will follow along. OBJECTIVE:   Prognosis: unknown    ----- REVIEW OF SYSTEMS -----  CONSTITUTIONAL: Denies fever, chill or rigors, nausea or vomiting. HEENT: denies blurring of vision or double vision, + hearing problem   RESPIRATORY: + cough, +shortness of breath, +chest pain. CARDIOVASCULAR: Denies palpitation   GASTROINTESTINAL: Denies abdominal pain, diarrhea or constipation. GENITOURINARY: Denies burning urination or frequency of urination   INTEGUMENT: denies wound, rash   HEMATOLOGIC/LYMPHATIC: Denies lymph node swelling, gum bleeding or easy bruising.    MUSCULOSKELETAL: Denies leg pain, joint pain, joint swelling  NEUROLOGICAL: + dizziness, no loss of consciousness, +weakness    Physical Exam:  BP (!) 94/55   Pulse 99   Temp 99.9 °F (37.7 °C) (Bladder)   Resp 21   Ht 5' 2\" (1.575 m)   Wt 155 lb (70.3 kg)   SpO2 96%   BMI 28.35 kg/m²   Gen:   awake, alert; mod shortness of breath; cough; denies pain  HEENT:  Normocephalic, atraumatic, mucosa moist, EOMI  Neck:  Supple, trachea midline, no JVD  Lungs:  audible rhonchi or wheezes noted, respirations mod labored; on oxygen  Heart:  RRR, distant heart tones, no murmur, rub, or gallop noted during exam  Abd:  Soft, non tender, non distended, bowel sounds present  :  slaughter catheter in place  Ext:  Moving all extremities, no edema, pulses present  Skin:  Warm and dry  Neuro:  PERRL, Alert, oriented x 3; following commands    Past Medical History:   Diagnosis Date    Bronchitis     Hypertension     Thyroid disease      Past Surgical History:   Procedure Laterality Date    CT NEEDLE BIOPSY LIVER PERCUTANEOUS  9/6/2022    CT NEEDLE BIOPSY LIVER PERCUTANEOUS 9/6/2022 Jeannine Cooper MD [facility]    OTHER SURGICAL HISTORY      states had something done right neck area per dr jaeger, might have been an abcess    TYMPANOSTOMY [address] [place] Problems    Diagnosis Date Noted    Primary hypertension [I10] 09/20/2022     Priority: Medium Hypothyroidism [E03.9] 09/20/2022     Priority: Medium    GERD (gastroesophageal reflux disease) [K21.9] 09/20/2022     Priority: Medium    Depression [F32.A] 09/20/2022     Priority: Medium    Hyponatremia [E87.1] 09/01/2022     Priority: Medium       Social History:   The patient currently lives at home with family checking  TOBACCO:  reports that she has been smoking cigarettes. She started smoking about 50 years ago. She has a 50.00 pack-year smoking history. She has never used smokeless tobacco.  ETOH:  reports no history of alcohol use. Objective data reviewed: labs, images, records, medication use, vitals, and chart    Discussed patient and the plan of care with the other IDT members: Palliative Medicine IDT Team, Floor Nurse, and Patient    Time/Communication  Greater than 50% of time spent, total 55 minutes in counseling and coordination of care at the bedside regarding goals of care, symptom management, diagnosis and prognosis, and see above. Thank you for allowing Palliative Medicine to participate in the care of [facility].   Martin MORALES, ALE

## 2022-09-21 NOTE — CARE COORDINATION
9/21:  Update CM Note:   CM spoke with brother in law Dipak. He states he has the paperwork for POA & living will & will bring it in.   Electronically signed by Erickson Liriano RN on 9/21/2022 at 3:41 PM

## 2022-09-21 NOTE — CONSULTS
Oral, Q8H PRN **OR** ondansetron (ZOFRAN) injection 4 mg, 4 mg, IntraVENous, Q6H PRN  polyethylene glycol (GLYCOLAX) packet 17 g, 17 g, Oral, Daily PRN  acetaminophen (TYLENOL) tablet 650 mg, 650 mg, Oral, Q6H PRN **OR** acetaminophen (TYLENOL) suppository 650 mg, 650 mg, Rectal, Q6H PRN  glucose chewable tablet 16 g, 4 tablet, Oral, PRN  dextrose bolus 10% 125 mL, 125 mL, IntraVENous, PRN **OR** dextrose bolus 10% 250 mL, 250 mL, IntraVENous, PRN  glucagon (rDNA) injection 1 mg, 1 mg, SubCUTAneous, PRN  dextrose 10 % infusion, , IntraVENous, Continuous PRN  levothyroxine (SYNTHROID) tablet 137 mcg, 137 mcg, Oral, Daily  montelukast (SINGULAIR) tablet 10 mg, 10 mg, Oral, QAM  ibuprofen (ADVIL;MOTRIN) tablet 200 mg, 200 mg, Oral, Q6H PRN  insulin lispro (HUMALOG) injection vial 0-4 Units, 0-4 Units, SubCUTAneous, TID WC  insulin lispro (HUMALOG) injection vial 0-4 Units, 0-4 Units, SubCUTAneous, Nightly  Arformoterol Tartrate (BROVANA) nebulizer solution 15 mcg, 15 mcg, Nebulization, BID  budesonide (PULMICORT) nebulizer suspension 500 mcg, 0.5 mg, Nebulization, BID  ipratropium-albuterol (DUONEB) nebulizer solution 1 ampule, 1 ampule, Inhalation, Q4H WA    Allergies:  Patient has no known allergies. Social History:   Social History     Socioeconomic History    Marital status:       Spouse name: Not on file    Number of children: Not on file    Years of education: Not on file    Highest education level: Not on file   Occupational History    Not on file   Tobacco Use    Smoking status: Every Day     Packs/day: 1.00     Years: 50.00     Pack years: 50.00     Types: Cigarettes     Start date: 0    Smokeless tobacco: Never   Vaping Use    Vaping Use: Never used   Substance and Sexual Activity    Alcohol use: No    Drug use: No    Sexual activity: Never   Other Topics Concern    Not on file   Social History Narrative    Not on file     Social Determinants of Health     Financial Resource Strain: Not on file Food Insecurity: Not on file   Transportation Needs: Not on file   Physical Activity: Not on file   Stress: Not on file   Social Connections: Not on file   Intimate Partner Violence: Not on file   Housing Stability: Not on file       Family History:     History reviewed. No pertinent family history. REVIEW OF SYSTEMS:    As per HPI, remaining ROS negative. PHYSICAL EXAM:      Vitals:  BP (!) 94/55   Pulse (!) 103   Temp 99.9 °F (37.7 °C) (Bladder)   Resp 18   Ht 5' 2\" (1.575 m)   Wt 155 lb (70.3 kg)   SpO2 96%   BMI 28.35 kg/m²     CONSTITUTIONAL:  awake, alert, cooperative, conversational dyspnea and cough throughout visit HEENT:  Normocepalic, atraumatic,  EOMI, sclera clear, conjunctiva normal, mucosa dry without ulcers, erythema, bleeding  NECK:  Supple, trachea midline, no adenopathy  HEMATOLOGIC/LYMPHATICS:  no cervical, supraclavicular fullness no discrete lymph node palpated  LUNGS: Poor air exchange with diffuse wheezing bilaterally, increased respiratory rate on oxygen via nasal cannula   CARDIOVASCULAR: Tachycardic, heart tones distant but no murmur appreciated   ABDOMEN:  No scars, normal BS, soft, non-distended, non-tender, no masses palpated, no hepatosplenomegaly  MUSCULOSKELETAL: No edema lower extremities decreased muscle tone   SKIN: intact without petechiae, ecchymoses, rash  NEUROLOGIC:  Awake, alert, oriented to name, place and time. No gross neurologic deficits      DATA:    CMP:    Lab Results   Component Value Date/Time     09/21/2022 06:42 AM    K 3.9 09/21/2022 06:42 AM    CL 78 09/21/2022 06:42 AM    CO2 22 09/21/2022 06:42 AM    BUN 15 09/21/2022 06:42 AM    PROT 7.1 09/20/2022 06:43 PM       CBC:    Recent Labs     09/20/22  1843 09/21/22  0500   WBC 8.3 14.8*   HGB 13.8 14.6    353       Radiology:    CTA CHEST W CONTRAST   Final Result   No evidence of pulmonary embolism.       Enlarged pulmonary arteries, correlate clinically for pulmonary artery hypertension. Infiltrative right mediastinal mass with lymphadenopathy or a large   conglomerate of lymph nodes involving the right aspect of the mediastinum and   right hilum with narrowing of the right mainstem bronchus. This finding   significantly worsened from the prior examination dated September 3, 2022. Partial atelectasis in the right middle lobe. CTA ABDOMEN PELVIS W CONTRAST   Final Result   No acute intraabdominal or intrapelvic pathology. Atrophy of the upper pole of the left kidney. Chronic vascular changes as described above. XR CHEST PORTABLE   Final Result   No acute process. IMPRESSION:     79-year-old female with multiple comorbidities including CHF and COPD presenting with extended stage small cell lung cancer with biopsy of liver mass consistent with metastatic disease. RECOMMENDATIONS:  -Complete work-up check brain MRI and will check bone scan  -Treat acute issues per ICU to determine what patient's baseline performance status is.  -Hyponatremia may be associated with SIADH    After work-up completed further recommendations can be made. Thank you for allowing us to participate in the care of [facility]. Giorgio Mejia [facility] [phone]    Electronically signed by ZAFAR Domingo on 9/21/2022 at 9:18 AM    Note: This report was completed using [employer] voiced recognition software. Every effort has been made to ensure accuracy; however, inadvertent computerized transcription errors may be present. Patient seen and examined. Case discussed with PA. Patient has extensive stage small cell lung cancer. MRI and bone scan ordered. 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 Treatment would be systemic chemotherapy with -16 and carboplatin. She would require Mediport placement for administration of chemotherapy. We will continue to follow along with you and see how she improves.   If she were to receive treatment, we would like to start treatment within the next week or so. All of her questions were answered to her satisfaction.     Harrison Aschoff MD

## 2022-09-21 NOTE — PROGRESS NOTES
Lukas served Dr. Maximilian Sky and updated him on patient's last sodium of 120. Orders to start patient on D5W at 120 ml/hr received. Orders placed and patient updated.

## 2022-09-21 NOTE — H&P
[facility]  History and Physical      CHIEF COMPLAINT:    Chief Complaint   Patient presents with    Other     Labs done two days ago. States low sodium. States dizziness. Denies CP or SOB. Denies n/v/d. 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 Patient was sent in after having lab work done outpatient to find that she had a low sodium. Upon arrival to emergency patient was found to have a sodium level critically low at 110 and a chloride level at 77. Patient had a CTA chest revealing of an infiltrative right mediastinal mass with lymphadenopathy or a large conglomerate of lymph nodes involving the right aspect of the mediastinum and right hilum with narrowing of the right mainstem bronchus which have significantly worsened from previous exam on the third of this month. Patient was also found to be hypotensive upon arrival and was started on the Levophed drip. Patient is admitted to intensive care unit for closer monitoring and further work-up and treatment. REVIEW OF SYSTEMS:  Pertinent negatives are above in HPI. 10 point ROS otherwise negative.       Past Medical History:   Diagnosis Date    Bronchitis     Hypertension     Thyroid disease          Past Surgical History:   Procedure Laterality Date    CT NEEDLE BIOPSY LIVER PERCUTANEOUS  9/6/2022    CT NEEDLE BIOPSY LIVER PERCUTANEOUS 9/6/2022 Jeannine Cooper MD [facility]    OTHER SURGICAL HISTORY      states had something done right neck area per dr jaeger, might have been an abcess    TYMPANOSTOMY TUBE PLACEMENT Medications Prior to Admission:    Medications Prior to Admission: fluticasone-vilanterol (BREO ELLIPTA) 100-25 MCG/INH AEPB inhaler, Inhale 1 puff into the lungs daily  albuterol (PROVENTIL) (2.5 MG/3ML) 0.083% nebulizer solution, Take 2.5 mg by nebulization 3 times daily as needed  fluticasone (FLONASE) 50 MCG/ACT nasal spray, 1 spray by Nasal route daily  montelukast (SINGULAIR) 10 MG tablet, Take 10 mg by mouth every morning  albuterol sulfate  (90 Base) MCG/ACT inhaler, Inhale 2 puffs into the lungs every 6 hours as needed for Wheezing  potassium chloride (KLOR-CON M) 20 MEQ extended release tablet, Take 1 tablet by mouth daily  sertraline (ZOLOFT) 50 MG tablet, Take 50 mg by mouth daily  metoprolol succinate (TOPROL XL) 25 MG extended release tablet, Take 25 mg by mouth daily  amLODIPine (NORVASC) 5 MG tablet, Take 5 mg by mouth in the morning and at bedtime  omeprazole (PRILOSEC) 20 MG delayed release capsule, Take 20 mg by mouth daily  clonazePAM (KLONOPIN) 1 MG tablet, Take 0.5 mg by mouth daily as needed for Anxiety. levothyroxine (SYNTHROID) 137 MCG tablet, Take 137 mcg by mouth Daily     Note that the patient's home medications were reviewed and the above list is accurate to the best of my knowledge at the time of the exam.    Allergies:    Patient has no known allergies. Social History:    reports that she has been smoking cigarettes. She started smoking about 50 years ago. She has a 50.00 pack-year smoking history. She has never used smokeless tobacco. She reports that she does not drink alcohol and does not use drugs. Family History:   family history is not on file.       PHYSICAL EXAM:    Vitals:  /66   Pulse (!) 116   Temp 99.7 °F (37.6 °C) (Bladder)   Resp 17   Ht 5' 2\" (1.575 m)   Wt 155 lb (70.3 kg)   SpO2 91%   BMI 28.35 kg/m²       General appearance: NAD, conversant  Eyes: Sclerae anicteric, PERRLA  HEENT: AT/NC, MMM  Neck: FROM, supple, no thyromegaly  Lymph: No cervical / supraclavicular lymphadenopathy  Lungs: Clear to auscultation, WOB normal  CV: RRR, no MRGs, no lower extremity edema  Abdomen: Soft, non-tender; no masses or HSM, +BS  Extremities: FROM without synovitis. No clubbing or cyanosis of the hands. Skin: no rash, induration, lesions, or ulcers  Psych: Calm and cooperative. Normal judgement and insight. Normal mood and affect. Neuro: Alert and interactive, face symmetric, speech fluent. LABS:  All labs reviewed. Of note:  CBC:   Lab Results   Component Value Date/Time    WBC 14.8 09/21/2022 05:00 AM    RBC 4.97 09/21/2022 05:00 AM    HGB 14.6 09/21/2022 05:00 AM    HCT 40.3 09/21/2022 05:00 AM    MCV 81.1 09/21/2022 05:00 AM    MCH 29.4 09/21/2022 05:00 AM    MCHC 36.2 09/21/2022 05:00 AM    RDW 13.3 09/21/2022 05:00 AM     09/21/2022 05:00 AM    MPV 9.3 09/21/2022 05:00 AM     CMP:    Lab Results   Component Value Date/Time     09/21/2022 12:30 PM    K 3.7 09/21/2022 12:30 PM    CL 82 09/21/2022 12:30 PM    CO2 26 09/21/2022 12:30 PM    BUN 18 09/21/2022 12:30 PM    CREATININE 1.0 09/21/2022 12:30 PM    GFRAA >60 09/21/2022 12:30 PM    LABGLOM 55 09/21/2022 12:30 PM    GLUCOSE 155 09/21/2022 12:30 PM    PROT 7.1 09/20/2022 06:43 PM    LABALBU 4.3 09/20/2022 06:43 PM    CALCIUM 8.7 09/21/2022 12:30 PM    BILITOT 0.2 09/20/2022 06:43 PM    ALKPHOS 137 09/20/2022 06:43 PM    AST 15 09/20/2022 06:43 PM    ALT 8 09/20/2022 06:43 PM       Imaging:  CXR: negative  CTA Chest: neagtive for PE. Enlarged pulmonary arteries. Infiltrative right mediastinal mass with lymphadenopathy or large conglomerate of lymph nodes involving the right aspect of the mediastinum and right hilum with narrowing of the right mainstem bronchus.   this has worsened since previous exam.     EKG:  Normal sinus rhythm  Possible Left atrial enlargement  Nonspecific ST abnormality    Telemetry:  I've personally reviewed the patient's telemetry:  Sinus tach    ASSESSMENT/PLAN:  Principal Problem:    Hyponatremia  Active Problems:    Primary hypertension    Hypothyroidism    GERD (gastroesophageal reflux disease)    Depression    Palliative care by specialist    Goals of care, counseling/discussion  Resolved Problems:    * No resolved hospital problems. *    Patient is a 72-year-old female admitted to ICU for  Severe hyponatremia, etiology consistent with metastatic small cell lung cancer   -Monitor labs  -Na+ 110 > 117  -Nephrology following  -Fluid restriction due to possible SIADH  -Urine studies    Hypokalemia  -K+ 3.3, replaced  -Monitor BMP    Hypotension  -initially needed IV levo, since has been discontinued    Leukocytosis, likely steroid-induced  -No clinical evidence of infection at this time    Hx Diastolic heart failure  -Elevated proBNP 1,284  -Last echo in April 2021 > EF of 55 to 54%, stage I diastolic dysfunction, mild to moderate MR, trace TR  -Monitor I's and O's  -Daily weights    Recently diagnosed with lung cancer with mets to the liver-pathology completed as below  -Pathology noting 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  -Pulmonology following  -Palliative care consulted  -Heme-onc consulted    Medication for other comorbidities continue as appropriate dose adjustment as necessary.     DVT prophylaxis Lovenox   PT OT  Discharge planning      Code status: Full  Requires Inpatient level of care    Shobha Haile MD

## 2022-09-22 ENCOUNTER — APPOINTMENT (OUTPATIENT)
Dept: NUCLEAR MEDICINE | Age: 72
DRG: 844 | End: 2022-09-22
Payer: MEDICARE

## 2022-09-22 ENCOUNTER — APPOINTMENT (OUTPATIENT)
Dept: GENERAL RADIOLOGY | Age: 72
DRG: 844 | End: 2022-09-22
Payer: MEDICARE

## 2022-09-22 PROBLEM — C34.90 SMALL CELL LUNG CANCER IN ADULT (HCC): Chronic | Status: ACTIVE | Noted: 2022-09-22

## 2022-09-22 LAB
ANION GAP SERPL CALCULATED.3IONS-SCNC: 10 MMOL/L (ref 7–16)
ANION GAP SERPL CALCULATED.3IONS-SCNC: 11 MMOL/L (ref 7–16)
ANION GAP SERPL CALCULATED.3IONS-SCNC: 12 MMOL/L (ref 7–16)
ANION GAP SERPL CALCULATED.3IONS-SCNC: 9 MMOL/L (ref 7–16)
BASOPHILS ABSOLUTE: 0.02 E9/L (ref 0–0.2)
BASOPHILS RELATIVE PERCENT: 0.2 % (ref 0–2)
BUN BLDV-MCNC: 11 MG/DL (ref 6–23)
BUN BLDV-MCNC: 12 MG/DL (ref 6–23)
BUN BLDV-MCNC: 12 MG/DL (ref 6–23)
BUN BLDV-MCNC: 13 MG/DL (ref 6–23)
BUN BLDV-MCNC: 15 MG/DL (ref 6–23)
BUN BLDV-MCNC: 19 MG/DL (ref 6–23)
CALCIUM SERPL-MCNC: 8.7 MG/DL (ref 8.6–10.2)
CALCIUM SERPL-MCNC: 8.8 MG/DL (ref 8.6–10.2)
CALCIUM SERPL-MCNC: 8.9 MG/DL (ref 8.6–10.2)
CALCIUM SERPL-MCNC: 9.1 MG/DL (ref 8.6–10.2)
CALCIUM SERPL-MCNC: 9.2 MG/DL (ref 8.6–10.2)
CALCIUM SERPL-MCNC: 9.7 MG/DL (ref 8.6–10.2)
CHLORIDE BLD-SCNC: 81 MMOL/L (ref 98–107)
CHLORIDE BLD-SCNC: 82 MMOL/L (ref 98–107)
CHLORIDE BLD-SCNC: 83 MMOL/L (ref 98–107)
CHLORIDE BLD-SCNC: 84 MMOL/L (ref 98–107)
CHLORIDE URINE RANDOM: 63 MMOL/L
CO2: 25 MMOL/L (ref 22–29)
CO2: 25 MMOL/L (ref 22–29)
CO2: 26 MMOL/L (ref 22–29)
CO2: 26 MMOL/L (ref 22–29)
CO2: 27 MMOL/L (ref 22–29)
CO2: 27 MMOL/L (ref 22–29)
CREAT SERPL-MCNC: 0.6 MG/DL (ref 0.5–1)
CREAT SERPL-MCNC: 0.7 MG/DL (ref 0.5–1)
CREAT SERPL-MCNC: 0.8 MG/DL (ref 0.5–1)
CREATININE URINE: 66 MG/DL (ref 29–226)
EOSINOPHILS ABSOLUTE: 0.03 E9/L (ref 0.05–0.5)
EOSINOPHILS RELATIVE PERCENT: 0.2 % (ref 0–6)
GFR AFRICAN AMERICAN: >60
GFR NON-AFRICAN AMERICAN: >60 ML/MIN/1.73
GLUCOSE BLD-MCNC: 119 MG/DL (ref 74–99)
GLUCOSE BLD-MCNC: 131 MG/DL (ref 74–99)
GLUCOSE BLD-MCNC: 154 MG/DL (ref 74–99)
GLUCOSE BLD-MCNC: 83 MG/DL (ref 74–99)
GLUCOSE BLD-MCNC: 85 MG/DL (ref 74–99)
GLUCOSE BLD-MCNC: 94 MG/DL (ref 74–99)
HCT VFR BLD CALC: 32.5 % (ref 34–48)
HEMOGLOBIN: 11.5 G/DL (ref 11.5–15.5)
IMMATURE GRANULOCYTES #: 0.06 E9/L
IMMATURE GRANULOCYTES %: 0.5 % (ref 0–5)
LV EF: 63 %
LVEF MODALITY: NORMAL
LYMPHOCYTES ABSOLUTE: 0.9 E9/L (ref 1.5–4)
LYMPHOCYTES RELATIVE PERCENT: 7 % (ref 20–42)
MAGNESIUM: 1.8 MG/DL (ref 1.6–2.6)
MAGNESIUM: 1.8 MG/DL (ref 1.6–2.6)
MAGNESIUM: 1.9 MG/DL (ref 1.6–2.6)
MCH RBC QN AUTO: 28.8 PG (ref 26–35)
MCHC RBC AUTO-ENTMCNC: 35.4 % (ref 32–34.5)
MCV RBC AUTO: 81.5 FL (ref 80–99.9)
METER GLUCOSE: 144 MG/DL (ref 74–99)
METER GLUCOSE: 69 MG/DL (ref 74–99)
MONOCYTES ABSOLUTE: 1.2 E9/L (ref 0.1–0.95)
MONOCYTES RELATIVE PERCENT: 9.3 % (ref 2–12)
NEUTROPHILS ABSOLUTE: 10.73 E9/L (ref 1.8–7.3)
NEUTROPHILS RELATIVE PERCENT: 82.8 % (ref 43–80)
PDW BLD-RTO: 13.7 FL (ref 11.5–15)
PHOSPHORUS: 2.9 MG/DL (ref 2.5–4.5)
PHOSPHORUS: 2.9 MG/DL (ref 2.5–4.5)
PLATELET # BLD: 293 E9/L (ref 130–450)
PMV BLD AUTO: 9.3 FL (ref 7–12)
POTASSIUM REFLEX MAGNESIUM: 3.3 MMOL/L (ref 3.5–5)
POTASSIUM REFLEX MAGNESIUM: 3.5 MMOL/L (ref 3.5–5)
POTASSIUM REFLEX MAGNESIUM: 3.9 MMOL/L (ref 3.5–5)
POTASSIUM REFLEX MAGNESIUM: 4.5 MMOL/L (ref 3.5–5)
POTASSIUM REFLEX MAGNESIUM: 4.6 MMOL/L (ref 3.5–5)
POTASSIUM REFLEX MAGNESIUM: 4.6 MMOL/L (ref 3.5–5)
POTASSIUM, UR: 30.4 MMOL/L
PRO-BNP: 1221 PG/ML (ref 0–125)
PROCALCITONIN: 3.94 NG/ML (ref 0–0.08)
RBC # BLD: 3.99 E12/L (ref 3.5–5.5)
SODIUM BLD-SCNC: 118 MMOL/L (ref 132–146)
SODIUM BLD-SCNC: 119 MMOL/L (ref 132–146)
SODIUM BLD-SCNC: 119 MMOL/L (ref 132–146)
SODIUM BLD-SCNC: 120 MMOL/L (ref 132–146)
SODIUM BLD-SCNC: 121 MMOL/L (ref 132–146)
SODIUM BLD-SCNC: 121 MMOL/L (ref 132–146)
SODIUM URINE: 59 MMOL/L
UREA NITROGEN, UR: 655 MG/DL (ref 800–1666)
WBC # BLD: 12.9 E9/L (ref 4.5–11.5)

## 2022-09-22 PROCEDURE — 6370000000 HC RX 637 (ALT 250 FOR IP): Performed by: INTERNAL MEDICINE

## 2022-09-22 PROCEDURE — 6360000002 HC RX W HCPCS: Performed by: INTERNAL MEDICINE

## 2022-09-22 PROCEDURE — 80048 BASIC METABOLIC PNL TOTAL CA: CPT

## 2022-09-22 PROCEDURE — 84133 ASSAY OF URINE POTASSIUM: CPT

## 2022-09-22 PROCEDURE — 78306 BONE IMAGING WHOLE BODY: CPT | Performed by: PHYSICIAN ASSISTANT

## 2022-09-22 PROCEDURE — 83735 ASSAY OF MAGNESIUM: CPT

## 2022-09-22 PROCEDURE — 93356 MYOCRD STRAIN IMG SPCKL TRCK: CPT

## 2022-09-22 PROCEDURE — A9503 TC99M MEDRONATE: HCPCS | Performed by: RADIOLOGY

## 2022-09-22 PROCEDURE — 94640 AIRWAY INHALATION TREATMENT: CPT

## 2022-09-22 PROCEDURE — 3430000000 HC RX DIAGNOSTIC RADIOPHARMACEUTICAL: Performed by: RADIOLOGY

## 2022-09-22 PROCEDURE — 85025 COMPLETE CBC W/AUTO DIFF WBC: CPT

## 2022-09-22 PROCEDURE — 6370000000 HC RX 637 (ALT 250 FOR IP)

## 2022-09-22 PROCEDURE — 2580000003 HC RX 258: Performed by: INTERNAL MEDICINE

## 2022-09-22 PROCEDURE — 36415 COLL VENOUS BLD VENIPUNCTURE: CPT

## 2022-09-22 PROCEDURE — 36592 COLLECT BLOOD FROM PICC: CPT

## 2022-09-22 PROCEDURE — 82436 ASSAY OF URINE CHLORIDE: CPT

## 2022-09-22 PROCEDURE — 84300 ASSAY OF URINE SODIUM: CPT

## 2022-09-22 PROCEDURE — 84540 ASSAY OF URINE/UREA-N: CPT

## 2022-09-22 PROCEDURE — 71045 X-RAY EXAM CHEST 1 VIEW: CPT

## 2022-09-22 PROCEDURE — 2000000000 HC ICU R&B

## 2022-09-22 PROCEDURE — 84100 ASSAY OF PHOSPHORUS: CPT

## 2022-09-22 PROCEDURE — 99233 SBSQ HOSP IP/OBS HIGH 50: CPT | Performed by: INTERNAL MEDICINE

## 2022-09-22 PROCEDURE — 82962 GLUCOSE BLOOD TEST: CPT

## 2022-09-22 PROCEDURE — 84145 PROCALCITONIN (PCT): CPT

## 2022-09-22 PROCEDURE — 2700000000 HC OXYGEN THERAPY PER DAY

## 2022-09-22 PROCEDURE — 93306 TTE W/DOPPLER COMPLETE: CPT

## 2022-09-22 PROCEDURE — 99223 1ST HOSP IP/OBS HIGH 75: CPT | Performed by: SURGERY

## 2022-09-22 PROCEDURE — 83880 ASSAY OF NATRIURETIC PEPTIDE: CPT

## 2022-09-22 PROCEDURE — 82570 ASSAY OF URINE CREATININE: CPT

## 2022-09-22 RX ORDER — LORAZEPAM 2 MG/ML
1 INJECTION INTRAMUSCULAR EVERY 6 HOURS PRN
Status: DISCONTINUED | OUTPATIENT
Start: 2022-09-22 | End: 2022-09-22

## 2022-09-22 RX ORDER — GUAIFENESIN 100 MG/5ML
200 SOLUTION ORAL EVERY 4 HOURS PRN
Status: DISCONTINUED | OUTPATIENT
Start: 2022-09-22 | End: 2022-09-24

## 2022-09-22 RX ORDER — LORAZEPAM 2 MG/ML
1 INJECTION INTRAMUSCULAR
Status: COMPLETED | OUTPATIENT
Start: 2022-09-22 | End: 2022-09-22

## 2022-09-22 RX ORDER — TC 99M MEDRONATE 20 MG/10ML
27 INJECTION, POWDER, LYOPHILIZED, FOR SOLUTION INTRAVENOUS ONCE
Status: COMPLETED | OUTPATIENT
Start: 2022-09-22 | End: 2022-09-22

## 2022-09-22 RX ORDER — SODIUM CHLORIDE 1000 MG
1 TABLET, SOLUBLE MISCELLANEOUS ONCE
Status: COMPLETED | OUTPATIENT
Start: 2022-09-22 | End: 2022-09-22

## 2022-09-22 RX ORDER — METHYLPREDNISOLONE SODIUM SUCCINATE 40 MG/ML
40 INJECTION, POWDER, LYOPHILIZED, FOR SOLUTION INTRAMUSCULAR; INTRAVENOUS EVERY 12 HOURS
Status: DISCONTINUED | OUTPATIENT
Start: 2022-09-22 | End: 2022-09-24

## 2022-09-22 RX ORDER — LANOLIN ALCOHOL/MO/W.PET/CERES
50 CREAM (GRAM) TOPICAL DAILY
Status: DISCONTINUED | OUTPATIENT
Start: 2022-09-22 | End: 2022-09-26 | Stop reason: HOSPADM

## 2022-09-22 RX ORDER — CARVEDILOL 3.12 MG/1
3.12 TABLET ORAL 2 TIMES DAILY WITH MEALS
Status: DISCONTINUED | OUTPATIENT
Start: 2022-09-22 | End: 2022-09-22

## 2022-09-22 RX ORDER — LORAZEPAM 2 MG/ML
1 INJECTION INTRAMUSCULAR
Status: DISCONTINUED | OUTPATIENT
Start: 2022-09-22 | End: 2022-09-22

## 2022-09-22 RX ORDER — MECOBALAMIN 5000 MCG
5 TABLET,DISINTEGRATING ORAL ONCE
Status: COMPLETED | OUTPATIENT
Start: 2022-09-22 | End: 2022-09-22

## 2022-09-22 RX ORDER — METOPROLOL SUCCINATE 50 MG/1
25 TABLET, EXTENDED RELEASE ORAL DAILY
Status: DISCONTINUED | OUTPATIENT
Start: 2022-09-23 | End: 2022-09-23

## 2022-09-22 RX ORDER — SODIUM CHLORIDE 1000 MG
1 TABLET, SOLUBLE MISCELLANEOUS
Status: DISCONTINUED | OUTPATIENT
Start: 2022-09-23 | End: 2022-09-23

## 2022-09-22 RX ADMIN — PIPERACILLIN AND TAZOBACTAM 4500 MG: 4; .5 INJECTION, POWDER, LYOPHILIZED, FOR SOLUTION INTRAVENOUS at 14:21

## 2022-09-22 RX ADMIN — POTASSIUM BICARBONATE 40 MEQ: 782 TABLET, EFFERVESCENT ORAL at 11:01

## 2022-09-22 RX ADMIN — ENOXAPARIN SODIUM 40 MG: 100 INJECTION SUBCUTANEOUS at 09:08

## 2022-09-22 RX ADMIN — Medication 5 MG: at 21:29

## 2022-09-22 RX ADMIN — SODIUM CHLORIDE, PRESERVATIVE FREE 10 ML: 5 INJECTION INTRAVENOUS at 21:29

## 2022-09-22 RX ADMIN — HYDROXYZINE PAMOATE 25 MG: 25 CAPSULE ORAL at 21:29

## 2022-09-22 RX ADMIN — IPRATROPIUM BROMIDE AND ALBUTEROL SULFATE 1 AMPULE: .5; 2.5 SOLUTION RESPIRATORY (INHALATION) at 16:56

## 2022-09-22 RX ADMIN — IPRATROPIUM BROMIDE AND ALBUTEROL SULFATE 1 AMPULE: .5; 2.5 SOLUTION RESPIRATORY (INHALATION) at 08:48

## 2022-09-22 RX ADMIN — TC 99M MEDRONATE 27 MILLICURIE: 20 INJECTION, POWDER, LYOPHILIZED, FOR SOLUTION INTRAVENOUS at 10:00

## 2022-09-22 RX ADMIN — IPRATROPIUM BROMIDE AND ALBUTEROL SULFATE 1 AMPULE: .5; 2.5 SOLUTION RESPIRATORY (INHALATION) at 20:35

## 2022-09-22 RX ADMIN — Medication 1 G: at 21:35

## 2022-09-22 RX ADMIN — BUDESONIDE 500 MCG: 0.5 SUSPENSION RESPIRATORY (INHALATION) at 05:44

## 2022-09-22 RX ADMIN — METHYLPREDNISOLONE SODIUM SUCCINATE 40 MG: 40 INJECTION, POWDER, FOR SOLUTION INTRAMUSCULAR; INTRAVENOUS at 22:35

## 2022-09-22 RX ADMIN — BENZOCAINE AND MENTHOL 1 LOZENGE: 15; 3.6 LOZENGE ORAL at 05:15

## 2022-09-22 RX ADMIN — HYDROXYZINE PAMOATE 25 MG: 25 CAPSULE ORAL at 05:37

## 2022-09-22 RX ADMIN — PIPERACILLIN AND TAZOBACTAM 4500 MG: 4; .5 INJECTION, POWDER, LYOPHILIZED, FOR SOLUTION INTRAVENOUS at 21:34

## 2022-09-22 RX ADMIN — SODIUM CHLORIDE, PRESERVATIVE FREE 10 ML: 5 INJECTION INTRAVENOUS at 09:08

## 2022-09-22 RX ADMIN — METHYLPREDNISOLONE SODIUM SUCCINATE 40 MG: 40 INJECTION, POWDER, FOR SOLUTION INTRAMUSCULAR; INTRAVENOUS at 11:17

## 2022-09-22 RX ADMIN — VANCOMYCIN HYDROCHLORIDE 1500 MG: 10 INJECTION, POWDER, LYOPHILIZED, FOR SOLUTION INTRAVENOUS at 16:00

## 2022-09-22 RX ADMIN — BENZOCAINE AND MENTHOL 1 LOZENGE: 15; 3.6 LOZENGE ORAL at 19:00

## 2022-09-22 RX ADMIN — GUAIFENESIN 200 MG: 200 SOLUTION ORAL at 21:29

## 2022-09-22 RX ADMIN — LORAZEPAM 1 MG: 2 INJECTION INTRAMUSCULAR; INTRAVENOUS at 13:30

## 2022-09-22 RX ADMIN — PYRIDOXINE HCL TAB 50 MG 50 MG: 50 TAB at 12:03

## 2022-09-22 RX ADMIN — MONTELUKAST 10 MG: 10 TABLET, FILM COATED ORAL at 09:07

## 2022-09-22 RX ADMIN — DEXAMETHASONE SODIUM PHOSPHATE 6 MG: 10 INJECTION, SOLUTION INTRAMUSCULAR; INTRAVENOUS at 09:05

## 2022-09-22 RX ADMIN — LEVOTHYROXINE SODIUM 137 MCG: 0.14 TABLET ORAL at 05:14

## 2022-09-22 RX ADMIN — ARFORMOTEROL TARTRATE 15 MCG: 15 SOLUTION RESPIRATORY (INHALATION) at 05:43

## 2022-09-22 RX ADMIN — IPRATROPIUM BROMIDE AND ALBUTEROL SULFATE 1 AMPULE: .5; 2.5 SOLUTION RESPIRATORY (INHALATION) at 05:44

## 2022-09-22 RX ADMIN — GUAIFENESIN 200 MG: 200 SOLUTION ORAL at 09:07

## 2022-09-22 RX ADMIN — BUDESONIDE 500 MCG: 0.5 SUSPENSION RESPIRATORY (INHALATION) at 20:35

## 2022-09-22 RX ADMIN — CARVEDILOL 3.12 MG: 3.12 TABLET, FILM COATED ORAL at 11:22

## 2022-09-22 ASSESSMENT — PAIN SCALES - GENERAL
PAINLEVEL_OUTOF10: 0

## 2022-09-22 NOTE — PLAN OF CARE
Problem: Chronic Conditions and Co-morbidities  Goal: Patient's chronic conditions and co-morbidity symptoms are monitored and maintained or improved  9/21/2022 2145 by Kellen Rashid RN  Outcome: Progressing  Flowsheets (Taken 9/21/2022 2000)  Care Plan - Patient's Chronic Conditions and Co-Morbidity Symptoms are Monitored and Maintained or Improved:   Monitor and assess patient's chronic conditions and comorbid symptoms for stability, deterioration, or improvement   Collaborate with multidisciplinary team to address chronic and comorbid conditions and prevent exacerbation or deterioration   Update acute care plan with appropriate goals if chronic or comorbid symptoms are exacerbated and prevent overall improvement and discharge  9/21/2022 1508 by Glenys Mabry RN  Outcome: Progressing     Problem: Discharge Planning  Goal: Discharge to home or other facility with appropriate resources  9/21/2022 2145 by Kellen Rashid RN  Outcome: Progressing  Flowsheets (Taken 9/21/2022 2000)  Discharge to home or other facility with appropriate resources:   Identify barriers to discharge with patient and caregiver   Arrange for needed discharge resources and transportation as appropriate   Identify discharge learning needs (meds, wound care, etc)   Refer to discharge planning if patient needs post-hospital services based on physician order or complex needs related to functional status, cognitive ability or social support system  9/21/2022 1508 by Glenys Mabry RN  Outcome: Progressing  Flowsheets (Taken 9/21/2022 0419 by Emanuel Lao RN)  Discharge to home or other facility with appropriate resources:   Identify barriers to discharge with patient and caregiver   Arrange for needed discharge resources and transportation as appropriate   Identify discharge learning needs (meds, wound care, etc)     Problem: Safety - Adult  Goal: Free from fall injury  9/21/2022 2145 by Kellen Rashid RN  Outcome: Progressing  9/21/2022 1508 by Juan F Grace, RN  Outcome: Progressing  Flowsheets (Taken 9/21/2022 1025)  Free From Fall Injury: Instruct family/caregiver on patient safety     Problem: Skin/Tissue Integrity  Goal: Absence of new skin breakdown  Description: 1. Monitor for areas of redness and/or skin breakdown  2. Assess vascular access sites hourly  3. Every 4-6 hours minimum:  Change oxygen saturation probe site  4. Every 4-6 hours:  If on nasal continuous positive airway pressure, respiratory therapy assess nares and determine need for appliance change or resting period. 9/21/2022 2145 by Lm Baca RN  Outcome: Progressing  9/21/2022 1508 by Juan F Edwards RN  Outcome: Progressing     Problem: Pain  Goal: Verbalizes/displays adequate comfort level or baseline comfort level  9/21/2022 2145 by Lm Baca RN  Outcome: Progressing  9/21/2022 1508 by Juan F Edwards RN  Outcome: Progressing     Problem: ABCDS Injury Assessment  Goal: Absence of physical injury  9/21/2022 2145 by Lm Baca RN  Outcome: Progressing  9/21/2022 1508 by Juan F Edwards RN  Outcome: Progressing  Flowsheets  Taken 9/21/2022 1025 by Juan F Edwards RN  Absence of Physical Injury: Implement safety measures based on patient assessment  Taken 9/21/2022 0424 by Edenilson Rust RN  Absence of Physical Injury: Implement safety measures based on patient assessment  Taken 9/21/2022 0323 by Edenilson Rust RN  Absence of Physical Injury: Implement safety measures based on patient assessment     Plan of care reviewed with pt.

## 2022-09-22 NOTE — PLAN OF CARE
Problem: Chronic Conditions and Co-morbidities  Goal: Patient's chronic conditions and co-morbidity symptoms are monitored and maintained or improved  9/22/2022 1615 by Liliana Whitt RN  Outcome: Progressing     Problem: Safety - Adult  Goal: Free from fall injury  9/22/2022 1615 by Liliana Whitt RN  Outcome: Progressing     Problem: Skin/Tissue Integrity  Goal: Absence of new skin breakdown  Description: 1. Monitor for areas of redness and/or skin breakdown  2. Assess vascular access sites hourly  3. Every 4-6 hours minimum:  Change oxygen saturation probe site  4. Every 4-6 hours:  If on nasal continuous positive airway pressure, respiratory therapy assess nares and determine need for appliance change or resting period.   9/22/2022 1615 by Liliana Whitt RN  Outcome: Progressing     Problem: Pain  Goal: Verbalizes/displays adequate comfort level or baseline comfort level  9/22/2022 1615 by Liliana Whitt RN  Outcome: Progressing     Problem: ABCDS Injury Assessment  Goal: Absence of physical injury  9/22/2022 1615 by Liliana Whitt RN  Outcome: Progressing

## 2022-09-22 NOTE — PROGRESS NOTES
[address] [facility]  Department of Internal Medicine   Internal Medicine Residency   MICU Progress Note    Patient:  Yumiko Berrios 81 y.o. female  MRN: [id number]     Date of Service: 9/22/2022    Allergy: Patient has no known allergies. Subjective     Overnight, patient had bouts of coughing spell but continue to maintain saturation at 95% on 2 L/min oxygen therapy. Lozenges were ordered which helped with the cough and patient was able to sleep. Vital signs remained stable. Ms. Pradip Sommer was seen and examined at the bedside in the morning. She still have some cough and has been giving some breathing treatments in the morning. Widespread rhonchi was noted on chest auscultation. Labs in the morning showed improvement in hyponatremia sodium level 121 other parameters within normal reference range. Chest x-ray today did show some right mediastinal infiltrates otherwise improvement in congestion compared to previous. Hematology oncology plans to start chemotherapy- -16, carboplatin within the next week. Plan is to switch dexamethasone to Solu-Medrol and continue breathing treatments holding off Brovana due to tachycardia.     ROS: Denies Fever/chills/CP    Objective     VS: BP (!) 162/89   Pulse (!) 107   Temp 98.8 °F (37.1 °C) (Bladder)   Resp 24   Ht 5' 2\" (1.575 m)   Wt 158 lb 11.7 oz (72 kg)   SpO2 97%   BMI 29.03 kg/m²     I & O - 24hr:   Intake/Output Summary (Last 24 hours) at 9/22/2022 1449  Last data filed at 9/22/2022 1400  Gross per 24 hour   Intake 1012.52 ml   Output 650 ml   Net 362.52 ml     Physical Exam:  General Appearance: alert, cooperative, and moderate distress  Neck: supple, symmetrical, trachea midline and thyroid not enlarged, symmetric, no tenderness/mass/nodules  Lung: Widespread rhonchi noted bilaterally  Heart: regular rate and rhythm, S1, S2 normal, no murmur, click, rub or gallop  Abdomen: soft, non-tender; bowel sounds normal; no masses,  no organomegaly  Extremities:  extremities normal, atraumatic, no cyanosis or edema  Musculoskeletal: No joint swelling, no muscle tenderness. ROM normal in all joints of extremities.    Neurologic: Mental status: Alert, oriented, thought content appropriate    Lines     site day    Art line   None    TLC None    PICC None    Hemoaccess None    Oxygen:    nasal canula at 3L/min    ABG:     Lab Results   Component Value Date/Time    PH 7.448 09/21/2022 10:19 AM    PCO2 35.3 09/21/2022 10:19 AM    PO2 89.1 09/21/2022 10:19 AM    HCO3 23.9 09/21/2022 10:19 AM    BE 0.3 09/21/2022 10:19 AM    THB 13.8 09/21/2022 10:19 AM    O2SAT 97.2 09/21/2022 10:19 AM        Medications     Infusions: (Fluid, Sedation, Vasopressors)  IVF:   None   Vasopressors  None   Sedation  None     Nutrition:   Adult regular feeding     ATB:   Antibiotics  Days   None                   Labs   CBC with Differential:    Lab Results   Component Value Date/Time    WBC 12.9 09/22/2022 08:57 AM    RBC 3.99 09/22/2022 08:57 AM    HGB 11.5 09/22/2022 08:57 AM    HCT 32.5 09/22/2022 08:57 AM     09/22/2022 08:57 AM    MCV 81.5 09/22/2022 08:57 AM    MCH 28.8 09/22/2022 08:57 AM    MCHC 35.4 09/22/2022 08:57 AM    RDW 13.7 09/22/2022 08:57 AM    LYMPHOPCT 7.0 09/22/2022 08:57 AM    MONOPCT 9.3 09/22/2022 08:57 AM    MYELOPCT 0.9 09/21/2022 05:00 AM    BASOPCT 0.2 09/22/2022 08:57 AM    MONOSABS 1.20 09/22/2022 08:57 AM    LYMPHSABS 0.90 09/22/2022 08:57 AM    EOSABS 0.03 09/22/2022 08:57 AM    BASOSABS 0.02 09/22/2022 08:57 AM     CMP:    Lab Results   Component Value Date/Time     09/22/2022 12:25 PM    K 4.5 09/22/2022 12:25 PM    CL 82 09/22/2022 12:25 PM    CO2 26 09/22/2022 12:25 PM    BUN 13 09/22/2022 12:25 PM    CREATININE 0.6 09/22/2022 12:25 PM    GFRAA >60 09/22/2022 12:25 PM    LABGLOM >60 09/22/2022 12:25 PM    GLUCOSE 94 09/22/2022 12:25 PM    PROT 7.1 09/20/2022 06:43 PM    LABALBU 4.3 09/20/2022 06:43 PM    CALCIUM 9.2 09/22/2022 12:25 PM    BILITOT 0.2 09/20/2022 06:43 PM    ALKPHOS 137 09/20/2022 06:43 PM    AST 15 09/20/2022 06:43 PM    ALT 8 09/20/2022 06:43 PM       Resident's Assessment and Plan     Assessment:     Pulmonology   Newly diagnosed lung malignancy with mets to the liver  Sept 02, 2022 CT chest w/o contrast performed to rule out malignancy  Lungs/pleural findings : Focal nodular consolidation in the peripheral right medial RUL measure 12 x 11 mm.  Superior mediastinal adenopathy, findings consistent with malignancy   Plan:  Heme-Onc following, inputs appreciated   plans to start chemotherapy- -16, carboplatin within the next week  Palliative following     Hx of COPD  Uses 2L of oxygen at home   On breathing treatment, proventil, flonase and singulair at home   Currently on NC, PFT ordered but never performed  Plan:  holding off Brovana due to tachycardia  Continue other breathing treatments  Continue oxygen therapy     Nephrology   Hyponatremia likely 2/2 SIADH vs hypothyroidism vs SSRI   Extensive smoking history   Recently diagnosed with small cell lung cancer with mets to the liver  Low sodium chronically   Not on sodium tablets   Feels like she is altered, no loss of consciousness, no falls, no syncope or presyncope   Continues to smoke 1 pack per day for a long time   Serum osmolality 254 low   Urine osmolality   Plan:   Nephrology following, inputs appreciated  Continue fluid restriction due to possible SIADH   Continue to monitor sodium level Q4H     Hypokalemia likely 2/2 poor oral intake   K 3.3, replaced  Monitor BMP   On potassium chloride PO daily      Hypochloremic metabolic acidosis   HCO3 21 trending up, chloride 80   Continue to monitor BMP      Cardiology   Hypotension  Found to be have low BP during admission 60/33 mmHg,    On Levophed to maintain pressure support   No source of infection      Elevate Pro-BNP likely 2/2 pulmonary disease vs heart failure   Has history of diastolic heart failure Recently diagnosed with lung cancer with mets to the liver      Hx of diastolic heart failure   Most recent echo in March 2021 showed stage I diastolic dysfunction   Mild left concentric left ventricular hypertrophy   EF 50-60% in March 2021   On amlodine and toprol 25mg   Continue current treatment      Chronic lower extremity edema likely 2/2 heart failure  No edema present during physical exam today   Furosemide if fluid overload and monitor      Hx of HTN on amlodipine and toprol      Hx of hypothyroidism   Most recent TSH 3.970 (normal), T4 2.15 high   Takes Levothyroxine 137 mcg tablet   Continue to monitor   Recent CT chest shows thyromegaly with nodular isodense calcified lesion in the thyroid isthmus      GI   Liver mets likely 2/2 lungs s/p core biopsy of liver mass in early Sept   2cm low-density mass in the right hepatic lobe suspicious for metastatic disease   Hematology - oncology on board  Extensive history of alcohol abuse  Decided to quit 6 months ago      Chronic alcohol abuse, quit drinking 6 months ago   Hx of depression   on Zoloft      PT/OT: Not indicated at the moment   DVT ppx: Lovenox   GI ppx: oral feeding     Code Status:   Full code     Lan Agee MD, MPH PGY-1  Attending physician: Soraida Lindsey   Department of Pulmonary, Critical Care and Sleep Medicine  [facility]  Department of Internal Medicine      During multidisciplinary team rounds Emmy Mancini is a 81 y.o. female was seen, examined and discussed. This is confirmation that I have personally seen and examined the patient and that the key elements of the encounter were performed by me (> 85 % time). The medications & laboratory data was discussed and adjusted where necessary. The radiographic images were reviewed or with radiologist or consultant if felt dis-concordant with the exam or history.  The above findings were corroborated, plans confirmed and changes made if needed. Family is updated at the bedside as available. Key issues of the case were discussed among consultants. Critical Care time is documented if appropriate.       Eric Shrestha DO, FACP, FCCP, [facility],

## 2022-09-22 NOTE — PROGRESS NOTES
[facility] MD Flor Daniels, MD Lolis Chatterjee, MD Umberto Gitelman, CNP   Snow Fernandez, ANP  Zuleika Valero, FIDEL  Progress Note    9/22/2022    SUBJECTIVE:   9/22: \"I feel like crap. \"  Generalized weakness, fatigue, myalgias, but denies dyspnea at rest on room air. Ongoing wheezing, coarse audible breath sounds. Denies chest pain. No peripheral swelling. No nausea or vomiting. Ongoing anorexia, no change from her baseline. ROS otherwise unremarkable appetite ok    PROBLEM LIST: G's  Principal Problem:    Hyponatremia  Active Problems:    Primary hypertension    Hypothyroidism    GERD (gastroesophageal reflux disease)    Depression    Palliative care by specialist    Goals of care, counseling/discussion  Resolved Problems:    * No resolved hospital problems. *         DIET:    ADULT DIET;  Regular; 4 carb choices (60 gm/meal); 1000 ml  Diet NPO Exceptions are: Sips of Water with Meds     MEDS (scheduled):    vitamin B-6  50 mg Oral Daily    methylPREDNISolone  40 mg IntraVENous Q12H    [START ON 9/23/2022] metoprolol succinate  25 mg Oral Daily    piperacillin-tazobactam  4,500 mg IntraVENous Q8H    sodium chloride flush  5-40 mL IntraVENous 2 times per day    enoxaparin  40 mg SubCUTAneous Daily    levothyroxine  137 mcg Oral Daily    montelukast  10 mg Oral QAM    budesonide  0.5 mg Nebulization BID    ipratropium-albuterol  1 ampule Inhalation Q4H WA       MEDS (infusions):   sodium chloride      dextrose         MEDS (prn):  guaiFENesin, perflutren lipid microspheres, sodium chloride flush, sodium chloride, ondansetron **OR** ondansetron, polyethylene glycol, acetaminophen **OR** acetaminophen, glucose, dextrose bolus **OR** dextrose bolus, glucagon (rDNA), dextrose, hydrOXYzine pamoate, benzocaine-menthol    PHYSICAL EXAM:     Patient Vitals for the past 24 hrs:   BP Temp Temp src Pulse Resp SpO2 Weight   09/22/22 1800 (!) 140/78 -- -- 100 25 99 % --   09/22/22 1700 (!) 138/93 -- -- 96 25 100 % --   09/22/22 1600 (!) 148/59 98 °F (36.7 °C) Temporal (!) 107 20 99 % --   09/22/22 1500 137/74 -- -- (!) 112 26 92 % --   09/22/22 1400 -- -- -- (!) 107 24 97 % --   09/22/22 1300 (!) 162/89 -- -- 95 25 98 % --   09/22/22 1200 -- 98.8 °F (37.1 °C) Temporal 99 26 98 % --   09/22/22 1122 119/74 -- -- (!) 117 -- -- --   09/22/22 1100 119/74 -- -- (!) 107 19 99 % --   09/22/22 1000 (!) 146/70 -- -- (!) 108 (!) 31 97 % --   09/22/22 0900 (!) 157/75 -- -- (!) 109 23 98 % --   09/22/22 0800 138/78 98.8 °F (37.1 °C) Temporal 97 18 99 % --   09/22/22 0700 132/74 -- -- (!) 101 19 100 % --   09/22/22 0600 -- -- -- (!) 114 26 92 % --   09/22/22 0544 -- -- -- (!) 119 23 100 % --   09/22/22 0543 -- -- -- (!) 112 23 100 % --   09/22/22 0542 -- -- -- (!) 112 18 100 % --   09/22/22 0518 -- -- -- -- -- -- 158 lb 11.7 oz (72 kg)   09/22/22 0500 (!) 142/93 -- -- (!) 111 (!) 54 96 % --   09/22/22 0400 (!) 140/88 98.7 °F (37.1 °C) Bladder (!) 102 23 100 % --   09/22/22 0300 (!) 142/100 -- -- (!) 111 20 100 % --   09/22/22 0200 117/61 -- -- (!) 101 19 92 % --   09/22/22 0100 134/69 -- -- (!) 109 18 100 % --   09/22/22 0000 (!) 96/54 98.9 °F (37.2 °C) Oral (!) 103 18 100 % --   09/21/22 2300 131/74 -- -- (!) 110 17 99 % --   09/21/22 2200 (!) 87/55 -- -- (!) 120 22 98 % --   09/21/22 2105 (!) 104/50 -- -- (!) 109 (!) 38 100 % --   09/21/22 2100 -- -- -- (!) 109 (!) 38 100 % --   09/21/22 2053 -- -- -- (!) 108 (!) 40 99 % --   09/21/22 2052 -- -- -- (!) 109 (!) 41 100 % --   09/21/22 2051 -- -- -- (!) 109 (!) 31 99 % --   09/21/22 2000 (!) 123/53 98.2 °F (36.8 °C) Oral (!) 117 19 95 % --   09/21/22 1900 (!) 134/113 -- -- (!) 122 25 93 % --   @      Intake/Output Summary (Last 24 hours) at 9/22/2022 1857  Last data filed at 9/22/2022 1849  Gross per 24 hour   Intake 1098.23 ml   Output 650 ml   Net 448.23 ml         Wt Readings from Last 3 Encounters:   09/22/22 158 lb 11.7 oz (72 kg)   09/01/22 161 lb (73 kg)   04/25/22 160 lb (72.6 kg)       Constitutional:  in no acute distress  HEENT: NC/AT, EOMI, sclera and conjunctiva are clear and anicteric, mucus membranes moist  Neck: Trachea midline, no JVD  Cardiovascular: S1, S2 regular rhythm, no murmur,or rub  Respiratory:  No crackles, no wheeze  Gastrointestinal:  Soft, nontender, nondistended, NABS  Ext: no edema, feet warm  Skin: dry, no rash  Neuro: awake, alert, interactive      DATA:    Recent Labs     09/20/22  1843 09/21/22  0500 09/22/22  0857   WBC 8.3 14.8* 12.9*   HGB 13.8 14.6 11.5   HCT 38.5 40.3 32.5*   MCV 79.9* 81.1 81.5    353 293     Recent Labs     09/20/22  1843 09/21/22  0032 09/21/22  0301 09/22/22  0533 09/22/22  0857 09/22/22  1225 09/22/22  1623   * 108*   < > 121* 121* 119* 120*   K 4.0 3.3*   < > 3.5 3.3* 4.5 4.6   CL 77* 75*   < > 84* 83* 82* 83*   CO2 20* 18*   < > 25 26 26 27   MG 1.6 2.0  --  1.8  1.9 1.8  --   --    PHOS  --   --   --  2.9 2.9  --   --    BUN 11 14   < > 15 12 13 12   CREATININE 0.6 0.8   < > 0.7 0.6 0.6 0.6   ALT 8  --   --   --   --   --   --    AST 15  --   --   --   --   --   --    BILITOT 0.2  --   --   --   --   --   --    ALKPHOS 137*  --   --   --   --   --   --     < > = values in this interval not displayed. No results found for: LABPROT      Assessment  1. 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     Rate of correction of [Na+] initially too brisk, has since been slowed down after D5 W drip    Recommendations  Continue current fluid restriction  Would not add salt tablets at this point  Continue to follow BMP every 4 hours, adjust treatment as warranted  If after next BMP, remains on appropriate rate improvement, will decrease to every 6 hours  goal for correction no more than 6-8 meq in 1th day and no more than 12-16 meq in 48 hours   Continue supportive care    Electronically signed by Britt Perez MD on 9/22/2022

## 2022-09-22 NOTE — PROGRESS NOTES
Palliative Care LSW met with pt to review current health needs. She tells me she has lung and liver cancer which has spread. She also understands she needs further testing before a treatment plan can be offered. She recalls her sister having had cancer but being to ill to have treatment and ultimately passed away at home. LSW will continue to follow to assist pt.

## 2022-09-22 NOTE — PROGRESS NOTES
[facility]                                Progress note    Subjective:    She feels well, no real confusion and spite of hyponatremia  Complaining of ongoing cough  No other acute complaints currently  Bone scan completed today  Objective:    BP (!) 162/89   Pulse (!) 107   Temp 98.8 °F (37.1 °C) (Bladder)   Resp 24   Ht 5' 2\" (1.575 m)   Wt 158 lb 11.7 oz (72 kg)   SpO2 97%   BMI 29.03 kg/m²     In: 1326.3 [P.O.:450; I.V.:574.7]  Out: 897   In: 1326.3   Out: 897 [Urine:897]    General appearance: NAD, conversant  HEENT: AT/NC, MMM  Neck: FROM, supple  Lungs: Coarse breath sounds  CV: RRR, no MRGs  Vasc: Radial pulses 2+  Abdomen: Soft, non-tender; no masses or HSM  Extremities: No peripheral edema or digital cyanosis  Skin: no rash, lesions or ulcers  Psych: Alert and oriented to person, place and time  Neuro: Alert and interactive     Recent Labs     09/20/22  1843 09/21/22  0500 09/22/22  0857   WBC 8.3 14.8* 12.9*   HGB 13.8 14.6 11.5   HCT 38.5 40.3 32.5*    353 293       Recent Labs     09/22/22  0038 09/22/22  0533 09/22/22  0857 09/22/22  1225   * 121* 121* 119*   K 3.9 3.5 3.3* 4.5   CL 81* 84* 83* 82*   CO2 25 25 26  --    BUN 19 15 12  --    CREATININE 0.8 0.7 0.6 0.6   CALCIUM 8.9 8.7 8.8  --        Assessment:    Principal Problem:    Hyponatremia  Active Problems:    Primary hypertension    Hypothyroidism    GERD (gastroesophageal reflux disease)    Depression    Palliative care by specialist    Goals of care, counseling/discussion  Resolved Problems:    * No resolved hospital problems.  *      Patient is a 44-year-old female admitted to ICU for  Severe hyponatremia, etiology consistent with metastatic small cell lung cancer   -Monitor labs  -Na+ 110 > 117 > 119 today, being checked every 6 hours  -Nephrology following  -Fluid restriction due to possible SIADH  -Urine studies  -Nephro recs > start D5w at 120 cc/hr to slow rate of correction, ordered and discontinued by ICU team.      Hypokalemia  -K+ 3.3, replaced, 4.5 today   -Monitor BMP     Hypotension  -initially needed IV levo, since has been discontinued     Leukocytosis, likely steroid-induced  -No clinical evidence of infection at this time  -Does not appear toxic, ongoing cough is present however, continue to monitor for developing pneumonia     Hx Diastolic heart failure  -Elevated proBNP 1,284  -Last echo in April 2021 > EF of 55 to 77%, stage I diastolic dysfunction, mild to moderate MR, trace TR  -Monitor I's and O's  -Daily weights     Recently diagnosed with lung cancer with mets to the liver-pathology completed as below  -Pathology noting 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  -Pulmonology following   -Palliative care consulted  -Heme-onc consulted > awaiting input   -Vascular surgery consulted for mediport placement   -No metastatic disease noted on bone scan 9/22/2022     DVT Prophylaxis Lovenox   PT/OT  Discharge planning     Connor Zhu MD

## 2022-09-22 NOTE — PLAN OF CARE
Problem: Chronic Conditions and Co-morbidities  Goal: Patient's chronic conditions and co-morbidity symptoms are monitored and maintained or improved  9/22/2022 1009 by Valencia Jacinto RN  Outcome: Progressing     Problem: Safety - Adult  Goal: Free from fall injury  9/22/2022 1009 by Valencia Jacinto RN  Outcome: Progressing  Flowsheets (Taken 9/22/2022 1000)  Free From Fall Injury: Instruct family/caregiver on patient safety     Problem: Skin/Tissue Integrity  Goal: Absence of new skin breakdown  Description: 1. Monitor for areas of redness and/or skin breakdown  2. Assess vascular access sites hourly  3. Every 4-6 hours minimum:  Change oxygen saturation probe site  4. Every 4-6 hours:  If on nasal continuous positive airway pressure, respiratory therapy assess nares and determine need for appliance change or resting period.   9/22/2022 1009 by Valencia Jacinto RN  Outcome: Progressing     Problem: Pain  Goal: Verbalizes/displays adequate comfort level or baseline comfort level  9/22/2022 1009 by Valencia Jacinto RN  Outcome: Progressing     Problem: ABCDS Injury Assessment  Goal: Absence of physical injury  9/22/2022 1009 by Valencia Jacinto RN  Outcome: Progressing  Flowsheets (Taken 9/22/2022 1000)  Absence of Physical Injury: Implement safety measures based on patient assessment

## 2022-09-22 NOTE — PROGRESS NOTES
[address] [facility], [place]  Department of Internal Medicine   Internal Medicine Residency   MICU Progress Note    Patient:  Shyla Ann 81 y.o. female  MRN: [id number]     Date of Service: 9/22/2022    Allergy: Patient has no known allergies. HPI:    This is a 68-year-old patient with a past medical history of chronic bronchitis (2L at home and breathing treatment), hypertension, hyperlipidemia, hypothyroidism, diastolic heart failure, depression, tobacco abuse (quit 1 week ago), ethanol abuse (quit 4-6 months ago), and newly diagnosed small cell carcinoma of the lung who is presenting with a chief complaint of dizziness. She was recently discharged from the hospital on 9/7/2022 and has increasingly felt dizzy, but reports no loss of consciousness. She visited her PCP for lab work and was found to have low sodium. In the ED on 9/20, the patient was hemodynamically unstable with a BP of 60/33 mmHg, pulse of 114, respiratory rate of 34 and SpO2 of 94%. Initial workup showed a sodium of 110 and a pH of 7.175, pCO2 of 46.9, PO2 of 223.8, and bicarb of 16.9. Other electrolytes were normal at this time. Repeat CTA of the chest showed a right mediastinal mass with lymphadenopathy, which had worsened compared to CTA on 9/3. A central line was placed due to hypotension and the patient was given cefepime 2g, duo nebs, Solu-Medrol 125mg, 10mcg increased to 15mcg of Levophed, and a sodium bicarb 8.4% injection as well as 1L of normal saline. The patient was subsequently weaned off of pressors due to blood pressure stabilization. Subjective     The patient was examined at the bedside this morning and was in moderate distress due to shortness of breath. The patient had a coughing spell last evening and has since reported shortness of breath and difficulty breathing compared to baseline, which did affect her sleep.   The patient did receive a breathing treatment this morning but stated it did not improve her symptoms. Lungs this morning sounded very rhonchorus compared to yesterday. The patient has no other complaints. Her WBC was decreased again to 12.9 (compared to 14.8 yesterday), but her procalcintonin was 3.94 today. Sodium is stable at 121.    24h change: Sodium decreased to 118 and administration of D5W was discontinued per nephrology resulting in an increase in sodium to 121. Patient also had a coughing spell around 10:45pm and received breathing treatments this morning. ROS: Denies Fever/chills/CP/N/V/D/C/Dysuria/Blood in stool or urine. Patient did admit to shortness of breath. Objective     VS: /74   Pulse 99   Temp 98.8 °F (37.1 °C) (Bladder)   Resp 26   Ht 5' 2\" (1.575 m)   Wt 158 lb 11.7 oz (72 kg)   SpO2 98%   BMI 29.03 kg/m²         I & O - 24hr:   Intake/Output Summary (Last 24 hours) at 9/22/2022 1254  Last data filed at 9/22/2022 1000  Gross per 24 hour   Intake 1012.52 ml   Output 300 ml   Net 712.52 ml       Physical Exam:  General Appearance: alert, appears stated age, and cooperative  Neck: no adenopathy, no carotid bruit, no JVD, supple, symmetrical, trachea midline, thyroid not enlarged, symmetric, no tenderness/mass/nodules, and palpable and moveable right supraclavicular lymph node palpated. Lung: diffuse wheezing and severe ronchi present bilaterally  Heart: regular rate and rhythm, S1, S2 normal, no murmur, click, rub or gallop  Abdomen: soft, non-tender; bowel sounds normal; no masses,  no organomegaly  Extremities:  extremities normal, atraumatic, no cyanosis or edema. Musculoskeletal: No joint swelling, no muscle tenderness. ROM normal in all joints of extremities.    Neurologic: Mental status: AAOx3, no focal deficit present    Lines     site day    Art line   None    TLC None    PICC None    Hemoaccess R Fem 1     Oxygen:    nasal canula at 4 L/min    ABG:     Lab Results   Component Value Date/Time    PH 7.448 09/21/2022 10:19 AM    PCO2 35.3 09/21/2022 10:19 AM    PO2 89.1 09/21/2022 10:19 AM    HCO3 23.9 09/21/2022 10:19 AM    BE 0.3 09/21/2022 10:19 AM    THB 13.8 09/21/2022 10:19 AM    O2SAT 97.2 09/21/2022 10:19 AM        Medications     Infusions: (Fluid, Sedation, Vasopressors)    Vasopressors  No pressors    Nutrition:  Dry mary carmen diet  ATB:   Antibiotics  Days                 Skin issues: no current skin issues  Patient currently has   DVT prophylaxis - Lovenox  Palliative care consult     Labs   CBC:   Lab Results   Component Value Date/Time    WBC 12.9 09/22/2022 08:57 AM    RBC 3.99 09/22/2022 08:57 AM    HGB 11.5 09/22/2022 08:57 AM    HCT 32.5 09/22/2022 08:57 AM    MCV 81.5 09/22/2022 08:57 AM    MCH 28.8 09/22/2022 08:57 AM    MCHC 35.4 09/22/2022 08:57 AM    RDW 13.7 09/22/2022 08:57 AM     09/22/2022 08:57 AM    MPV 9.3 09/22/2022 08:57 AM     BMP:    Lab Results   Component Value Date/Time     09/22/2022 08:57 AM    K 3.3 09/22/2022 08:57 AM    CL 83 09/22/2022 08:57 AM    CO2 26 09/22/2022 08:57 AM    BUN 12 09/22/2022 08:57 AM    LABALBU 4.3 09/20/2022 06:43 PM    CREATININE 0.6 09/22/2022 08:57 AM    CALCIUM 8.8 09/22/2022 08:57 AM    GFRAA >60 09/22/2022 08:57 AM    LABGLOM >60 09/22/2022 08:57 AM    GLUCOSE 85 09/22/2022 08:57 AM     Hepatic Function Panel:    Lab Results   Component Value Date/Time    ALKPHOS 137 09/20/2022 06:43 PM    ALT 8 09/20/2022 06:43 PM    AST 15 09/20/2022 06:43 PM    PROT 7.1 09/20/2022 06:43 PM    BILITOT 0.2 09/20/2022 06:43 PM    LABALBU 4.3 09/20/2022 06:43 PM     U/A:    Lab Results   Component Value Date/Time    COLORU Yellow 09/21/2022 06:42 AM    COLORU Yellow 09/21/2022 06:42 AM    PROTEINU TRACE 09/21/2022 06:42 AM    PROTEINU 30 09/21/2022 06:42 AM    PHUR 6.0 09/21/2022 06:42 AM    PHUR 6.0 09/21/2022 06:42 AM    WBCUA NONE 09/21/2022 06:42 AM    WBCUA NONE 09/21/2022 06:42 AM    RBCUA 2-5 09/21/2022 06:42 AM    RBCUA 2-5 09/21/2022 06:42 AM    BACTERIA NONE SEEN 09/21/2022 06:42 AM BACTERIA NONE SEEN 09/21/2022 06:42 AM    CLARITYU Clear 09/21/2022 06:42 AM    CLARITYU Clear 09/21/2022 06:42 AM    SPECGRAV 1.010 09/21/2022 06:42 AM    SPECGRAV 1.010 09/21/2022 06:42 AM    LEUKOCYTESUR Negative 09/21/2022 06:42 AM    LEUKOCYTESUR Negative 09/21/2022 06:42 AM    UROBILINOGEN 0.2 09/21/2022 06:42 AM    UROBILINOGEN 0.2 09/21/2022 06:42 AM    BILIRUBINUR Negative 09/21/2022 06:42 AM    BILIRUBINUR Negative 09/21/2022 06:42 AM    BLOODU SMALL 09/21/2022 06:42 AM    BLOODU SMALL 09/21/2022 06:42 AM    GLUCOSEU Negative 09/21/2022 06:42 AM    GLUCOSEU Negative 09/21/2022 06:42 AM     ABG:    Lab Results   Component Value Date/Time    PH 7.448 09/21/2022 10:19 AM    PCO2 35.3 09/21/2022 10:19 AM    PO2 89.1 09/21/2022 10:19 AM    HCO3 23.9 09/21/2022 10:19 AM    BE 0.3 09/21/2022 10:19 AM    O2SAT 97.2 09/21/2022 10:19 AM     FLP:    Lab Results   Component Value Date/Time    TRIG 96 09/21/2022 08:15 AM    HDL 69 09/21/2022 08:15 AM    LDLCALC 92 09/21/2022 08:15 AM    LABVLDL 19 09/21/2022 08:15 AM     TSH:    Lab Results   Component Value Date/Time    TSH 3.970 09/21/2022 12:32 AM     VITAMIN B12: No components found for: B12  FOLATE:    Lab Results   Component Value Date/Time    FOLATE 10.4 09/21/2022 12:32 AM       Imaging Studies:     CT HEAD WO CONTRAST     Result Date: 9/1/2022  EXAMINATION: CT OF THE HEAD WITHOUT CONTRAST  9/1/2022 2:28 pm TECHNIQUE: CT of the head was performed without the administration of intravenous contrast. Automated exposure control, iterative reconstruction, and/or weight based adjustment of the mA/kV was utilized to reduce the radiation dose to as low as reasonably achievable. COMPARISON: None. HISTORY: ORDERING SYSTEM PROVIDED HISTORY: dizzy TECHNOLOGIST PROVIDED HISTORY: Has a \"code stroke\" or \"stroke alert\" been called? ->No Reason for exam:->dizzy Decision Support Exception - unselect if not a suspected or confirmed emergency medical condition->Emergency Medical Condition (MA) What reading provider will be dictating this exam?->CRC FINDINGS: BRAIN/VENTRICLES: There is no acute intracranial hemorrhage, mass effect or midline shift. No abnormal extra-axial fluid collection. The gray-white differentiation is maintained without evidence of an acute infarct. There is no evidence of hydrocephalus. ORBITS: The visualized portion of the orbits demonstrate no acute abnormality. SINUSES: The visualized paranasal sinuses demonstrate no acute abnormality. There is partial opacification of bilateral mastoid air cells. SOFT TISSUES/SKULL:  No acute abnormality of the visualized skull or soft tissues. No acute intracranial abnormality. Periventricular leukomalacia. Suspect bilateral mastoiditis. CT CHEST WO CONTRAST     Result Date: 9/3/2022  EXAMINATION: CT OF THE CHEST WITHOUT CONTRAST 9/3/2022 9:27 am TECHNIQUE: CT of the chest was performed without the administration of intravenous contrast. Multiplanar reformatted images are provided for review. Automated exposure control, iterative reconstruction, and/or weight based adjustment of the mA/kV was utilized to reduce the radiation dose to as low as reasonably achievable. COMPARISON: None. HISTORY: ORDERING SYSTEM PROVIDED HISTORY: Rule out malignancy/mass TECHNOLOGIST PROVIDED HISTORY: Reason for exam:->Rule out malignancy/mass What reading provider will be dictating this exam?->CRC FINDINGS: Mediastinum: There is superior mediastinal adenopathy and a prominent right paratracheal lymph node mass. Right paratracheal lymph node mass measures 2.8 x 1.8 cm. Upper anterior mediastinal lymph nodes measure approximately 7-8 mm in short axis. Thyromegaly is noted with coarse calcification in the enlarged thyroid isthmus. Pulmonary artery is prominent in size. Aorta is nonaneurysmal.  There is ASVD of the coronary vessels. There is a small pericardial effusion. Lungs/pleura:  There is a focal nodular consolidation in the peripheral aspect of the medial right upper lobe measuring 12 x 11 mm. There is slight volume loss extending to the right suprahilar region. There is a small right pleural effusion. No endobronchial masses. No other lung lesions identified. Upper Abdomen: Images of the upper abdomen demonstrate a 2 cm low-density mass in the right hepatic lobe suspicious for metastatic disease. The adrenal glands appear normal.  There is a small left kidney. Soft Tissues/Bones: There appears to be a chronic fracture of the manubrium best seen on sagittal reconstruction views. Degenerative changes are noted in the thoracic and lumbar spine. No lytic or blastic lesions detected. 1.  Right paratracheal lymph node mass measuring 2.8 x 1.8 cm. There is a medial right upper lobe nodular parenchymal lung opacity measuring 12 x 11 mm. There is a small right pleural effusion. Mild upper mediastinal adenopathy. Findings are compatible with malignancy. 2.  Probable metastatic lesion in the right hepatic lobe measuring 2 cm. 3.  Small pericardial effusion. 4.  Small left kidney. 5.  Thyromegaly with nodular isodense calcified lesion in the thyroid isthmus. Lesion measures 14 mm AP.       CT GUIDED NEEDLE PLACEMENT     Result Date: 9/6/2022  PROCEDURE: CT NEEDLE BIOPSY LIVER PERCUTANEOUS; CT GUIDED NDL PLACEMENT MODERATE CONSCIOUS SEDATION 9/6/2022 HISTORY: ORDERING SYSTEM PROVIDED HISTORY: lung and liver nodule TECHNOLOGIST PROVIDED HISTORY: Biopsy of either right lung nodule or liver nodule, whichever is easiest to get to Reason for exam:->lung and liver nodule What reading provider will be dictating this exam?->CRC; ORDERING SYSTEM PROVIDED HISTORY: liver lesion TECHNOLOGIST PROVIDED HISTORY: Biopsy of either right lung nodule or liver nodule, whichever is easiest to get to Reason for exam:->liver lesion What reading provider will be dictating this exam?->CRC TECHNIQUE: Automated exposure control, iterative reconstruction, and/or weight based adjustment of the mA/kV was utilized to reduce the radiation dose to as low as reasonably achievable. CONTRAST: None SEDATION: 1 mgversed and 50 mcg fentanyl were titrated intravenously for moderate sedation monitored under my direction. Total intraservice time of sedation was 20 minutes. The patient's vital signs were monitored throughout the procedure and recorded in the patient's medical record by the nurse. Mallapati score 2 ASA 2 FLUOROSCOPY DOSE AND TYPE OR TIME AND EXPOSURES: CT DESCRIPTION OF PROCEDURE: Informed consent was obtained after a detailed explanation of the procedure including risks, benefits, and alternatives. Universal protocol was observed. Sterile gowns, masks, hats and gloves utilized for maximal sterile barrier. After informed consent patient is placed supine on the table. Liver mass right lobe was localized with CT scanning. Patient prepped draped sterile fashion. 1% lidocaine was infiltrated for local anesthesia. 20 gauge coaxial needle was advanced into the lesion. Multiple 20 gauge cores were obtained and submitted to pathology. Post biopsy imaging shows air within the lesion suggesting good position of the biopsy needle. No immediate postoperative complication was seen. Dressing was applied. Patient was returned to holding stable condition. FINDINGS: Needle tip located in the right lobe liver mass. Successful CT-guided core biopsy right liver mass.         XR CHEST PORTABLE    Result date:  9/22/2022  EXAMINATION: ONE XRAY VIEW OF THE CHEST 9/22/2022   COMPARISON:   CT chest 20 September 2022       HISTORY:   ORDERING SYSTEM PROVIDED HISTORY: SOB, crackles on physical exam; assess for   volume overload   TECHNOLOGIST PROVIDED HISTORY:   Reason for exam:->SOB, crackles on physical exam; assess for volume overload   What reading provider will be dictating this exam?->CRC       FINDINGS:   Single AP upright portable chest demonstrates S shaped scoliotic curvature. The lungs are mildly hyperexpanded with increased markings at the lung bases   with mild blunting of the left costophrenic angle. Persistent fullness in   the right mediastinal region. There is no evidence of a pneumothorax. The   upper abdomen appears unremarkable. IMPRESSION:   Findings consistent with infiltrative process involving the right mediastinum   as noted on the previous CT chest.  No evidence of a pneumothorax. XR CHEST PORTABLE     Result Date: 9/20/2022  EXAMINATION: ONE XRAY VIEW OF THE CHEST 9/20/2022 9:06 pm COMPARISON: 09/01/2022 HISTORY: ORDERING SYSTEM PROVIDED HISTORY: Shortness of breath TECHNOLOGIST PROVIDED HISTORY: Reason for exam:->Shortness of breath What reading provider will be dictating this exam?->CRC FINDINGS: The lungs are without acute focal process. There is no effusion or pneumothorax. The cardiomediastinal silhouette is without acute process. The osseous structures are without acute process. No acute process. XR CHEST PORTABLE     Result Date: 9/1/2022  EXAMINATION: ONE XRAY VIEW OF THE CHEST 9/1/2022 1:04 pm COMPARISON: None. HISTORY: ORDERING SYSTEM PROVIDED HISTORY: Shortness of breath TECHNOLOGIST PROVIDED HISTORY: Reason for exam:->Shortness of breath What reading provider will be dictating this exam?->CRC FINDINGS: Lungs are clear. The heart is mildly enlarged. There is no pneumothorax. This blunting of the right costophrenic angle. Mild scoliosis of the thoracic spine. Mild cardiomegaly. Mild right pleural effusion.       CT NEEDLE BIOPSY LIVER PERCUTANEOUS     Result Date: 9/6/2022  PROCEDURE: CT NEEDLE BIOPSY LIVER PERCUTANEOUS; CT GUIDED NDL PLACEMENT MODERATE CONSCIOUS SEDATION 9/6/2022 HISTORY: ORDERING SYSTEM PROVIDED HISTORY: lung and liver nodule TECHNOLOGIST PROVIDED HISTORY: Biopsy of either right lung nodule or liver nodule, whichever is easiest to get to Reason for exam:->lung and liver nodule What reading provider will be dictating this exam?->CRC; ORDERING SYSTEM PROVIDED HISTORY: liver lesion TECHNOLOGIST PROVIDED HISTORY: Biopsy of either right lung nodule or liver nodule, whichever is easiest to get to Reason for exam:->liver lesion What reading provider will be dictating this exam?->CRC TECHNIQUE: Automated exposure control, iterative reconstruction, and/or weight based adjustment of the mA/kV was utilized to reduce the radiation dose to as low as reasonably achievable. CONTRAST: None SEDATION: 1 mgversed and 50 mcg fentanyl were titrated intravenously for moderate sedation monitored under my direction. Total intraservice time of sedation was 20 minutes. The patient's vital signs were monitored throughout the procedure and recorded in the patient's medical record by the nurse. Mallapati score 2 ASA 2 FLUOROSCOPY DOSE AND TYPE OR TIME AND EXPOSURES: CT DESCRIPTION OF PROCEDURE: Informed consent was obtained after a detailed explanation of the procedure including risks, benefits, and alternatives. Universal protocol was observed. Sterile gowns, masks, hats and gloves utilized for maximal sterile barrier. After informed consent patient is placed supine on the table. Liver mass right lobe was localized with CT scanning. Patient prepped draped sterile fashion. 1% lidocaine was infiltrated for local anesthesia. 20 gauge coaxial needle was advanced into the lesion. Multiple 20 gauge cores were obtained and submitted to pathology. Post biopsy imaging shows air within the lesion suggesting good position of the biopsy needle. No immediate postoperative complication was seen. Dressing was applied. Patient was returned to holding stable condition. FINDINGS: Needle tip located in the right lobe liver mass. Successful CT-guided core biopsy right liver mass. Resident's Assessment and Plan     Ms. David Murrieta is a 60-year-old woman with a PMH of chronic bronchitis, hypertension, hyperlipidemia, hypothyroidism, diastolic heart failure, depression, tobacco abuse, ethanol abuse, and newly diagnosed small cell carcinoma of the lung who is presenting with the following:      Pulmonary    Newly diagnosed small cell lung cancer with metastasis to the liver  9/2 results from CT w/o contrast performed to rule out malignancy; showed focal nodular consolidation in the peripheral right medial RUL measuring [id number] as well as superior mediastinal adenopathy, consistent with malignancy  Liver biopsy results on 9/6 confirmed small cell lung cancer diagnosis  Patient has extensive smoking history  Has lost undisclosed amount of weight within the last few months  Procalcitonin elevated at 3.94  PLAN  Consulted palliative care; follow their recs  Follow MRI brain as well as NM full body bone scan for appropriate staging  Following oncology recs; patient will most likely need chemotherapy   Consult vascular surgery for mediport placement  Order echocardiogram to assess heart structure  Begin Pyridoxine 50mg QD     Hx of chronic bronchitis/COPD  Uses 2L of oxygen at home  On breathing treatment, Proventil, Flonase, and Singulair at home as well  Currently has SoB; has significant wheezing BL  PLAN  Discontinue Brovana   Add Solu-medrol 40mg IV  Add guaifenesin 200mg PRN Q4H  Continue current breathing treatment     Post-obstructive pneumonia vs small cell lung cancer  Currently no positive cultures and no fevers  Procalcitonin elevated at 3.94  WBC slightly elevated at 12.9  CT scan findings show possible post-obstructive pneumonia (air bronchograms and tree-in-bud in R lobe)  PLAN  Begin Zosyn 4.5g IV Q8H and one-dose Vancomycin prophylactically; potential bronch per ID if patient's symptoms worsen      Nephrology    Hypotonic hypovolemic hyponatremia 2/2 intravascular volume depletion vs SIADH 2/2 small cell lung carcinoma  Patient with extensive smoking history  Recently diagnosed with small cell lung cancer with metastasis to the liver  Sodium = 121 currently, slowly improving (110 -> 112 -> 117)  PLAN  Follow BMP Q2  Follow urine studies  Order serum cortisol to rule out adrenal insufficiency   Dry mary carmen diet  Fluid restriction due to possible SIADH  Consulted nephrology, follow recs    Hypokalemia 2/2 poor oral intake  K was 3.3, replaced  Follow BMP  Issued resolved    Lactic acidosis 2/2 intramuscular volume depletion (type A) vs liver metastasis (type B)  Lactic acid = 41.3, trending down from 4.2  Issue resolved      Cardiology    Hx of diastolic heart failure  Most recent echo in March 2021 showed:  Stage I diastolic dysfunction  Mild left concentric left ventricular hypertrophy EF = 50-60%  Patient stated in the past she was on Lasix, but not currently taking  No lower extremity edema noted  PLAN  Monitor BMP and vitals    Hx of HTN   On amlodipine and Toprol at home  Continued Toprol       Endocrine    Hx of hypothyroidism  TSH = 3.970, Free T4 elevated at 2.15  Home medication = levothyroxine 137mcg tablet  Recent CT chest showed thyromegaly with nodular calcified isodense lesion in the thyroid isthmus   PLAN  Continue current levothyroxine dose      Sudheer Alvarez, MS-3  Attending physician: Dr. Marco A Soliz  Department of Pulmonary, Critical Care and Sleep Medicine  [address] [facility]  Department of Internal Medicine      During multidisciplinary team rounds Fermín Rene is a 81 y.o. female was seen, examined and discussed. This is confirmation that I have personally seen and examined the patient and that the key elements of the encounter were performed by me (> 85 % time). The medications & laboratory data was discussed and adjusted where necessary. The radiographic images were reviewed or with radiologist or consultant if felt dis-concordant with the exam or history. The above findings were corroborated, plans confirmed and changes made if needed.    Family is updated at the bedside as available. Key issues of the case were discussed among consultants. Critical Care time is documented if appropriate.       Delia Martinez DO, FACP, FCCP, [facility],

## 2022-09-23 ENCOUNTER — ANESTHESIA (OUTPATIENT)
Dept: OPERATING ROOM | Age: 72
DRG: 844 | End: 2022-09-23
Payer: MEDICARE

## 2022-09-23 ENCOUNTER — APPOINTMENT (OUTPATIENT)
Dept: GENERAL RADIOLOGY | Age: 72
DRG: 844 | End: 2022-09-23
Payer: MEDICARE

## 2022-09-23 ENCOUNTER — ANESTHESIA EVENT (OUTPATIENT)
Dept: OPERATING ROOM | Age: 72
DRG: 844 | End: 2022-09-23
Payer: MEDICARE

## 2022-09-23 PROBLEM — R91.8 LUNG MASS: Status: ACTIVE | Noted: 2022-09-23

## 2022-09-23 LAB
ABO/RH: NORMAL
ANION GAP SERPL CALCULATED.3IONS-SCNC: 10 MMOL/L (ref 7–16)
ANION GAP SERPL CALCULATED.3IONS-SCNC: 11 MMOL/L (ref 7–16)
ANION GAP SERPL CALCULATED.3IONS-SCNC: 11 MMOL/L (ref 7–16)
ANION GAP SERPL CALCULATED.3IONS-SCNC: 13 MMOL/L (ref 7–16)
ANION GAP SERPL CALCULATED.3IONS-SCNC: 14 MMOL/L (ref 7–16)
ANION GAP SERPL CALCULATED.3IONS-SCNC: 9 MMOL/L (ref 7–16)
ANTIBODY SCREEN: NORMAL
B.E.: 3.6 MMOL/L (ref -3–3)
BASOPHILS ABSOLUTE: 0 E9/L (ref 0–0.2)
BASOPHILS RELATIVE PERCENT: 0.1 % (ref 0–2)
BUN BLDV-MCNC: 10 MG/DL (ref 6–23)
BUN BLDV-MCNC: 12 MG/DL (ref 6–23)
BUN BLDV-MCNC: 9 MG/DL (ref 6–23)
BUN BLDV-MCNC: 9 MG/DL (ref 6–23)
BURR CELLS: ABNORMAL
CALCIUM IONIZED: 1.28 MMOL/L (ref 1.15–1.33)
CALCIUM SERPL-MCNC: 8.9 MG/DL (ref 8.6–10.2)
CALCIUM SERPL-MCNC: 8.9 MG/DL (ref 8.6–10.2)
CALCIUM SERPL-MCNC: 9.1 MG/DL (ref 8.6–10.2)
CALCIUM SERPL-MCNC: 9.4 MG/DL (ref 8.6–10.2)
CALCIUM SERPL-MCNC: 9.4 MG/DL (ref 8.6–10.2)
CALCIUM SERPL-MCNC: 9.7 MG/DL (ref 8.6–10.2)
CHLORIDE BLD-SCNC: 81 MMOL/L (ref 98–107)
CHLORIDE BLD-SCNC: 81 MMOL/L (ref 98–107)
CHLORIDE BLD-SCNC: 82 MMOL/L (ref 98–107)
CHLORIDE BLD-SCNC: 82 MMOL/L (ref 98–107)
CHLORIDE BLD-SCNC: 84 MMOL/L (ref 98–107)
CHLORIDE BLD-SCNC: 84 MMOL/L (ref 98–107)
CO2: 24 MMOL/L (ref 22–29)
CO2: 25 MMOL/L (ref 22–29)
CO2: 26 MMOL/L (ref 22–29)
CO2: 27 MMOL/L (ref 22–29)
COHB: 1.1 % (ref 0–1.5)
CREAT SERPL-MCNC: 0.6 MG/DL (ref 0.5–1)
CREAT SERPL-MCNC: 0.7 MG/DL (ref 0.5–1)
CRITICAL: ABNORMAL
DATE ANALYZED: ABNORMAL
DATE OF COLLECTION: ABNORMAL
EOSINOPHILS ABSOLUTE: 0 E9/L (ref 0.05–0.5)
EOSINOPHILS RELATIVE PERCENT: 0 % (ref 0–6)
GFR AFRICAN AMERICAN: >60
GFR NON-AFRICAN AMERICAN: >60 ML/MIN/1.73
GLUCOSE BLD-MCNC: 102 MG/DL (ref 74–99)
GLUCOSE BLD-MCNC: 104 MG/DL (ref 74–99)
GLUCOSE BLD-MCNC: 116 MG/DL (ref 74–99)
GLUCOSE BLD-MCNC: 134 MG/DL (ref 74–99)
GLUCOSE BLD-MCNC: 144 MG/DL (ref 74–99)
GLUCOSE BLD-MCNC: 159 MG/DL (ref 74–99)
HCO3: 27.8 MMOL/L (ref 22–26)
HCT VFR BLD CALC: 33.5 % (ref 34–48)
HEMOGLOBIN: 11.7 G/DL (ref 11.5–15.5)
HHB: 5 % (ref 0–5)
LAB: ABNORMAL
LYMPHOCYTES ABSOLUTE: 0.37 E9/L (ref 1.5–4)
LYMPHOCYTES RELATIVE PERCENT: 2.6 % (ref 20–42)
Lab: ABNORMAL
MAGNESIUM: 1.7 MG/DL (ref 1.6–2.6)
MCH RBC QN AUTO: 29 PG (ref 26–35)
MCHC RBC AUTO-ENTMCNC: 34.9 % (ref 32–34.5)
MCV RBC AUTO: 83.1 FL (ref 80–99.9)
METAMYELOCYTES RELATIVE PERCENT: 0.9 % (ref 0–1)
METHB: 0.3 % (ref 0–1.5)
MODE: ABNORMAL
MONOCYTES ABSOLUTE: 0 E9/L (ref 0.1–0.95)
MONOCYTES RELATIVE PERCENT: 2.8 % (ref 2–12)
NEUTROPHILS ABSOLUTE: 11.93 E9/L (ref 1.8–7.3)
NEUTROPHILS RELATIVE PERCENT: 96.5 % (ref 43–80)
O2 SATURATION: 95.1 % (ref 92–98.5)
O2HB: 93.6 % (ref 94–97)
OPERATOR ID: 8215
PARATHYROID HORMONE INTACT: 40 PG/ML (ref 15–65)
PATIENT TEMP: 37 C
PCO2: 40.4 MMHG (ref 35–45)
PDW BLD-RTO: 13.7 FL (ref 11.5–15)
PH BLOOD GAS: 7.46 (ref 7.35–7.45)
PHOSPHORUS: 2.9 MG/DL (ref 2.5–4.5)
PLATELET # BLD: 269 E9/L (ref 130–450)
PMV BLD AUTO: 9.4 FL (ref 7–12)
PO2: 71.3 MMHG (ref 75–100)
POIKILOCYTES: ABNORMAL
POTASSIUM REFLEX MAGNESIUM: 3.9 MMOL/L (ref 3.5–5)
POTASSIUM REFLEX MAGNESIUM: 4 MMOL/L (ref 3.5–5)
POTASSIUM REFLEX MAGNESIUM: 4.1 MMOL/L (ref 3.5–5)
POTASSIUM REFLEX MAGNESIUM: 4.1 MMOL/L (ref 3.5–5)
POTASSIUM REFLEX MAGNESIUM: 4.3 MMOL/L (ref 3.5–5)
POTASSIUM SERPL-SCNC: 4 MMOL/L (ref 3.5–5)
PROCALCITONIN: 1.39 NG/ML (ref 0–0.08)
RBC # BLD: 4.03 E12/L (ref 3.5–5.5)
SODIUM BLD-SCNC: 116 MMOL/L (ref 132–146)
SODIUM BLD-SCNC: 118 MMOL/L (ref 132–146)
SODIUM BLD-SCNC: 119 MMOL/L (ref 132–146)
SODIUM BLD-SCNC: 121 MMOL/L (ref 132–146)
SOURCE, BLOOD GAS: ABNORMAL
THB: 12.8 G/DL (ref 11.5–16.5)
TIME ANALYZED: 1100
VITAMIN D 25-HYDROXY: 54 NG/ML (ref 30–100)
WBC # BLD: 12.3 E9/L (ref 4.5–11.5)

## 2022-09-23 PROCEDURE — 80048 BASIC METABOLIC PNL TOTAL CA: CPT

## 2022-09-23 PROCEDURE — 85025 COMPLETE CBC W/AUTO DIFF WBC: CPT

## 2022-09-23 PROCEDURE — 0JH63WZ INSERTION OF TOTALLY IMPLANTABLE VASCULAR ACCESS DEVICE INTO CHEST SUBCUTANEOUS TISSUE AND FASCIA, PERCUTANEOUS APPROACH: ICD-10-PCS | Performed by: SURGERY

## 2022-09-23 PROCEDURE — 6360000002 HC RX W HCPCS: Performed by: SURGERY

## 2022-09-23 PROCEDURE — 3600000003 HC SURGERY LEVEL 3 BASE: Performed by: SURGERY

## 2022-09-23 PROCEDURE — 2580000003 HC RX 258: Performed by: SURGERY

## 2022-09-23 PROCEDURE — 82330 ASSAY OF CALCIUM: CPT

## 2022-09-23 PROCEDURE — 84145 PROCALCITONIN (PCT): CPT

## 2022-09-23 PROCEDURE — 2580000003 HC RX 258: Performed by: INTERNAL MEDICINE

## 2022-09-23 PROCEDURE — 02HV33Z INSERTION OF INFUSION DEVICE INTO SUPERIOR VENA CAVA, PERCUTANEOUS APPROACH: ICD-10-PCS | Performed by: SURGERY

## 2022-09-23 PROCEDURE — 99233 SBSQ HOSP IP/OBS HIGH 50: CPT | Performed by: INTERNAL MEDICINE

## 2022-09-23 PROCEDURE — 3600000013 HC SURGERY LEVEL 3 ADDTL 15MIN: Performed by: SURGERY

## 2022-09-23 PROCEDURE — 36415 COLL VENOUS BLD VENIPUNCTURE: CPT

## 2022-09-23 PROCEDURE — 99291 CRITICAL CARE FIRST HOUR: CPT | Performed by: INTERNAL MEDICINE

## 2022-09-23 PROCEDURE — 6360000002 HC RX W HCPCS: Performed by: INTERNAL MEDICINE

## 2022-09-23 PROCEDURE — 99232 SBSQ HOSP IP/OBS MODERATE 35: CPT | Performed by: NURSE PRACTITIONER

## 2022-09-23 PROCEDURE — 86900 BLOOD TYPING SEROLOGIC ABO: CPT

## 2022-09-23 PROCEDURE — 3700000000 HC ANESTHESIA ATTENDED CARE: Performed by: SURGERY

## 2022-09-23 PROCEDURE — 3700000001 HC ADD 15 MINUTES (ANESTHESIA): Performed by: SURGERY

## 2022-09-23 PROCEDURE — 6370000000 HC RX 637 (ALT 250 FOR IP): Performed by: INTERNAL MEDICINE

## 2022-09-23 PROCEDURE — 2700000000 HC OXYGEN THERAPY PER DAY

## 2022-09-23 PROCEDURE — 6360000002 HC RX W HCPCS: Performed by: NURSE ANESTHETIST, CERTIFIED REGISTERED

## 2022-09-23 PROCEDURE — 83735 ASSAY OF MAGNESIUM: CPT

## 2022-09-23 PROCEDURE — 84100 ASSAY OF PHOSPHORUS: CPT

## 2022-09-23 PROCEDURE — 77001 FLUOROGUIDE FOR VEIN DEVICE: CPT | Performed by: SURGERY

## 2022-09-23 PROCEDURE — 83970 ASSAY OF PARATHORMONE: CPT

## 2022-09-23 PROCEDURE — 36561 INSERT TUNNELED CV CATH: CPT | Performed by: SURGERY

## 2022-09-23 PROCEDURE — 2000000000 HC ICU R&B

## 2022-09-23 PROCEDURE — 2709999900 HC NON-CHARGEABLE SUPPLY: Performed by: SURGERY

## 2022-09-23 PROCEDURE — 73030 X-RAY EXAM OF SHOULDER: CPT

## 2022-09-23 PROCEDURE — 6370000000 HC RX 637 (ALT 250 FOR IP)

## 2022-09-23 PROCEDURE — APPSS60 APP SPLIT SHARED TIME 46-60 MINUTES: Performed by: NURSE PRACTITIONER

## 2022-09-23 PROCEDURE — A4217 STERILE WATER/SALINE, 500 ML: HCPCS | Performed by: SURGERY

## 2022-09-23 PROCEDURE — 2580000003 HC RX 258: Performed by: NURSE ANESTHETIST, CERTIFIED REGISTERED

## 2022-09-23 PROCEDURE — 2500000003 HC RX 250 WO HCPCS: Performed by: SURGERY

## 2022-09-23 PROCEDURE — 86901 BLOOD TYPING SEROLOGIC RH(D): CPT

## 2022-09-23 PROCEDURE — C1788 PORT, INDWELLING, IMP: HCPCS | Performed by: SURGERY

## 2022-09-23 PROCEDURE — 82306 VITAMIN D 25 HYDROXY: CPT

## 2022-09-23 PROCEDURE — 82805 BLOOD GASES W/O2 SATURATION: CPT

## 2022-09-23 PROCEDURE — 82542 COL CHROMOTOGRAPHY QUAL/QUAN: CPT

## 2022-09-23 PROCEDURE — 86850 RBC ANTIBODY SCREEN: CPT

## 2022-09-23 PROCEDURE — 94640 AIRWAY INHALATION TREATMENT: CPT

## 2022-09-23 DEVICE — VACCESS CT POWER-INJECTABLE IMPLANTABLE PORT (WITH SUTURE PLUGS) (8F)
Type: IMPLANTABLE DEVICE | Site: SUBCLAVIAN | Status: FUNCTIONAL
Brand: VACCESS

## 2022-09-23 RX ORDER — LOPERAMIDE HYDROCHLORIDE 2 MG/1
2 CAPSULE ORAL 4 TIMES DAILY PRN
Status: DISCONTINUED | OUTPATIENT
Start: 2022-09-23 | End: 2022-09-26 | Stop reason: HOSPADM

## 2022-09-23 RX ORDER — METOPROLOL SUCCINATE 50 MG/1
50 TABLET, EXTENDED RELEASE ORAL DAILY
Status: DISCONTINUED | OUTPATIENT
Start: 2022-09-24 | End: 2022-09-26 | Stop reason: HOSPADM

## 2022-09-23 RX ORDER — MAGNESIUM SULFATE IN WATER 40 MG/ML
2000 INJECTION, SOLUTION INTRAVENOUS ONCE
Status: COMPLETED | OUTPATIENT
Start: 2022-09-23 | End: 2022-09-23

## 2022-09-23 RX ORDER — LOSARTAN POTASSIUM 25 MG/1
25 TABLET ORAL DAILY
Status: DISCONTINUED | OUTPATIENT
Start: 2022-09-23 | End: 2022-09-26 | Stop reason: HOSPADM

## 2022-09-23 RX ORDER — MIDAZOLAM HYDROCHLORIDE 1 MG/ML
INJECTION INTRAMUSCULAR; INTRAVENOUS
Status: DISPENSED
Start: 2022-09-23 | End: 2022-09-23

## 2022-09-23 RX ORDER — AMLODIPINE BESYLATE 5 MG/1
5 TABLET ORAL DAILY
Status: DISCONTINUED | OUTPATIENT
Start: 2022-09-23 | End: 2022-09-26 | Stop reason: HOSPADM

## 2022-09-23 RX ORDER — SODIUM CHLORIDE 9 MG/ML
INJECTION, SOLUTION INTRAVENOUS CONTINUOUS PRN
Status: DISCONTINUED | OUTPATIENT
Start: 2022-09-23 | End: 2022-09-23 | Stop reason: SDUPTHER

## 2022-09-23 RX ORDER — PROPOFOL 10 MG/ML
INJECTION, EMULSION INTRAVENOUS CONTINUOUS PRN
Status: DISCONTINUED | OUTPATIENT
Start: 2022-09-23 | End: 2022-09-23 | Stop reason: SDUPTHER

## 2022-09-23 RX ORDER — ROSUVASTATIN CALCIUM 20 MG/1
20 TABLET, COATED ORAL NIGHTLY
Status: DISCONTINUED | OUTPATIENT
Start: 2022-09-23 | End: 2022-09-26 | Stop reason: HOSPADM

## 2022-09-23 RX ORDER — SODIUM CHLORIDE 1000 MG
2 TABLET, SOLUBLE MISCELLANEOUS
Status: DISCONTINUED | OUTPATIENT
Start: 2022-09-24 | End: 2022-09-26 | Stop reason: HOSPADM

## 2022-09-23 RX ORDER — LABETALOL HYDROCHLORIDE 5 MG/ML
INJECTION, SOLUTION INTRAVENOUS
Status: DISPENSED
Start: 2022-09-23 | End: 2022-09-23

## 2022-09-23 RX ORDER — HEPARIN SODIUM (PORCINE) LOCK FLUSH IV SOLN 100 UNIT/ML 100 UNIT/ML
SOLUTION INTRAVENOUS PRN
Status: DISCONTINUED | OUTPATIENT
Start: 2022-09-23 | End: 2022-09-23 | Stop reason: ALTCHOICE

## 2022-09-23 RX ORDER — POTASSIUM CHLORIDE 20 MEQ/1
40 TABLET, EXTENDED RELEASE ORAL ONCE
Status: COMPLETED | OUTPATIENT
Start: 2022-09-23 | End: 2022-09-23

## 2022-09-23 RX ADMIN — HYDROXYZINE PAMOATE 25 MG: 25 CAPSULE ORAL at 20:34

## 2022-09-23 RX ADMIN — BENZOCAINE AND MENTHOL 1 LOZENGE: 15; 3.6 LOZENGE ORAL at 22:08

## 2022-09-23 RX ADMIN — SODIUM CHLORIDE 1 G: 1 TABLET ORAL at 12:12

## 2022-09-23 RX ADMIN — PROPOFOL 20 MCG/KG/MIN: 10 INJECTION, EMULSION INTRAVENOUS at 14:28

## 2022-09-23 RX ADMIN — PYRIDOXINE HCL TAB 50 MG 50 MG: 50 TAB at 10:15

## 2022-09-23 RX ADMIN — MONTELUKAST 10 MG: 10 TABLET, FILM COATED ORAL at 10:14

## 2022-09-23 RX ADMIN — IPRATROPIUM BROMIDE AND ALBUTEROL SULFATE 1 AMPULE: .5; 2.5 SOLUTION RESPIRATORY (INHALATION) at 08:52

## 2022-09-23 RX ADMIN — IPRATROPIUM BROMIDE AND ALBUTEROL SULFATE 1 AMPULE: .5; 2.5 SOLUTION RESPIRATORY (INHALATION) at 20:46

## 2022-09-23 RX ADMIN — PAMIDRONATE DISODIUM 90 MG: 3 INJECTION INTRAVENOUS at 19:02

## 2022-09-23 RX ADMIN — SODIUM CHLORIDE, PRESERVATIVE FREE 10 ML: 5 INJECTION INTRAVENOUS at 10:18

## 2022-09-23 RX ADMIN — GUAIFENESIN 200 MG: 200 SOLUTION ORAL at 10:38

## 2022-09-23 RX ADMIN — ANORECTAL OINTMENT: 15.7; .44; 24; 20.6 OINTMENT TOPICAL at 19:58

## 2022-09-23 RX ADMIN — PIPERACILLIN AND TAZOBACTAM 4500 MG: 4; .5 INJECTION, POWDER, LYOPHILIZED, FOR SOLUTION INTRAVENOUS at 13:30

## 2022-09-23 RX ADMIN — METHYLPREDNISOLONE SODIUM SUCCINATE 40 MG: 40 INJECTION, POWDER, FOR SOLUTION INTRAMUSCULAR; INTRAVENOUS at 10:38

## 2022-09-23 RX ADMIN — BUDESONIDE 500 MCG: 0.5 SUSPENSION RESPIRATORY (INHALATION) at 20:45

## 2022-09-23 RX ADMIN — IPRATROPIUM BROMIDE AND ALBUTEROL SULFATE 1 AMPULE: .5; 2.5 SOLUTION RESPIRATORY (INHALATION) at 04:07

## 2022-09-23 RX ADMIN — METOPROLOL SUCCINATE 25 MG: 50 TABLET, EXTENDED RELEASE ORAL at 10:19

## 2022-09-23 RX ADMIN — LEVOTHYROXINE SODIUM 137 MCG: 0.14 TABLET ORAL at 05:39

## 2022-09-23 RX ADMIN — ANORECTAL OINTMENT: 15.7; .44; 24; 20.6 OINTMENT TOPICAL at 04:02

## 2022-09-23 RX ADMIN — PIPERACILLIN AND TAZOBACTAM 4500 MG: 4; .5 INJECTION, POWDER, LYOPHILIZED, FOR SOLUTION INTRAVENOUS at 05:38

## 2022-09-23 RX ADMIN — BUDESONIDE 500 MCG: 0.5 SUSPENSION RESPIRATORY (INHALATION) at 08:52

## 2022-09-23 RX ADMIN — PIPERACILLIN AND TAZOBACTAM 4500 MG: 4; .5 INJECTION, POWDER, LYOPHILIZED, FOR SOLUTION INTRAVENOUS at 22:01

## 2022-09-23 RX ADMIN — GUAIFENESIN 200 MG: 200 SOLUTION ORAL at 05:39

## 2022-09-23 RX ADMIN — METHYLPREDNISOLONE SODIUM SUCCINATE 40 MG: 40 INJECTION, POWDER, FOR SOLUTION INTRAMUSCULAR; INTRAVENOUS at 23:07

## 2022-09-23 RX ADMIN — SODIUM CHLORIDE, PRESERVATIVE FREE 10 ML: 5 INJECTION INTRAVENOUS at 20:34

## 2022-09-23 RX ADMIN — POTASSIUM CHLORIDE 40 MEQ: 1500 TABLET, EXTENDED RELEASE ORAL at 15:50

## 2022-09-23 RX ADMIN — IPRATROPIUM BROMIDE AND ALBUTEROL SULFATE 1 AMPULE: .5; 2.5 SOLUTION RESPIRATORY (INHALATION) at 13:44

## 2022-09-23 RX ADMIN — GUAIFENESIN 200 MG: 200 SOLUTION ORAL at 20:34

## 2022-09-23 RX ADMIN — SODIUM CHLORIDE: 9 INJECTION, SOLUTION INTRAVENOUS at 14:22

## 2022-09-23 RX ADMIN — IPRATROPIUM BROMIDE AND ALBUTEROL SULFATE 1 AMPULE: .5; 2.5 SOLUTION RESPIRATORY (INHALATION) at 15:49

## 2022-09-23 RX ADMIN — BENZOCAINE AND MENTHOL 1 LOZENGE: 15; 3.6 LOZENGE ORAL at 20:00

## 2022-09-23 RX ADMIN — MAGNESIUM SULFATE HEPTAHYDRATE 2000 MG: 40 INJECTION, SOLUTION INTRAVENOUS at 10:42

## 2022-09-23 RX ADMIN — SODIUM CHLORIDE 1 G: 1 TABLET ORAL at 16:00

## 2022-09-23 RX ADMIN — SODIUM CHLORIDE 1 G: 1 TABLET ORAL at 10:14

## 2022-09-23 RX ADMIN — ROSUVASTATIN CALCIUM 20 MG: 20 TABLET, FILM COATED ORAL at 20:34

## 2022-09-23 RX ADMIN — LOSARTAN POTASSIUM 25 MG: 25 TABLET, FILM COATED ORAL at 15:50

## 2022-09-23 RX ADMIN — AMLODIPINE BESYLATE 5 MG: 5 TABLET ORAL at 12:12

## 2022-09-23 RX ADMIN — LOPERAMIDE HYDROCHLORIDE 2 MG: 2 CAPSULE ORAL at 23:07

## 2022-09-23 ASSESSMENT — LIFESTYLE VARIABLES: SMOKING_STATUS: 1

## 2022-09-23 ASSESSMENT — ENCOUNTER SYMPTOMS: SHORTNESS OF BREATH: 1

## 2022-09-23 ASSESSMENT — PAIN SCALES - GENERAL
PAINLEVEL_OUTOF10: 0

## 2022-09-23 NOTE — PLAN OF CARE
Problem: Chronic Conditions and Co-morbidities  Goal: Patient's chronic conditions and co-morbidity symptoms are monitored and maintained or improved  9/23/2022 1028 by Kim Macias RN  Outcome: Progressing     Problem: Safety - Adult  Goal: Free from fall injury  9/23/2022 1028 by Kim Macias RN  Outcome: Progressing     Problem: Skin/Tissue Integrity  Goal: Absence of new skin breakdown  Description: 1. Monitor for areas of redness and/or skin breakdown  2. Assess vascular access sites hourly  3. Every 4-6 hours minimum:  Change oxygen saturation probe site  4. Every 4-6 hours:  If on nasal continuous positive airway pressure, respiratory therapy assess nares and determine need for appliance change or resting period. 9/23/2022 1028 by Kim Macias RN  Outcome: Progressing     Problem: Pain  Goal: Verbalizes/displays adequate comfort level or baseline comfort level  9/23/2022 1028 by Kim Macias RN  Outcome: Progressing     Problem: ABCDS Injury Assessment  Goal: Absence of physical injury  9/23/2022 1028 by Kim Macias RN  Outcome: Progressing     Problem: Discharge Planning  Goal: Discharge to home or other facility with appropriate resources  9/23/2022 0048 by Umer Pink RN  Outcome: Not Progressing     Problem: Skin/Tissue Integrity  Goal: Absence of new skin breakdown  Description: 1. Monitor for areas of redness and/or skin breakdown  2. Assess vascular access sites hourly  3. Every 4-6 hours minimum:  Change oxygen saturation probe site  4. Every 4-6 hours:  If on nasal continuous positive airway pressure, respiratory therapy assess nares and determine need for appliance change or resting period.   9/23/2022 1028 by Kim Macias RN  Outcome: Progressing  9/23/2022 0048 by Umer Pink RN  Outcome: Not Progressing     Problem: Pain  Goal: Verbalizes/displays adequate comfort level or baseline comfort level  9/23/2022 1028 by Kim Macias RN  Outcome: Progressing  9/23/2022 0048 by Beth Stock, RN  Outcome: Not Progressing

## 2022-09-23 NOTE — PROGRESS NOTES
Coordination of care discussion and chart review with PM team. Pt denies needs, had visited this am with brother in law and niece. No immediate PM psychosocial needs identified for [employer].  will remain available for on-going psychosocial support, as needed.

## 2022-09-23 NOTE — PROGRESS NOTES
Vascular Surgery Progress Note    Pt is being seen in f/u today regarding mediport placement     Subjective  Pt s/e. No acute changes overnight. Na 118. Questions and concerns regarding mediport placement answered. Current Medications:    sodium chloride      dextrose        menthol-zinc oxide, guaiFENesin, perflutren lipid microspheres, sodium chloride flush, sodium chloride, ondansetron **OR** ondansetron, polyethylene glycol, acetaminophen **OR** acetaminophen, glucose, dextrose bolus **OR** dextrose bolus, glucagon (rDNA), dextrose, hydrOXYzine pamoate, benzocaine-menthol    magnesium sulfate  2,000 mg IntraVENous Once    vitamin B-6  50 mg Oral Daily    methylPREDNISolone  40 mg IntraVENous Q12H    metoprolol succinate  25 mg Oral Daily    piperacillin-tazobactam  4,500 mg IntraVENous Q8H    sodium chloride  1 g Oral TID WC    sodium chloride flush  5-40 mL IntraVENous 2 times per day    enoxaparin  40 mg SubCUTAneous Daily    levothyroxine  137 mcg Oral Daily    montelukast  10 mg Oral QAM    budesonide  0.5 mg Nebulization BID    ipratropium-albuterol  1 ampule Inhalation Q4H WA        PHYSICAL EXAM:    BP (!) 156/98   Pulse (!) 109   Temp 97.6 °F (36.4 °C) (Oral)   Resp 20   Ht 5' 2\" (1.575 m)   Wt 151 lb 8 oz (68.7 kg)   SpO2 97%   BMI 27.71 kg/m²     Intake/Output Summary (Last 24 hours) at 9/23/2022 8891  Last data filed at 9/23/2022 3636  Gross per 24 hour   Intake 953.71 ml   Output 1050 ml   Net -96.29 ml          Gen: awake, alert and oriented x3, no apparent distress  CVS: mild tachycardia   Resp: B/L wheeze.  4LNC   Abd: Soft, non-tender, non-distended  Extremities:b/l 2+ radial pulse     LABS:    Lab Results   Component Value Date    WBC 12.3 (H) 09/23/2022    HGB 11.7 09/23/2022    HCT 33.5 (L) 09/23/2022     09/23/2022    PROTIME 12.1 09/06/2022    INR 1.1 09/06/2022    K 4.0 09/23/2022    BUN 10 09/23/2022    CREATININE 0.6 09/23/2022       A/P  81 y.o. female with small cell lung CA with metastatic disease to liver     - plan for mediport insertion today 9/23  - keep NPO until after procedure     Plan will be discussed with Dr. Lauren Ambriz MD

## 2022-09-23 NOTE — PROGRESS NOTES
[address] [facility], [place]  Department of Internal Medicine   Internal Medicine Residency   MICU Progress Note    Patient:  Elidia Mast 81 y.o. female  MRN: [id number]     Date of Service: 9/23/2022    Allergy: Patient has no known allergies. HPI:    This is a 65-year-old patient with a past medical history of chronic bronchitis (2L at home and breathing treatment), hypertension, hyperlipidemia, hypothyroidism, diastolic heart failure, depression, tobacco abuse (quit 1 week ago), ethanol abuse (quit 4-6 months ago), and newly diagnosed small cell carcinoma of the lung who is presenting with a chief complaint of dizziness. She was recently discharged from the hospital on 9/7/2022 and has increasingly felt dizzy, but reports no loss of consciousness. She visited her PCP for lab work and was found to have low sodium. In the ED on 9/20, the patient was hemodynamically unstable with a BP of 60/33 mmHg, pulse of 114, respiratory rate of 34 and SpO2 of 94%. Initial workup showed a sodium of 110 and a pH of 7.175, pCO2 of 46.9, PO2 of 223.8, and bicarb of 16.9. Other electrolytes were normal at this time. Repeat CTA of the chest showed a right mediastinal mass with lymphadenopathy, which had worsened compared to CTA on 9/3. A central line was placed due to hypotension and the patient was given cefepime 2g, duo nebs, Solu-Medrol 125mg, 10mcg increased to 15mcg of Levophed, and a sodium bicarb 8.4% injection as well as 1L of normal saline. The patient was subsequently weaned off of pressors due to blood pressure stabilization. Subjective     The patient was examined at the bedside this morning and still had some coughing. She was given breathing treatments with mild improvement, but she felt roughly the same today as she did yesterday. Widespread rhonchi were heard bilaterally. Sodium was at 118. She appeared anxious about her diagnosis and wants to know about next steps.   I informed her that this would be a team decision and that oncology will have most of the recommendations. Her code status was changed to [facility]. 24h change: No acute events; oral salt tablet was begun by nephrology     ROS: Denies Fever/chills/CP/N/V/D/C/Dysuria/Blood in stool or urine. Patient did admit to shortness of breath. Objective     VS: BP (!) 149/89   Pulse (!) 105   Temp 98 °F (36.7 °C) (Oral)   Resp 21   Ht 5' 2\" (1.575 m)   Wt 151 lb 8 oz (68.7 kg)   SpO2 94%   BMI 27.71 kg/m²         I & O - 24hr:   Intake/Output Summary (Last 24 hours) at 9/23/2022 1609  Last data filed at 9/23/2022 1535  Gross per 24 hour   Intake 903.71 ml   Output 1250 ml   Net -346.29 ml       Physical Exam:  General Appearance: alert, appears stated age, and cooperative  Neck: no adenopathy, no carotid bruit, no JVD, supple, symmetrical, trachea midline, thyroid not enlarged, symmetric, no tenderness/mass/nodules, and palpable and moveable right supraclavicular lymph node palpated. Lung: diffuse wheezing and severe ronchi present bilaterally  Heart: regular rate and rhythm, S1, S2 normal, no murmur, click, rub or gallop  Abdomen: soft, non-tender; bowel sounds normal; no masses,  no organomegaly  Extremities:  extremities normal, atraumatic, no cyanosis or edema. Musculoskeletal: No joint swelling, no muscle tenderness. ROM normal in all joints of extremities.    Neurologic: Mental status: AAOx3, no focal deficit present    Lines     site day    Art line   None    TLC None    PICC None    Hemoaccess R Fem 2     Oxygen:    nasal canula at 4 L/min    ABG:     Lab Results   Component Value Date/Time    PH 7.455 09/23/2022 11:00 AM    PCO2 40.4 09/23/2022 11:00 AM    PO2 71.3 09/23/2022 11:00 AM    HCO3 27.8 09/23/2022 11:00 AM    BE 3.6 09/23/2022 11:00 AM    THB 12.8 09/23/2022 11:00 AM    O2SAT 95.1 09/23/2022 11:00 AM        Medications     Infusions: (Fluid, Sedation, Vasopressors)    Vasopressors  No pressors    Nutrition:  NPO (d/t mediport procedure)  ATB:   Antibiotics  Days                 Skin issues: no current skin issues  Patient currently has   DVT prophylaxis - Lovenox; held today  Palliative care consult     Labs   CBC:   Lab Results   Component Value Date/Time    WBC 12.3 09/23/2022 04:05 AM    RBC 4.03 09/23/2022 04:05 AM    HGB 11.7 09/23/2022 04:05 AM    HCT 33.5 09/23/2022 04:05 AM    MCV 83.1 09/23/2022 04:05 AM    MCH 29.0 09/23/2022 04:05 AM    MCHC 34.9 09/23/2022 04:05 AM    RDW 13.7 09/23/2022 04:05 AM     09/23/2022 04:05 AM    MPV 9.4 09/23/2022 04:05 AM     BMP:    Lab Results   Component Value Date/Time     09/23/2022 12:13 PM    K 4.1 09/23/2022 12:13 PM    CL 82 09/23/2022 12:13 PM    CO2 25 09/23/2022 12:13 PM    BUN 10 09/23/2022 12:13 PM    LABALBU 4.3 09/20/2022 06:43 PM    CREATININE 0.7 09/23/2022 12:13 PM    CALCIUM 9.4 09/23/2022 12:13 PM    GFRAA >60 09/23/2022 12:13 PM    LABGLOM >60 09/23/2022 12:13 PM    GLUCOSE 102 09/23/2022 12:13 PM     Hepatic Function Panel:    Lab Results   Component Value Date/Time    ALKPHOS 137 09/20/2022 06:43 PM    ALT 8 09/20/2022 06:43 PM    AST 15 09/20/2022 06:43 PM    PROT 7.1 09/20/2022 06:43 PM    BILITOT 0.2 09/20/2022 06:43 PM    LABALBU 4.3 09/20/2022 06:43 PM     U/A:    Lab Results   Component Value Date/Time    COLORU Yellow 09/21/2022 06:42 AM    COLORU Yellow 09/21/2022 06:42 AM    PROTEINU TRACE 09/21/2022 06:42 AM    PROTEINU 30 09/21/2022 06:42 AM    PHUR 6.0 09/21/2022 06:42 AM    PHUR 6.0 09/21/2022 06:42 AM    WBCUA NONE 09/21/2022 06:42 AM    WBCUA NONE 09/21/2022 06:42 AM    RBCUA 2-5 09/21/2022 06:42 AM    RBCUA 2-5 09/21/2022 06:42 AM    BACTERIA NONE SEEN 09/21/2022 06:42 AM    BACTERIA NONE SEEN 09/21/2022 06:42 AM    CLARITYU Clear 09/21/2022 06:42 AM    CLARITYU Clear 09/21/2022 06:42 AM    SPECGRAV 1.010 09/21/2022 06:42 AM    SPECGRAV 1.010 09/21/2022 06:42 AM    LEUKOCYTESUR Negative 09/21/2022 06:42 AM    LEUKOCYTESUR Negative 09/21/2022 06:42 AM    UROBILINOGEN 0.2 09/21/2022 06:42 AM    UROBILINOGEN 0.2 09/21/2022 06:42 AM    BILIRUBINUR Negative 09/21/2022 06:42 AM    BILIRUBINUR Negative 09/21/2022 06:42 AM    BLOODU SMALL 09/21/2022 06:42 AM    BLOODU SMALL 09/21/2022 06:42 AM    GLUCOSEU Negative 09/21/2022 06:42 AM    GLUCOSEU Negative 09/21/2022 06:42 AM     ABG:    Lab Results   Component Value Date/Time    PH 7.455 09/23/2022 11:00 AM    PCO2 40.4 09/23/2022 11:00 AM    PO2 71.3 09/23/2022 11:00 AM    HCO3 27.8 09/23/2022 11:00 AM    BE 3.6 09/23/2022 11:00 AM    O2SAT 95.1 09/23/2022 11:00 AM     FLP:    Lab Results   Component Value Date/Time    TRIG 96 09/21/2022 08:15 AM    HDL 69 09/21/2022 08:15 AM    LDLCALC 92 09/21/2022 08:15 AM    LABVLDL 19 09/21/2022 08:15 AM     TSH:    Lab Results   Component Value Date/Time    TSH 3.970 09/21/2022 12:32 AM     VITAMIN B12: No components found for: B12  FOLATE:    Lab Results   Component Value Date/Time    FOLATE 10.4 09/21/2022 12:32 AM       Imaging Studies:    ECHOCARDIOGRAM     Result Date: 9/22/2022  Summary   Mildly dilated left ventricle. Ejection fraction is visually estimated at 60-65%. 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 Severe concentric left ventricular hypertrophy. Indeterminate diastolic function. Global longitudinal peak strain -12.5% (low due basal aneurysm)   Normal right ventricular size and function. TAPSE 25 mm. Mild mitral regurgitation is present. No hemodynamically significant aortic stenosis is present. Unable to estimate PA systolic pressure. No evidence for hemodynamically significant pericardial effusion. Consider cardiac MRI further evaluation of LV basal aneurysm. NM BONE SCAN WHOLE BODY     Result Date: 9/21/2022  FINDINGS:   Chest CT of September 20 has been reviewed. There are displaced healing   fractures in the right 5, 6 and 7 ribs.   There are areas of focal increased   uptake of the radionuclide which correlates with these fractures. There are old fractures in the posterior aspect of the right 8 and 9 ribs. There are areas of focal increased uptake adrenal glide which correlates with   these rib fractures. There is a healing fracture of the lower 3rd of the manubrium of the sternum. There is area of more intense uptake adrenal glide which correlate with these   fracture. There is a impacted the subcapital or infra capital fracture of the proximal   left humerus which correlates with increased uptake the radionuclide in this   area. There is a S shaped scoliotic curvature for the thoracolumbar spine with the   mid right thoracic convexity, a more discrete left thoracolumbar convexity,   and a mid left lumbar convexity. Increased uptake of the nuclide in the L2-3 level relates with more advanced   degenerative disc disease the reactive bone sclerosis at that. The increased   uptake adrenal glide across T7-T8 relate with spondylosis more prominent to   the left of the midline and the discrete in these uptake in other levels of   the lumbar spine also correlate degenerative changes. Some increased activity seen in the knees and in the right shoulder elbow   joints and wrist joints correlate with degenerative changes. There are limitation in the evaluation of the lower sacral spine and   symphysis of the pubis do residual activity the bladder. .           Impression   1. No pattern of metastatic bone disease. 2.  Multiple trauma injuries are seen in the right ribcage, left humerus   subcapital region, and degenerative changes are seen in multiple levels in   the thoracolumbar spine.        RECOMMENDATIONS:   Unavailable        CT HEAD WO CONTRAST     Result Date: 9/1/2022  EXAMINATION: CT OF THE HEAD WITHOUT CONTRAST  9/1/2022 2:28 pm TECHNIQUE: CT of the head was performed without the administration of intravenous contrast. Automated exposure control, iterative reconstruction, and/or weight based adjustment of the mA/kV was utilized to reduce the radiation dose to as low as reasonably achievable. COMPARISON: None. HISTORY: ORDERING SYSTEM PROVIDED HISTORY: dizzy TECHNOLOGIST PROVIDED HISTORY: Has a \"code stroke\" or \"stroke alert\" been called? ->No Reason for exam:->dizzy Decision Support Exception - unselect if not a suspected or confirmed emergency medical condition->Emergency Medical Condition (MA) What reading provider will be dictating this exam?->CRC FINDINGS: BRAIN/VENTRICLES: There is no acute intracranial hemorrhage, mass effect or midline shift. No abnormal extra-axial fluid collection. The gray-white differentiation is maintained without evidence of an acute infarct. There is no evidence of hydrocephalus. ORBITS: The visualized portion of the orbits demonstrate no acute abnormality. SINUSES: The visualized paranasal sinuses demonstrate no acute abnormality. There is partial opacification of bilateral mastoid air cells. SOFT TISSUES/SKULL:  No acute abnormality of the visualized skull or soft tissues. No acute intracranial abnormality. Periventricular leukomalacia. Suspect bilateral mastoiditis. CT CHEST WO CONTRAST     Result Date: 9/3/2022  EXAMINATION: CT OF THE CHEST WITHOUT CONTRAST 9/3/2022 9:27 am TECHNIQUE: CT of the chest was performed without the administration of intravenous contrast. Multiplanar reformatted images are provided for review. Automated exposure control, iterative reconstruction, and/or weight based adjustment of the mA/kV was utilized to reduce the radiation dose to as low as reasonably achievable. COMPARISON: None.  HISTORY: ORDERING SYSTEM PROVIDED HISTORY: Rule out malignancy/mass TECHNOLOGIST PROVIDED HISTORY: Reason for exam:->Rule out malignancy/mass What reading provider will be dictating this exam?->CRC FINDINGS: Mediastinum: There is superior mediastinal adenopathy and a prominent right paratracheal lymph node mass. Right paratracheal lymph node mass measures 2.8 x 1.8 cm. Upper anterior mediastinal lymph nodes measure approximately 7-8 mm in short axis. Thyromegaly is noted with coarse calcification in the enlarged thyroid isthmus. Pulmonary artery is prominent in size. Aorta is nonaneurysmal.  There is ASVD of the coronary vessels. There is a small pericardial effusion. Lungs/pleura: There is a focal nodular consolidation in the peripheral aspect of the medial right upper lobe measuring 12 x 11 mm. There is slight volume loss extending to the right suprahilar region. There is a small right pleural effusion. No endobronchial masses. No other lung lesions identified. Upper Abdomen: Images of the upper abdomen demonstrate a 2 cm low-density mass in the right hepatic lobe suspicious for metastatic disease. The adrenal glands appear normal.  There is a small left kidney. Soft Tissues/Bones: There appears to be a chronic fracture of the manubrium best seen on sagittal reconstruction views. Degenerative changes are noted in the thoracic and lumbar spine. No lytic or blastic lesions detected. 1.  Right paratracheal lymph node mass measuring 2.8 x 1.8 cm. There is a medial right upper lobe nodular parenchymal lung opacity measuring 12 x 11 mm. There is a small right pleural effusion. Mild upper mediastinal adenopathy. Findings are compatible with malignancy. 2.  Probable metastatic lesion in the right hepatic lobe measuring 2 cm. 3.  Small pericardial effusion. 4.  Small left kidney. 5.  Thyromegaly with nodular isodense calcified lesion in the thyroid isthmus. Lesion measures 14 mm AP.       CT GUIDED NEEDLE PLACEMENT     Result Date: 9/6/2022  PROCEDURE: CT NEEDLE BIOPSY LIVER PERCUTANEOUS; CT GUIDED NDL PLACEMENT MODERATE CONSCIOUS SEDATION 9/6/2022 HISTORY: ORDERING SYSTEM PROVIDED HISTORY: lung and liver nodule TECHNOLOGIST PROVIDED HISTORY: Biopsy of either right lung nodule or liver nodule, whichever is easiest to get to Reason for exam:->lung and liver nodule What reading provider will be dictating this exam?->CRC; ORDERING SYSTEM PROVIDED HISTORY: liver lesion TECHNOLOGIST PROVIDED HISTORY: Biopsy of either right lung nodule or liver nodule, whichever is easiest to get to Reason for exam:->liver lesion What reading provider will be dictating this exam?->CRC TECHNIQUE: Automated exposure control, iterative reconstruction, and/or weight based adjustment of the mA/kV was utilized to reduce the radiation dose to as low as reasonably achievable. CONTRAST: None SEDATION: 1 mgversed and 50 mcg fentanyl were titrated intravenously for moderate sedation monitored under my direction. Total intraservice time of sedation was 20 minutes. The patient's vital signs were monitored throughout the procedure and recorded in the patient's medical record by the nurse. Mallapati score 2 ASA 2 FLUOROSCOPY DOSE AND TYPE OR TIME AND EXPOSURES: CT DESCRIPTION OF PROCEDURE: Informed consent was obtained after a detailed explanation of the procedure including risks, benefits, and alternatives. Universal protocol was observed. Sterile gowns, masks, hats and gloves utilized for maximal sterile barrier. After informed consent patient is placed supine on the table. Liver mass right lobe was localized with CT scanning. Patient prepped draped sterile fashion. 1% lidocaine was infiltrated for local anesthesia. 20 gauge coaxial needle was advanced into the lesion. Multiple 20 gauge cores were obtained and submitted to pathology. Post biopsy imaging shows air within the lesion suggesting good position of the biopsy needle. No immediate postoperative complication was seen. Dressing was applied. Patient was returned to holding stable condition. FINDINGS: Needle tip located in the right lobe liver mass. Successful CT-guided core biopsy right liver mass.         XR CHEST PORTABLE    Result date: 9/22/2022  EXAMINATION: ONE XRAY VIEW OF THE CHEST 9/22/2022   COMPARISON:   CT chest 20 September 2022       HISTORY:   ORDERING SYSTEM PROVIDED HISTORY: SOB, crackles on physical exam; assess for   volume overload   TECHNOLOGIST PROVIDED HISTORY:   Reason for exam:->SOB, crackles on physical exam; assess for volume overload   What reading provider will be dictating this exam?->CRC       FINDINGS:   Single AP upright portable chest demonstrates S shaped scoliotic curvature. The lungs are mildly hyperexpanded with increased markings at the lung bases   with mild blunting of the left costophrenic angle. Persistent fullness in   the right mediastinal region. There is no evidence of a pneumothorax. The   upper abdomen appears unremarkable. IMPRESSION:   Findings consistent with infiltrative process involving the right mediastinum   as noted on the previous CT chest.  No evidence of a pneumothorax. XR CHEST PORTABLE     Result Date: 9/20/2022  EXAMINATION: ONE XRAY VIEW OF THE CHEST 9/20/2022 9:06 pm COMPARISON: 09/01/2022 HISTORY: ORDERING SYSTEM PROVIDED HISTORY: Shortness of breath TECHNOLOGIST PROVIDED HISTORY: Reason for exam:->Shortness of breath What reading provider will be dictating this exam?->CRC FINDINGS: The lungs are without acute focal process. There is no effusion or pneumothorax. The cardiomediastinal silhouette is without acute process. The osseous structures are without acute process. No acute process. XR CHEST PORTABLE     Result Date: 9/1/2022  EXAMINATION: ONE XRAY VIEW OF THE CHEST 9/1/2022 1:04 pm COMPARISON: None. HISTORY: ORDERING SYSTEM PROVIDED HISTORY: Shortness of breath TECHNOLOGIST PROVIDED HISTORY: Reason for exam:->Shortness of breath What reading provider will be dictating this exam?->CRC FINDINGS: Lungs are clear. The heart is mildly enlarged. There is no pneumothorax. This blunting of the right costophrenic angle. Mild scoliosis of the thoracic spine. Mild cardiomegaly. Mild right pleural effusion. CT NEEDLE BIOPSY LIVER PERCUTANEOUS     Result Date: 9/6/2022  PROCEDURE: CT NEEDLE BIOPSY LIVER PERCUTANEOUS; CT GUIDED NDL PLACEMENT MODERATE CONSCIOUS SEDATION 9/6/2022 HISTORY: ORDERING SYSTEM PROVIDED HISTORY: lung and liver nodule TECHNOLOGIST PROVIDED HISTORY: Biopsy of either right lung nodule or liver nodule, whichever is easiest to get to Reason for exam:->lung and liver nodule What reading provider will be dictating this exam?->CRC; ORDERING SYSTEM PROVIDED HISTORY: liver lesion TECHNOLOGIST PROVIDED HISTORY: Biopsy of either right lung nodule or liver nodule, whichever is easiest to get to Reason for exam:->liver lesion What reading provider will be dictating this exam?->CRC TECHNIQUE: Automated exposure control, iterative reconstruction, and/or weight based adjustment of the mA/kV was utilized to reduce the radiation dose to as low as reasonably achievable. CONTRAST: None SEDATION: 1 mgversed and 50 mcg fentanyl were titrated intravenously for moderate sedation monitored under my direction. Total intraservice time of sedation was 20 minutes. The patient's vital signs were monitored throughout the procedure and recorded in the patient's medical record by the nurse. Mallapati score 2 ASA 2 FLUOROSCOPY DOSE AND TYPE OR TIME AND EXPOSURES: CT DESCRIPTION OF PROCEDURE: Informed consent was obtained after a detailed explanation of the procedure including risks, benefits, and alternatives. Universal protocol was observed. Sterile gowns, masks, hats and gloves utilized for maximal sterile barrier. After informed consent patient is placed supine on the table. Liver mass right lobe was localized with CT scanning. Patient prepped draped sterile fashion. 1% lidocaine was infiltrated for local anesthesia. 20 gauge coaxial needle was advanced into the lesion. Multiple 20 gauge cores were obtained and submitted to pathology.   Post biopsy imaging shows air within the lesion suggesting good position of the biopsy needle. No immediate postoperative complication was seen. Dressing was applied. Patient was returned to holding stable condition. FINDINGS: Needle tip located in the right lobe liver mass. Successful CT-guided core biopsy right liver mass. Resident's Assessment and Plan     Ms. Jazz Koenig is a 72-year-old woman with a PMH of chronic bronchitis, hypertension, hyperlipidemia, hypothyroidism, diastolic heart failure, depression, tobacco abuse, ethanol abuse, and newly diagnosed small cell carcinoma of the lung who is presenting with the following:      Pulmonary    Newly diagnosed small cell lung cancer with metastasis to the liver  9/2 results from CT w/o contrast performed to rule out malignancy; showed focal nodular consolidation in the peripheral right medial RUL measuring [id number] as well as superior mediastinal adenopathy, consistent with malignancy  Liver biopsy results on 9/6 confirmed small cell lung cancer diagnosis  Patient has extensive smoking history  Has lost undisclosed amount of weight within the last few months  Procalcitonin elevated at 3.94  On pyridoxine 50mg QD  PLAN  Consulted palliative care; follow their recs  Follow MRI brain  Following oncology recs; patient will most likely need chemotherapy  (-16, carboplatin)  Consult vascular surgery for mediport placement today  Order echocardiogram to assess heart structure    Hx of chronic bronchitis/COPD  Uses 2L of oxygen at home  On breathing treatment, Proventil, Flonase, and Singulair at home as well  Currently has SoB; has significant wheezing BL  PLAN  Discontinue Brovana   Add Solu-medrol 40mg IV  Add guaifenesin 200mg PRN Q4H  Continue current breathing treatment     Post-obstructive pneumonia vs small cell lung cancer  Currently no positive cultures and no fevers  Procalcitonin elevated at 3.94  WBC slightly elevated at 12.3  CT scan findings show possible post-obstructive pneumonia (air bronchograms and tree-in-bud in R lobe)  PLAN  Begin Zosyn 4.5g IV Q8H and one-dose Vancomycin prophylactically; potential bronch per ID if patient's symptoms worsen      Nephrology    Hypotonic hypovolemic hyponatremia 2/2 intravascular volume depletion vs SIADH 2/2 small cell lung carcinoma  Patient with extensive smoking history  Recently diagnosed with small cell lung cancer with metastasis to the liver  Sodium = 121 currently, slowly improving (110 -> 112 -> 117)  PLAN  Follow BMP Q4H  Follow urine studies  Dry mary carmen diet  Fluid restriction due to possible SIADH  Consulted nephrology, follow recs    Hypokalemia 2/2 poor oral intake  K was 3.3, replaced  Follow BMP  Issued resolved; on potassium chloride PO daily    Lactic acidosis 2/2 intramuscular volume depletion (type A) vs liver metastasis (type B)  Lactic acid = 1.3, trending down from 4.2  Issue resolved      Cardiology    Hx of diastolic heart failure and structural heart disease  Echo on 9/22 showed EF 60-65% with severe concentric LVH  Basal inferior lateral wall aneurysmal dilation  Patient stated in the past she was on Lasix, but not currently taking  No lower extremity edema noted  PLAN  Cardiology recommended:  Cardiac MRI for further evaluation  Rosuvastatin 20mg QD  Increase metoprolol to 50mg QD  Ramipril 2.5mg PO QD - prefer losartan due to cough  ASA 81mg daily only if no brain mets on MRI    Hx of HTN   On amlodipine and Toprol at home  Continued Toprol       Endocrine    Hx of hypothyroidism  TSH = 3.970, Free T4 elevated at 2.15  Home medication = levothyroxine 137mcg tablet  Recent CT chest showed thyromegaly with nodular calcified isodense lesion in the thyroid isthmus   PLAN  Continue current levothyroxine dose      Koffi Garcia, MS-3  Attending physician: Dr. Sarah mistry

## 2022-09-23 NOTE — PROGRESS NOTES
[facility] MD Slava Sanford MD Ria Gauze, MD Linwood Connors, CNP   Snow Fernandez, ESTER Moses, FIDEL  Progress Note    9/23/2022    SUBJECTIVE:   9/22: \"I feel like crap. \"  Generalized weakness, fatigue, myalgias, but denies dyspnea at rest on room air. Ongoing wheezing, coarse audible breath sounds. Denies chest pain. No peripheral swelling. No nausea or vomiting. Ongoing anorexia, no change from her baseline. ROS otherwise unremarkable appetite ok    9/23: Ongoing dyspnea. Ongoing wheezing despite intensification of therapy. Fatigue, malaise. Status post Mediport. BP stable    PROBLEM LIST: G's  Principal Problem:    Small cell lung cancer in adult [facility])  Active Problems:    Hyponatremia    Primary hypertension    Hypothyroidism    GERD (gastroesophageal reflux disease)    Depression    Palliative care by specialist    Goals of care, counseling/discussion    Lung mass  Resolved Problems:    * No resolved hospital problems.  *         DIET:    Diet NPO Exceptions are: Sips of Water with Meds     MEDS (scheduled):    labetalol        midazolam        amLODIPine  5 mg Oral Daily    rosuvastatin  20 mg Oral Nightly    losartan  25 mg Oral Daily    pamidronate (AREDIA) IVPB  90 mg IntraVENous Once    [START ON 9/24/2022] metoprolol succinate  50 mg Oral Daily    vitamin B-6  50 mg Oral Daily    methylPREDNISolone  40 mg IntraVENous Q12H    piperacillin-tazobactam  4,500 mg IntraVENous Q8H    sodium chloride  1 g Oral TID WC    sodium chloride flush  5-40 mL IntraVENous 2 times per day    [Held by provider] enoxaparin  40 mg SubCUTAneous Daily    levothyroxine  137 mcg Oral Daily    montelukast  10 mg Oral QAM    budesonide  0.5 mg Nebulization BID    ipratropium-albuterol  1 ampule Inhalation Q4H WA       MEDS (infusions):   sodium chloride      dextrose         MEDS (prn):  menthol-zinc oxide, guaiFENesin, perflutren lipid microspheres, sodium chloride flush, sodium chloride, ondansetron **OR** ondansetron, polyethylene glycol, acetaminophen **OR** acetaminophen, glucose, dextrose bolus **OR** dextrose bolus, glucagon (rDNA), dextrose, hydrOXYzine pamoate, benzocaine-menthol    PHYSICAL EXAM:     Patient Vitals for the past 24 hrs:   BP Temp Temp src Pulse Resp SpO2 Weight   09/23/22 1700 (!) 154/98 -- -- (!) 102 19 95 % --   09/23/22 1600 (!) 148/99 -- -- (!) 108 26 90 % --   09/23/22 1535 (!) 149/89 98 °F (36.7 °C) Oral (!) 105 21 94 % --   09/23/22 1400 (!) 164/87 -- -- (!) 107 27 93 % --   09/23/22 1344 -- -- -- (!) 102 24 93 % --   09/23/22 1300 (!) 153/67 -- -- (!) 105 24 94 % --   09/23/22 1212 (!) 149/109 -- -- -- -- -- --   09/23/22 1200 (!) 149/109 (!) 46.8 °F (8.2 °C) Oral 100 19 97 % --   09/23/22 1100 (!) 158/142 -- -- (!) 108 20 95 % --   09/23/22 1019 (!) 153/95 -- -- (!) 106 -- -- --   09/23/22 1000 -- -- -- (!) 109 23 96 % --   09/23/22 0900 (!) 153/95 -- -- (!) 108 17 98 % --   09/23/22 0800 136/74 -- Oral (!) 107 21 97 % --   09/23/22 0700 (!) 148/79 -- -- (!) 110 (!) 38 96 % --   09/23/22 0623 -- -- -- -- -- -- 151 lb 8 oz (68.7 kg)   09/23/22 0600 (!) 156/98 -- -- (!) 109 20 97 % --   09/23/22 0500 115/73 -- -- (!) 108 19 96 % --   09/23/22 0408 -- -- -- (!) 113 28 96 % --   09/23/22 0400 (!) 150/92 97.6 °F (36.4 °C) Oral (!) 114 22 92 % --   09/23/22 0300 138/80 -- -- (!) 101 17 96 % --   09/23/22 0200 136/76 -- -- (!) 103 18 93 % --   09/23/22 0100 116/69 -- -- 95 18 97 % --   09/23/22 0000 (!) 142/76 97.2 °F (36.2 °C) Axillary (!) 101 18 97 % --   09/22/22 2300 (!) 81/50 -- -- 89 18 98 % --   09/22/22 2200 101/65 -- -- 97 22 99 % --   09/22/22 2100 (!) 147/62 -- -- (!) 116 (!) 33 97 % --   09/22/22 2037 -- -- -- (!) 109 14 97 % --   09/22/22 2036 -- -- -- (!) 110 23 98 % --   09/22/22 2000 (!) 165/138 97.3 °F (36.3 °C) Axillary (!) 109 17 98 % --   09/22/22 1900 116/86 -- -- 93 17 100 % --   09/22/22 1800 (!) 140/78 -- -- 100 25 99 % -- @      Intake/Output Summary (Last 24 hours) at 9/23/2022 1709  Last data filed at 9/23/2022 1642  Gross per 24 hour   Intake 903.71 ml   Output 1325 ml   Net -421.29 ml         Wt Readings from Last 3 Encounters:   09/23/22 151 lb 8 oz (68.7 kg)   09/01/22 161 lb (73 kg)   04/25/22 160 lb (72.6 kg)       Constitutional:  in no acute distress  HEENT: NC/AT, EOMI, sclera and conjunctiva are clear and anicteric, mucus membranes moist  Neck: Trachea midline, no JVD  Cardiovascular: S1, S2 regular rhythm, no murmur,or rub  Respiratory:  No crackles, no wheeze  Gastrointestinal:  Soft, nontender, nondistended, NABS  Ext: no edema, feet warm  Skin: dry, no rash  Neuro: awake, alert, interactive      DATA:    Recent Labs     09/21/22  0500 09/22/22  0857 09/23/22  0405   WBC 14.8* 12.9* 12.3*   HGB 14.6 11.5 11.7   HCT 40.3 32.5* 33.5*   MCV 81.1 81.5 83.1    293 269     Recent Labs     09/20/22  1843 09/21/22  0032 09/22/22  0533 09/22/22  0857 09/22/22  1225 09/23/22  0405 09/23/22  0933 09/23/22  1213   *   < > 121* 121*   < > 118* 121* 121*   K 4.0   < > 3.5 3.3*   < > 4.0 3.9 4.1   CL 77*   < > 84* 83*   < > 81* 82* 82*   CO2 20*   < > 25 26   < > 27 26 25   MG 1.6   < > 1.8  1.9 1.8  --  1.7  --   --    PHOS  --   --  2.9 2.9  --  2.9  --   --    BUN 11   < > 15 12   < > 10 10 10   CREATININE 0.6   < > 0.7 0.6   < > 0.6 0.7 0.7   ALT 8  --   --   --   --   --   --   --    AST 15  --   --   --   --   --   --   --    BILITOT 0.2  --   --   --   --   --   --   --    ALKPHOS 137*  --   --   --   --   --   --   --     < > = values in this interval not displayed. No results found for: LABPROT      Assessment  1. 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     Rate of correction of [Na+] initially too brisk, has since been slowed down after D5 W drip. With cessation of D5 water drip, sodium is improved somewhat, plateaued.     Recommendations  Continue current fluid restriction  Would not add salt tablets at this point  Continue to follow BMP every 4 hours, adjust treatment as warranted  If after next BMP, remains on appropriate rate improvement, will decrease frequency to every 6 hours  If sodium still at plateau at next BMP, will add salt tablets  goal for correction no more than 6-8 meq in 1th day and no more than 12-16 meq in 48 hours   Continue supportive care    Electronically signed by Lino Paul MD on 9/23/2022

## 2022-09-23 NOTE — CARE COORDINATION
9/23:  Update CM Note:  Pt presented to the ER after her PCP called with her lab work results showed her Na level critically low. Pt was found to be hypotensive, tachycardic & Na level 110. Pt was transferred to MICU. Pt is on 3L/NC at 93% & Iv Decadron. Nephrology & Oncology consulted. 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 Pt unable to lay flat for MRI. Pt is for a Mediport today & possible chemo starting next week. CM spoke with pt & her brother in [facility] bedside today/  Pt's PCP is  & uses the [facility] in [facility]. Pt lives alone in a house with 4 steps to enter. The bed/bathroom are on the 2nd fl with 8 steps. PTA pt independent who normally gets around using the walls & furniture. Pt has 02 2L/NC continuous , nebulizer via [facility]/DME. Pt has a hx of [facility] & is agreeable to use them again. [facility] is following 1st day available is 9/27. She will need Nursing/PT/OT/NA. Will need HHC order. Sw/CM will continue to follow for dc planning. Electronically signed by Ara Lopez RN on 9/23/2022 at 2:25 PM      The Plan for Transition of Care is related to the following treatment goals: [id number]    The Patient and/or patient representative  was provided with a choice of provider and agrees   with the discharge plan. [x] Yes [] No    Freedom of choice list was provided with basic dialogue that supports the patient's individualized plan of care/goals, treatment preferences and shares the quality data associated with the providers.  [x] Yes [] No

## 2022-09-23 NOTE — PLAN OF CARE
Problem: Chronic Conditions and Co-morbidities  Goal: Patient's chronic conditions and co-morbidity symptoms are monitored and maintained or improved  9/23/2022 0048 by Page Roy RN  Outcome: Progressing  9/22/2022 1615 by Manjeet Newman RN  Outcome: Progressing     Problem: Discharge Planning  Goal: Discharge to home or other facility with appropriate resources  Outcome: Not Progressing     Problem: Safety - Adult  Goal: Free from fall injury  9/23/2022 0048 by Page Roy RN  Outcome: Progressing  9/22/2022 1615 by Manjeet Newman RN  Outcome: Progressing     Problem: Skin/Tissue Integrity  Goal: Absence of new skin breakdown  9/23/2022 0048 by Page Roy RN  Outcome: Not Progressing  9/22/2022 1615 by Manjeet Newman RN  Outcome: Progressing     Problem: Pain  Goal: Verbalizes/displays adequate comfort level or baseline comfort level  9/23/2022 0048 by Page Roy RN  Outcome: Not Progressing  9/22/2022 1615 by Manjeet Newman RN  Outcome: Progressing     Problem: ABCDS Injury Assessment  Goal: Absence of physical injury  9/23/2022 0048 by Page Roy RN  Outcome: Progressing  9/22/2022 1615 by Manjeet Newman RN  Outcome: Progressing     Problem: Discharge Planning  Goal: Discharge to home or other facility with appropriate resources  Outcome: Not Progressing     Problem: Skin/Tissue Integrity  Goal: Absence of new skin breakdown  9/23/2022 0048 by Page Roy RN  Outcome: Not Progressing  9/22/2022 1615 by Manjeet Newman RN  Outcome: Progressing     Problem: Pain  Goal: Verbalizes/displays adequate comfort level or baseline comfort level  9/23/2022 0048 by Page Roy RN  Outcome: Not Progressing  9/22/2022 1615 by Manjeet Newman RN  Outcome: Progressing

## 2022-09-23 NOTE — PROGRESS NOTES
[facility]                                Progress note    Subjective:    She feels well, no real confusion and spite of hyponatremia  Complaining of ongoing cough  No other acute complaints currently  Bone scan completed today  She is resting comfortably, somewhat despondent over her diagnosis of small cell lung cancer  Had port placed    Objective:    BP (!) 153/67   Pulse (!) 102   Temp 97.6 °F (36.4 °C) (Oral)   Resp 24   Ht 5' 2\" (1.575 m)   Wt 151 lb 8 oz (68.7 kg)   SpO2 93%   BMI 27.71 kg/m²     In: 953.7 [P.O.:420; I.V.:30]  Out: 1150   In: 953.7   Out: 1150 [Urine:1150]    General appearance: NAD, conversant  HEENT: AT/NC, MMM  Neck: FROM, supple  Lungs: Coarse breath sounds  CV: RRR, no MRGs-left-sided port in place  Vasc: Radial pulses 2+  Abdomen: Soft, non-tender; no masses or HSM  Extremities: No peripheral edema or digital cyanosis  Skin: no rash, lesions or ulcers  Psych: Alert and oriented to person, place and time  Neuro: Alert and interactive     Recent Labs     09/21/22  0500 09/22/22  0857 09/23/22  0405   WBC 14.8* 12.9* 12.3*   HGB 14.6 11.5 11.7   HCT 40.3 32.5* 33.5*    293 269         Recent Labs     09/23/22  0405 09/23/22  0933 09/23/22  1213   * 121* 121*   K 4.0 3.9 4.1   CL 81* 82* 82*   CO2 27 26 25   BUN 10 10 10   CREATININE 0.6 0.7 0.7   CALCIUM 9.4 9.7 9.4         Assessment:    Principal Problem:    Small cell lung cancer in adult [facility])  Active Problems:    Hyponatremia    Primary hypertension    Hypothyroidism    GERD (gastroesophageal reflux disease)    Depression    Palliative care by specialist    Goals of care, counseling/discussion  Resolved Problems:    * No resolved hospital problems.  *      Patient is a 70-year-old female admitted to ICU for  Severe hyponatremia, etiology consistent with metastatic small cell lung cancer   -Monitor labs  -Na+ 110 > 117 > 121 today, being checked every 6 hours  -Nephrology following  -Fluid restriction due to possible SIADH  -Urine studies  -Nephro recs > start D5w at 120 cc/hr to slow rate of correction, ordered and discontinued by ICU team.      Hypokalemia  -K+ 3.3, replaced, 4.1 today   -Monitor BMP     Hypotension  -initially needed IV levo, since has been discontinued     Leukocytosis, likely steroid-induced  -No clinical evidence of infection at this time  -Does not appear toxic, ongoing cough is present however, continue to monitor for developing pneumonia     Hx Diastolic heart failure  -Elevated proBNP 1,284  -Last echo in April 2021 > EF of 55 to 09%, stage I diastolic dysfunction, mild to moderate MR, trace TR  -Monitor I's and O's  -Daily weights     Recently diagnosed with lung cancer with mets to the liver-pathology completed as below  -Pathology noting 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  -Pulmonology following   -Palliative care consulted  -Heme-onc consulted > awaiting input   -Vascular surgery consulted for mediport placement > -placement of port 9/23/2022  -No metastatic disease noted on bone scan 9/22/2022     DVT Prophylaxis Lovenox   PT/OT  Discharge planning   To transfer out of ICU setting    Elmer Silveira MD

## 2022-09-23 NOTE — CONSULTS
Department of Orthopedic Surgery  Resident consult Note    Reason for Consult: Left shoulder pain    HISTORY OF PRESENT ILLNESS:       Patient is a 81 y.o. female who is seen on the floor. Patient is admitted for ongoing small cell lung cancer nodule. 3 days ago patient felt lightheaded, and was transported to hospital by her brother in law. She was discharged from the hospital less than 2 weeks prior. We were consulted after an incidental left proximal humerus fracture was found on a chest x-ray. Patient states to be unaware of fracture. Patient denies any trauma. She admits being aware of the possible lesion on her left shoulder for the past 2 years, but denies any current pain at her left shoulder or anywhere else. She denies numbness tingling of the lower extremities. Her breathing is labored and supported by supplemental oxygen.   Patient has no other orthopedic complaints      Past Medical History:        Diagnosis Date    Bronchitis     Hypertension     Thyroid disease      Past Surgical History:        Procedure Laterality Date    CT NEEDLE BIOPSY LIVER PERCUTANEOUS  9/6/2022    CT NEEDLE BIOPSY LIVER PERCUTANEOUS 9/6/2022 Charlee Garcia MD [facility]    OTHER SURGICAL HISTORY      states had something done right neck area per dr jaeger, might have been an abcess    TYMPANOSTOMY TUBE PLACEMENT       Current Medications:   Current Facility-Administered Medications: menthol-zinc oxide (CALMOSEPTINE) 0.44-20.6 % ointment, , Topical, BID PRN  labetalol (NORMODYNE;TRANDATE) 5 MG/ML injection, , ,   midazolam (VERSED) 2 MG/2ML injection, , ,   amLODIPine (NORVASC) tablet 5 mg, 5 mg, Oral, Daily  rosuvastatin (CRESTOR) tablet 20 mg, 20 mg, Oral, Nightly  losartan (COZAAR) tablet 25 mg, 25 mg, Oral, Daily  pamidronate (AREDIA) 90 mg in sodium chloride 0.9 % 500 mL infusion, 90 mg, IntraVENous, Once  [START ON 9/24/2022] metoprolol succinate (TOPROL XL) extended release tablet 50 mg, 50 mg, Oral, Daily  [START ON 9/24/2022] sodium chloride tablet 2 g, 2 g, Oral, TID WC  guaiFENesin (ROBITUSSIN) 100 MG/5ML oral solution 200 mg, 200 mg, Oral, Q4H PRN  vitamin B-6 (PYRIDOXINE) tablet 50 mg, 50 mg, Oral, Daily  methylPREDNISolone sodium (SOLU-MEDROL) injection 40 mg, 40 mg, IntraVENous, Q12H  perflutren lipid microspheres (DEFINITY) injection 1.65 mg, 1.5 mL, IntraVENous, ONCE PRN  piperacillin-tazobactam (ZOSYN) 4,500 mg in dextrose 5 % 100 mL IVPB ([id number]), 4,500 mg, IntraVENous, Q8H  sodium chloride flush 0.9 % injection 5-40 mL, 5-40 mL, IntraVENous, 2 times per day  sodium chloride flush 0.9 % injection 5-40 mL, 5-40 mL, IntraVENous, PRN  0.9 % sodium chloride infusion, , IntraVENous, PRN  [Held by provider] enoxaparin (LOVENOX) injection 40 mg, 40 mg, SubCUTAneous, Daily  ondansetron (ZOFRAN-ODT) disintegrating tablet 4 mg, 4 mg, Oral, Q8H PRN **OR** ondansetron (ZOFRAN) injection 4 mg, 4 mg, IntraVENous, Q6H PRN  polyethylene glycol (GLYCOLAX) packet 17 g, 17 g, Oral, Daily PRN  acetaminophen (TYLENOL) tablet 650 mg, 650 mg, Oral, Q6H PRN **OR** acetaminophen (TYLENOL) suppository 650 mg, 650 mg, Rectal, Q6H PRN  glucose chewable tablet 16 g, 4 tablet, Oral, PRN  dextrose bolus 10% 125 mL, 125 mL, IntraVENous, PRN **OR** dextrose bolus 10% 250 mL, 250 mL, IntraVENous, PRN  glucagon (rDNA) injection 1 mg, 1 mg, SubCUTAneous, PRN  dextrose 10 % infusion, , IntraVENous, Continuous PRN  levothyroxine (SYNTHROID) tablet 137 mcg, 137 mcg, Oral, Daily  montelukast (SINGULAIR) tablet 10 mg, 10 mg, Oral, QAM  budesonide (PULMICORT) nebulizer suspension 500 mcg, 0.5 mg, Nebulization, BID  ipratropium-albuterol (DUONEB) nebulizer solution 1 ampule, 1 ampule, Inhalation, Q4H WA  hydrOXYzine pamoate (VISTARIL) capsule 25 mg, 25 mg, Oral, TID PRN  benzocaine-menthol (CEPACOL SORE THROAT) lozenge 1 lozenge, 1 lozenge, Oral, Q2H PRN  Allergies:  Patient has no known allergies.     Social History:   TOBACCO:   reports that she has been smoking cigarettes. She started smoking about 50 years ago. She has a 50.00 pack-year smoking history. She has never used smokeless tobacco.  ETOH:   reports no history of alcohol use. DRUGS:   reports no history of drug use. ACTIVITIES OF DAILY LIVING:    OCCUPATION:    Family History:   History reviewed. No pertinent family history. REVIEW OF SYSTEMS:  CONSTITUTIONAL:  negative for  fevers, chills  EYES:  negative for acute visual disturbance  HEENT:  negative for  acute hearing loss, voice change  RESPIRATORY: Positive for  dyspnea, rales, labored breathing  CARDIOVASCULAR:  negative for  acute chest pain  GASTROINTESTINAL:  negative for nausea, vomiting  HEMATOLOGIC/LYMPHATIC:  negative for acute bleeding   MUSCULOSKELETAL: Negative for  myalgias, pain, and bone pain  NEUROLOGICAL:  negative for headaches, dizziness  BEHAVIOR/PSYCH:  negative for increased agitation and anxiety    PHYSICAL EXAM:    VITALS:  BP (!) 145/89   Pulse (!) 103   Temp 98 °F (36.7 °C) (Oral)   Resp 28   Ht 5' 2\" (1.575 m)   Wt 151 lb 8 oz (68.7 kg)   SpO2 96%   BMI 27.71 kg/m²   CONSTITUTIONAL:  awake, alert, cooperative, no apparent distress, and appears stated age  MUSCULOSKELETAL:  left Upper Extremity   Skin intact circumferentially, no visible deformity noted  +TTP over coracoid process   compartments soft and compressible  +AIN/PIN/Ulnar nerve function intact grossly  +Radial pulse, Brisk Cap refill, hand warm and perfused  Sensation intact to touch in radial/ulnar/median nerve distributions to hand  Axillary nerve distribution intact. Able to abduct shoulder with minimal pain. Secondary Exam:   rightUE: No obvious signs of trauma. -TTP to fingers, hand, wrist, forearm, elbow, humerus, shoulder or clavicle. -- Patient able to flex/extend fingers, wrist, elbow and shoulder with active and passive ROM without pain, +2/4 Radial pulse, cap refill <3sec, +AIN/PIN/Radial/Ulnar/Median N, distal sensation grossly intact to C4-T1 dermatomes, compartments soft and compressible. bilateralLE: No obvious signs of trauma. -TTP to foot, ankle, leg, knee, thigh, hip.-- Patient able to flex/extend toes, ankle, knee and hip with active and passive ROM without pain,+2/4 DP & PT pulses, cap refill <3sec, +5/5 PF/DF/EHL, distal sensation grossly intact to L4-S1 dermatomes, compartments soft and compressible. Pelvis: -TTP, -Log roll, -Heel strike     DATA:    CBC:   Lab Results   Component Value Date/Time    WBC 12.3 09/23/2022 04:05 AM    RBC 4.03 09/23/2022 04:05 AM    HGB 11.7 09/23/2022 04:05 AM    HCT 33.5 09/23/2022 04:05 AM    MCV 83.1 09/23/2022 04:05 AM    MCH 29.0 09/23/2022 04:05 AM    MCHC 34.9 09/23/2022 04:05 AM    RDW 13.7 09/23/2022 04:05 AM     09/23/2022 04:05 AM    MPV 9.4 09/23/2022 04:05 AM     PT/INR:    Lab Results   Component Value Date/Time    PROTIME 12.1 09/06/2022 04:29 AM    INR 1.1 09/06/2022 04:29 AM       Radiology Review:  X-ray 9/23/2022 of the left shoulder demonstrates a valgus impacted minimally displaced proximal humeral neck fracture. Glenohumeral joint is well located, with appropriate joint space. No other fracture noted. X-ray 9/01/2022 of chest demonstrate valgus impacted minimally displaced proximal humeral neck fracture. No obvious dislocation noted. Clavicle intact bilaterally. No other fracture noted. X-ray 9/16/2021 of chest demonstrates no acute  fracture of humerus bilaterally. No  glenohumeral joint dislocation bilaterally. No other fracture appreciated. IMPRESSION:  Nondisplaced left proximal humeral neck fracture, most likely pathologic    PLAN:  Nonweightbearing on left upper extremity  Sling for comfort  No other bony pains elsewhere on examination. Bone scan reviewed. Pain management per primary team  No orthopedic intervention at this time.   We will continue monitoring  Follow-up in office in 1 week for assessment  Discuss with attending  I have seen and evaluated the patient and agree with the above assessment and plan on today's visit. I have performed the key components of the history and physical examination with significant findings of left proximal humerus fracture subacute. Sling for comfort/prn. May follow up office 1 month if needed. May use arm as tolerated. I concur with the findings and plan as documented.     Rafaela John MD  9/24/2022

## 2022-09-23 NOTE — PLAN OF CARE
Problem: Chronic Conditions and Co-morbidities  Goal: Patient's chronic conditions and co-morbidity symptoms are monitored and maintained or improved  9/23/2022 1653 by Eulalia Leyva RN  Outcome: Progressing     Problem: Safety - Adult  Goal: Free from fall injury  9/23/2022 1653 by Eulalia Leyva RN  Outcome: Progressing     Problem: Skin/Tissue Integrity  Goal: Absence of new skin breakdown  Description: 1. Monitor for areas of redness and/or skin breakdown  2. Assess vascular access sites hourly  3. Every 4-6 hours minimum:  Change oxygen saturation probe site  4. Every 4-6 hours:  If on nasal continuous positive airway pressure, respiratory therapy assess nares and determine need for appliance change or resting period.   9/23/2022 1653 by Eulalia Leyva RN  Outcome: Progressing     Problem: Pain  Goal: Verbalizes/displays adequate comfort level or baseline comfort level  Outcome: Progressing     Problem: ABCDS Injury Assessment  Goal: Absence of physical injury  9/23/2022 1653 by Eulalia Leyva RN  Outcome: Progressing

## 2022-09-23 NOTE — CONSULTS
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 Jovanna Moya    Requesting Physician: Dr. Beatriz Thompson    Date of Consultation: 9/23/2022    HISTORY OF PRESENT ILLNESS:   Ms. Savannah Hidalgo is a 42-year-old female who is previously not known to [facility]) Card[facility] Phys[facility] in [facility]. 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 Ms. Savannah Hidalgo presented to [facility] ED on 09/20/2022 with complaints of hyponatremia. According to ED documentation she presented as she was hyponatremic per lab results acquired 2 days earlier and was complaining of dizziness and lightheadedness. Please note Ms. Savannah Hidalgo is somewhat of a poor historian it is unable to ascertain the events that led to her presentation and subsequent admission. Her brother-in-law, who was present at the bedside, states over the course of the past 1.5 years she has been having worsening MILLER and was most recently diagnosed with lung CA this past week. He states that she lives alone. He states over the past several months she started to drink alcohol excessively \"cheap vodka\", with no prior Hx EtOH. Ms. Savannah Hidalgo states that she has chronic orthopnea and PND for which she sleeps on 3 pillows. She denies MILLER. She states that she has been having \" chest pain, but just a little bit\" and is further unable to describe. Upon arrival to the ED her VS were oral temperature 97.4-92-18-90 5% RA-142/93. EKG SR.  .  K4. BUN/SCR 11/0.6. Magnesium 1.6.  proBNP 1284. WBC 8.3.  H&H 13.8/38.5. ABG on BiPAP with pH 7.175, PCO2 46.9, PO2 223.8. CTA of the chest was negative for pulmonary embolism and did show enlarged pulmonary arteries and right mediastinal mass with lymphadenopathy. CTA of the abdomen and CXR were unremarkable. She was admitted to the MICU. She underwent a NM full body bone scan on 09/22/2022 that revealed no pattern of metastatic bone disease. However, multiple trauma injuries seen in the right rib cage, left humerus subcapital region and degenerative changes are seen in multiple levels in the thoracolumbar spine. Currently, orthopedic surgery was consulted. Nephrology was also consulted for hyponatremia. She underwent a TTE prior to initiation of carboplatin chemotherapy on 09/22/2022 that was read as showing an EF of 60-65% with basal inferior and inferior lateral walls with aneurysmal dilatation, severe concentric LVH, global longitudinal peak strain 12.5% (low due to basal aneurysm) with normal RV size and function. TAPSE 25 mm. No MR, with suggestion to consider cardiac MRI for further evaluation of LV basal aneurysm. Subsequently, cardiology was consulted for abnormal TTE findings prior to carboplatin chemotherapy initiation. Please note: past medical records were reviewed per electronic medical record (EMR) - see detailed reports under Past Medical/ Surgical History. Past Medical and Surgical History:    HTN  Family Hx premature CAD and SCD  Continued tobacco abuse  COPD on chronic O2 therapy   EtOH  Hypothyroidism, on replacement therapy  S/p tympanostomy tube placement  Small cell lung CA with liver metastasis (diagnosed 09/2022). TTE 09/22/2022 (Dr. Usha Bishop): Mildly dilated left ventricle. Ejection fraction is visually estimated at 60-65%. 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 Severe concentric left ventricular hypertrophy. Indeterminate diastolic function. Global longitudinal peak strain -12.5% (low due basal aneurysm). Normal right ventricular size and function. TAPSE 25 mm. Mild mitral regurgitation is present. No hemodynamically significant aortic stenosis is present. Unable to estimate PA systolic pressure.  No evidence for hemodynamically significant pericardial effusion. Consider cardiac MRI further evaluation of LV basal aneurysm. Medications Prior to admit:  Prior to Admission medications    Medication Sig Start Date End Date Taking? Authorizing Provider   fluticasone-vilanterol (BREO ELLIPTA) 100-25 MCG/INH AEPB inhaler Inhale 1 puff into the lungs daily    Historical Provider, MD   albuterol (PROVENTIL) (2.5 MG/3ML) 0.083% nebulizer solution Take 2.5 mg by nebulization 3 times daily as needed 10/1/21   Historical Provider, MD   fluticasone (FLONASE) 50 MCG/ACT nasal spray 1 spray by Nasal route daily 9/17/21   Historical Provider, MD   montelukast (SINGULAIR) 10 MG tablet Take 10 mg by mouth every morning 10/22/21   Historical Provider, MD   albuterol sulfate  (90 Base) MCG/ACT inhaler Inhale 2 puffs into the lungs every 6 hours as needed for Wheezing 11/11/21   Nicko Schulte MD   potassium chloride (KLOR-CON M) 20 MEQ extended release tablet Take 1 tablet by mouth daily 4/2/21   Warden Nata MD   sertraline (ZOLOFT) 50 MG tablet Take 50 mg by mouth daily    Historical Provider, MD   metoprolol succinate (TOPROL XL) 25 MG extended release tablet Take 25 mg by mouth daily    Historical Provider, MD   amLODIPine (NORVASC) 5 MG tablet Take 5 mg by mouth in the morning and at bedtime    Historical Provider, MD   omeprazole (PRILOSEC) 20 MG delayed release capsule Take 20 mg by mouth daily    Historical Provider, MD   clonazePAM (KLONOPIN) 1 MG tablet Take 0.5 mg by mouth daily as needed for Anxiety.     Historical Provider, MD   levothyroxine (SYNTHROID) 137 MCG tablet Take 137 mcg by mouth Daily     Historical Provider, MD       Current Medications:    Current Facility-Administered Medications: menthol-zinc oxide (CALMOSEPTINE) 0.44-20.6 % ointment, , Topical, BID PRN  magnesium sulfate 2000 mg in 50 mL IVPB premix, 2,000 mg, IntraVENous, Once  labetalol (NORMODYNE;TRANDATE) 5 MG/ML injection, , ,   midazolam (VERSED) 2 MG/2ML injection, , ,   guaiFENesin (ROBITUSSIN) 100 MG/5ML oral solution 200 mg, 200 mg, Oral, Q4H PRN  vitamin B-6 (PYRIDOXINE) tablet 50 mg, 50 mg, Oral, Daily  methylPREDNISolone sodium (SOLU-MEDROL) injection 40 mg, 40 mg, IntraVENous, Q12H  perflutren lipid microspheres (DEFINITY) injection 1.65 mg, 1.5 mL, IntraVENous, ONCE PRN  metoprolol succinate (TOPROL XL) extended release tablet 25 mg, 25 mg, Oral, Daily  piperacillin-tazobactam (ZOSYN) 4,500 mg in dextrose 5 % 100 mL IVPB (Ouus1Ewb), 4,500 mg, IntraVENous, Q8H  sodium chloride tablet 1 g, 1 g, Oral, TID WC  sodium chloride flush 0.9 % injection 5-40 mL, 5-40 mL, IntraVENous, 2 times per day  sodium chloride flush 0.9 % injection 5-40 mL, 5-40 mL, IntraVENous, PRN  0.9 % sodium chloride infusion, , IntraVENous, PRN  [Held by provider] enoxaparin (LOVENOX) injection 40 mg, 40 mg, SubCUTAneous, Daily  ondansetron (ZOFRAN-ODT) disintegrating tablet 4 mg, 4 mg, Oral, Q8H PRN **OR** ondansetron (ZOFRAN) injection 4 mg, 4 mg, IntraVENous, Q6H PRN  polyethylene glycol (GLYCOLAX) packet 17 g, 17 g, Oral, Daily PRN  acetaminophen (TYLENOL) tablet 650 mg, 650 mg, Oral, Q6H PRN **OR** acetaminophen (TYLENOL) suppository 650 mg, 650 mg, Rectal, Q6H PRN  glucose chewable tablet 16 g, 4 tablet, Oral, PRN  dextrose bolus 10% 125 mL, 125 mL, IntraVENous, PRN **OR** dextrose bolus 10% 250 mL, 250 mL, IntraVENous, PRN  glucagon (rDNA) injection 1 mg, 1 mg, SubCUTAneous, PRN  dextrose 10 % infusion, , IntraVENous, Continuous PRN  levothyroxine (SYNTHROID) tablet 137 mcg, 137 mcg, Oral, Daily  montelukast (SINGULAIR) tablet 10 mg, 10 mg, Oral, QAM  budesonide (PULMICORT) nebulizer suspension 500 mcg, 0.5 mg, Nebulization, BID  ipratropium-albuterol (DUONEB) nebulizer solution 1 ampule, 1 ampule, Inhalation, Q4H WA  hydrOXYzine pamoate (VISTARIL) capsule 25 mg, 25 mg, Oral, TID PRN  benzocaine-menthol (CEPACOL SORE THROAT) lozenge 1 lozenge, 1 lozenge, Oral, Q2H PRN    Allergies:  Patient has no known allergies. Social History:    Currently smokes 1 pack a day has done so much of her life. Drinks vodka several times a day over the past several months  Denies illicit drug use  Denies routine caffeine intake. Family History:   Father with initial MI in his 46s. Son  in his 35s from reported congenital cardiomyopathy. REVIEW OF SYSTEMS:     Please see HPI. Further ROS unable to be obtained as the patient is somewhat of a poor historian and lives alone. PHYSICAL EXAM:   BP (!) 153/95   Pulse (!) 108   Temp 97.6 °F (36.4 °C) (Oral)   Resp 17   Ht 5' 2\" (1.575 m)   Wt 151 lb 8 oz (68.7 kg)   SpO2 98%   BMI 27.71 kg/m²   CONST:  Well developed, well nourished elderly  female who appears stated age. Awake, alert, cooperative, no apparent distress  HEENT:   Head- Normocephalic, atraumatic   Eyes- Conjunctivae pink, anicteric  Throat- Oral mucosa pink and moist  Neck-  No stridor, trachea midline, no jugular venous distention. No adenopathy   CHEST: Chest symmetrical and non-tender to palpation. No accessory muscle use or intercostal retractions  RESPIRATORY: Lung sounds -diminished throughout fields   CARDIOVASCULAR:     No carotid bruit  Heart Inspection- shows no noted pulsations  Heart Palpation- no heaves or thrills; PMI is non-displaced   Heart Ausculation- Regular rate and rhythm, no murmur. No s3, s4 or rub   PV: No lower extremity edema. No varicosities. Pedal pulses palpable, no clubbing or cyanosis   ABDOMEN: Soft, non-tender to light palpation. Bowel sounds present. No palpable masses no organomegaly; no abdominal bruit  MS: Good muscle strength and tone. No atrophy or abnormal movements. : Deferred  SKIN: Warm and dry no statis dermatitis or ulcers   NEURO / PSYCH: Oriented to person, place and time. Speech clear and inappropriate. Follows all commands.  Pleasant affect     DATA:    ECG: As above  Tele strips: SR  Diagnostic:      Intake/Output Summary (Last 24 hours) at 9/23/2022 0959  Last data filed at 9/23/2022 0614  Gross per 24 hour   Intake 853.71 ml   Output 1050 ml   Net -196.29 ml       Labs:   CBC:   Recent Labs     09/22/22  0857 09/23/22  0405   WBC 12.9* 12.3*   HGB 11.5 11.7   HCT 32.5* 33.5*    269     BMP:   Recent Labs     09/23/22  0037 09/23/22  0405   * 118*   K 4.0 4.0   CO2 26 27   BUN 12 10   CREATININE 0.7 0.6   LABGLOM >60 >60   CALCIUM 8.9 9.4     Mag:   Recent Labs     09/22/22  0857 09/23/22  0405   MG 1.8 1.7     Phos:   Recent Labs     09/22/22  0857 09/23/22  0405   PHOS 2.9 2.9     TSH:   Recent Labs     09/21/22  0032   TSH 3.970     HgA1c:   Lab Results   Component Value Date    LABA1C 5.6 09/21/2022   FASTING LIPID PANEL:  Lab Results   Component Value Date/Time    CHOL 180 09/21/2022 08:15 AM    HDL 69 09/21/2022 08:15 AM    LDLCALC 92 09/21/2022 08:15 AM    TRIG 96 09/21/2022 08:15 AM     LIVER PROFILE:  Recent Labs     09/20/22  1843   AST 15   ALT 8   LABALBU 4.3      Latest Reference Range & Units 9/1/22 10:23 9/20/22 18:43 9/20/22 21:05   Troponin, High Sensitivity 0 - 9 ng/L 11 (H) 7 7   (H): Data is abnormally high    09/20/2022 CXR:  No acute process    09/20/2022 CTA Abdomen and Pelvis:  No acute intra-abdominal or intrapelvic pathology. Atrophy of the upper pole of the left kidney. Chronic vascular changes as described above.    09/20/2022 CTA Chest:   No evidence of pulmonary embolism. Enlarged pulmonary arteries, correlate clinically for pulmonary artery hypertension. Infiltrative right mediastinal mass with lymphadenopathy or a large  conglomerate of lymph nodes involving the right aspect of the mediastinum and  right hilum with narrowing of the right mainstem bronchus. This finding  significantly worsened from the prior examination dated September 3, 2022.   Partial atelectasis in the right middle lobe.     09/22/2022 CXR:  Findings consistent with infiltrative process involving the right mediastinum as noted on the previous CT chest.  No evidence of a pneumothorax.    09/22/2022 NM Whole Body Bone Scan:  1. No pattern of metastatic bone disease. 2.  Multiple trauma injuries are seen in the right ribcage, left humerus subcapital region, and degenerative changes are seen in multiple levels in  the thoracolumbar spine. 09/22/2022 TTE (Dr. Chong Aguilar):  Mildly dilated left ventricle. Ejection fraction is visually estimated at 60-65%. 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 Severe concentric left ventricular hypertrophy. Indeterminate diastolic function. Global longitudinal peak strain -12.5% (low due basal aneurysm)  Normal right ventricular size and function. TAPSE 25 mm. Mild mitral regurgitation is present. No hemodynamically significant aortic stenosis is present. Unable to estimate PA systolic pressure. No evidence for hemodynamically significant pericardial effusion. Consider cardiac MRI further evaluation of LV basal aneurysm. ASSESSMENT:  Incidentally identified basal inferior LV aneurysm - no thrombus and normal overall EF. No angina. hs-Angel negative x2  Severe COPD on home oxygen 3LPM  Active smoker  Metastatic SCLC of the left lung (liver - pending brain MRI)  Hyponatremia  Alcohol abuse  HTN     RECOMMENDATIONS:  Ideally this would be worked up with a CMR and a coronary angiogram but in the absence of angina and no ACS and normal EF this would all be elective and unlikely to  acutely given her stage IV cancer  Recommend rosuvastatin 20 mg once daily  Increase metop to 50 mg QD  Ramipril 2.5 mg PO QD  Aspirin 81 mg daily only if no brain mets on MRI  Above as discussed with Dr. Anusha David  We will sign off at this time.   Please call with questions or concerns    Electronically signed by FELIPA Kearns CNP on 9/23/2022 at 9:59 AM      PHYSICIAN ADDENDUM:  I independently contributed to, made amendments as needed, and finalized the above documentation. I contributed >50% to the overall encounter time including review of testing, formulating a plan with the APC and communication with the other care team members and family.      Critical care time 35 minutes spent reviewing data, documentation, exam, formulating a therapeutic plan and discussing with family/care team.     Shirley Reid MD, [facility]  Interventional Cardiology/Structural Heart Disease  Office: [phone]  Coordinator: Paige Jimenez

## 2022-09-23 NOTE — ANESTHESIA PRE PROCEDURE
Department of Anesthesiology  Preprocedure Note       Name:  Gerald Cornejo   Age:  81 y.o.  :  1950                                          MRN:  [id number]         Date:  2022      Surgeon: Channing Alejo):  Lawrence Sandhoff, MD    Procedure: Procedure(s): MEDI-PORT INSERTION    Medications prior to admission:   Prior to Admission medications    Medication Sig Start Date End Date Taking? Authorizing Provider   fluticasone-vilanterol (BREO ELLIPTA) 100-25 MCG/INH AEPB inhaler Inhale 1 puff into the lungs daily    Historical Provider, MD   albuterol (PROVENTIL) (2.5 MG/3ML) 0.083% nebulizer solution Take 2.5 mg by nebulization 3 times daily as needed 10/1/21   Historical Provider, MD   fluticasone (FLONASE) 50 MCG/ACT nasal spray 1 spray by Nasal route daily 21   Historical Provider, MD   montelukast (SINGULAIR) 10 MG tablet Take 10 mg by mouth every morning 10/22/21   Historical Provider, MD   albuterol sulfate  (90 Base) MCG/ACT inhaler Inhale 2 puffs into the lungs every 6 hours as needed for Wheezing 21   Brina Galeana MD   potassium chloride (KLOR-CON M) 20 MEQ extended release tablet Take 1 tablet by mouth daily 21   Janiya William MD   sertraline (ZOLOFT) 50 MG tablet Take 50 mg by mouth daily    Historical Provider, MD   metoprolol succinate (TOPROL XL) 25 MG extended release tablet Take 25 mg by mouth daily    Historical Provider, MD   amLODIPine (NORVASC) 5 MG tablet Take 5 mg by mouth in the morning and at bedtime    Historical Provider, MD   omeprazole (PRILOSEC) 20 MG delayed release capsule Take 20 mg by mouth daily    Historical Provider, MD   clonazePAM (KLONOPIN) 1 MG tablet Take 0.5 mg by mouth daily as needed for Anxiety.     Historical Provider, MD   levothyroxine (SYNTHROID) 137 MCG tablet Take 137 mcg by mouth Daily     Historical Provider, MD       Current medications:    Current Facility-Administered Medications   Medication Dose Route Frequency Provider Last Rate Last Admin    menthol-zinc oxide (CALMOSEPTINE) 0.44-20.6 % ointment   Topical BID PRN Ryan Flannery MD   Given at 09/23/22 0402    labetalol (NORMODYNE;TRANDATE) 5 MG/ML injection             midazolam (VERSED) 2 MG/2ML injection             amLODIPine (NORVASC) tablet 5 mg  5 mg Oral Daily Fidelia L Gonzalez, DO   5 mg at 09/23/22 1212    guaiFENesin (ROBITUSSIN) 100 MG/5ML oral solution 200 mg  200 mg Oral Q4H PRN Fidelia L Gonzalez, DO   200 mg at 09/23/22 1038    vitamin B-6 (PYRIDOXINE) tablet 50 mg  50 mg Oral Daily Fiedlia L Gonzalez, DO   50 mg at 09/23/22 1015    methylPREDNISolone sodium (SOLU-MEDROL) injection 40 mg  40 mg IntraVENous Q12H Fidelia L Gonzalez, DO   40 mg at 09/23/22 1038    perflutren lipid microspheres (DEFINITY) injection 1.65 mg  1.5 mL IntraVENous ONCE PRN Fidelia L Gonzalez, DO        metoprolol succinate (TOPROL XL) extended release tablet 25 mg  25 mg Oral Daily Fidelia L Gonzalez, DO   25 mg at 09/23/22 1019    piperacillin-tazobactam (ZOSYN) 4,500 mg in dextrose 5 % 100 mL IVPB (Eoro5Ynq)  4,500 mg IntraVENous Q8H Fidelia L Gonzalez, DO   Stopped at 09/23/22 2997    sodium chloride tablet 1 g  1 g Oral TID SEBASTIAN Fletcher MD   1 g at 09/23/22 1212    sodium chloride flush 0.9 % injection 5-40 mL  5-40 mL IntraVENous 2 times per day Benigno Aase, MD   10 mL at 09/23/22 1018    sodium chloride flush 0.9 % injection 5-40 mL  5-40 mL IntraVENous PRN Benigno Aase, MD   10 mL at 09/21/22 1137    0.9 % sodium chloride infusion   IntraVENous PRN Benigno Aase, MD        [Held by provider] enoxaparin (LOVENOX) injection 40 mg  40 mg SubCUTAneous Daily Benigno Aase, MD   40 mg at 09/22/22 0908    ondansetron (ZOFRAN-ODT) disintegrating tablet 4 mg  4 mg Oral Q8H PRN Benigno Aase, MD        Or    ondansetron [facility]) injection 4 mg  4 mg IntraVENous Q6H PRN Benigno Aase, MD        polyethylene glycol [facility]) packet 17 g  17 g Oral Daily PRN Kenyatta DAVIS Delfino Boggs MD        acetaminophen (TYLENOL) tablet 650 mg  650 mg Oral Q6H PRN Tj Hennessy MD        Or    acetaminophen (TYLENOL) suppository 650 mg  650 mg Rectal Q6H PRN Tj Hennessy MD        glucose chewable tablet 16 g  4 tablet Oral PRN Tj Hennessy MD        dextrose bolus 10% 125 mL  125 mL IntraVENous PRN Tj Hennessy MD        Or    dextrose bolus 10% 250 mL  250 mL IntraVENous PRN Tj Hennessy MD        glucagon (rDNA) injection 1 mg  1 mg SubCUTAneous PRN Tj Hennessy MD        dextrose 10 % infusion   IntraVENous Continuous PRN Tj Hennessy MD        levothyroxine (SYNTHROID) tablet 137 mcg  137 mcg Oral Daily Tj Hennessy MD   137 mcg at 09/23/22 0539    montelukast (SINGULAIR) tablet 10 mg  10 mg Oral QAM Tj Hennessy MD   10 mg at 09/23/22 1014    budesonide (PULMICORT) nebulizer suspension 500 mcg  0.5 mg Nebulization BID Fidelia L Gonzalez, DO   500 mcg at 09/23/22 6560    ipratropium-albuterol (DUONEB) nebulizer solution 1 ampule  1 ampule Inhalation Q4H WA Fidelia L Gonzalez, DO   1 ampule at 09/23/22 3744    hydrOXYzine pamoate (VISTARIL) capsule 25 mg  25 mg Oral TID PRN Estephania Rahul, DO   25 mg at 09/22/22 2129    benzocaine-menthol (CEPACOL SORE THROAT) lozenge 1 lozenge  1 lozenge Oral Q2H PRN Julienne Felty, DO   1 lozenge at 09/22/22 1900       Allergies:  No Known Allergies    Problem List:    Patient Active Problem List   Diagnosis Code    Congestive heart failure of unknown etiology ([facility] [address] I50.9    Congestive heart failure due to cardiomyopathy (HCC) I50.9, I42.9    Hypoxia R09.02    Simple chronic bronchitis (HCC) J41.0    Hyponatremia E87.1    Primary hypertension I10    Hypothyroidism E03.9    GERD (gastroesophageal reflux disease) K21.9    Depression F32. A    Palliative care by specialist Z51.5    Goals of care, counseling/discussion Z71.89    [facility] cell lung cancer in [facility]) C34.90       Past Medical History: Diagnosis Date    Bronchitis     Hypertension     Thyroid disease        Past Surgical History:        Procedure Laterality Date    CT NEEDLE BIOPSY LIVER PERCUTANEOUS  9/6/2022    CT NEEDLE BIOPSY LIVER PERCUTANEOUS 9/6/2022 Emory Clarke MD [facility] CT    OTHER SURGICAL HISTORY      states had something done right neck area per dr jaeger, might have been an abcess    TYMPANOSTOMY TUBE PLACEMENT         Social History:    Social History     Tobacco Use    Smoking status: Every Day     Packs/day: 1.00     Years: 50.00     Pack years: 50.00     Types: Cigarettes     Start date: 0    Smokeless tobacco: Never   Substance Use Topics    Alcohol use: No                                Ready to quit: Not Answered  Counseling given: Not Answered      Vital Signs (Current):   Vitals:    09/23/22 1100 09/23/22 1200 09/23/22 1212 09/23/22 1300   BP: (!) 158/142 (!) 149/109 (!) 149/109 (!) 153/67   Pulse: (!) 108 100  (!) 105   Resp: 20 19 24   Temp:       TempSrc:  Oral     SpO2: 95% 97%  94%   Weight:       Height:                                                  BP Readings from Last 3 Encounters:   09/23/22 (!) 153/67   09/07/22 127/74   04/25/22 115/74       NPO Status:  Date of last solid food consumption: 09/22/2022     Time of last solid consumption: 1730    Date of last liquid consumption: 09/22/2022    Time of last liquid consumption: 2359                                                                               BMI:   Wt Readings from Last 3 Encounters:   09/23/22 151 lb 8 oz (68.7 kg)   09/01/22 161 lb (73 kg)   04/25/22 160 lb (72.6 kg)     Body mass index is 27.71 kg/m².     CBC:   Lab Results   Component Value Date/Time    WBC 12.3 09/23/2022 04:05 AM    RBC 4.03 09/23/2022 04:05 AM    HGB 11.7 09/23/2022 04:05 AM    HCT 33.5 09/23/2022 04:05 AM    MCV 83.1 09/23/2022 04:05 AM    RDW 13.7 09/23/2022 04:05 AM     09/23/2022 04:05 AM       CMP:   Lab Results   Component Value Date/Time     09/23/2022 09:33 AM    K 3.9 09/23/2022 09:33 AM    CL 82 09/23/2022 09:33 AM    CO2 26 09/23/2022 09:33 AM    BUN 10 09/23/2022 09:33 AM    CREATININE 0.7 09/23/2022 09:33 AM    GFRAA >60 09/23/2022 09:33 AM    LABGLOM >60 09/23/2022 09:33 AM    GLUCOSE 104 09/23/2022 09:33 AM    PROT 7.1 09/20/2022 06:43 PM    CALCIUM 9.7 09/23/2022 09:33 AM    BILITOT 0.2 09/20/2022 06:43 PM    ALKPHOS 137 09/20/2022 06:43 PM    AST 15 09/20/2022 06:43 PM    ALT 8 09/20/2022 06:43 PM       POC Tests: No results for input(s): POCGLU, POCNA, POCK, POCCL, POCBUN, POCHEMO, POCHCT in the last 72 hours. Coags:   Lab Results   Component Value Date/Time    PROTIME 12.1 09/06/2022 04:29 AM    INR 1.1 09/06/2022 04:29 AM       HCG (If Applicable): No results found for: PREGTESTUR, PREGSERUM, HCG, HCGQUANT     ABGs: No results found for: PHART, PO2ART, CBO8JSK, THA4HIP, BEART, Z1LLZRNX     Type & Screen (If Applicable):  No results found for: LABABO, LABRH    Drug/Infectious Status (If Applicable):  No results found for: HIV, HEPCAB    COVID-19 Screening (If Applicable):   Lab Results   Component Value Date/Time    COVID19 Not Detected 09/21/2022 05:00 AM           Anesthesia Evaluation  Patient summary reviewed and Nursing notes reviewed  Airway: Mallampati: IV  TM distance: >3 FB   Neck ROM: limited  Mouth opening: < 3 FB   Dental:      Comment: Unable to fully visualize due to limited mouth opening    Pulmonary:   (+) COPD (2L O2 at night):  shortness of breath: chronic,  rhonchi:bilateral wheezes (Expiratory): scattered current smoker                          ROS comment: Chronic bronchitis  Small cell carcinoma    CXR 09/21/2022  FINDINGS:  Single AP upright portable chest demonstrates S shaped scoliotic curvature.   The lungs are mildly hyperexpanded with increased markings at the lung bases  with mild blunting of the left costophrenic angle.  Persistent fullness in  the right mediastinal region. Jose F Guevara is no evidence of a pneumothorax.  The  upper abdomen appears unremarkable. Cardiovascular:  Exercise tolerance: poor (<4 METS),   (+) hypertension: moderate, CHF:,       ECG reviewed  Rhythm: regular  Rate: normal  Echocardiogram reviewed         Beta Blocker:  Dose within 24 Hrs      ROS comment: 2D Echocardiogram 09/20/2022   Summary   Mildly dilated left ventricle. Ejection fraction is visually estimated at 60-65%. 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 Severe concentric left ventricular hypertrophy. Indeterminate diastolic function. Global longitudinal peak strain -12.5% (low due basal aneurysm)   Normal right ventricular size and function. TAPSE 25 mm. Mild mitral regurgitation is present. No hemodynamically significant aortic stenosis is present. Unable to estimate PA systolic pressure. No evidence for hemodynamically significant pericardial effusion. Consider cardiac MRI further evaluation of LV basal aneurysm. Signature      ----------------------------------------------------------------   Electronically signed by Bhavna Cooley MD(Interpreting   physician) on 09/22/2022 06:37 PM    EKG 09/20/2022  Normal sinus rhythm  Possible Left atrial enlargement  Nonspecific ST abnormality  Abnormal ECG  When compared with ECG of 01-SEP-2022 10:26,  Significant changes have occurred  Confirmed by Emerson Emanuel ([id number]) on 9/21/2022 8:49:55 AM    Specimen Collected: 09/20/22 18:50 EDT           Neuro/Psych:   (+) depression/anxiety             GI/Hepatic/Renal:   (+) GERD:,          ROS comment: Hx alcoholism. Endo/Other:    (+) hypothyroidism::., electrolyte abnormalities (Hyponatremia, last sodium 121), malignancy/cancer (Small cell carcinoma). Abdominal:             Vascular: Other Findings:           Anesthesia Plan      MAC     ASA 4     (Pt unable to tolerate laying flat, on 4 L NC)  Induction: intravenous.     MIPS: Postoperative opioids intended and Prophylactic antiemetics administered. Anesthetic plan and risks discussed with patient. Use of blood products discussed with patient whom consented to blood products. Plan discussed with CRNA and attending.                     Luis Piedra RN SRNA  9/23/2022

## 2022-09-23 NOTE — HOME CARE
[address] [facility] referral received, awaiting final orders. Awaiting return call to verify demos. Will follow. Trent Menjivar LPN [id number] [facility].

## 2022-09-23 NOTE — OP NOTE
Operative Note      Patient: Deann Holstein  YOB: 1950  MRN: [id number]    DATE OF PROCEDURE: 9/23/2022     SURGEON:  Tita Barr M.D.     ASSISTANT: Vanessa Solares and Duy Antony     PREOPERATIVE DIAGNOSIS: cancer. POSTOPERATIVE DIAGNOSIS: Same    OPERATION: Insertion of left internal jugular vein central venous catheter with subcutaneous reservoir (87401), fluoroscopy (53721-84)    Findings: Widely patent left internal jugular vein. Good compressibility no echogenic material.      ANESTHESIA: MAC and local     ESTIMATED BLOOD LOSS: Minimal     COMPLICATIONS: None    DESCRIPTION OF PROCEDURE: The patient was identified and the procedure was confirmed. The left neck and chest were prepped and draped in the usual sterile fashion. Next, 1% lidocaine mixed with 0.25% Marcaine was used for local anesthesia. The left internal jugular vein was percutaneously entered and a guidewire was advanced into the superior vena cava under fluoroscopic guidance. A small incision was made around the wire. A second skin incision was made below the clavicle and a pocket was made in the subcutaneous tissue for the reservoir. The catheter was pulled through a subcutaneous tunnel from the inferior chest incision to the neck incision. The introducer was passed over the wire then the catheter was passed through the introducer and the tip was positioned in the superior vena cava right atrial junction under fluoroscopic guidance. The catheter was connected to the reservoir and the locking hub was engaged. The port was noted to withdrawal and flush blood easily and was flushed with heparinized saline solution. The reservoir was secured in the pocket with Prolene sutures. Hemostasis was assured and the incisions were irrigated with antibiotic saline solution. The incisions were approximated with Vicryl sutures and Dermabond was applied to the incisions in the operating room.   Needle, sponge, and instrument counts were reported as correct times two. The patient tolerated the procedure and was transferred to the recovery area in satisfactory condition. Specimens:   * No specimens in log *    Implants:  Implant Name Type Inv. Item Serial No.  Lot No. LRB No. Used Action   PORT INFUS 8FR PWR INJ CT FOR VASC ACCS CATH - [id number]  PORT INFUS 8FR PWR INJ CT FOR VASC ACCS CATH  [employer]- [id number] Left 1 Implanted         Drains:   [REMOVED] Urinary Catheter 09/20/22 Li-Temperature (Removed)   Catheter Indications Need for fluid volume management of the critically ill patient in a critical care setting 09/21/22 0857   Site Assessment Pink; No urethral drainage 09/21/22 0857   Urine Color Yellow 09/21/22 0857   Urine Appearance Clear 09/21/22 0857   Collection Container Standard 09/21/22 0857   Securement Method Tape 09/21/22 0857   Catheter Care Completed Yes 09/21/22 0857   Catheter Best Practices  Drainage tube clipped to bed;Catheter secured to thigh; Tamper seal intact; Bag below bladder;Bag not on floor; Lack of dependent loop in tubing;Drainage bag less than half full 09/21/22 0857   Status Draining;Patent 09/21/22 0857   Output (mL) 100 mL 09/21/22 1430       [REMOVED] External Urinary Catheter (Removed)   Output (mL) 0 mL 09/22/22 1000       Electronically signed by Olga Scott MD on 9/23/2022 at 3:07 PM

## 2022-09-23 NOTE — PROGRESS NOTES
Attempted to speak with and examine patient. She was not in the room at this time and was reported to be getting a Mediport at this time. Will attempt to revaluate at a later time.

## 2022-09-23 NOTE — ACP (ADVANCE CARE PLANNING)
Advance Care Planning   Healthcare Decision Maker:    Primary Decision Maker: Memo Agueda - [phone]    Secondary Decision Maker: Elena Diaz - Niece/Nephew - [phone]    Supplemental (Other) Decision Maker: Briana Velez - Other - [phone]    Click here to complete Healthcare Decision Makers including selection of the Healthcare Decision Maker Relationship (ie \"Primary\"). Paper work placed in chart & copies given to brother-in-law.     Electronically signed by Valentin De La Rosa RN on 9/23/2022 at 2:31 PM

## 2022-09-23 NOTE — PROGRESS NOTES
Subjective: The patient is awake and alert. No problems overnight. Had L mediport placed this morning. She is now on MICU. Has stable SOB, cough, dyspnea. Denies chest pain, angina, abdominal discomfort. No nausea or vomiting. Tolerating diet. Objective:    BP (!) 154/98   Pulse (!) 102   Temp 98 °F (36.7 °C) (Oral)   Resp 19   Ht 5' 2\" (1.575 m)   Wt 151 lb 8 oz (68.7 kg)   SpO2 95%   BMI 27.71 kg/m²     General: NAD  HEENT: No thrush or mucositis, EOMI  Heart:  Sinus tachy, no murmurs, gallops, or rubs. Lungs:  Decreased BS bilaterally with significant wheezing and rhonchi. No rales.    Abd: BS present, nontender, nondistended, no masses  Extrem:  No clubbing, cyanosis, or edema  Lymphatics: No palpable adenopathy in cervical and supraclavicular regions  Skin: Intact, no petechia or purpura    CBC with Differential:    Lab Results   Component Value Date/Time    WBC 12.3 09/23/2022 04:05 AM    RBC 4.03 09/23/2022 04:05 AM    HGB 11.7 09/23/2022 04:05 AM    HCT 33.5 09/23/2022 04:05 AM     09/23/2022 04:05 AM    MCV 83.1 09/23/2022 04:05 AM    MCH 29.0 09/23/2022 04:05 AM    MCHC 34.9 09/23/2022 04:05 AM    RDW 13.7 09/23/2022 04:05 AM    METASPCT 0.9 09/23/2022 04:05 AM    LYMPHOPCT 2.6 09/23/2022 04:05 AM    MONOPCT 2.8 09/23/2022 04:05 AM    MYELOPCT 0.9 09/21/2022 05:00 AM    BASOPCT 0.1 09/23/2022 04:05 AM    MONOSABS 0.00 09/23/2022 04:05 AM    LYMPHSABS 0.37 09/23/2022 04:05 AM    EOSABS 0.00 09/23/2022 04:05 AM    BASOSABS 0.00 09/23/2022 04:05 AM     CMP:    Lab Results   Component Value Date/Time     09/23/2022 12:13 PM    K 4.1 09/23/2022 12:13 PM    CL 82 09/23/2022 12:13 PM    CO2 25 09/23/2022 12:13 PM    BUN 10 09/23/2022 12:13 PM    CREATININE 0.7 09/23/2022 12:13 PM    GFRAA >60 09/23/2022 12:13 PM    LABGLOM >60 09/23/2022 12:13 PM    GLUCOSE 102 09/23/2022 12:13 PM    PROT 7.1 09/20/2022 06:43 PM    LABALBU 4.3 09/20/2022 06:43 PM    CALCIUM 9.4 09/23/2022 12:13 PM    BILITOT 0.2 09/20/2022 06:43 PM    ALKPHOS 137 09/20/2022 06:43 PM    AST 15 09/20/2022 06:43 PM    ALT 8 09/20/2022 06:43 PM          Current Facility-Administered Medications:     menthol-zinc oxide (CALMOSEPTINE) 0.44-20.6 % ointment, , Topical, BID PRN, Sharon Copeland MD, Given at 09/23/22 0402    labetalol (NORMODYNE;TRANDATE) 5 MG/ML injection, , , ,     midazolam (VERSED) 2 MG/2ML injection, , , ,     amLODIPine (NORVASC) tablet 5 mg, 5 mg, Oral, Daily, Fidelia L Gonzalez, DO, 5 mg at 09/23/22 1212    rosuvastatin (CRESTOR) tablet 20 mg, 20 mg, Oral, Nightly, Fidelia L Gonzalez, DO    losartan (COZAAR) tablet 25 mg, 25 mg, Oral, Daily, Fidelia L Gonzalez, DO, 25 mg at 09/23/22 1550    pamidronate (AREDIA) 90 mg in sodium chloride 0.9 % 500 mL infusion, 90 mg, IntraVENous, Once, Fidelia L Gonzalez, DO    [START ON 9/24/2022] metoprolol succinate (TOPROL XL) extended release tablet 50 mg, 50 mg, Oral, Daily, Fidelia L Gonzalez, DO    guaiFENesin (ROBITUSSIN) 100 MG/5ML oral solution 200 mg, 200 mg, Oral, Q4H PRN, Fidelia L Gonzalez, DO, 200 mg at 09/23/22 1038    vitamin B-6 (PYRIDOXINE) tablet 50 mg, 50 mg, Oral, Daily, Fidelia L Gonzalez, DO, 50 mg at 09/23/22 1015    methylPREDNISolone sodium (SOLU-MEDROL) injection 40 mg, 40 mg, IntraVENous, Q12H, Fidelia L Gonzalez, DO, 40 mg at 09/23/22 1038    perflutren lipid microspheres (DEFINITY) injection 1.65 mg, 1.5 mL, IntraVENous, ONCE PRN, Fidelia L Gonzalez, DO    piperacillin-tazobactam (ZOSYN) 4,500 mg in dextrose 5 % 100 mL IVPB ([id number]), 4,500 mg, IntraVENous, Q8H, Fidelia Gonzalez DO, Stopped at 09/23/22 1707    sodium chloride tablet 1 g, 1 g, Oral, TID WC, Lino Paul MD, 1 g at 09/23/22 1600    sodium chloride flush 0.9 % injection 5-40 mL, 5-40 mL, IntraVENous, 2 times per day, Zari Be MD, 10 mL at 09/23/22 1018    sodium chloride flush 0.9 % injection 5-40 mL, 5-40 mL, IntraVENous, PRN, Zari Be MD, 10 mL at 09/21/22 1137    0.9 % sodium chloride infusion, , IntraVENous, PRN, Kevin Lindsay MD    [facility] by provider] enoxaparin (LOVENOX) injection 40 mg, 40 mg, SubCUTAneous, Daily, Kevin Lindsay MD, 40 mg at 09/22/22 0908    ondansetron (ZOFRAN-ODT) disintegrating tablet 4 mg, 4 mg, Oral, Q8H PRN **OR** ondansetron (ZOFRAN) injection 4 mg, 4 mg, IntraVENous, Q6H PRN, Kevin Lindsay MD    polyethylene glycol (GLYCOLAX) packet 17 g, 17 g, Oral, Daily PRN, Kevin Lindsay MD    acetaminophen (TYLENOL) tablet 650 mg, 650 mg, Oral, Q6H PRN **OR** acetaminophen (TYLENOL) suppository 650 mg, 650 mg, Rectal, Q6H PRN, Kevin Lindsay MD    glucose chewable tablet 16 g, 4 tablet, Oral, PRN, Kevin Lindsay MD    dextrose bolus 10% 125 mL, 125 mL, IntraVENous, PRN **OR** dextrose bolus 10% 250 mL, 250 mL, IntraVENous, PRN, Kevin Lindsay MD    glucagon (rDNA) injection 1 mg, 1 mg, SubCUTAneous, PRN, Kevin Lindsay MD    dextrose 10 % infusion, , IntraVENous, Continuous PRN, Kevin Lindsay MD    levothyroxine (SYNTHROID) tablet 137 mcg, 137 mcg, Oral, Daily, Kevin Lindsay MD, 137 mcg at 09/23/22 0539    montelukast (SINGULAIR) tablet 10 mg, 10 mg, Oral, QAM, Kevin Lindsay MD, 10 mg at 09/23/22 1014    budesonide (PULMICORT) nebulizer suspension 500 mcg, 0.5 mg, Nebulization, BID, Fideliasa JOVANNY Felipes, DO, 500 mcg at 09/23/22 3114    ipratropium-albuterol (DUONEB) nebulizer solution 1 ampule, 1 ampule, Inhalation, Q4H WA, Fideliarica Felipes, DO, 1 ampule at 09/23/22 1549    hydrOXYzine pamoate (VISTARIL) capsule 25 mg, 25 mg, Oral, TID PRN, Fidelia L Gonzalez, DO, 25 mg at 09/22/22 2129    benzocaine-menthol (CEPACOL SORE THROAT) lozenge 1 lozenge, 1 lozenge, Oral, Q2H PRN, Taisha Bidding, DO, 1 lozenge at 09/22/22 1900    XR SHOULDER LEFT (MIN 2 VIEWS)   Final Result   Impacted left humeral neck fracture. NM BONE SCAN WHOLE BODY   Final Result   1. No pattern of metastatic bone disease.       2.  Multiple trauma injuries are seen in the right ribcage, left humerus   subcapital region, and degenerative changes are seen in multiple levels in   the thoracolumbar spine. RECOMMENDATIONS:   Unavailable         XR CHEST PORTABLE   Final Result   Findings consistent with infiltrative process involving the right mediastinum   as noted on the previous CT chest.  No evidence of a pneumothorax. CTA CHEST W CONTRAST   Final Result   No evidence of pulmonary embolism. Enlarged pulmonary arteries, correlate clinically for pulmonary artery   hypertension. Infiltrative right mediastinal mass with lymphadenopathy or a large   conglomerate of lymph nodes involving the right aspect of the mediastinum and   right hilum with narrowing of the right mainstem bronchus. This finding   significantly worsened from the prior examination dated September 3, 2022. Partial atelectasis in the right middle lobe. CTA ABDOMEN PELVIS W CONTRAST   Final Result   No acute intraabdominal or intrapelvic pathology. Atrophy of the upper pole of the left kidney. Chronic vascular changes as described above. XR CHEST PORTABLE   Final Result   No acute process. Assessment:    Principal Problem:    Small cell lung cancer in adult [facility])  Active Problems:    Hyponatremia    Primary hypertension    Hypothyroidism    GERD (gastroesophageal reflux disease)    Depression    Palliative care by specialist    Goals of care, counseling/discussion    Lung mass  Resolved Problems:    * No resolved hospital problems. *    45-year-old female with extended stage small cell lung carcinoma of R hilum with biopsy proven liver metastasis dx 9/6/22. Also has CHF and COPD. No evidence of metastasis to the bones on bone scan 9/22/22. Sp Mediport placement 9/23/22. Plan:  I discussed role of combination chemotherapy with immunotherapy. Follow-up with Dr. Jayne Gomez at the [facility] after discharge.      Electronically signed by Hector Garcia MD on 9/23/2022 at 5:18 PM

## 2022-09-23 NOTE — PROGRESS NOTES
Palliative Care Department  [phone]  Palliative Care Progress Note  Jaymie Bound APRN-CNP    Shyla Ann  [id number]  Hospital Day: 4    Date of Initial Consult: 9/21/22  Referring Provider: Dr. Claudette Paget was consulted for assistance with: code status discussion, goals of care    HPI:   Shyla Ann is a 81 y.o. with a past medical history of bronchitis, hypertension, hypothyroidism, COPD (on 2 L at night at home), chronic alcohol abuse (reports stopping 4-6 mo ago), depression, diastolic heart failure who presented to the ED with CHIEF COMPLAINT of weakness, dizziness. She states has feeling of being drunk. Lab work done from her PCPs noting critically low sodium level so was instructed to come to ED. States she has been drinking a lot more water than she normal. She did report cough which is non productive and chest pain which is not new. BP Workup in ED noting BP 60/33. Labs noting; Na 110; ABG- pH 7.175; ProBNP elevated. Imaging including  CTA chest disclosed right mediastinal mass with lymphadenopathy which is worsened compared to prior exam on September 3, 2022. CT abdomen and pelvis with contrast showed no acute intra-abdominal or intrapelvic pathology, chest x-ray noting no acute process. A central line was placed and pt was started on cefepime; nebulizers; pressors and solumedrol. Pt had recent admission for hyponatremia 9/1- 9/13; found with pulmonary nodule s/p biopsy; also with likely metastatic lesion in right hepatic lobe; Liver, right lobe, needle biopsy: pathology noting metastatic high-grade neuroendocrine carcinoma, consistent with small cell carcinoma of pulmonary origin. Pt was admitted for further care and management on 9/20/2022. Pulmonology, Hem/Onc, and Nephrology services were consulted.    ASSESSMENT/PLAN:     Pertinent Hospital Diagnoses   Hyponatremia- Nephrology following  New diagnosis Lung cancer with mets to liver- Hem/Onc consulted  Hx of diastolic heart failure  Hypotension-wean pressors as able    Palliative Care Encounter / Counseling Regarding Goals of An Keenan Sch[address] 56, Does have capacity for medical decision-making. Capacity is time limited and situation/question specific  During encounter no was surrogate medical decision-maker was present  Outcome of goals of care meeting:   Changed code status to [facility]  Plan to start chemotherapy next week  Hoping she can return home  Code status DNR-CCA  Advanced Directives: no HCPOA or Living Will noted in chart but states brother in law CATRACHITO is HCPOA; request copy when able  Surrogate/Legal NOK:  Brother in law (HCPOA)- David French @ Primary Phone: [phone] (H  Niramón Jarquin @ [phone] (M)    Spiritual assessment: no spiritual distress identified  Bereavement and grief: to be determined  Referrals to: none today    SUBJECTIVE:     Details of Conversation:     Chart reviewed. Update received from critical care team.  Patient seen sitting up at the side of the bed with family at bedside. In-depth conversation regarding goals of care and CODE STATUS options. Patient states she plans to start chemotherapy next week. Patient ultimate goal is to return home when stable. At this time patient states she would like to change her CODE STATUS to DNR CCA and continue all current medical management. We did discuss hospice care and comfort care briefly. Patient not receptive at this time. Emotional support given and all questions addressed. We will continue to follow for ongoing goals of care discussion as well as support for the patient and family. OBJECTIVE:   Prognosis: unknown    ----- REVIEW OF SYSTEMS -----  CONSTITUTIONAL: Denies fever, chill or rigors, nausea or vomiting. HEENT: denies blurring of vision or double vision, + hearing problem   RESPIRATORY: + cough, +shortness of breath, +chest pain.    CARDIOVASCULAR: Denies palpitation   GASTROINTESTINAL: Denies abdominal pain, diarrhea or constipation. GENITOURINARY: Denies burning urination or frequency of urination   INTEGUMENT: denies wound, rash   HEMATOLOGIC/LYMPHATIC: Denies lymph node swelling, gum bleeding or easy bruising.    MUSCULOSKELETAL: Denies leg pain, joint pain, joint swelling  NEUROLOGICAL: + dizziness, no loss of consciousness, +weakness    Physical Exam:  BP (!) 153/95   Pulse (!) 106   Temp 97.6 °F (36.4 °C) (Oral)   Resp 23   Ht 5' 2\" (1.575 m)   Wt 151 lb 8 oz (68.7 kg)   SpO2 96%   BMI 27.71 kg/m²   Gen:   awake, alert  Lungs:  audible rhonchi or wheezes noted, respirations mod labored; on oxygen  Heart:  RRR, distant heart tones, no murmur, rub, or gallop noted during exam  Abd:  Soft, non tender, non distended, bowel sounds present  :  slaughter catheter in place  Ext:  Moving all extremities, no edema, pulses present  Skin:  Warm and dry  Neuro:  Alert, oriented x 3; following commands    Past Medical History:   Diagnosis Date    Bronchitis     Hypertension     Thyroid disease      Past Surgical History:   Procedure Laterality Date    CT NEEDLE BIOPSY LIVER PERCUTANEOUS  9/6/2022    CT NEEDLE BIOPSY LIVER PERCUTANEOUS 9/6/2022 Mary Faith MD [facility] CT    OTHER SURGICAL HISTORY      states had something done right neck area per dr jaeger, might have been an abcess    TYMPANOSTOMY [address] Problems    Diagnosis Date Noted    Small cell lung cancer in adult [facility]) [C34.90] 09/22/2022     Priority: Medium    Palliative care by specialist [Z51.5] 09/21/2022     Priority: Medium    Goals of care, counseling/discussion [Z71.89] 09/21/2022     Priority: Medium    Primary hypertension [I10] 09/20/2022     Priority: Medium    Hypothyroidism [E03.9] 09/20/2022     Priority: Medium    GERD (gastroesophageal reflux disease) [K21.9] 09/20/2022     Priority: Medium    Depression [F32.A] 09/20/2022     Priority: Medium    Hyponatremia [E87.1] 09/01/2022     Priority: Medium       Social History: The patient currently lives at home with family checking  TOBACCO:  reports that she has been smoking cigarettes. She started smoking about 50 years ago. She has a 50.00 pack-year smoking history. She has never used smokeless tobacco.  ETOH:  reports no history of alcohol use. Objective data reviewed: labs, images, records, medication use, vitals, and chart    Discussed patient and the plan of care with the other IDT members: Palliative Medicine IDT Team, Floor Nurse, and Patient    Time/Communication  Greater than 50% of time spent, total 15 minutes in counseling and coordination of care at the bedside regarding goals of care, symptom management, diagnosis and prognosis, and see above. Thank you for allowing Palliative Medicine to participate in the care of [facility].   Tammy Staff APRN-CNP

## 2022-09-23 NOTE — PROGRESS NOTES
[facility]  Department of Internal Medicine   Internal Medicine Residency   MICU Progress Note    Patient:  Elijah Raya 81 y.o. female  MRN: [id number]     Date of Service: 9/23/2022    Allergy: Patient has no known allergies. Subjective     Overnight, no significant acute events reported. Oral salt tablet was started as per nephrology. Ms. Nilton Almonte was seen and examined at the bedside in the morning. She still have some cough and has been giving some breathing treatments in the morning. Widespread rhonchi was noted on chest auscultation. Labs today showed improvement in hyponatremia sodium level from 118  since commencement of day salt tablets. Patient has been chronically hyponatremic. Other parameters within normal reference range. Hematology/oncology plans to start chemotherapy- -16, carboplatin within the next week. Patient is currently on Solu-Medrol and breathing treatments holding off Brovana due to tachycardia which has slightly improved. Cardiology consulted today, recommends rosuvastatin, ramipril and increase metoprolol to 50 mg. This time patient would like to change her CODE STATUS to [facility] and continue all current medical management as per palliative care documentation. Patient's CODE STATUS was updated accordingly.     ROS: Denies Fever/chills/CP    Objective     VS: BP (!) 153/67   Pulse (!) 102   Temp 97.6 °F (36.4 °C) (Oral)   Resp 24   Ht 5' 2\" (1.575 m)   Wt 151 lb 8 oz (68.7 kg)   SpO2 93%   BMI 27.71 kg/m²     I & O - 24hr:   Intake/Output Summary (Last 24 hours) at 9/23/2022 1443  Last data filed at 9/23/2022 1300  Gross per 24 hour   Intake 803.71 ml   Output 700 ml   Net 103.71 ml     Physical Exam:  General Appearance: alert, cooperative, and moderate distress  Neck: supple, symmetrical, trachea midline and thyroid not enlarged, symmetric, no tenderness/mass/nodules  Lung: Widespread rhonchi noted bilaterally  Heart: regular rate and rhythm, S1, S2 normal, no murmur, click, rub or gallop  Abdomen: soft, non-tender; bowel sounds normal; no masses,  no organomegaly  Extremities:  extremities normal, atraumatic, no cyanosis or edema  Musculoskeletal: No joint swelling, no muscle tenderness. ROM normal in all joints of extremities.    Neurologic: Mental status: Alert, oriented, thought content appropriate    Lines     site day    Art line   None    TLC None    PICC None    Hemoaccess None    Oxygen:    nasal canula at 3L/min    ABG:     Lab Results   Component Value Date/Time    PH 7.455 09/23/2022 11:00 AM    PCO2 40.4 09/23/2022 11:00 AM    PO2 71.3 09/23/2022 11:00 AM    HCO3 27.8 09/23/2022 11:00 AM    BE 3.6 09/23/2022 11:00 AM    THB 12.8 09/23/2022 11:00 AM    O2SAT 95.1 09/23/2022 11:00 AM        Medications     Infusions: (Fluid, Sedation, Vasopressors)  IVF:   None   Vasopressors  None   Sedation  None     Nutrition:   Adult regular feeding     ATB:   Antibiotics  Days   None                   Labs   CBC with Differential:    Lab Results   Component Value Date/Time    WBC 12.3 09/23/2022 04:05 AM    RBC 4.03 09/23/2022 04:05 AM    HGB 11.7 09/23/2022 04:05 AM    HCT 33.5 09/23/2022 04:05 AM     09/23/2022 04:05 AM    MCV 83.1 09/23/2022 04:05 AM    MCH 29.0 09/23/2022 04:05 AM    MCHC 34.9 09/23/2022 04:05 AM    RDW 13.7 09/23/2022 04:05 AM    METASPCT 0.9 09/23/2022 04:05 AM    LYMPHOPCT 2.6 09/23/2022 04:05 AM    MONOPCT 2.8 09/23/2022 04:05 AM    MYELOPCT 0.9 09/21/2022 05:00 AM    BASOPCT 0.1 09/23/2022 04:05 AM    MONOSABS 0.00 09/23/2022 04:05 AM    LYMPHSABS 0.37 09/23/2022 04:05 AM    EOSABS 0.00 09/23/2022 04:05 AM    BASOSABS 0.00 09/23/2022 04:05 AM     CMP:    Lab Results   Component Value Date/Time     09/23/2022 12:13 PM    K 4.1 09/23/2022 12:13 PM    CL 82 09/23/2022 12:13 PM    CO2 25 09/23/2022 12:13 PM    BUN 10 09/23/2022 12:13 PM    CREATININE 0.7 09/23/2022 12:13 PM    GFRAA >60 09/23/2022 12:13 PM    LABGLOM >60 09/23/2022 12:13 PM    GLUCOSE 102 09/23/2022 12:13 PM    PROT 7.1 09/20/2022 06:43 PM    LABALBU 4.3 09/20/2022 06:43 PM    CALCIUM 9.4 09/23/2022 12:13 PM    BILITOT 0.2 09/20/2022 06:43 PM    ALKPHOS 137 09/20/2022 06:43 PM    AST 15 09/20/2022 06:43 PM    ALT 8 09/20/2022 06:43 PM       Resident's Assessment and Plan     Assessment:     Pulmonology   Newly diagnosed lung malignancy with mets to the liver  Sept 02, 2022 CT chest w/o contrast performed to rule out malignancy  Lungs/pleural findings : Focal nodular consolidation in the peripheral right medial RUL measure 12 x 11 mm.  Superior mediastinal adenopathy, findings consistent with malignancy   Plan:  Heme-Onc following, inputs appreciated   plans to start chemotherapy- -16, carboplatin within the next week  Palliative care following     Hx of COPD  Uses 2L of oxygen at home   On breathing treatment, proventil, flonase and singulair at home   Currently on NC, PFT ordered but never performed  Plan:  Continue holding off Brovana due to tachycardia  Continue other breathing treatments  Continue oxygen therapy     Nephrology   Hyponatremia likely 2/2 SIADH vs hypothyroidism vs SSRI   Extensive smoking history   Recently diagnosed with small cell lung cancer with mets to the liver  Low sodium chronically   Not on sodium tablets   Feels like she is altered, no loss of consciousness, no falls, no syncope or presyncope   Continues to smoke 1 pack per day for a long time   Serum osmolality 254 low   Urine osmolality   Plan:   Nephrology following, inputs appreciated  Follow nephrology recommendations  Continue fluid restriction due to possible SIADH   Continue to monitor sodium level Q4H     Hypokalemia likely 2/2 poor oral intake   K 3.3, replaced  Monitor BMP   On potassium chloride PO daily      Hypochloremic metabolic acidosis   HCO3 21 trending up, chloride 80   Continue to monitor BMP      Cardiology   Structural heart disease on echo, 9/22/2022  EF 60-65%, severe concentric left ventricular hypertrophy  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  Global longitudinal peak strain -12.5%  Plan:  Cardiology consulted, recommends:   cardiac MRI for further evaluation of LV basal aneurysm  Recommend rosuvastatin 20 mg once daily  Increase metop to 50 mg QD  Ramipril 2.5 mg PO QD - will prefer losartan, patient has persistent cough  Aspirin 81 mg daily only if no brain mets on MRI    Hypotension  Found to be have low BP during admission 60/33 mmHg,    On Levophed to maintain pressure support   No source of infection      Elevate Pro-BNP likely 2/2 pulmonary disease vs heart failure   Has history of diastolic heart failure   Recently diagnosed with lung cancer with mets to the liver      Hx of diastolic heart failure   Most recent echo in March 2021 showed stage I diastolic dysfunction   Mild left concentric left ventricular hypertrophy   EF 50-60% in March 2021   On amlodine and toprol 25mg   Continue current treatment      Chronic lower extremity edema likely 2/2 heart failure  No edema present during physical exam today   Furosemide if fluid overload and monitor      Hx of HTN on amlodipine and toprol      Hx of hypothyroidism   Most recent TSH 3.970 (normal), T4 2.15 high   Takes Levothyroxine 137 mcg tablet   Continue to monitor   Recent CT chest shows thyromegaly with nodular isodense calcified lesion in the thyroid isthmus      GI   Liver mets likely 2/2 lungs s/p core biopsy of liver mass in early Sept   2cm low-density mass in the right hepatic lobe suspicious for metastatic disease   Hematology - oncology on board  Extensive history of alcohol abuse  Decided to quit 6 months ago      Chronic alcohol abuse, quit drinking 6 months ago   Hx of depression   on Zoloft      PT/OT: Not indicated at the moment   DVT ppx:  On hold Lovenox, patient has metastatic cancer  GI ppx: oral feeding     Code Status:   Full code     Hayden Guadalupe MD, MPH PGY-1  Attending physician: Dr. Martha Muñoz, [address]  Department of Pulmonary, [address]  Department of Internal Medicine      During multidisciplinary team rounds Ming Stewart is a 81 y.o. female was seen, examined and discussed. This is confirmation that I have personally seen and examined the patient and that the key elements of the encounter were performed by me (> 85 % time). The medications & laboratory data was discussed and adjusted where necessary. The radiographic images were reviewed or with radiologist or consultant if felt dis-concordant with the exam or history. The above findings were corroborated, plans confirmed and changes made if needed. Family is updated at the bedside as available. Key issues of the case were discussed among consultants. Critical Care time is documented if appropriate.       Graciela Burgess DO, FACP, FCCP, [facility],

## 2022-09-24 ENCOUNTER — APPOINTMENT (OUTPATIENT)
Dept: GENERAL RADIOLOGY | Age: 72
DRG: 844 | End: 2022-09-24
Payer: MEDICARE

## 2022-09-24 PROBLEM — S42.202A CLOSED FRACTURE OF LEFT PROXIMAL HUMERUS: Status: ACTIVE | Noted: 2022-09-24

## 2022-09-24 PROBLEM — E44.0 MODERATE PROTEIN-CALORIE MALNUTRITION (HCC): Chronic | Status: ACTIVE | Noted: 2022-09-24

## 2022-09-24 LAB
ANION GAP SERPL CALCULATED.3IONS-SCNC: 12 MMOL/L (ref 7–16)
ANION GAP SERPL CALCULATED.3IONS-SCNC: 12 MMOL/L (ref 7–16)
ANION GAP SERPL CALCULATED.3IONS-SCNC: 13 MMOL/L (ref 7–16)
ANION GAP SERPL CALCULATED.3IONS-SCNC: 13 MMOL/L (ref 7–16)
ANISOCYTOSIS: ABNORMAL
BASOPHILS ABSOLUTE: 0.01 E9/L (ref 0–0.2)
BASOPHILS RELATIVE PERCENT: 0.1 % (ref 0–2)
BUN BLDV-MCNC: 13 MG/DL (ref 6–23)
BUN BLDV-MCNC: 8 MG/DL (ref 6–23)
BUN BLDV-MCNC: 9 MG/DL (ref 6–23)
BUN BLDV-MCNC: 9 MG/DL (ref 6–23)
CALCIUM SERPL-MCNC: 9 MG/DL (ref 8.6–10.2)
CALCIUM SERPL-MCNC: 9.2 MG/DL (ref 8.6–10.2)
CALCIUM SERPL-MCNC: 9.4 MG/DL (ref 8.6–10.2)
CALCIUM SERPL-MCNC: 9.4 MG/DL (ref 8.6–10.2)
CHLORIDE BLD-SCNC: 83 MMOL/L (ref 98–107)
CHLORIDE BLD-SCNC: 84 MMOL/L (ref 98–107)
CHLORIDE BLD-SCNC: 84 MMOL/L (ref 98–107)
CHLORIDE BLD-SCNC: 85 MMOL/L (ref 98–107)
CO2: 25 MMOL/L (ref 22–29)
CREAT SERPL-MCNC: 0.6 MG/DL (ref 0.5–1)
CREAT SERPL-MCNC: 0.6 MG/DL (ref 0.5–1)
CREAT SERPL-MCNC: 0.7 MG/DL (ref 0.5–1)
CREAT SERPL-MCNC: 0.7 MG/DL (ref 0.5–1)
EOSINOPHILS ABSOLUTE: 0 E9/L (ref 0.05–0.5)
EOSINOPHILS RELATIVE PERCENT: 0 % (ref 0–6)
GFR AFRICAN AMERICAN: >60
GFR NON-AFRICAN AMERICAN: >60 ML/MIN/1.73
GLUCOSE BLD-MCNC: 105 MG/DL (ref 74–99)
GLUCOSE BLD-MCNC: 108 MG/DL (ref 74–99)
GLUCOSE BLD-MCNC: 122 MG/DL (ref 74–99)
GLUCOSE BLD-MCNC: 140 MG/DL (ref 74–99)
HCT VFR BLD CALC: 33.2 % (ref 34–48)
HEMOGLOBIN: 11.6 G/DL (ref 11.5–15.5)
HOWELL-JOLLY BODIES: ABNORMAL
HYPOCHROMIA: ABNORMAL
IMMATURE GRANULOCYTES #: 0.08 E9/L
IMMATURE GRANULOCYTES %: 0.6 % (ref 0–5)
LYMPHOCYTES ABSOLUTE: 0.24 E9/L (ref 1.5–4)
LYMPHOCYTES RELATIVE PERCENT: 1.7 % (ref 20–42)
MAGNESIUM: 2 MG/DL (ref 1.6–2.6)
MCH RBC QN AUTO: 29.7 PG (ref 26–35)
MCHC RBC AUTO-ENTMCNC: 34.9 % (ref 32–34.5)
MCV RBC AUTO: 85.1 FL (ref 80–99.9)
METER GLUCOSE: 117 MG/DL (ref 74–99)
METER GLUCOSE: 136 MG/DL (ref 74–99)
MONOCYTES ABSOLUTE: 0.45 E9/L (ref 0.1–0.95)
MONOCYTES RELATIVE PERCENT: 3.2 % (ref 2–12)
NEUTROPHILS ABSOLUTE: 13.25 E9/L (ref 1.8–7.3)
NEUTROPHILS RELATIVE PERCENT: 94.4 % (ref 43–80)
OVALOCYTES: ABNORMAL
PDW BLD-RTO: 13.6 FL (ref 11.5–15)
PHOSPHORUS: 3.1 MG/DL (ref 2.5–4.5)
PLATELET # BLD: 303 E9/L (ref 130–450)
PMV BLD AUTO: 9.5 FL (ref 7–12)
POIKILOCYTES: ABNORMAL
POLYCHROMASIA: ABNORMAL
POTASSIUM REFLEX MAGNESIUM: 4.1 MMOL/L (ref 3.5–5)
POTASSIUM REFLEX MAGNESIUM: 4.4 MMOL/L (ref 3.5–5)
POTASSIUM REFLEX MAGNESIUM: 4.4 MMOL/L (ref 3.5–5)
POTASSIUM REFLEX MAGNESIUM: 4.5 MMOL/L (ref 3.5–5)
RBC # BLD: 3.9 E12/L (ref 3.5–5.5)
SODIUM BLD-SCNC: 120 MMOL/L (ref 132–146)
SODIUM BLD-SCNC: 121 MMOL/L (ref 132–146)
SODIUM BLD-SCNC: 122 MMOL/L (ref 132–146)
SODIUM BLD-SCNC: 123 MMOL/L (ref 132–146)
WBC # BLD: 14 E9/L (ref 4.5–11.5)

## 2022-09-24 PROCEDURE — 99233 SBSQ HOSP IP/OBS HIGH 50: CPT | Performed by: INTERNAL MEDICINE

## 2022-09-24 PROCEDURE — 84100 ASSAY OF PHOSPHORUS: CPT

## 2022-09-24 PROCEDURE — 85025 COMPLETE CBC W/AUTO DIFF WBC: CPT

## 2022-09-24 PROCEDURE — 71045 X-RAY EXAM CHEST 1 VIEW: CPT

## 2022-09-24 PROCEDURE — 6370000000 HC RX 637 (ALT 250 FOR IP)

## 2022-09-24 PROCEDURE — 99222 1ST HOSP IP/OBS MODERATE 55: CPT | Performed by: ORTHOPAEDIC SURGERY

## 2022-09-24 PROCEDURE — 2580000003 HC RX 258: Performed by: INTERNAL MEDICINE

## 2022-09-24 PROCEDURE — 6370000000 HC RX 637 (ALT 250 FOR IP): Performed by: INTERNAL MEDICINE

## 2022-09-24 PROCEDURE — 80048 BASIC METABOLIC PNL TOTAL CA: CPT

## 2022-09-24 PROCEDURE — 2140000000 HC CCU INTERMEDIATE R&B

## 2022-09-24 PROCEDURE — 6360000002 HC RX W HCPCS: Performed by: INTERNAL MEDICINE

## 2022-09-24 PROCEDURE — 82962 GLUCOSE BLOOD TEST: CPT

## 2022-09-24 PROCEDURE — 2700000000 HC OXYGEN THERAPY PER DAY

## 2022-09-24 PROCEDURE — 83735 ASSAY OF MAGNESIUM: CPT

## 2022-09-24 PROCEDURE — 94640 AIRWAY INHALATION TREATMENT: CPT

## 2022-09-24 PROCEDURE — 36592 COLLECT BLOOD FROM PICC: CPT

## 2022-09-24 PROCEDURE — 36415 COLL VENOUS BLD VENIPUNCTURE: CPT

## 2022-09-24 RX ORDER — PREDNISONE 20 MG/1
20 TABLET ORAL 2 TIMES DAILY
Status: DISCONTINUED | OUTPATIENT
Start: 2022-09-24 | End: 2022-09-26 | Stop reason: HOSPADM

## 2022-09-24 RX ORDER — LANOLIN ALCOHOL/MO/W.PET/CERES
3 CREAM (GRAM) TOPICAL NIGHTLY PRN
Status: DISCONTINUED | OUTPATIENT
Start: 2022-09-24 | End: 2022-09-25

## 2022-09-24 RX ORDER — ARFORMOTEROL TARTRATE 15 UG/2ML
15 SOLUTION RESPIRATORY (INHALATION) 2 TIMES DAILY
Status: DISCONTINUED | OUTPATIENT
Start: 2022-09-24 | End: 2022-09-26 | Stop reason: HOSPADM

## 2022-09-24 RX ORDER — LANOLIN ALCOHOL/MO/W.PET/CERES
CREAM (GRAM) TOPICAL
Status: COMPLETED
Start: 2022-09-24 | End: 2022-09-24

## 2022-09-24 RX ORDER — CODEINE PHOSPHATE AND GUAIFENESIN 10; 100 MG/5ML; MG/5ML
2.5 SOLUTION ORAL NIGHTLY
Status: DISCONTINUED | OUTPATIENT
Start: 2022-09-24 | End: 2022-09-26 | Stop reason: HOSPADM

## 2022-09-24 RX ADMIN — HYDROXYZINE PAMOATE 25 MG: 25 CAPSULE ORAL at 14:38

## 2022-09-24 RX ADMIN — BUDESONIDE 500 MCG: 0.5 SUSPENSION RESPIRATORY (INHALATION) at 21:12

## 2022-09-24 RX ADMIN — ARFORMOTEROL TARTRATE 15 MCG: 15 SOLUTION RESPIRATORY (INHALATION) at 21:12

## 2022-09-24 RX ADMIN — SODIUM CHLORIDE 2 G: 1 TABLET ORAL at 09:36

## 2022-09-24 RX ADMIN — PYRIDOXINE HCL TAB 50 MG 50 MG: 50 TAB at 09:36

## 2022-09-24 RX ADMIN — METHYLPREDNISOLONE SODIUM SUCCINATE 40 MG: 40 INJECTION, POWDER, FOR SOLUTION INTRAMUSCULAR; INTRAVENOUS at 09:39

## 2022-09-24 RX ADMIN — SODIUM CHLORIDE 2 G: 1 TABLET ORAL at 16:44

## 2022-09-24 RX ADMIN — PIPERACILLIN AND TAZOBACTAM 4500 MG: 4; .5 INJECTION, POWDER, FOR SOLUTION INTRAVENOUS at 20:50

## 2022-09-24 RX ADMIN — IPRATROPIUM BROMIDE AND ALBUTEROL SULFATE 1 AMPULE: .5; 2.5 SOLUTION RESPIRATORY (INHALATION) at 08:39

## 2022-09-24 RX ADMIN — MONTELUKAST 10 MG: 10 TABLET, FILM COATED ORAL at 09:35

## 2022-09-24 RX ADMIN — IPRATROPIUM BROMIDE AND ALBUTEROL SULFATE 1 AMPULE: .5; 2.5 SOLUTION RESPIRATORY (INHALATION) at 12:03

## 2022-09-24 RX ADMIN — GUAIFENESIN 200 MG: 200 SOLUTION ORAL at 09:35

## 2022-09-24 RX ADMIN — AMLODIPINE BESYLATE 5 MG: 5 TABLET ORAL at 09:36

## 2022-09-24 RX ADMIN — SODIUM CHLORIDE, PRESERVATIVE FREE 10 ML: 5 INJECTION INTRAVENOUS at 19:53

## 2022-09-24 RX ADMIN — PREDNISONE 20 MG: 20 TABLET ORAL at 19:53

## 2022-09-24 RX ADMIN — BUDESONIDE 500 MCG: 0.5 SUSPENSION RESPIRATORY (INHALATION) at 08:39

## 2022-09-24 RX ADMIN — SODIUM CHLORIDE, PRESERVATIVE FREE 10 ML: 5 INJECTION INTRAVENOUS at 09:36

## 2022-09-24 RX ADMIN — SODIUM CHLORIDE 2 G: 1 TABLET ORAL at 13:48

## 2022-09-24 RX ADMIN — MELATONIN 3 MG ORAL TABLET 3 MG: 3 TABLET ORAL at 20:09

## 2022-09-24 RX ADMIN — LOPERAMIDE HYDROCHLORIDE 2 MG: 2 CAPSULE ORAL at 02:30

## 2022-09-24 RX ADMIN — IPRATROPIUM BROMIDE AND ALBUTEROL SULFATE 1 AMPULE: .5; 2.5 SOLUTION RESPIRATORY (INHALATION) at 21:12

## 2022-09-24 RX ADMIN — PIPERACILLIN AND TAZOBACTAM 4500 MG: 4; .5 INJECTION, POWDER, LYOPHILIZED, FOR SOLUTION INTRAVENOUS at 06:29

## 2022-09-24 RX ADMIN — IPRATROPIUM BROMIDE AND ALBUTEROL SULFATE 1 AMPULE: .5; 2.5 SOLUTION RESPIRATORY (INHALATION) at 16:50

## 2022-09-24 RX ADMIN — LEVOTHYROXINE SODIUM 137 MCG: 0.14 TABLET ORAL at 06:28

## 2022-09-24 RX ADMIN — HYDROXYZINE PAMOATE 25 MG: 25 CAPSULE ORAL at 04:08

## 2022-09-24 RX ADMIN — METOPROLOL SUCCINATE 50 MG: 50 TABLET, EXTENDED RELEASE ORAL at 09:36

## 2022-09-24 RX ADMIN — ROSUVASTATIN CALCIUM 20 MG: 20 TABLET, FILM COATED ORAL at 19:53

## 2022-09-24 RX ADMIN — PIPERACILLIN AND TAZOBACTAM 4500 MG: 4; .5 INJECTION, POWDER, FOR SOLUTION INTRAVENOUS at 14:00

## 2022-09-24 RX ADMIN — MELATONIN 3 MG ORAL TABLET 3 MG: 3 TABLET ORAL at 02:30

## 2022-09-24 RX ADMIN — LOPERAMIDE HYDROCHLORIDE 2 MG: 2 CAPSULE ORAL at 14:39

## 2022-09-24 RX ADMIN — GUAIFENESIN AND CODEINE PHOSPHATE 2.5 ML: 10; 100 LIQUID ORAL at 19:53

## 2022-09-24 RX ADMIN — LOSARTAN POTASSIUM 25 MG: 25 TABLET, FILM COATED ORAL at 09:35

## 2022-09-24 ASSESSMENT — PAIN SCALES - GENERAL
PAINLEVEL_OUTOF10: 0

## 2022-09-24 NOTE — PROGRESS NOTES
[address] [facility]  Department of Internal Medicine   Internal Medicine Residency   MICU Progress Note    Patient:  Elijah Raya 81 y.o. female  MRN: [id number]     Date of Service: 9/24/2022    Allergy: Patient has no known allergies. Subjective     Overnight, patient had an episode of diarrhea and was given Imodium. Oral salt tablet was started as per nephrology. Ms. Nilton Almonte was seen and examined at the bedside in the morning. She still have some cough and has been giving some breathing treatments in the morning. He denies fever, chest pain or chills. Widespread rhonchi was noted on chest auscultation. Labs today showed improvement in hyponatremia sodium level from 118 to 120 since commencement of day salt tablets. Patient has been chronically hyponatremic. Calcitonin is elevated today 1.39 and showed x-ray obtained yesterday showed impacted left humeral neck fracture. Other parameters within normal reference range. Mediport was inserted yesterday by vascular team and hematology/oncology plans to start chemotherapy- -16, carboplatin within the next week as outpatient. Patient is currently on Solu-Medrol and breathing treatments holding off Brovana due to tachycardia which has slightly improved. Cardiology consulted for echo findings and recommendations improvement noted. Patient is currently [facility] and palliative care team is following. Plan is to start guaifenesin-codeine, continue breathing treatments and transfer patient to regular floor.     ROS: Denies Fever/chills/CP    Objective     VS: BP (!) 163/86   Pulse 94   Temp 97.6 °F (36.4 °C) (Temporal)   Resp 27   Ht 5' 2\" (1.575 m)   Wt 144 lb 12.8 oz (65.7 kg)   SpO2 97%   BMI 26.48 kg/m²     I & O - 24hr:   Intake/Output Summary (Last 24 hours) at 9/24/2022 0933  Last data filed at 9/24/2022 3796  Gross per 24 hour   Intake 1326.85 ml   Output 725 ml   Net 601.85 ml     Physical Exam:  General Appearance: alert, cooperative, and moderate distress  Neck: supple, symmetrical, trachea midline and thyroid not enlarged, symmetric, no tenderness/mass/nodules  Lung: Widespread rhonchi noted bilaterally  Heart: regular rate and rhythm, S1, S2 normal, no murmur, click, rub or gallop  Abdomen: soft, non-tender; bowel sounds normal; no masses,  no organomegaly  Extremities:  extremities normal, atraumatic, no cyanosis or edema  Musculoskeletal: No joint swelling, no muscle tenderness. ROM normal in all joints of extremities.    Neurologic: Mental status: Alert, oriented, thought content appropriate    Lines     site day    Art line   None    TLC None    PICC None    Hemoaccess None    Oxygen:    nasal canula at 4L/min    ABG:     Lab Results   Component Value Date/Time    PH 7.455 09/23/2022 11:00 AM    PCO2 40.4 09/23/2022 11:00 AM    PO2 71.3 09/23/2022 11:00 AM    HCO3 27.8 09/23/2022 11:00 AM    BE 3.6 09/23/2022 11:00 AM    THB 12.8 09/23/2022 11:00 AM    O2SAT 95.1 09/23/2022 11:00 AM        Medications     Infusions: (Fluid, Sedation, Vasopressors)  IVF:   None   Vasopressors  None   Sedation  None     Nutrition:   Adult regular feeding     ATB:   Antibiotics  Days   None                   Labs   CBC with Differential:    Lab Results   Component Value Date/Time    WBC 14.0 09/24/2022 04:03 AM    RBC 3.90 09/24/2022 04:03 AM    HGB 11.6 09/24/2022 04:03 AM    HCT 33.2 09/24/2022 04:03 AM     09/24/2022 04:03 AM    MCV 85.1 09/24/2022 04:03 AM    MCH 29.7 09/24/2022 04:03 AM    MCHC 34.9 09/24/2022 04:03 AM    RDW 13.6 09/24/2022 04:03 AM    METASPCT 0.9 09/23/2022 04:05 AM    LYMPHOPCT 1.7 09/24/2022 04:03 AM    MONOPCT 3.2 09/24/2022 04:03 AM    MYELOPCT 0.9 09/21/2022 05:00 AM    BASOPCT 0.1 09/24/2022 04:03 AM    MONOSABS 0.45 09/24/2022 04:03 AM    LYMPHSABS 0.24 09/24/2022 04:03 AM    EOSABS 0.00 09/24/2022 04:03 AM    BASOSABS 0.01 09/24/2022 04:03 AM     CMP:    Lab Results   Component Value Date/Time     09/24/2022 04:03 AM K 4.5 09/24/2022 04:03 AM    CL 83 09/24/2022 04:03 AM    CO2 25 09/24/2022 04:03 AM    BUN 8 09/24/2022 04:03 AM    CREATININE 0.6 09/24/2022 04:03 AM    GFRAA >60 09/24/2022 04:03 AM    LABGLOM >60 09/24/2022 04:03 AM    GLUCOSE 108 09/24/2022 04:03 AM    PROT 7.1 09/20/2022 06:43 PM    LABALBU 4.3 09/20/2022 06:43 PM    CALCIUM 9.2 09/24/2022 04:03 AM    BILITOT 0.2 09/20/2022 06:43 PM    ALKPHOS 137 09/20/2022 06:43 PM    AST 15 09/20/2022 06:43 PM    ALT 8 09/20/2022 06:43 PM       Resident's Assessment and Plan     Assessment:     Pulmonology   Newly diagnosed lung malignancy with mets to the liver  Sept 02, 2022 CT chest w/o contrast performed to rule out malignancy  Lungs/pleural findings : Focal nodular consolidation in the peripheral right medial RUL measure 12 x 11 mm.  Superior mediastinal adenopathy, findings consistent with malignancy   Plan:  Heme-Onc following, inputs appreciated   plans to start chemotherapy- -16, carboplatin within the next week  Palliative care following     Hx of COPD, with persistent cough  Uses 2L of oxygen at home   On breathing treatment, proventil, flonase and singulair at home   Currently on NC, PFT ordered but never performed  Plan:  Continue holding off Brovana due to tachycardia  Continue other breathing treatments  Guaifenesin-codeine to help with cough  Continue oxygen therapy     Nephrology   Hyponatremia likely 2/2 SIADH vs hypothyroidism vs SSRI   Extensive smoking history   Recently diagnosed with small cell lung cancer with mets to the liver  Low sodium chronically   Not on sodium tablets   Feels like she is altered, no loss of consciousness, no falls, no syncope or presyncope   Continues to smoke 1 pack per day for a long time   Serum osmolality 254 low   Plan:   Nephrology following, inputs appreciated  Follow nephrology recommendations  Continue fluid restriction due to possible SIADH   Continue to monitor sodium level Q4H     Hypokalemia likely 2/2 poor oral intake   K 3.3, replaced  Monitor BMP   On potassium chloride PO daily      Hypochloremic metabolic acidosis   HCO3 21 trending up, chloride 80   Continue to monitor BMP      Cardiology   Structural heart disease on echo, 9/22/2022  EF 60-65%, severe concentric left ventricular hypertrophy  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  Global longitudinal peak strain -12.5%  Plan:  Cardiology consulted, recommends:   cardiac MRI for further evaluation of LV basal aneurysm  Recommend rosuvastatin 20 mg once daily  Increase metop to 50 mg QD  Ramipril 2.5 mg PO QD - will prefer losartan, patient has persistent cough  Aspirin 81 mg daily only if no brain mets on MRI    Hypotension  Found to be have low BP during admission 60/33 mmHg,    On Levophed to maintain pressure support   No source of infection      Elevate Pro-BNP likely 2/2 pulmonary disease vs heart failure   Has history of diastolic heart failure   Recently diagnosed with lung cancer with mets to the liver      Hx of diastolic heart failure   Most recent echo in March 2021 showed stage I diastolic dysfunction   Mild left concentric left ventricular hypertrophy   EF 50-60% in March 2021   On amlodine and toprol 25mg   Continue current treatment      Chronic lower extremity edema likely 2/2 heart failure  No edema present during physical exam today   Furosemide if fluid overload and monitor      Hx of HTN on amlodipine and toprol      Hx of hypothyroidism   Most recent TSH 3.970 (normal), T4 2.15 high   Takes Levothyroxine 137 mcg tablet   Continue to monitor   Recent CT chest shows thyromegaly with nodular isodense calcified lesion in the thyroid isthmus      Musculoskeletal   X-ray 9/23/2022 of the left shoulder demonstrates a valgus impacted minimally displaced proximal humeral neck fracture  Plan:  Orthopedics following, input is appreciated  Recommends sling for comfort, patient confused  Pamidronate 90 mg    GI   Liver mets likely 2/2 lungs s/p core biopsy of liver mass in early Sept   2cm low-density mass in the right hepatic lobe suspicious for metastatic disease   Hematology - oncology on board  Extensive history of alcohol abuse  Decided to quit 6 months ago      Chronic alcohol abuse, quit drinking 6 months ago   Hx of depression   on Zoloft      PT/OT: Not indicated at the moment   DVT ppx: On hold Lovenox, patient has metastatic cancer  GI ppx: oral feeding    Transfer patient to regular floor     Code Status:   DNR-CCA    Mitch Charles MD, MPH PGY-1  Attending physician: Dr. Cricket Kim, [place] [id number]  Department of Pulmonary, Critical Care and [address] [place] [address]  Department of Internal Medicine      During multidisciplinary team rounds Michelle Chaney is a 81 y.o. female was seen, examined and discussed. This is confirmation that I have personally seen and examined the patient and that the key elements of the encounter were performed by me (> 85 % time). The medications & laboratory data was discussed and adjusted where necessary. The radiographic images were reviewed or with radiologist or consultant if felt dis-concordant with the exam or history. The above findings were corroborated, plans confirmed and changes made if needed. Family is updated at the bedside as available. Key issues of the case were discussed among consultants. Critical Care time is documented if appropriate.       Delia Martinez DO, FACP, FCCP, Keven Christian,

## 2022-09-24 NOTE — CONSULTS
Comprehensive Nutrition Assessment    Type and Reason for Visit:  Initial, Consult (poor intake/ ONS)    Nutrition Recommendations/Plan:   Continue current diet ; note 1000ml fluid restriction   Start magic cup BID and gelatein BID per discussion w/ pt  Will monitor     Malnutrition Assessment:  Malnutrition Status: Moderate malnutrition (09/24/22 1135)    Context:  Chronic Illness     Findings of the 6 clinical characteristics of malnutrition:  Energy Intake:  75% or less estimated energy requirements for 1 month or longer  Weight Loss:  Unable to assess (wt flux likely d/t fluid shifts ; pt w/ hx of CHF)     Body Fat Loss:  Mild body fat loss Orbital, Triceps   Muscle Mass Loss:  Mild muscle mass loss Temples (temporalis), Hand (interosseous)  Fluid Accumulation:  No significant fluid accumulation     Strength:  Not Performed    Nutrition Assessment:    pt adm d/t small cell lung CA w/ mets to liver ; PMhx of HTN,COPD,ETOH abuse, CHF; pt w/ hyponatremia likely d/t volume depletion/CA- on 1000ml fluid restriction; s/p mediport placement 9/23; pt anticipating chemo; multiple frxs noted ; per discussion w/ pt her appetite is fair (~20% avg per EMR); pt meets criteria for moderate malnutrition; will start ONS per discussion w/ pt and monitor. Nutrition Related Findings:    mild muscle/fat wasting; +I/O; A&Ox4; active BS; no edema; [place]; hyponatremia Wound Type: Surgical Incision       Current Nutrition Intake & Therapies:    Average Meal Intake: 26-50%, 1-25% (avg)  Average Supplements Intake: None Ordered  ADULT DIET; Regular; 4 carb choices (60 gm/meal); 1000 ml  ADULT ORAL NUTRITION SUPPLEMENT; Breakfast, Lunch;  Other Oral Supplement; Gelatein  ADULT ORAL NUTRITION SUPPLEMENT; Lunch, Dinner; Frozen Oral Supplement    Anthropometric Measures:  Height: 5' 2\" (157.5 cm)  Ideal Body Weight (IBW): 110 lbs (50 kg)    Admission Body Weight: 155 lb 10.3 oz (70.6 kg) (9/21-BS)  Current Body Weight: 144 lb 12.8 oz (65.7 kg) (9/24-BS), 131.6 % IBW. Current BMI (kg/m2): 26.5  Usual Body Weight:  (FARHAN d/t wt flux likely d/t CHF hx)     Weight Adjustment For: No Adjustment                 BMI Categories: Overweight (BMI 25.0-29. 9)    Estimated Daily Nutrient Needs:  Energy Requirements Based On: Formula  Weight Used for Energy Requirements: Current  Energy (kcal/day): 8939-6969  Weight Used for Protein Requirements: Ideal  Protein (g/day): 1.3-1.5g/kgxIBW=65-75g  Method Used for Fluid Requirements: 1 ml/kcal  Fluid (ml/day): per critical care    Nutrition Diagnosis:   Moderate malnutrition, In context of chronic illness related to catabolic illness (comorbids) as evidenced by Criteria as identified in malnutrition assessment    Nutrition Interventions:   Food and/or Nutrient Delivery: Continue Current Diet, Start Oral Nutrition Supplement (Gelatein BID; magic cup BID; pt declined pudding ONS; pt on 1000ml fluid restriction.)  Nutrition Education/Counseling: Education not indicated  Coordination of Nutrition Care: Continue to monitor while inpatient       Goals:     Goals: PO intake 50% or greater, by next RD assessment       Nutrition Monitoring and Evaluation:   Behavioral-Environmental Outcomes: None Identified  Food/Nutrient Intake Outcomes: Food and Nutrient Intake, Supplement Intake  Physical Signs/Symptoms Outcomes: Biochemical Data, Nutrition Focused Physical Findings, Skin, Weight, Chewing or Swallowing, GI Status, Fluid Status or Edema, Hemodynamic Status    Discharge Planning:     Too soon to determine     Rashid Keen RD  Contact: [phone]

## 2022-09-24 NOTE — PLAN OF CARE
Problem: Chronic Conditions and Co-morbidities  Goal: Patient's chronic conditions and co-morbidity symptoms are monitored and maintained or improved  9/24/2022 0101 by Ranjith Ferreira RN  Outcome: Progressing  9/23/2022 1653 by Tania Shen RN  Outcome: Progressing     Problem: Discharge Planning  Goal: Discharge to home or other facility with appropriate resources  Outcome: Not Progressing     Problem: Safety - Adult  Goal: Free from fall injury  9/24/2022 0101 by Ranjith Ferreira RN  Outcome: Progressing  9/23/2022 1653 by Tania Shen RN  Outcome: Progressing     Problem: Skin/Tissue Integrity  Goal: Absence of new skin breakdown  9/24/2022 0101 by Ranjith Ferreira RN  Outcome: Progressing  9/23/2022 1653 by Tania Shen RN  Outcome: Progressing     Problem: Pain  Goal: Verbalizes/displays adequate comfort level or baseline comfort level  Outcome: Progressing     Problem: ABCDS Injury Assessment  Goal: Absence of physical injury  9/24/2022 0101 by Ranjith Ferreira RN  Outcome: Progressing  9/23/2022 1653 by Tania Shen RN  Outcome: Progressing     Problem: Discharge Planning  Goal: Discharge to home or other facility with appropriate resources  Outcome: Not Progressing

## 2022-09-24 NOTE — PROGRESS NOTES
[facility]                                Progress note    Subjective:    Pleasant  No acute distress  Weary of her diagnosis with small cell lung cancer with metastatic disease    Objective:    /65   Pulse (!) 108   Temp 97.4 °F (36.3 °C) (Temporal)   Resp 19   Ht 5' 2\" (1.575 m)   Wt 144 lb 12.8 oz (65.7 kg)   SpO2 94%   BMI 26.48 kg/m²     In: 1900.9 [P.O.:810; I.V.:214]  Out: 725   In: 1900.9   Out: 725 [Urine:725]    General appearance: NAD, conversant  HEENT: AT/NC, MMM  Neck: FROM, supple  Lungs: Coarse breath sounds  CV: RRR, no MRGs-left-sided port in place  Vasc: Radial pulses 2+  Abdomen: Soft, non-tender; no masses or HSM  Extremities: No peripheral edema or digital cyanosis  Skin: no rash, lesions or ulcers  Psych: Alert and oriented to person, place and time  Neuro: Alert and interactive     Recent Labs     09/22/22  0857 09/23/22  0405 09/24/22  0403   WBC 12.9* 12.3* 14.0*   HGB 11.5 11.7 11.6   HCT 32.5* 33.5* 33.2*    269 303       Recent Labs     09/24/22  0403 09/24/22  1006 09/24/22  1339   * 123* 122*   K 4.5 4.4 4.4   CL 83* 85* 84*   CO2 25 25 25   BUN 8 9 13   CREATININE 0.6 0.7 0.7   CALCIUM 9.2 9.4 9.4       Assessment:    Principal Problem:    Small cell lung cancer in adult [facility])  Active Problems:    Hyponatremia    Primary hypertension    Hypothyroidism    GERD (gastroesophageal reflux disease)    Depression    Palliative care by specialist    Goals of care, counseling/discussion    Lung mass    Closed fracture of left proximal humerus    Moderate protein-calorie malnutrition (HCC)  Resolved Problems:    * No resolved hospital problems.  *      Patient is a 79-year-old female admitted to ICU for  Severe hyponatremia, etiology consistent with metastatic small cell lung cancer   -Monitor labs  -Na+ 110 > 117 > 122 today, being checked every 6 hours  -Nephrology following  -Fluid restriction due to possible SIADH  -Urine studies  -Nephro recs, off IV fluids     Hypokalemia  -K+ 3.3, replaced, 4.4 today   -Monitor BMP     Hypotension  -initially needed IV levo, since has been discontinued     Leukocytosis, likely steroid-induced  -No clinical evidence of infection at this time  -Does not appear toxic, ongoing cough is present however, continue to monitor for developing pneumonia     Hx Diastolic heart failure  -Elevated proBNP 1,284  -Last echo in April 2021 > EF of 55 to 03%, stage I diastolic dysfunction, mild to moderate MR, trace TR  -Monitor I's and O's  -Daily weights     Recently diagnosed with lung cancer with mets to the liver-pathology completed as below  -Pathology noting 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  -Pulmonology following   -Palliative care consulted  -Heme-onc consulted > awaiting input   -Vascular surgery consulted for mediport placement > -placement of port 9/23/2022  -No metastatic disease noted on bone scan 9/22/2022     DVT Prophylaxis Lovenox   PT/OT  Discharge planning   To transfer out of ICU setting    Clara Robison MD

## 2022-09-24 NOTE — PROGRESS NOTES
[facility] MD Huy Lebron MD Willye Foley, MD Shelly Edelman, FIDEL Fernandez, ESTER Mccann, FIDEL  Progress Note    9/24/2022    SUBJECTIVE:   9/22: \"I feel like crap. \"  Generalized weakness, fatigue, myalgias, but denies dyspnea at rest on room air. Ongoing wheezing, coarse audible breath sounds. Denies chest pain. No peripheral swelling. No nausea or vomiting. Ongoing anorexia, no change from her baseline. ROS otherwise unremarkable appetite ok    9/23: Ongoing dyspnea. Ongoing wheezing despite intensification of therapy. Fatigue, malaise. Status post Mediport. BP stable    9/24: Still \"sometimes hard to breathe,\" though better than yesterday. Wheezing much improved. Denies nausea or vomiting. No chest pain or palpitations. No peripheral swelling. Complaining of \"peeing a lot. \"  Fluid restriction at 1000 cc, sodium chloride tablets increased to 2 g 3 times daily    PROBLEM LIST: G's  Principal Problem:    Small cell lung cancer in adult [facility])  Active Problems:    Hyponatremia    Primary hypertension    Hypothyroidism    GERD (gastroesophageal reflux disease)    Depression    Palliative care by specialist    Goals of care, counseling/discussion    Lung mass    Closed fracture of left proximal humerus    Moderate protein-calorie malnutrition ([address]  Resolved Problems:    * No resolved hospital problems. *         DIET:    ADULT DIET; Regular; 4 carb choices (60 gm/meal); 1000 ml  ADULT ORAL NUTRITION SUPPLEMENT; Breakfast, Lunch;  Other Oral Supplement; Gelatein  ADULT ORAL NUTRITION SUPPLEMENT; Lunch, Dinner; Frozen Oral Supplement     MEDS (scheduled):    piperacillin-tazobactam  4,500 mg IntraVENous Q8H    predniSONE  20 mg Oral BID    guaiFENesin-codeine  2.5 mL Oral Nightly    Arformoterol Tartrate  15 mcg Nebulization BID    amLODIPine  5 mg Oral Daily    rosuvastatin  20 mg Oral Nightly    losartan  25 mg Oral Daily    metoprolol succinate 50 mg Oral Daily    sodium chloride  2 g Oral TID     vitamin B-6  50 mg Oral Daily    sodium chloride flush  5-40 mL IntraVENous 2 times per day    enoxaparin  40 mg SubCUTAneous Daily    levothyroxine  137 mcg Oral Daily    montelukast  10 mg Oral QAM    budesonide  0.5 mg Nebulization BID    ipratropium-albuterol  1 ampule Inhalation Q4H WA       MEDS (infusions):   sodium chloride      dextrose         MEDS (prn):  melatonin, menthol-zinc oxide, loperamide, perflutren lipid microspheres, sodium chloride flush, sodium chloride, ondansetron **OR** ondansetron, polyethylene glycol, acetaminophen **OR** acetaminophen, glucose, dextrose bolus **OR** dextrose bolus, glucagon (rDNA), dextrose, hydrOXYzine pamoate, benzocaine-menthol    PHYSICAL EXAM:     Patient Vitals for the past 24 hrs:   BP Temp Temp src Pulse Resp SpO2 Height Weight   09/24/22 1300 112/71 -- -- (!) 104 17 93 % -- --   09/24/22 1204 -- -- -- 97 20 92 % -- --   09/24/22 1200 (!) 159/84 97.4 °F (36.3 °C) Temporal -- -- 90 % -- --   09/24/22 1138 -- -- -- -- -- -- 5' 2\" (1.575 m) --   09/24/22 1100 122/70 -- -- 94 17 95 % -- --   09/24/22 1000 (!) 149/71 -- -- (!) 104 20 93 % -- --   09/24/22 0936 (!) 157/107 -- -- (!) 106 -- -- -- --   09/24/22 0900 (!) 157/107 -- -- 96 25 96 % -- --   09/24/22 0840 -- -- -- 94 27 97 % -- --   09/24/22 0839 -- -- -- 94 20 96 % -- --   09/24/22 0800 (!) 163/86 97.6 °F (36.4 °C) Temporal 96 24 97 % -- --   09/24/22 0700 (!) 149/91 -- -- 94 23 98 % -- --   09/24/22 0627 -- -- -- -- -- -- -- 144 lb 12.8 oz (65.7 kg)   09/24/22 0600 (!) 142/122 -- -- 86 18 100 % -- --   09/24/22 0500 (!) 134/103 -- -- 90 18 95 % -- --   09/24/22 0400 (!) 151/70 97.3 °F (36.3 °C) Axillary 88 19 98 % -- --   09/24/22 0300 133/77 -- -- 93 20 97 % -- --   09/24/22 0200 (!) 158/126 -- -- (!) 101 30 90 % -- --   09/24/22 0100 (!) 156/67 -- -- (!) 101 23 94 % -- --   09/24/22 0000 (!) 169/95 97.4 °F (36.3 °C) Axillary (!) 104 28 92 % -- -- 09/23/22 2300 (!) 161/112 -- -- 100 21 94 % -- --   09/23/22 2200 (!) 133/96 -- -- 98 19 98 % -- --   09/23/22 2100 (!) 166/90 -- -- (!) 102 24 95 % -- --   09/23/22 2000 (!) 145/82 97.6 °F (36.4 °C) Axillary (!) 107 20 95 % -- --   09/23/22 1900 (!) 153/91 -- -- 99 21 98 % -- --   09/23/22 1800 (!) 145/89 -- -- (!) 103 28 96 % -- --   09/23/22 1700 (!) 154/98 -- -- (!) 102 19 95 % -- --   09/23/22 1600 (!) 148/99 -- -- (!) 108 26 90 % -- --   09/23/22 1535 (!) 149/89 98 °F (36.7 °C) Oral (!) 105 21 94 % -- --   @      Intake/Output Summary (Last 24 hours) at 9/24/2022 1430  Last data filed at 9/24/2022 1000  Gross per 24 hour   Intake 1666.85 ml   Output 625 ml   Net 1041. 85 ml         Wt Readings from Last 3 Encounters:   09/24/22 144 lb 12.8 oz (65.7 kg)   09/01/22 161 lb (73 kg)   04/25/22 160 lb (72.6 kg)       Constitutional:  in no acute distress  HEENT: NC/AT, EOMI, sclera and conjunctiva are clear and anicteric, mucus membranes moist  Neck: Trachea midline, no JVD  Cardiovascular: S1, S2 regular rhythm, no murmur,or rub  Respiratory:  No crackles, no wheeze  Gastrointestinal:  Soft, nontender, nondistended, NABS  Ext: no edema, feet warm  Skin: dry, no rash  Neuro: awake, alert, interactive      DATA:    Recent Labs     09/22/22  0857 09/23/22  0405 09/24/22  0403   WBC 12.9* 12.3* 14.0*   HGB 11.5 11.7 11.6   HCT 32.5* 33.5* 33.2*   MCV 81.5 83.1 85.1    269 303     Recent Labs     09/22/22  0857 09/22/22  1225 09/23/22  0405 09/23/22  0933 09/24/22  0017 09/24/22  0403 09/24/22  1006   *   < > 118*   < > 121* 120* 123*   K 3.3*   < > 4.0   < > 4.1 4.5 4.4   CL 83*   < > 81*   < > 84* 83* 85*   CO2 26   < > 27   < > 25 25 25   MG 1.8  --  1.7  --   --  2.0  --    PHOS 2.9  --  2.9  --   --  3.1  --    BUN 12   < > 10   < > 9 8 9   CREATININE 0.6   < > 0.6   < > 0.6 0.6 0.7    < > = values in this interval not displayed. No results found for: LABPROT      Assessment  1.   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 Rate of correction of [Na+] initially too brisk, has since been slowed down after D5 W drip. With cessation of D5 water drip, sodium is improved somewhat, plateaued.     [Na+] improved as of 10 AM.    Recommendations  Continue current fluid restriction  NaCl tabs 2 g 3 times daily  Continue to follow BMP every 4 hours, adjust treatment as warranted  If after next BMP, [na+] improving, will decrease frequency to every 6 hours  If improvement stalls again, consider tolvaptan  goal for correction no more than 6-8 meq per day  Continue supportive care    Electronically signed by Lisa Coburn MD on 9/24/2022

## 2022-09-24 NOTE — PROGRESS NOTES
[address] [facility]  Department of Internal Medicine   Internal Medicine Residency   MICU Progress Note    Patient:  Mane Mcmahon 81 y.o. female  MRN: [id number]     Date of Service: 9/24/2022    Allergy: Patient has no known allergies. HPI:    This is a 74-year-old patient with a past medical history of chronic bronchitis (2L at home and breathing treatment), hypertension, hyperlipidemia, hypothyroidism, diastolic heart failure, depression, tobacco abuse (quit 1 week ago), ethanol abuse (quit 4-6 months ago), and newly diagnosed small cell carcinoma of the lung who is presenting with a chief complaint of dizziness. She was recently discharged from the hospital on 9/7/2022 and has increasingly felt dizzy, but reports no loss of consciousness. She visited her PCP for lab work and was found to have low sodium. In the ED on 9/20, the patient was hemodynamically unstable with a BP of 60/33 mmHg, pulse of 114, respiratory rate of 34 and SpO2 of 94%. Initial workup showed a sodium of 110 and a pH of 7.175, pCO2 of 46.9, PO2 of 223.8, and bicarb of 16.9. Other electrolytes were normal at this time. Repeat CTA of the chest showed a right mediastinal mass with lymphadenopathy, which had worsened compared to CTA on 9/3. A central line was placed due to hypotension and the patient was given cefepime 2g, duo nebs, Solu-Medrol 125mg, 10mcg increased to 15mcg of Levophed, and a sodium bicarb 8.4% injection as well as 1L of normal saline. The patient was subsequently weaned off of pressors due to blood pressure stabilization. On 9/23, the patient's code status was changed to [facility]. Subjective     The patient was examined at the bedside this morning and still had some coughing. She was given breathing treatments with mild improvement, but she felt roughly the same today as she did yesterday. Widespread rhonchi were heard bilaterally.   Sodium was at 120 and is stable here; nephrology reccommended a salt pill.  Mediport insertion went according the plan and the patient has no acute complaints. She did admit to urinating 8 times throughout the night, but has no pain with urination or discoloration of the urine. The plan if for transfer to another floor out of the MICU today. 24h change: No acute events; oral salt tablet was begun by nephrology     ROS: Denies Fever/chills/CP/N/V/D/C/Dysuria/Blood in stool or urine. Patient did admit to shortness of breath. Objective     VS: BP (!) 149/71   Pulse 97   Temp 97.6 °F (36.4 °C) (Temporal)   Resp 20   Ht 5' 2\" (1.575 m)   Wt 144 lb 12.8 oz (65.7 kg)   SpO2 92%   BMI 26.48 kg/m²         I & O - 24hr:   Intake/Output Summary (Last 24 hours) at 9/24/2022 1209  Last data filed at 9/24/2022 1000  Gross per 24 hour   Intake 1566.85 ml   Output 725 ml   Net 841.85 ml       Physical Exam:  General Appearance: alert, appears stated age, and cooperative  Neck: no adenopathy, no carotid bruit, no JVD, supple, symmetrical, trachea midline, thyroid not enlarged, symmetric, no tenderness/mass/nodules, and palpable and moveable right supraclavicular lymph node palpated. Lung: diffuse wheezing and severe ronchi present bilaterally  Heart: regular rate and rhythm, S1, S2 normal, no murmur, click, rub or gallop  Abdomen: soft, non-tender; bowel sounds normal; no masses,  no organomegaly  Extremities:  extremities normal, atraumatic, no cyanosis or edema. Musculoskeletal: No joint swelling, no muscle tenderness. ROM normal in all joints of extremities.    Neurologic: Mental status: AAOx3, no focal deficit present    Lines     site day    Art line   None    TLC None    PICC None    Hemoaccess R Fem 2     Oxygen:    nasal canula at 4 L/min    ABG:     Lab Results   Component Value Date/Time    PH 7.455 09/23/2022 11:00 AM    PCO2 40.4 09/23/2022 11:00 AM    PO2 71.3 09/23/2022 11:00 AM    HCO3 27.8 09/23/2022 11:00 AM    BE 3.6 09/23/2022 11:00 AM    THB 12.8 09/23/2022 11:00 AM    O2SAT 95.1 09/23/2022 11:00 AM        Medications     Infusions: (Fluid, Sedation, Vasopressors)    Vasopressors  No pressors    Nutrition:  NPO (d/t mediport procedure)  ATB:   Antibiotics  Days                 Skin issues: no current skin issues  Patient currently has   DVT prophylaxis - Lovenox; held yesterday and is still on hold  Palliative care consult     Labs   CBC:   Lab Results   Component Value Date/Time    WBC 14.0 09/24/2022 04:03 AM    RBC 3.90 09/24/2022 04:03 AM    HGB 11.6 09/24/2022 04:03 AM    HCT 33.2 09/24/2022 04:03 AM    MCV 85.1 09/24/2022 04:03 AM    MCH 29.7 09/24/2022 04:03 AM    MCHC 34.9 09/24/2022 04:03 AM    RDW 13.6 09/24/2022 04:03 AM     09/24/2022 04:03 AM    MPV 9.5 09/24/2022 04:03 AM     BMP:    Lab Results   Component Value Date/Time     09/24/2022 10:06 AM    K 4.4 09/24/2022 10:06 AM    CL 85 09/24/2022 10:06 AM    CO2 25 09/24/2022 10:06 AM    BUN 9 09/24/2022 10:06 AM    LABALBU 4.3 09/20/2022 06:43 PM    CREATININE 0.7 09/24/2022 10:06 AM    CALCIUM 9.4 09/24/2022 10:06 AM    GFRAA >60 09/24/2022 10:06 AM    LABGLOM >60 09/24/2022 10:06 AM    GLUCOSE 122 09/24/2022 10:06 AM     Hepatic Function Panel:    Lab Results   Component Value Date/Time    ALKPHOS 137 09/20/2022 06:43 PM    ALT 8 09/20/2022 06:43 PM    AST 15 09/20/2022 06:43 PM    PROT 7.1 09/20/2022 06:43 PM    BILITOT 0.2 09/20/2022 06:43 PM    LABALBU 4.3 09/20/2022 06:43 PM     U/A:    Lab Results   Component Value Date/Time    COLORU Yellow 09/21/2022 06:42 AM    COLORU Yellow 09/21/2022 06:42 AM    PROTEINU TRACE 09/21/2022 06:42 AM    PROTEINU 30 09/21/2022 06:42 AM    PHUR 6.0 09/21/2022 06:42 AM    PHUR 6.0 09/21/2022 06:42 AM    WBCUA NONE 09/21/2022 06:42 AM    WBCUA NONE 09/21/2022 06:42 AM    RBCUA 2-5 09/21/2022 06:42 AM    RBCUA 2-5 09/21/2022 06:42 AM    BACTERIA NONE SEEN 09/21/2022 06:42 AM    BACTERIA NONE SEEN 09/21/2022 06:42 AM    CLARITYU Clear 09/21/2022 06:42 AM CLARITYU Clear 09/21/2022 06:42 AM    SPECGRAV 1.010 09/21/2022 06:42 AM    SPECGRAV 1.010 09/21/2022 06:42 AM    LEUKOCYTESUR Negative 09/21/2022 06:42 AM    LEUKOCYTESUR Negative 09/21/2022 06:42 AM    UROBILINOGEN 0.2 09/21/2022 06:42 AM    UROBILINOGEN 0.2 09/21/2022 06:42 AM    BILIRUBINUR Negative 09/21/2022 06:42 AM    BILIRUBINUR Negative 09/21/2022 06:42 AM    BLOODU SMALL 09/21/2022 06:42 AM    BLOODU SMALL 09/21/2022 06:42 AM    GLUCOSEU Negative 09/21/2022 06:42 AM    GLUCOSEU Negative 09/21/2022 06:42 AM     ABG:    Lab Results   Component Value Date/Time    PH 7.455 09/23/2022 11:00 AM    PCO2 40.4 09/23/2022 11:00 AM    PO2 71.3 09/23/2022 11:00 AM    HCO3 27.8 09/23/2022 11:00 AM    BE 3.6 09/23/2022 11:00 AM    O2SAT 95.1 09/23/2022 11:00 AM     FLP:    Lab Results   Component Value Date/Time    TRIG 96 09/21/2022 08:15 AM    HDL 69 09/21/2022 08:15 AM    LDLCALC 92 09/21/2022 08:15 AM    LABVLDL 19 09/21/2022 08:15 AM     TSH:    Lab Results   Component Value Date/Time    TSH 3.970 09/21/2022 12:32 AM     VITAMIN B12: No components found for: B12  FOLATE:    Lab Results   Component Value Date/Time    FOLATE 10.4 09/21/2022 12:32 AM       Imaging Studies:    ECHOCARDIOGRAM     Result Date: 9/22/2022  Summary   Mildly dilated left ventricle. Ejection fraction is visually estimated at 60-65%. 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 Severe concentric left ventricular hypertrophy. Indeterminate diastolic function. Global longitudinal peak strain -12.5% (low due basal aneurysm)   Normal right ventricular size and function. TAPSE 25 mm. Mild mitral regurgitation is present. No hemodynamically significant aortic stenosis is present. Unable to estimate PA systolic pressure. No evidence for hemodynamically significant pericardial effusion. Consider cardiac MRI further evaluation of LV basal aneurysm.     NM BONE SCAN WHOLE BODY     Result Date: 9/21/2022  FINDINGS:   Chest CT of September 20 has been reviewed. There are displaced healing   fractures in the right 5, 6 and 7 ribs. There are areas of focal increased   uptake of the radionuclide which correlates with these fractures. There are old fractures in the posterior aspect of the right 8 and 9 ribs. There are areas of focal increased uptake adrenal glide which correlates with   these rib fractures. There is a healing fracture of the lower 3rd of the manubrium of the sternum. There is area of more intense uptake adrenal glide which correlate with these   fracture. There is a impacted the subcapital or infra capital fracture of the proximal   left humerus which correlates with increased uptake the radionuclide in this   area. There is a S shaped scoliotic curvature for the thoracolumbar spine with the   mid right thoracic convexity, a more discrete left thoracolumbar convexity,   and a mid left lumbar convexity. Increased uptake of the nuclide in the L2-3 level relates with more advanced   degenerative disc disease the reactive bone sclerosis at that. The increased   uptake adrenal glide across T7-T8 relate with spondylosis more prominent to   the left of the midline and the discrete in these uptake in other levels of   the lumbar spine also correlate degenerative changes. Some increased activity seen in the knees and in the right shoulder elbow   joints and wrist joints correlate with degenerative changes. There are limitation in the evaluation of the lower sacral spine and   symphysis of the pubis do residual activity the bladder. .           Impression   1. No pattern of metastatic bone disease. 2.  Multiple trauma injuries are seen in the right ribcage, left humerus   subcapital region, and degenerative changes are seen in multiple levels in   the thoracolumbar spine.        RECOMMENDATIONS:   Unavailable        CT HEAD WO CONTRAST Result Date: 9/1/2022  EXAMINATION: CT OF THE HEAD WITHOUT CONTRAST  9/1/2022 2:28 pm TECHNIQUE: CT of the head was performed without the administration of intravenous contrast. Automated exposure control, iterative reconstruction, and/or weight based adjustment of the mA/kV was utilized to reduce the radiation dose to as low as reasonably achievable. COMPARISON: None. HISTORY: ORDERING SYSTEM PROVIDED HISTORY: dizzy TECHNOLOGIST PROVIDED HISTORY: Has a \"code stroke\" or \"stroke alert\" been called? ->No Reason for exam:->dizzy Decision Support Exception - unselect if not a suspected or confirmed emergency medical condition->Emergency Medical Condition (MA) What reading provider will be dictating this exam?->CRC FINDINGS: BRAIN/VENTRICLES: There is no acute intracranial hemorrhage, mass effect or midline shift. No abnormal extra-axial fluid collection. The gray-white differentiation is maintained without evidence of an acute infarct. There is no evidence of hydrocephalus. ORBITS: The visualized portion of the orbits demonstrate no acute abnormality. SINUSES: The visualized paranasal sinuses demonstrate no acute abnormality. There is partial opacification of bilateral mastoid air cells. SOFT TISSUES/SKULL:  No acute abnormality of the visualized skull or soft tissues. No acute intracranial abnormality. Periventricular leukomalacia. Suspect bilateral mastoiditis. CT CHEST WO CONTRAST     Result Date: 9/3/2022  EXAMINATION: CT OF THE CHEST WITHOUT CONTRAST 9/3/2022 9:27 am TECHNIQUE: CT of the chest was performed without the administration of intravenous contrast. Multiplanar reformatted images are provided for review. Automated exposure control, iterative reconstruction, and/or weight based adjustment of the mA/kV was utilized to reduce the radiation dose to as low as reasonably achievable. COMPARISON: None.  HISTORY: ORDERING SYSTEM PROVIDED HISTORY: Rule out malignancy/mass TECHNOLOGIST PROVIDED HISTORY: Reason for exam:->Rule out malignancy/mass What reading provider will be dictating this exam?->CRC FINDINGS: Mediastinum: There is superior mediastinal adenopathy and a prominent right paratracheal lymph node mass. Right paratracheal lymph node mass measures 2.8 x 1.8 cm. Upper anterior mediastinal lymph nodes measure approximately 7-8 mm in short axis. Thyromegaly is noted with coarse calcification in the enlarged thyroid isthmus. Pulmonary artery is prominent in size. Aorta is nonaneurysmal.  There is ASVD of the coronary vessels. There is a small pericardial effusion. Lungs/pleura: There is a focal nodular consolidation in the peripheral aspect of the medial right upper lobe measuring 12 x 11 mm. There is slight volume loss extending to the right suprahilar region. There is a small right pleural effusion. No endobronchial masses. No other lung lesions identified. Upper Abdomen: Images of the upper abdomen demonstrate a 2 cm low-density mass in the right hepatic lobe suspicious for metastatic disease. The adrenal glands appear normal.  There is a small left kidney. Soft Tissues/Bones: There appears to be a chronic fracture of the manubrium best seen on sagittal reconstruction views. Degenerative changes are noted in the thoracic and lumbar spine. No lytic or blastic lesions detected. 1.  Right paratracheal lymph node mass measuring 2.8 x 1.8 cm. There is a medial right upper lobe nodular parenchymal lung opacity measuring 12 x 11 mm. There is a small right pleural effusion. Mild upper mediastinal adenopathy. Findings are compatible with malignancy. 2.  Probable metastatic lesion in the right hepatic lobe measuring 2 cm. 3.  Small pericardial effusion. 4.  Small left kidney. 5.  Thyromegaly with nodular isodense calcified lesion in the thyroid isthmus. Lesion measures 14 mm AP.       CT GUIDED NEEDLE PLACEMENT     Result Date: 9/6/2022  PROCEDURE: CT NEEDLE BIOPSY LIVER PERCUTANEOUS; CT GUIDED NDL PLACEMENT MODERATE CONSCIOUS SEDATION 9/6/2022 HISTORY: ORDERING SYSTEM PROVIDED HISTORY: lung and liver nodule TECHNOLOGIST PROVIDED HISTORY: Biopsy of either right lung nodule or liver nodule, whichever is easiest to get to Reason for exam:->lung and liver nodule What reading provider will be dictating this exam?->CRC; ORDERING SYSTEM PROVIDED HISTORY: liver lesion TECHNOLOGIST PROVIDED HISTORY: Biopsy of either right lung nodule or liver nodule, whichever is easiest to get to Reason for exam:->liver lesion What reading provider will be dictating this exam?->CRC TECHNIQUE: Automated exposure control, iterative reconstruction, and/or weight based adjustment of the mA/kV was utilized to reduce the radiation dose to as low as reasonably achievable. CONTRAST: None SEDATION: 1 mgversed and 50 mcg fentanyl were titrated intravenously for moderate sedation monitored under my direction. Total intraservice time of sedation was 20 minutes. The patient's vital signs were monitored throughout the procedure and recorded in the patient's medical record by the nurse. Mallapati score 2 ASA 2 FLUOROSCOPY DOSE AND TYPE OR TIME AND EXPOSURES: CT DESCRIPTION OF PROCEDURE: Informed consent was obtained after a detailed explanation of the procedure including risks, benefits, and alternatives. Universal protocol was observed. Sterile gowns, masks, hats and gloves utilized for maximal sterile barrier. After informed consent patient is placed supine on the table. Liver mass right lobe was localized with CT scanning. Patient prepped draped sterile fashion. 1% lidocaine was infiltrated for local anesthesia. 20 gauge coaxial needle was advanced into the lesion. Multiple 20 gauge cores were obtained and submitted to pathology. Post biopsy imaging shows air within the lesion suggesting good position of the biopsy needle. No immediate postoperative complication was seen. Dressing was applied.   Patient was returned to [facility] stable condition. FINDINGS: Needle tip located in the right lobe liver mass. Successful CT-guided core biopsy right liver mass. XR CHEST PORTABLE    Result date:  9/22/2022  EXAMINATION: ONE XRAY VIEW OF THE CHEST 9/22/2022   COMPARISON:   CT chest 20 September 2022       HISTORY:   ORDERING SYSTEM PROVIDED HISTORY: SOB, crackles on physical exam; assess for   volume overload   TECHNOLOGIST PROVIDED HISTORY:   Reason for exam:->SOB, crackles on physical exam; assess for volume overload   What reading provider will be dictating this exam?->CRC       FINDINGS:   Single AP upright portable chest demonstrates S shaped scoliotic curvature. The lungs are mildly hyperexpanded with increased markings at the lung bases   with mild blunting of the left costophrenic angle. Persistent fullness in   the right mediastinal region. There is no evidence of a pneumothorax. The   upper abdomen appears unremarkable. IMPRESSION:   Findings consistent with infiltrative process involving the right mediastinum   as noted on the previous CT chest.  No evidence of a pneumothorax. XR CHEST PORTABLE     Result Date: 9/20/2022  EXAMINATION: ONE XRAY VIEW OF THE CHEST 9/20/2022 9:06 pm COMPARISON: 09/01/2022 HISTORY: ORDERING SYSTEM PROVIDED HISTORY: Shortness of breath TECHNOLOGIST PROVIDED HISTORY: Reason for exam:->Shortness of breath What reading provider will be dictating this exam?->CRC FINDINGS: The lungs are without acute focal process. There is no effusion or pneumothorax. The cardiomediastinal silhouette is without acute process. The osseous structures are without acute process. No acute process. XR CHEST PORTABLE     Result Date: 9/1/2022  EXAMINATION: ONE XRAY VIEW OF THE CHEST 9/1/2022 1:04 pm COMPARISON: None.  HISTORY: ORDERING SYSTEM PROVIDED HISTORY: Shortness of breath TECHNOLOGIST PROVIDED HISTORY: Reason for exam:->Shortness of breath What reading provider will be dictating this exam?->CRC FINDINGS: Lungs are clear. The heart is mildly enlarged. There is no pneumothorax. This blunting of the right costophrenic angle. Mild scoliosis of the thoracic spine. Mild cardiomegaly. Mild right pleural effusion. CT NEEDLE BIOPSY LIVER PERCUTANEOUS     Result Date: 9/6/2022  PROCEDURE: CT NEEDLE BIOPSY LIVER PERCUTANEOUS; CT GUIDED NDL PLACEMENT MODERATE CONSCIOUS SEDATION 9/6/2022 HISTORY: ORDERING SYSTEM PROVIDED HISTORY: lung and liver nodule TECHNOLOGIST PROVIDED HISTORY: Biopsy of either right lung nodule or liver nodule, whichever is easiest to get to Reason for exam:->lung and liver nodule What reading provider will be dictating this exam?->CRC; ORDERING SYSTEM PROVIDED HISTORY: liver lesion TECHNOLOGIST PROVIDED HISTORY: Biopsy of either right lung nodule or liver nodule, whichever is easiest to get to Reason for exam:->liver lesion What reading provider will be dictating this exam?->CRC TECHNIQUE: Automated exposure control, iterative reconstruction, and/or weight based adjustment of the mA/kV was utilized to reduce the radiation dose to as low as reasonably achievable. CONTRAST: None SEDATION: 1 mgversed and 50 mcg fentanyl were titrated intravenously for moderate sedation monitored under my direction. Total intraservice time of sedation was 20 minutes. The patient's vital signs were monitored throughout the procedure and recorded in the patient's medical record by the nurse. Mallapati score 2 ASA 2 FLUOROSCOPY DOSE AND TYPE OR TIME AND EXPOSURES: CT DESCRIPTION OF PROCEDURE: Informed consent was obtained after a detailed explanation of the procedure including risks, benefits, and alternatives. Universal protocol was observed. Sterile gowns, masks, hats and gloves utilized for maximal sterile barrier. After informed consent patient is placed supine on the table. Liver mass right lobe was localized with CT scanning. Patient prepped draped sterile fashion.   1% lidocaine was infiltrated for local anesthesia. 20 gauge coaxial needle was advanced into the lesion. Multiple 20 gauge cores were obtained and submitted to pathology. Post biopsy imaging shows air within the lesion suggesting good position of the biopsy needle. No immediate postoperative complication was seen. Dressing was applied. Patient was returned to holding stable condition. FINDINGS: Needle tip located in the right lobe liver mass. Successful CT-guided core biopsy right liver mass. Resident's Assessment and Plan     Ms. Hannah Avitia is a 82-year-old woman with a PMH of chronic bronchitis, hypertension, hyperlipidemia, hypothyroidism, diastolic heart failure, depression, tobacco abuse, ethanol abuse, and newly diagnosed small cell carcinoma of the lung who is presenting with the following:      Pulmonary    Newly diagnosed small cell lung cancer with metastasis to the liver  9/2 results from CT w/o contrast performed to rule out malignancy; showed focal nodular consolidation in the peripheral right medial RUL measuring [id number] as well as superior mediastinal adenopathy, consistent with malignancy  Liver biopsy results on 9/6 confirmed small cell lung cancer diagnosis  Patient has extensive smoking history  Has lost undisclosed amount of weight within the last few months  Procalcitonin elevated at 3.94, decreased to 1.39  On pyridoxine 50mg QD  Successful Mediport placement  PLAN  Consulted palliative care; follow their recs  Follow MRI brain  Following oncology recs; patient will start chemotherapy  (-16, carboplatin) in outpatient setting with Dr. Yariel Hines    Hx of chronic bronchitis/COPD  Uses 2L of oxygen at home  On breathing treatment, Proventil, Flonase, and Singulair at home as well  Currently has SoB; has significant wheezing BL  PLAN  Discontinue Brovana   Change methyprednisolone to PO QD  Add Codeine   Continue guaifenesin 200mg PRN Q4H  Continue current breathing treatment     Post-obstructive pneumonia vs small cell lung cancer  Currently no positive cultures and no fevers  Procalcitonin elevated at 3.94; decreased to 1.39 today  WBC slightly elevated at 12.3; up today at 14.0  CT scan findings show possible post-obstructive pneumonia (air bronchograms and tree-in-bud in R lobe)  PLAN  Continue Zosyn 4.5g IV Q8H; potential bronch per ID if patient's symptoms worsen      Nephrology    Hypotonic hypovolemic hyponatremia 2/2 intravascular volume depletion vs SIADH 2/2 small cell lung carcinoma  Patient with extensive smoking history  Recently diagnosed with small cell lung cancer with metastasis to the liver  Sodium = 120 currently, has stabilized around here  PLAN  Follow BMP Q4H  Follow urine studies  Dry mary carmen diet  Fluid restriction due to possible SIADH  Consulted nephrology, follow recs including salt pill  Consider starting minocycline     Hypokalemia 2/2 poor oral intake  K was 3.3, replaced  Follow BMP  Issued resolved; on potassium chloride PO daily    Lactic acidosis 2/2 intramuscular volume depletion (type A) vs liver metastasis (type B)  Lactic acid = 1.3, trending down from 4.2  Issue resolved      Cardiology    Hx of diastolic heart failure and structural heart disease  Echo on 9/22 showed EF 60-65% with severe concentric LVH  Basal inferior lateral wall aneurysmal dilation  Patient stated in the past she was on Lasix, but not currently taking  No lower extremity edema noted  PLAN  Cardiology recommended:  Cardiac MRI for further evaluation  Rosuvastatin 20mg QD  Increase metoprolol to 50mg QD  Ramipril 2.5mg PO QD - prefer losartan due to cough  ASA 81mg daily only if no brain mets on MRI    Hx of HTN   On amlodipine and Toprol at home  Continued Toprol       Endocrine    Hx of hypothyroidism  TSH = 3.970, Free T4 elevated at 2.15  Home medication = levothyroxine 137mcg tablet  Recent CT chest showed thyromegaly with nodular calcified isodense lesion in the thyroid isthmus   PLAN  Continue current levothyroxine dose    Musculoskeletal    Multiple fracture of ribs and left humoral neck fracture 2/2 malnutrition-induced osteopenia/osteoporosis vs malignancy-induced fractures vs trauma  Patient had NM Bone scan and XR of left shoulder detailing multiple fractures as noted in above imaging report  PLAN  Begin Pamidronate 90mg; plan of care documentation noted in Karla [facility], MS-3  Attending physician: Dr. Soumya Stanley  Department of Pulmonary, Critical Care and Sleep Medicine  [address] [facility]  Department of Internal Medicine      During multidisciplinary team rounds Shira Tinoco is a 81 y.o. female was seen, examined and discussed. This is confirmation that I have personally seen and examined the patient and that the key elements of the encounter were performed by me (> 85 % time). The medications & laboratory data was discussed and adjusted where necessary. The radiographic images were reviewed or with radiologist or consultant if felt dis-concordant with the exam or history. The above findings were corroborated, plans confirmed and changes made if needed. Family is updated at the bedside as available. Key issues of the case were discussed among consultants. Critical Care time is documented if appropriate.       Erasmo Stevens, , FACP, FCCP, Melanie stoddard,

## 2022-09-24 NOTE — PROGRESS NOTES
Doing ok after mediport placement yesterday, states she was not able to sleep well due to her cough. Incision is c/d/I with no hematoma. Vascular will sign off, please call if needed.     Electronically signed by Reggie Hilario MD on 9/24/22 at 7:51 AM EDT

## 2022-09-24 NOTE — PLAN OF CARE
Problem: Safety - Adult  Goal: Free from fall injury  9/24/2022 1225 by Mónica Arreaga RN  Outcome: Progressing     Problem: Skin/Tissue Integrity  Goal: Absence of new skin breakdown  9/24/2022 1225 by Mónica Arreaga RN  Outcome: Progressing     Problem: Pain  Goal: Verbalizes/displays adequate comfort level or baseline comfort level  9/24/2022 1225 by Mónica Arreaga RN  Outcome: Progressing     Problem: ABCDS Injury Assessment  Goal: Absence of physical injury  9/24/2022 1225 by Mónica Arreaga RN  Outcome: Progressing     Problem: Nutrition Deficit:  Goal: Optimize nutritional status  Outcome: Progressing     Problem: Discharge Planning  Goal: Discharge to home or other facility with appropriate resources  9/24/2022 0101 by Maximo Severin, RN  Outcome: Not Progressing

## 2022-09-24 NOTE — PLAN OF CARE
Per pharmacy, clarification needed as to the indications for initiation of Pamidronate 90mg as a one-time dose. Pt has multiple fractures to ribs, sternum, and L humeral neck, concerning for either malnutrition-induced osteopenia/osteoporosis, with combined concern for malignancy-induced fractures given recent diagnosis of metastatic small cell lung cancer. Please do not hesitate to call with further questions or concerns.      Bridgette Schwab, DO, PGY3   9/24/2022

## 2022-09-25 LAB
ANION GAP SERPL CALCULATED.3IONS-SCNC: 10 MMOL/L (ref 7–16)
ANION GAP SERPL CALCULATED.3IONS-SCNC: 16 MMOL/L (ref 7–16)
ANION GAP SERPL CALCULATED.3IONS-SCNC: 8 MMOL/L (ref 7–16)
BASOPHILS ABSOLUTE: 0.01 E9/L (ref 0–0.2)
BASOPHILS RELATIVE PERCENT: 0.1 % (ref 0–2)
BLOOD CULTURE, ROUTINE: NORMAL
BUN BLDV-MCNC: 15 MG/DL (ref 6–23)
BUN BLDV-MCNC: 16 MG/DL (ref 6–23)
BUN BLDV-MCNC: 17 MG/DL (ref 6–23)
CALCIUM SERPL-MCNC: 8.6 MG/DL (ref 8.6–10.2)
CALCIUM SERPL-MCNC: 8.9 MG/DL (ref 8.6–10.2)
CALCIUM SERPL-MCNC: 9 MG/DL (ref 8.6–10.2)
CHLORIDE BLD-SCNC: 89 MMOL/L (ref 98–107)
CHLORIDE BLD-SCNC: 90 MMOL/L (ref 98–107)
CHLORIDE BLD-SCNC: 96 MMOL/L (ref 98–107)
CO2: 22 MMOL/L (ref 22–29)
CO2: 26 MMOL/L (ref 22–29)
CO2: 28 MMOL/L (ref 22–29)
CREAT SERPL-MCNC: 0.7 MG/DL (ref 0.5–1)
CULTURE, BLOOD 2: NORMAL
EOSINOPHILS ABSOLUTE: 0.03 E9/L (ref 0.05–0.5)
EOSINOPHILS RELATIVE PERCENT: 0.2 % (ref 0–6)
GFR AFRICAN AMERICAN: >60
GFR NON-AFRICAN AMERICAN: >60 ML/MIN/1.73
GLUCOSE BLD-MCNC: 105 MG/DL (ref 74–99)
GLUCOSE BLD-MCNC: 135 MG/DL (ref 74–99)
GLUCOSE BLD-MCNC: 84 MG/DL (ref 74–99)
HCT VFR BLD CALC: 35.9 % (ref 34–48)
HEMOGLOBIN: 12.1 G/DL (ref 11.5–15.5)
IMMATURE GRANULOCYTES #: 0.12 E9/L
IMMATURE GRANULOCYTES %: 0.8 % (ref 0–5)
LYMPHOCYTES ABSOLUTE: 0.67 E9/L (ref 1.5–4)
LYMPHOCYTES RELATIVE PERCENT: 4.4 % (ref 20–42)
MAGNESIUM: 1.8 MG/DL (ref 1.6–2.6)
MCH RBC QN AUTO: 28.5 PG (ref 26–35)
MCHC RBC AUTO-ENTMCNC: 33.7 % (ref 32–34.5)
MCV RBC AUTO: 84.5 FL (ref 80–99.9)
METER GLUCOSE: 136 MG/DL (ref 74–99)
METER GLUCOSE: 78 MG/DL (ref 74–99)
METER GLUCOSE: 85 MG/DL (ref 74–99)
MONOCYTES ABSOLUTE: 1.15 E9/L (ref 0.1–0.95)
MONOCYTES RELATIVE PERCENT: 7.5 % (ref 2–12)
NEUTROPHILS ABSOLUTE: 13.4 E9/L (ref 1.8–7.3)
NEUTROPHILS RELATIVE PERCENT: 87 % (ref 43–80)
PDW BLD-RTO: 13.8 FL (ref 11.5–15)
PHOSPHORUS: 2.9 MG/DL (ref 2.5–4.5)
PLATELET # BLD: 336 E9/L (ref 130–450)
PMV BLD AUTO: 9.1 FL (ref 7–12)
POTASSIUM REFLEX MAGNESIUM: 4.1 MMOL/L (ref 3.5–5)
POTASSIUM SERPL-SCNC: 3.9 MMOL/L (ref 3.5–5)
POTASSIUM SERPL-SCNC: 4.1 MMOL/L (ref 3.5–5)
RBC # BLD: 4.25 E12/L (ref 3.5–5.5)
SODIUM BLD-SCNC: 126 MMOL/L (ref 132–146)
SODIUM BLD-SCNC: 127 MMOL/L (ref 132–146)
SODIUM BLD-SCNC: 132 MMOL/L (ref 132–146)
WBC # BLD: 15.4 E9/L (ref 4.5–11.5)

## 2022-09-25 PROCEDURE — 85025 COMPLETE CBC W/AUTO DIFF WBC: CPT

## 2022-09-25 PROCEDURE — 6370000000 HC RX 637 (ALT 250 FOR IP): Performed by: INTERNAL MEDICINE

## 2022-09-25 PROCEDURE — 2580000003 HC RX 258: Performed by: STUDENT IN AN ORGANIZED HEALTH CARE EDUCATION/TRAINING PROGRAM

## 2022-09-25 PROCEDURE — 80048 BASIC METABOLIC PNL TOTAL CA: CPT

## 2022-09-25 PROCEDURE — 6370000000 HC RX 637 (ALT 250 FOR IP): Performed by: STUDENT IN AN ORGANIZED HEALTH CARE EDUCATION/TRAINING PROGRAM

## 2022-09-25 PROCEDURE — 6360000002 HC RX W HCPCS: Performed by: STUDENT IN AN ORGANIZED HEALTH CARE EDUCATION/TRAINING PROGRAM

## 2022-09-25 PROCEDURE — 83735 ASSAY OF MAGNESIUM: CPT

## 2022-09-25 PROCEDURE — 2580000003 HC RX 258: Performed by: INTERNAL MEDICINE

## 2022-09-25 PROCEDURE — 94660 CPAP INITIATION&MGMT: CPT

## 2022-09-25 PROCEDURE — 6370000000 HC RX 637 (ALT 250 FOR IP): Performed by: NURSE PRACTITIONER

## 2022-09-25 PROCEDURE — 36591 DRAW BLOOD OFF VENOUS DEVICE: CPT

## 2022-09-25 PROCEDURE — 6360000002 HC RX W HCPCS: Performed by: INTERNAL MEDICINE

## 2022-09-25 PROCEDURE — 84100 ASSAY OF PHOSPHORUS: CPT

## 2022-09-25 PROCEDURE — 82962 GLUCOSE BLOOD TEST: CPT

## 2022-09-25 PROCEDURE — 6370000000 HC RX 637 (ALT 250 FOR IP): Performed by: FAMILY MEDICINE

## 2022-09-25 PROCEDURE — 2700000000 HC OXYGEN THERAPY PER DAY

## 2022-09-25 PROCEDURE — 94640 AIRWAY INHALATION TREATMENT: CPT

## 2022-09-25 PROCEDURE — 2140000000 HC CCU INTERMEDIATE R&B

## 2022-09-25 PROCEDURE — 36415 COLL VENOUS BLD VENIPUNCTURE: CPT

## 2022-09-25 RX ORDER — HYDROXYZINE PAMOATE 25 MG/1
25 CAPSULE ORAL EVERY 8 HOURS PRN
Status: DISCONTINUED | OUTPATIENT
Start: 2022-09-25 | End: 2022-09-26 | Stop reason: HOSPADM

## 2022-09-25 RX ORDER — HEPARIN SODIUM (PORCINE) LOCK FLUSH IV SOLN 100 UNIT/ML 100 UNIT/ML
300 SOLUTION INTRAVENOUS 2 TIMES DAILY
Status: DISCONTINUED | OUTPATIENT
Start: 2022-09-25 | End: 2022-09-26 | Stop reason: HOSPADM

## 2022-09-25 RX ORDER — GUAIFENESIN 100 MG/5ML
200 SOLUTION ORAL EVERY 6 HOURS PRN
Status: DISCONTINUED | OUTPATIENT
Start: 2022-09-25 | End: 2022-09-26 | Stop reason: HOSPADM

## 2022-09-25 RX ORDER — HEPARIN SODIUM (PORCINE) LOCK FLUSH IV SOLN 100 UNIT/ML 100 UNIT/ML
300 SOLUTION INTRAVENOUS PRN
Status: DISCONTINUED | OUTPATIENT
Start: 2022-09-25 | End: 2022-09-26 | Stop reason: HOSPADM

## 2022-09-25 RX ORDER — GUAIFENESIN 400 MG/1
400 TABLET ORAL 3 TIMES DAILY
Status: DISCONTINUED | OUTPATIENT
Start: 2022-09-25 | End: 2022-09-26 | Stop reason: HOSPADM

## 2022-09-25 RX ADMIN — HEPARIN 300 UNITS: 100 SYRINGE at 20:40

## 2022-09-25 RX ADMIN — PREDNISONE 20 MG: 20 TABLET ORAL at 20:40

## 2022-09-25 RX ADMIN — GUAIFENESIN 200 MG: 200 SOLUTION ORAL at 00:50

## 2022-09-25 RX ADMIN — GUAIFENESIN AND CODEINE PHOSPHATE 2.5 ML: 10; 100 LIQUID ORAL at 20:39

## 2022-09-25 RX ADMIN — BUDESONIDE 500 MCG: 0.5 SUSPENSION RESPIRATORY (INHALATION) at 19:44

## 2022-09-25 RX ADMIN — IPRATROPIUM BROMIDE AND ALBUTEROL SULFATE 1 AMPULE: .5; 2.5 SOLUTION RESPIRATORY (INHALATION) at 16:03

## 2022-09-25 RX ADMIN — MONTELUKAST 10 MG: 10 TABLET, FILM COATED ORAL at 08:16

## 2022-09-25 RX ADMIN — PREDNISONE 20 MG: 20 TABLET ORAL at 08:15

## 2022-09-25 RX ADMIN — GUAIFENESIN 200 MG: 200 SOLUTION ORAL at 08:16

## 2022-09-25 RX ADMIN — GUAIFENESIN 400 MG: 400 TABLET ORAL at 20:39

## 2022-09-25 RX ADMIN — PIPERACILLIN AND TAZOBACTAM 4500 MG: 4; .5 INJECTION, POWDER, FOR SOLUTION INTRAVENOUS at 22:41

## 2022-09-25 RX ADMIN — SODIUM CHLORIDE 2 G: 1 TABLET ORAL at 17:33

## 2022-09-25 RX ADMIN — ARFORMOTEROL TARTRATE 15 MCG: 15 SOLUTION RESPIRATORY (INHALATION) at 19:44

## 2022-09-25 RX ADMIN — HYDROXYZINE PAMOATE 25 MG: 25 CAPSULE ORAL at 08:22

## 2022-09-25 RX ADMIN — GUAIFENESIN 400 MG: 400 TABLET ORAL at 17:33

## 2022-09-25 RX ADMIN — ROSUVASTATIN CALCIUM 20 MG: 20 TABLET, FILM COATED ORAL at 20:40

## 2022-09-25 RX ADMIN — LOSARTAN POTASSIUM 25 MG: 25 TABLET, FILM COATED ORAL at 08:15

## 2022-09-25 RX ADMIN — IPRATROPIUM BROMIDE AND ALBUTEROL SULFATE 1 AMPULE: .5; 2.5 SOLUTION RESPIRATORY (INHALATION) at 19:44

## 2022-09-25 RX ADMIN — AMLODIPINE BESYLATE 5 MG: 5 TABLET ORAL at 08:15

## 2022-09-25 RX ADMIN — LEVOTHYROXINE SODIUM 137 MCG: 0.14 TABLET ORAL at 05:43

## 2022-09-25 RX ADMIN — SODIUM CHLORIDE, PRESERVATIVE FREE 10 ML: 5 INJECTION INTRAVENOUS at 08:18

## 2022-09-25 RX ADMIN — PIPERACILLIN AND TAZOBACTAM 4500 MG: 4; .5 INJECTION, POWDER, FOR SOLUTION INTRAVENOUS at 05:45

## 2022-09-25 RX ADMIN — PIPERACILLIN AND TAZOBACTAM 4500 MG: 4; .5 INJECTION, POWDER, FOR SOLUTION INTRAVENOUS at 13:59

## 2022-09-25 RX ADMIN — SODIUM CHLORIDE 2 G: 1 TABLET ORAL at 08:23

## 2022-09-25 RX ADMIN — IPRATROPIUM BROMIDE AND ALBUTEROL SULFATE 1 AMPULE: .5; 2.5 SOLUTION RESPIRATORY (INHALATION) at 12:04

## 2022-09-25 RX ADMIN — PYRIDOXINE HCL TAB 50 MG 50 MG: 50 TAB at 08:16

## 2022-09-25 RX ADMIN — SODIUM CHLORIDE, PRESERVATIVE FREE 10 ML: 5 INJECTION INTRAVENOUS at 20:40

## 2022-09-25 RX ADMIN — ENOXAPARIN SODIUM 40 MG: 100 INJECTION SUBCUTANEOUS at 08:16

## 2022-09-25 RX ADMIN — SODIUM CHLORIDE 2 G: 1 TABLET ORAL at 11:58

## 2022-09-25 RX ADMIN — METOPROLOL SUCCINATE 50 MG: 50 TABLET, EXTENDED RELEASE ORAL at 08:16

## 2022-09-25 ASSESSMENT — PAIN SCALES - GENERAL
PAINLEVEL_OUTOF10: 0
PAINLEVEL_OUTOF10: 0

## 2022-09-25 NOTE — PROGRESS NOTES
Subjective: The patient is awake and alert. No problems overnight. L mediport site is healing appropriately  She is now out of MICU. Has stable SOB, cough, dyspnea. Denies chest pain, angina, abdominal discomfort. No nausea or vomiting. Tolerating diet. Objective:    BP (!) 152/81 Comment: RN notifieed; Caitlyn/Mónica  Pulse 94   Temp 97.7 °F (36.5 °C) (Temporal)   Resp 20   Ht 5' 2\" (1.575 m)   Wt 147 lb 11.2 oz (67 kg)   SpO2 97%   BMI 27.01 kg/m²     General: NAD  HEENT: No thrush or mucositis, EOMI  Heart:  Sinus tachy, no murmurs, gallops, or rubs. Lungs:  Decreased BS bilaterally with significant wheezing and rhonchi. No rales. Abd: BS present, nontender, nondistended, no masses  Extrem:  No clubbing, cyanosis, or edema  Lymphatics: No palpable adenopathy in cervical and supraclavicular regions  Skin: +L mediport site is healing.  Intact, no petechia or purpura    CBC with Differential:    Lab Results   Component Value Date/Time    WBC 15.4 09/25/2022 05:30 AM    RBC 4.25 09/25/2022 05:30 AM    HGB 12.1 09/25/2022 05:30 AM    HCT 35.9 09/25/2022 05:30 AM     09/25/2022 05:30 AM    MCV 84.5 09/25/2022 05:30 AM    MCH 28.5 09/25/2022 05:30 AM    MCHC 33.7 09/25/2022 05:30 AM    RDW 13.8 09/25/2022 05:30 AM    METASPCT 0.9 09/23/2022 04:05 AM    LYMPHOPCT 4.4 09/25/2022 05:30 AM    MONOPCT 7.5 09/25/2022 05:30 AM    MYELOPCT 0.9 09/21/2022 05:00 AM    BASOPCT 0.1 09/25/2022 05:30 AM    MONOSABS 1.15 09/25/2022 05:30 AM    LYMPHSABS 0.67 09/25/2022 05:30 AM    EOSABS 0.03 09/25/2022 05:30 AM    BASOSABS 0.01 09/25/2022 05:30 AM     CMP:    Lab Results   Component Value Date/Time     09/25/2022 11:08 AM    K 4.1 09/25/2022 11:08 AM    K 4.1 09/25/2022 12:03 AM    CL 90 09/25/2022 11:08 AM    CO2 28 09/25/2022 11:08 AM    BUN 16 09/25/2022 11:08 AM    CREATININE 0.7 09/25/2022 11:08 AM    GFRAA >60 09/25/2022 11:08 AM    LABGLOM >60 09/25/2022 11:08 AM    GLUCOSE 84 09/25/2022 11:08 AM    PROT 7.1 09/20/2022 06:43 PM    LABALBU 4.3 09/20/2022 06:43 PM    CALCIUM 9.0 09/25/2022 11:08 AM    BILITOT 0.2 09/20/2022 06:43 PM    ALKPHOS 137 09/20/2022 06:43 PM    AST 15 09/20/2022 06:43 PM    ALT 8 09/20/2022 06:43 PM          Current Facility-Administered Medications:     guaiFENesin (ROBITUSSIN) 100 MG/5ML oral solution 200 mg, 200 mg, Oral, Q6H PRN, April Wesley-Alexandru, APRN - CNP, 200 mg at 09/25/22 8739    hydrOXYzine pamoate (VISTARIL) capsule 25 mg, 25 mg, Oral, Q8H PRN, April Wesley-Alexandru, APRN - CNP, 25 mg at 09/25/22 8672    heparin flush 100 UNIT/ML injection 300 Units, 300 Units, IntraCATHeter, PRN, Warden Nata MD    heparin flush 100 UNIT/ML injection 300 Units, 300 Units, IntraCATHeter, BID, Warden Nata MD    piperacillin-tazobactam (ZOSYN) 4,500 mg in dextrose 5 % 100 mL IVPB ([id number]), 4,500 mg, IntraVENous, Q8H, Fidelia L Gonzalez, DO, Last Rate: 25 mL/hr at 09/25/22 1359, 4,500 mg at 09/25/22 1359    predniSONE (DELTASONE) tablet 20 mg, 20 mg, Oral, BID, Fidelia L Gonzalez, DO, 20 mg at 09/25/22 0815    guaiFENesin-codeine (GUAIFENESIN AC) 100-10 MG/5ML liquid 2.5 mL, 2.5 mL, Oral, Nightly, Fidelia L Gonzalez, DO, 2.5 mL at 09/24/22 1953    Arformoterol Tartrate (BROVANA) nebulizer solution 15 mcg, 15 mcg, Nebulization, BID, Fidelia L Gonzalez, DO, 15 mcg at 09/24/22 2112    menthol-zinc oxide (CALMOSEPTINE) 0.44-20.6 % ointment, , Topical, BID PRN, Mariam Rivas MD, Given at 09/23/22 1958    amLODIPine (NORVASC) tablet 5 mg, 5 mg, Oral, Daily, Mariam Rivas MD, 5 mg at 09/25/22 0815    rosuvastatin (CRESTOR) tablet 20 mg, 20 mg, Oral, Nightly, Mariam Rivas MD, 20 mg at 09/24/22 1953    losartan (COZAAR) tablet 25 mg, 25 mg, Oral, Daily, Mariam Rivas MD, 25 mg at 09/25/22 0815    metoprolol succinate (TOPROL XL) extended release tablet 50 mg, 50 mg, Oral, Daily, Fidelia Gonzalez DO, 50 mg at 09/25/22 0816    sodium chloride tablet 2 g, 2 g, Oral, TID WC, Fidelia L Gonzalez, DO, 2 g at 09/25/22 1158    loperamide (IMODIUM) capsule 2 mg, 2 mg, Oral, 4x Daily PRN, Fidelia L Gonzalez, DO, 2 mg at 09/24/22 1439    vitamin B-6 (PYRIDOXINE) tablet 50 mg, 50 mg, Oral, Daily, Janeth Baugh MD, 50 mg at 09/25/22 7742    perflutren lipid microspheres (DEFINITY) injection 1.65 mg, 1.5 mL, IntraVENous, ONCE PRN, Janeth Baugh MD    sodium chloride flush 0.9 % injection 5-40 mL, 5-40 mL, IntraVENous, 2 times per day, Janeth Baugh MD, 10 mL at 09/25/22 0818    sodium chloride flush 0.9 % injection 5-40 mL, 5-40 mL, IntraVENous, PRN, Janeth Baugh MD, 10 mL at 09/21/22 1137    0.9 % sodium chloride infusion, , IntraVENous, PRN, Janeth Baugh MD    enoxaparin (LOVENOX) injection 40 mg, 40 mg, SubCUTAneous, Daily, Fidelia Matamons, DO, 40 mg at 09/25/22 0816    ondansetron (ZOFRAN-ODT) disintegrating tablet 4 mg, 4 mg, Oral, Q8H PRN **OR** ondansetron (ZOFRAN) injection 4 mg, 4 mg, IntraVENous, Q6H PRN, Janeth Baugh MD    polyethylene glycol (GLYCOLAX) packet 17 g, 17 g, Oral, Daily PRN, Janeth Baugh MD    acetaminophen (TYLENOL) tablet 650 mg, 650 mg, Oral, Q6H PRN **OR** acetaminophen (TYLENOL) suppository 650 mg, 650 mg, Rectal, Q6H PRN, Janeth Baugh MD    glucose chewable tablet 16 g, 4 tablet, Oral, PRN, Janeth Baugh MD    dextrose bolus 10% 125 mL, 125 mL, IntraVENous, PRN **OR** dextrose bolus 10% 250 mL, 250 mL, IntraVENous, PRN, Janeth Baugh MD    glucagon (rDNA) injection 1 mg, 1 mg, SubCUTAneous, PRN, Janeth Baugh MD    dextrose 10 % infusion, , IntraVENous, Continuous PRN, Janeth Baugh MD    levothyroxine (SYNTHROID) tablet 137 mcg, 137 mcg, Oral, Daily, Janeth Baugh MD, 137 mcg at 09/25/22 0543    montelukast (SINGULAIR) tablet 10 mg, 10 mg, Oral, QAM, Janeth Baugh MD, 10 mg at 09/25/22 0816    budesonide (PULMICORT) nebulizer suspension 500 mcg, 0.5 mg, Nebulization, BID, Janeth Baugh MD, 500 mcg at 09/24/22 2112    ipratropium-albuterol (DUONEB) nebulizer solution 1 ampule, 1 ampule, Inhalation, Q4H WA, Rc Britt MD, 1 ampule at 09/25/22 1204    benzocaine-menthol (CEPACOL SORE THROAT) lozenge 1 lozenge, 1 lozenge, Oral, Q2H PRN, Rc Britt MD, 1 lozenge at 09/23/22 2208    XR CHEST PORTABLE   Final Result   1. Left chest wall MediPort without complicating features. No obvious   pneumothorax. 2.  Stable coarsened interstitial markings in lung hyperinflation. Stable   atherosclerotic disease. No acute cardiopulmonary pathology. XR SHOULDER LEFT (MIN 2 VIEWS)   Final Result   Impacted left humeral neck fracture. NM BONE SCAN WHOLE BODY   Final Result   1. No pattern of metastatic bone disease. 2.  Multiple trauma injuries are seen in the right ribcage, left humerus   subcapital region, and degenerative changes are seen in multiple levels in   the thoracolumbar spine. RECOMMENDATIONS:   Unavailable         XR CHEST PORTABLE   Final Result   Findings consistent with infiltrative process involving the right mediastinum   as noted on the previous CT chest.  No evidence of a pneumothorax. CTA CHEST W CONTRAST   Final Result   No evidence of pulmonary embolism. Enlarged pulmonary arteries, correlate clinically for pulmonary artery   hypertension. Infiltrative right mediastinal mass with lymphadenopathy or a large   conglomerate of lymph nodes involving the right aspect of the mediastinum and   right hilum with narrowing of the right mainstem bronchus. This finding   significantly worsened from the prior examination dated September 3, 2022. Partial atelectasis in the right middle lobe. CTA ABDOMEN PELVIS W CONTRAST   Final Result   No acute intraabdominal or intrapelvic pathology. Atrophy of the upper pole of the left kidney. Chronic vascular changes as described above.          XR CHEST PORTABLE   Final Result   No acute process. Assessment:    Principal Problem:    Small cell lung cancer in adult [facility])  Active Problems:    Hyponatremia    Primary hypertension    Hypothyroidism    GERD (gastroesophageal reflux disease)    Depression    Palliative care by specialist    Goals of care, counseling/discussion    Lung mass    Closed fracture of left proximal humerus    Moderate protein-calorie malnutrition (HCC)  Resolved Problems:    * No resolved hospital problems. *    68-year-old female with extended stage small cell lung carcinoma of R hilum with biopsy proven liver metastasis dx 9/6/22. Also has CHF and COPD. No evidence of metastasis to the bones on bone scan 9/22/22. Sp Mediport placement 9/23/22. Plan:  I discussed role of combination chemotherapy with immunotherapy. Follow-up with Dr. Adolfo Pierre at the [facility] after discharge within the week.      Electronically signed by Deny Sevilla MD on 9/25/2022 at 3:49 PM [Chest Pain] : no chest pain [Palpitations] : no palpitations [Lower Ext Edema] : lower extremity edema [Negative] : Neurological

## 2022-09-25 NOTE — PROGRESS NOTES
[facility] Quality Flow/Interdisciplinary Rounds Progress Note        Quality Flow Rounds held on September 25, 2022    Disciplines Attending:  Bedside Nurse, , , and Nursing Unit Leadership    Shira Deniseyair was admitted on 9/20/2022  8:02 PM    Anticipated Discharge Date:  Expected Discharge Date: 09/27/22    Disposition:    Franklin Score:  Franklin Scale Score: 20    Readmission Risk              Risk of Unplanned Readmission:  26           Discussed patient goal for the day, patient clinical progression, and barriers to discharge.   The following Goal(s) of the Day/Commitment(s) have been identified:  Report labs/diagnostics      Ophelia Cochran RN  September 25, 2022

## 2022-09-25 NOTE — PROGRESS NOTES
Patient has a transfer to tele unit. Attempted twice without success to get a peripheral IV in patient. Femoral CVC needs removed prior to transfer. Mediport accessed per protocol with 20ga . 75\" blake needle without complications. Brisk blood return noted. Dressing applied. Femoral CVC will be removed prior to transfer.

## 2022-09-25 NOTE — PROGRESS NOTES
[facility]                                Progress note    Subjective:    Pleasant  No acute distress  Weary of her diagnosis with small cell lung cancer with metastatic disease  No further profound weakness or altered mental status    Objective:    BP (!) 137/94   Pulse 97   Temp 96.8 °F (36 °C)   Resp 18   Ht 5' 2\" (1.575 m)   Wt 147 lb 11.2 oz (67 kg)   SpO2 95%   BMI 27.01 kg/m²     In: 1034 [P.O.:720; I.V.:114]  Out: 500   In: 1034   Out: 500 [Urine:500]    General appearance: NAD, conversant  HEENT: AT/NC, MMM  Neck: FROM, supple  Lungs: Coarse breath sounds  CV: RRR, no MRGs-left-sided port in place  Vasc: Radial pulses 2+  Abdomen: Soft, non-tender; no masses or HSM  Extremities: No peripheral edema or digital cyanosis  Skin: no rash, lesions or ulcers  Psych: Alert and oriented to person, place and time  Neuro: Alert and interactive     Recent Labs     09/23/22  0405 09/24/22  0403 09/25/22  0530   WBC 12.3* 14.0* 15.4*   HGB 11.7 11.6 12.1   HCT 33.5* 33.2* 35.9    303 336       Recent Labs     09/24/22  1339 09/25/22  0003 09/25/22  1108   * 127* 126*   K 4.4 4.1 4.1   CL 84* 89* 90*   CO2 25 22 28   BUN 13 15 16   CREATININE 0.7 0.7 0.7   CALCIUM 9.4 8.6 9.0       Assessment:    Principal Problem:    Small cell lung cancer in adult [facility])  Active Problems:    Hyponatremia    Primary hypertension    Hypothyroidism    GERD (gastroesophageal reflux disease)    Depression    Palliative care by specialist    Goals of care, counseling/discussion    Lung mass    Closed fracture of left proximal humerus    Moderate protein-calorie malnutrition (HCC)  Resolved Problems:    * No resolved hospital problems.  *      Patient is a 82-year-old female admitted to ICU for  Severe hyponatremia, etiology consistent with metastatic small cell lung cancer   -Monitor labs  -Na+ 110 > 117 > 126 today, being checked every 6 hours  -Nephrology following  -Fluid restriction due to possible SIADH  -Urine studies  -Nephro recs, off IV fluids     Hypokalemia  -K+ 3.3, replaced, 4.4 today   -Monitor BMP     Hypotension  -initially needed IV levo, since has been discontinued     Leukocytosis, likely steroid-induced  -No clinical evidence of infection at this time  -Does not appear toxic, ongoing cough is present however, continue to monitor for developing pneumonia     Hx Diastolic heart failure  -Elevated proBNP 1,284  -Last echo in April 2021 > EF of 55 to 66%, stage I diastolic dysfunction, mild to moderate MR, trace TR  -Monitor I's and O's  -Daily weights     Recently diagnosed with lung cancer with mets to the liver-pathology completed as below  -Pathology noting 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  -Pulmonology following   -Palliative care consulted  -Heme-onc consulted > awaiting input   -Vascular surgery consulted for mediport placement > -port placed 9/23/2022  -No metastatic disease noted on bone scan 9/22/2022     DVT Prophylaxis Lovenox   PT/OT  Discharge planning -okay for discharge from medicine standpoint 9/26/2022 if okay with renal service    Durga Damon MD

## 2022-09-25 NOTE — PLAN OF CARE

## 2022-09-25 NOTE — PLAN OF CARE
Problem: Chronic Conditions and Co-morbidities  Goal: Patient's chronic conditions and co-morbidity symptoms are monitored and maintained or improved  Outcome: Progressing     Problem: Discharge Planning  Goal: Discharge to home or other facility with appropriate resources  Outcome: Progressing     Problem: Safety - Adult  Goal: Free from fall injury  9/24/2022 2037 by Sofya Florence RN  Outcome: Progressing  9/24/2022 1225 by Francesca Chen RN  Outcome: Progressing     Problem: Skin/Tissue Integrity  Goal: Absence of new skin breakdown  Description: 1. Monitor for areas of redness and/or skin breakdown  2. Assess vascular access sites hourly  3. Every 4-6 hours minimum:  Change oxygen saturation probe site  4. Every 4-6 hours:  If on nasal continuous positive airway pressure, respiratory therapy assess nares and determine need for appliance change or resting period.   9/24/2022 2037 by Sofya Florence RN  Outcome: Progressing  9/24/2022 1225 by Francesca Chen RN  Outcome: Progressing     Problem: Pain  Goal: Verbalizes/displays adequate comfort level or baseline comfort level  9/24/2022 2037 by Sofya Florence RN  Outcome: Progressing  9/24/2022 1225 by Francesca Chen RN  Outcome: Progressing     Problem: ABCDS Injury Assessment  Goal: Absence of physical injury  9/24/2022 2037 by Sofya Florence RN  Outcome: Progressing  9/24/2022 1225 by Prabhu Escamilla RN  Outcome: Progressing     Problem: Nutrition Deficit:  Goal: Optimize nutritional status  9/24/2022 2037 by Sofya Florence RN  Outcome: Progressing  9/24/2022 1225 by Francesca Chen RN  Outcome: Progressing

## 2022-09-25 NOTE — PROGRESS NOTES
[facility] MD Mirela Khoury MD Eli Client, MD Earlyne Candle, FIDEL Fernandez, ESTER Trotter, FIDEL  Progress Note    9/25/2022    SUBJECTIVE:   9/22: \"I feel like crap. \"  Generalized weakness, fatigue, myalgias, but denies dyspnea at rest on room air. Ongoing wheezing, coarse audible breath sounds. Denies chest pain. No peripheral swelling. No nausea or vomiting. Ongoing anorexia, no change from her baseline. ROS otherwise unremarkable appetite ok    9/23: Ongoing dyspnea. Ongoing wheezing despite intensification of therapy. Fatigue, malaise. Status post Mediport. BP stable    9/24: Still \"sometimes hard to breathe,\" though better than yesterday. Wheezing much improved. Denies nausea or vomiting. No chest pain or palpitations. No peripheral swelling. Complaining of \"peeing a lot. \"  Fluid restriction at 1000 cc, sodium chloride tablets increased to 2 g 3 times daily    9/25: Feeling better. Still has a course though nonproductive cough. Wheezing persists, though has improved markedly even since yesterday. PROBLEM LIST:   Principal Problem:    Small cell lung cancer in adult [facility])  Active Problems:    Hyponatremia    Primary hypertension    Hypothyroidism    GERD (gastroesophageal reflux disease)    Depression    Palliative care by specialist    Goals of care, counseling/discussion    Lung mass    Closed fracture of left proximal humerus    Moderate protein-calorie malnutrition ([address]  Resolved Problems:    * No resolved hospital problems. *         DIET:    ADULT DIET; Regular; 4 carb choices (60 gm/meal); 1000 ml  ADULT ORAL NUTRITION SUPPLEMENT; Breakfast, Lunch;  Other Oral Supplement; Gelatein  ADULT ORAL NUTRITION SUPPLEMENT; Lunch, Dinner; Frozen Oral Supplement     MEDS (scheduled):    heparin flush  300 Units IntraCATHeter BID    piperacillin-tazobactam  4,500 mg IntraVENous Q8H    predniSONE  20 mg Oral BID guaiFENesin-codeine  2.5 mL Oral Nightly    Arformoterol Tartrate  15 mcg Nebulization BID    amLODIPine  5 mg Oral Daily    rosuvastatin  20 mg Oral Nightly    losartan  25 mg Oral Daily    metoprolol succinate  50 mg Oral Daily    sodium chloride  2 g Oral TID WC    vitamin B-6  50 mg Oral Daily    sodium chloride flush  5-40 mL IntraVENous 2 times per day    enoxaparin  40 mg SubCUTAneous Daily    levothyroxine  137 mcg Oral Daily    montelukast  10 mg Oral QAM    budesonide  0.5 mg Nebulization BID    ipratropium-albuterol  1 ampule Inhalation Q4H WA       MEDS (infusions):   sodium chloride      dextrose         MEDS (prn):  guaiFENesin, hydrOXYzine pamoate, heparin flush, menthol-zinc oxide, loperamide, perflutren lipid microspheres, sodium chloride flush, sodium chloride, ondansetron **OR** ondansetron, polyethylene glycol, acetaminophen **OR** acetaminophen, glucose, dextrose bolus **OR** dextrose bolus, glucagon (rDNA), dextrose, benzocaine-menthol    PHYSICAL EXAM:     Patient Vitals for the past 24 hrs:   BP Temp Temp src Pulse Resp SpO2 Weight   09/25/22 1456 (!) 152/81 97.7 °F (36.5 °C) Temporal 94 20 97 % --   09/25/22 0756 (!) 137/94 96.8 °F (36 °C) -- 97 18 95 % --   09/25/22 0537 -- -- -- -- -- -- 147 lb 11.2 oz (67 kg)   09/25/22 0338 115/70 98.2 °F (36.8 °C) Temporal 96 22 96 % --   09/24/22 2229 (!) 153/96 97.1 °F (36.2 °C) Temporal 98 24 95 % 145 lb 6.4 oz (66 kg)   09/24/22 2200 (!) 185/103 -- -- (!) 107 (!) 36 90 % --   09/24/22 2100 -- -- -- (!) 101 24 97 % --   09/24/22 2000 (!) 140/95 97.6 °F (36.4 °C) Temporal (!) 108 23 97 % --   09/24/22 1900 134/78 -- -- -- -- 94 % --   09/24/22 1800 (!) 165/86 -- -- (!) 104 18 96 % --   09/24/22 1700 (!) 136/104 -- -- (!) 102 21 97 % --   09/24/22 1652 -- -- -- 99 24 96 % --   09/24/22 1600 (!) 165/85 -- -- (!) 102 21 95 % --   @      Intake/Output Summary (Last 24 hours) at 9/25/2022 1511  Last data filed at 9/25/2022 1341  Gross per 24 hour   Intake 460 ml   Output 500 ml   Net -40 ml         Wt Readings from Last 3 Encounters:   09/25/22 147 lb 11.2 oz (67 kg)   09/01/22 161 lb (73 kg)   04/25/22 160 lb (72.6 kg)       Constitutional:  in no acute distress  HEENT: NC/AT, EOMI, sclera and conjunctiva are clear and anicteric, mucus membranes moist  Neck: Trachea midline, no JVD  Cardiovascular: S1, S2 regular rhythm, no murmur,or rub  Respiratory:  No crackles, no wheeze  Gastrointestinal:  Soft, nontender, nondistended, NABS  Ext: no edema, feet warm  Skin: dry, no rash  Neuro: awake, alert, interactive      DATA:    Recent Labs     09/23/22  0405 09/24/22  0403 09/25/22  0530   WBC 12.3* 14.0* 15.4*   HGB 11.7 11.6 12.1   HCT 33.5* 33.2* 35.9   MCV 83.1 85.1 84.5    303 336     Recent Labs     09/23/22  0405 09/23/22  0933 09/24/22  0403 09/24/22  1006 09/24/22  1339 09/25/22  0003 09/25/22  0530 09/25/22  1108   *   < > 120*   < > 122* 127*  --  126*   K 4.0   < > 4.5   < > 4.4 4.1  --  4.1   CL 81*   < > 83*   < > 84* 89*  --  90*   CO2 27   < > 25   < > 25 22  --  28   MG 1.7  --  2.0  --   --   --  1.8  --    PHOS 2.9  --  3.1  --   --   --  2.9  --    BUN 10   < > 8   < > 13 15  --  16   CREATININE 0.6   < > 0.6   < > 0.7 0.7  --  0.7    < > = values in this interval not displayed. No results found for: LABPROT      Assessment  1. 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 Rate of correction of [Na+] initially too brisk, slowed down after D5 W drip. With cessation of D5 water drip, sodium is improved somewhat, plateaued.     [Na+] improved to 127 mmol/L, at plateau since midnight    Recommendations  Continue current fluid restriction  NaCl tabs 2 g 3 times daily  Next BMP this evening, adjust therapy as warranted  If improvement stalls again, consider tolvaptan  For home-going will need sodium chloride tablets and fluid restriction  For her might consider ure-Na tablets  Continue supportive care    Electronically signed by Jose Patel MD on 9/25/2022

## 2022-09-26 VITALS
DIASTOLIC BLOOD PRESSURE: 82 MMHG | BODY MASS INDEX: 26.61 KG/M2 | SYSTOLIC BLOOD PRESSURE: 143 MMHG | HEIGHT: 62 IN | HEART RATE: 94 BPM | WEIGHT: 144.6 LBS | OXYGEN SATURATION: 100 % | TEMPERATURE: 97.6 F | RESPIRATION RATE: 16 BRPM

## 2022-09-26 LAB
ALBUMIN SERPL-MCNC: 4.2 G/DL (ref 3.5–5.2)
ALP BLD-CCNC: 93 U/L (ref 35–104)
ALT SERPL-CCNC: 15 U/L (ref 0–32)
ANION GAP SERPL CALCULATED.3IONS-SCNC: 10 MMOL/L (ref 7–16)
AST SERPL-CCNC: 17 U/L (ref 0–31)
BASOPHILS ABSOLUTE: 0.02 E9/L (ref 0–0.2)
BASOPHILS RELATIVE PERCENT: 0.2 % (ref 0–2)
BILIRUB SERPL-MCNC: 0.3 MG/DL (ref 0–1.2)
BLOOD CULTURE, ROUTINE: NORMAL
BUN BLDV-MCNC: 12 MG/DL (ref 6–23)
BURR CELLS: ABNORMAL
CALCIUM SERPL-MCNC: 8.8 MG/DL (ref 8.6–10.2)
CHLORIDE BLD-SCNC: 92 MMOL/L (ref 98–107)
CO2: 25 MMOL/L (ref 22–29)
CREAT SERPL-MCNC: 0.6 MG/DL (ref 0.5–1)
CULTURE, BLOOD 2: NORMAL
EOSINOPHILS ABSOLUTE: 0.05 E9/L (ref 0.05–0.5)
EOSINOPHILS RELATIVE PERCENT: 0.4 % (ref 0–6)
GFR AFRICAN AMERICAN: >60
GFR NON-AFRICAN AMERICAN: >60 ML/MIN/1.73
GLUCOSE BLD-MCNC: 104 MG/DL (ref 74–99)
HCT VFR BLD CALC: 35 % (ref 34–48)
HEMOGLOBIN: 12.2 G/DL (ref 11.5–15.5)
IMMATURE GRANULOCYTES #: 0.11 E9/L
IMMATURE GRANULOCYTES %: 0.9 % (ref 0–5)
LYMPHOCYTES ABSOLUTE: 0.57 E9/L (ref 1.5–4)
LYMPHOCYTES RELATIVE PERCENT: 4.9 % (ref 20–42)
MAGNESIUM: 2 MG/DL (ref 1.6–2.6)
MCH RBC QN AUTO: 29.8 PG (ref 26–35)
MCHC RBC AUTO-ENTMCNC: 34.9 % (ref 32–34.5)
MCV RBC AUTO: 85.4 FL (ref 80–99.9)
METER GLUCOSE: 89 MG/DL (ref 74–99)
METER GLUCOSE: 90 MG/DL (ref 74–99)
METER GLUCOSE: 92 MG/DL (ref 74–99)
MONOCYTES ABSOLUTE: 0.79 E9/L (ref 0.1–0.95)
MONOCYTES RELATIVE PERCENT: 6.8 % (ref 2–12)
NEUTROPHILS ABSOLUTE: 10.11 E9/L (ref 1.8–7.3)
NEUTROPHILS RELATIVE PERCENT: 86.8 % (ref 43–80)
PDW BLD-RTO: 13.7 FL (ref 11.5–15)
PHOSPHORUS: 2.5 MG/DL (ref 2.5–4.5)
PLATELET # BLD: 308 E9/L (ref 130–450)
PMV BLD AUTO: 9.1 FL (ref 7–12)
POLYCHROMASIA: ABNORMAL
POTASSIUM SERPL-SCNC: 3.9 MMOL/L (ref 3.5–5)
RBC # BLD: 4.1 E12/L (ref 3.5–5.5)
SODIUM BLD-SCNC: 127 MMOL/L (ref 132–146)
TOTAL PROTEIN: 6.4 G/DL (ref 6.4–8.3)
WBC # BLD: 11.7 E9/L (ref 4.5–11.5)

## 2022-09-26 PROCEDURE — 6370000000 HC RX 637 (ALT 250 FOR IP): Performed by: STUDENT IN AN ORGANIZED HEALTH CARE EDUCATION/TRAINING PROGRAM

## 2022-09-26 PROCEDURE — 6370000000 HC RX 637 (ALT 250 FOR IP): Performed by: INTERNAL MEDICINE

## 2022-09-26 PROCEDURE — 85025 COMPLETE CBC W/AUTO DIFF WBC: CPT

## 2022-09-26 PROCEDURE — 80053 COMPREHEN METABOLIC PANEL: CPT

## 2022-09-26 PROCEDURE — 2700000000 HC OXYGEN THERAPY PER DAY

## 2022-09-26 PROCEDURE — 6360000002 HC RX W HCPCS: Performed by: INTERNAL MEDICINE

## 2022-09-26 PROCEDURE — 82962 GLUCOSE BLOOD TEST: CPT

## 2022-09-26 PROCEDURE — 94660 CPAP INITIATION&MGMT: CPT

## 2022-09-26 PROCEDURE — 94640 AIRWAY INHALATION TREATMENT: CPT

## 2022-09-26 PROCEDURE — 84100 ASSAY OF PHOSPHORUS: CPT

## 2022-09-26 PROCEDURE — 6360000002 HC RX W HCPCS: Performed by: STUDENT IN AN ORGANIZED HEALTH CARE EDUCATION/TRAINING PROGRAM

## 2022-09-26 PROCEDURE — 83735 ASSAY OF MAGNESIUM: CPT

## 2022-09-26 PROCEDURE — 6370000000 HC RX 637 (ALT 250 FOR IP): Performed by: NURSE PRACTITIONER

## 2022-09-26 PROCEDURE — 36415 COLL VENOUS BLD VENIPUNCTURE: CPT

## 2022-09-26 PROCEDURE — 2580000003 HC RX 258: Performed by: INTERNAL MEDICINE

## 2022-09-26 PROCEDURE — 6370000000 HC RX 637 (ALT 250 FOR IP): Performed by: FAMILY MEDICINE

## 2022-09-26 PROCEDURE — 2580000003 HC RX 258: Performed by: STUDENT IN AN ORGANIZED HEALTH CARE EDUCATION/TRAINING PROGRAM

## 2022-09-26 RX ORDER — METOPROLOL SUCCINATE 50 MG/1
50 TABLET, EXTENDED RELEASE ORAL DAILY
Qty: 30 TABLET | Refills: 3 | Status: SHIPPED | OUTPATIENT
Start: 2022-09-27

## 2022-09-26 RX ORDER — PYRIDOXINE HCL (VITAMIN B6) 50 MG
50 TABLET ORAL DAILY
Qty: 30 TABLET | Refills: 3 | Status: SHIPPED | OUTPATIENT
Start: 2022-09-27

## 2022-09-26 RX ORDER — GUAIFENESIN 400 MG/1
400 TABLET ORAL 3 TIMES DAILY
Qty: 56 TABLET | Refills: 0 | Status: SHIPPED | OUTPATIENT
Start: 2022-09-26

## 2022-09-26 RX ORDER — PREDNISONE 20 MG/1
20 TABLET ORAL 2 TIMES DAILY
Qty: 20 TABLET | Refills: 0 | Status: SHIPPED | OUTPATIENT
Start: 2022-09-26 | End: 2022-10-06

## 2022-09-26 RX ORDER — ROSUVASTATIN CALCIUM 20 MG/1
20 TABLET, COATED ORAL NIGHTLY
Qty: 30 TABLET | Refills: 3 | Status: SHIPPED | OUTPATIENT
Start: 2022-09-26

## 2022-09-26 RX ORDER — HYDROXYZINE PAMOATE 25 MG/1
25 CAPSULE ORAL EVERY 8 HOURS PRN
Qty: 28 CAPSULE | Refills: 0 | Status: ON HOLD | OUTPATIENT
Start: 2022-09-26 | End: 2022-10-13 | Stop reason: HOSPADM

## 2022-09-26 RX ORDER — SODIUM CHLORIDE 1000 MG
2 TABLET, SOLUBLE MISCELLANEOUS
Qty: 90 TABLET | Refills: 3 | Status: SHIPPED | OUTPATIENT
Start: 2022-09-26

## 2022-09-26 RX ORDER — LOSARTAN POTASSIUM 25 MG/1
25 TABLET ORAL DAILY
Qty: 30 TABLET | Refills: 3 | Status: ON HOLD | OUTPATIENT
Start: 2022-09-27 | End: 2022-10-13 | Stop reason: HOSPADM

## 2022-09-26 RX ORDER — AMLODIPINE BESYLATE 5 MG/1
5 TABLET ORAL DAILY
Qty: 30 TABLET | Refills: 3 | Status: ON HOLD | OUTPATIENT
Start: 2022-09-27 | End: 2022-10-13 | Stop reason: HOSPADM

## 2022-09-26 RX ADMIN — SODIUM CHLORIDE 2 G: 1 TABLET ORAL at 11:08

## 2022-09-26 RX ADMIN — MONTELUKAST 10 MG: 10 TABLET, FILM COATED ORAL at 08:13

## 2022-09-26 RX ADMIN — PREDNISONE 20 MG: 20 TABLET ORAL at 08:13

## 2022-09-26 RX ADMIN — LOPERAMIDE HYDROCHLORIDE 2 MG: 2 CAPSULE ORAL at 11:08

## 2022-09-26 RX ADMIN — GUAIFENESIN 400 MG: 400 TABLET ORAL at 08:13

## 2022-09-26 RX ADMIN — PYRIDOXINE HCL TAB 50 MG 50 MG: 50 TAB at 08:12

## 2022-09-26 RX ADMIN — GUAIFENESIN 400 MG: 400 TABLET ORAL at 14:46

## 2022-09-26 RX ADMIN — METOPROLOL SUCCINATE 50 MG: 50 TABLET, EXTENDED RELEASE ORAL at 08:13

## 2022-09-26 RX ADMIN — HEPARIN 300 UNITS: 100 SYRINGE at 08:13

## 2022-09-26 RX ADMIN — ARFORMOTEROL TARTRATE 15 MCG: 15 SOLUTION RESPIRATORY (INHALATION) at 08:03

## 2022-09-26 RX ADMIN — BUDESONIDE 500 MCG: 0.5 SUSPENSION RESPIRATORY (INHALATION) at 08:03

## 2022-09-26 RX ADMIN — SODIUM CHLORIDE, PRESERVATIVE FREE 10 ML: 5 INJECTION INTRAVENOUS at 08:13

## 2022-09-26 RX ADMIN — LEVOTHYROXINE SODIUM 137 MCG: 0.14 TABLET ORAL at 06:20

## 2022-09-26 RX ADMIN — IPRATROPIUM BROMIDE AND ALBUTEROL SULFATE 1 AMPULE: .5; 2.5 SOLUTION RESPIRATORY (INHALATION) at 08:03

## 2022-09-26 RX ADMIN — HYDROXYZINE PAMOATE 25 MG: 25 CAPSULE ORAL at 03:12

## 2022-09-26 RX ADMIN — LOSARTAN POTASSIUM 25 MG: 25 TABLET, FILM COATED ORAL at 08:13

## 2022-09-26 RX ADMIN — SODIUM CHLORIDE 2 G: 1 TABLET ORAL at 08:11

## 2022-09-26 RX ADMIN — ENOXAPARIN SODIUM 40 MG: 100 INJECTION SUBCUTANEOUS at 08:12

## 2022-09-26 RX ADMIN — AMLODIPINE BESYLATE 5 MG: 5 TABLET ORAL at 08:13

## 2022-09-26 RX ADMIN — IPRATROPIUM BROMIDE AND ALBUTEROL SULFATE 1 AMPULE: .5; 2.5 SOLUTION RESPIRATORY (INHALATION) at 12:52

## 2022-09-26 RX ADMIN — PIPERACILLIN AND TAZOBACTAM 4500 MG: 4; .5 INJECTION, POWDER, FOR SOLUTION INTRAVENOUS at 06:23

## 2022-09-26 RX ADMIN — IPRATROPIUM BROMIDE AND ALBUTEROL SULFATE 1 AMPULE: .5; 2.5 SOLUTION RESPIRATORY (INHALATION) at 16:26

## 2022-09-26 ASSESSMENT — PAIN SCALES - GENERAL
PAINLEVEL_OUTOF10: 0
PAINLEVEL_OUTOF10: 0

## 2022-09-26 NOTE — CARE COORDINATION
Care Coordination   The patient had am ambulatory pulse ox test done shows the patient will need home 02 4l n.c. Dme orders in for 4 liters. Called [facility] dme the patient 18 can be turned up to 4 liters the one she has at home. [address] [place] [address] will be sending someone out in the am to make sure she on the 4 liters. [facility] following home care orders.

## 2022-09-26 NOTE — CARE COORDINATION
Care Coordiantion  Mediport was placed today as the patient will be following up at the [facility] is the next few days. Home care orders in. [address] [place] [address] is following with start of care tomorrow 9/27/22. Notified [facility] orders are in. Called [facility].  Will follow

## 2022-09-26 NOTE — PROGRESS NOTES
[facility] MD Lisandra Amaya MD Lore Hering, MD Rosaleen Blitz, FIDEL Fernandez, ESTER Colbert, FIDEL  Progress Note    9/26/2022    SUBJECTIVE:   9/22: \"I feel like crap. \"  Generalized weakness, fatigue, myalgias, but denies dyspnea at rest on room air. Ongoing wheezing, coarse audible breath sounds. Denies chest pain. No peripheral swelling. No nausea or vomiting. Ongoing anorexia, no change from her baseline. ROS otherwise unremarkable appetite ok    9/23: Ongoing dyspnea. Ongoing wheezing despite intensification of therapy. Fatigue, malaise. Status post Mediport. BP stable    9/24: Still \"sometimes hard to breathe,\" though better than yesterday. Wheezing much improved. Denies nausea or vomiting. No chest pain or palpitations. No peripheral swelling. Complaining of \"peeing a lot. \"  Fluid restriction at 1000 cc, sodium chloride tablets increased to 2 g 3 times daily    9/25: Feeling better. Still has a course though nonproductive cough. Wheezing persists, though has improved markedly even since yesterday. 9/26: Feeling much better. Anxious for discharge. Discussed at length instructions as regards fluid restriction (to 1500 cc/day) and continuing sodium chloride tablets (2 g p.o. 3 times daily)    PROBLEM LIST:   Principal Problem:    Small cell lung cancer in adult [facility])  Active Problems:    Hyponatremia    Primary hypertension    Hypothyroidism    GERD (gastroesophageal reflux disease)    Depression    Palliative care by specialist    Goals of care, counseling/discussion    Lung mass    Closed fracture of left proximal humerus    Moderate protein-calorie malnutrition (HCC)  Resolved Problems:    * No resolved hospital problems. *         DIET:    ADULT DIET; Regular; 4 carb choices (60 gm/meal); 1000 ml  ADULT ORAL NUTRITION SUPPLEMENT; Breakfast, Lunch;  Other Oral Supplement; Gelatein  ADULT ORAL NUTRITION SUPPLEMENT; Lunch, Dinner; Frozen Oral Supplement     MEDS (scheduled):    heparin flush  300 Units IntraCATHeter BID    guaiFENesin  400 mg Oral TID    piperacillin-tazobactam  4,500 mg IntraVENous Q8H    predniSONE  20 mg Oral BID    guaiFENesin-codeine  2.5 mL Oral Nightly    Arformoterol Tartrate  15 mcg Nebulization BID    amLODIPine  5 mg Oral Daily    rosuvastatin  20 mg Oral Nightly    losartan  25 mg Oral Daily    metoprolol succinate  50 mg Oral Daily    sodium chloride  2 g Oral TID WC    vitamin B-6  50 mg Oral Daily    sodium chloride flush  5-40 mL IntraVENous 2 times per day    enoxaparin  40 mg SubCUTAneous Daily    levothyroxine  137 mcg Oral Daily    montelukast  10 mg Oral QAM    budesonide  0.5 mg Nebulization BID    ipratropium-albuterol  1 ampule Inhalation Q4H WA       MEDS (infusions):   sodium chloride      dextrose         MEDS (prn):  guaiFENesin, hydrOXYzine pamoate, heparin flush, menthol-zinc oxide, loperamide, perflutren lipid microspheres, sodium chloride flush, sodium chloride, ondansetron **OR** ondansetron, polyethylene glycol, acetaminophen **OR** acetaminophen, glucose, dextrose bolus **OR** dextrose bolus, glucagon (rDNA), dextrose, benzocaine-menthol    PHYSICAL EXAM:     Patient Vitals for the past 24 hrs:   BP Temp Temp src Pulse Resp SpO2 Weight   09/26/22 1452 (!) 143/82 97.6 °F (36.4 °C) Temporal 94 16 100 % --   09/26/22 1253 -- -- -- -- -- 91 % --   09/26/22 1053 98/85 97.6 °F (36.4 °C) Temporal 85 16 100 % --   09/26/22 0808 -- -- -- -- -- 95 % --   09/26/22 0749 (!) 171/74 97.3 °F (36.3 °C) Temporal 97 16 91 % --   09/26/22 0552 -- -- -- -- -- -- 144 lb 9.6 oz (65.6 kg)   09/26/22 0343 (!) 146/77 97.5 °F (36.4 °C) Temporal 86 15 96 % --   09/25/22 2337 (!) 150/78 97.9 °F (36.6 °C) -- 90 20 96 % --   09/25/22 1945 (!) 145/82 97.3 °F (36.3 °C) -- 89 20 100 % --   09/25/22 1944 -- -- -- 88 17 98 % --   @      Intake/Output Summary (Last 24 hours) at 9/26/2022 8458  Last data filed at 9/26/2022 1422  Gross per 24 hour   Intake 540 ml   Output 850 ml   Net -310 ml         Wt Readings from Last 3 Encounters:   09/26/22 144 lb 9.6 oz (65.6 kg)   09/01/22 161 lb (73 kg)   04/25/22 160 lb (72.6 kg)       Constitutional:  in no acute distress  HEENT: NC/AT, EOMI, sclera and conjunctiva are clear and anicteric, mucus membranes moist  Neck: Trachea midline, no JVD  Cardiovascular: S1, S2 regular rhythm, no murmur,or rub  Respiratory: Coarse breath sounds, few scattered wheezes, no crackles, though poor air entry diffusely particular at the bases's  Gastrointestinal:  Soft, nontender, nondistended, NABS  Ext: Scant distal lower extremity edema edema, feet warm  Skin: dry, no rash  Neuro: awake, alert, interactive      DATA:    Recent Labs     09/24/22  0403 09/25/22  0530 09/26/22  0535   WBC 14.0* 15.4* 11.7*   HGB 11.6 12.1 12.2   HCT 33.2* 35.9 35.0   MCV 85.1 84.5 85.4    336 308     Recent Labs     09/24/22  0403 09/24/22  1006 09/25/22  0530 09/25/22  1108 09/25/22  1830 09/26/22  0535   *   < >  --  126* 132 127*   K 4.5   < >  --  4.1 3.9 3.9   CL 83*   < >  --  90* 96* 92*   CO2 25   < >  --  28 26 25   MG 2.0  --  1.8  --   --  2.0   PHOS 3.1  --  2.9  --   --  2.5   BUN 8   < >  --  16 17 12   CREATININE 0.6   < >  --  0.7 0.7 0.6   ALT  --   --   --   --   --  15   AST  --   --   --   --   --  17   BILITOT  --   --   --   --   --  0.3   ALKPHOS  --   --   --   --   --  93    < > = values in this interval not displayed. No results found for: LABPROT      Assessment  1. 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 Rate of correction of [Na+] initially too brisk, slowed down after D5 W drip. With cessation of D5 water drip, sodium is improved somewhat, plateaued.     [Na+] improved to 127 mmol/L, at plateau since midnight    Recommendations  Continue current fluid restriction 1500 cc/day  NaCl tabs 2 g 3 times daily  If not discharge, Next BMP this evening, adjust therapy as warranted  For home-going will need sodium chloride tablets and fluid restriction  For her might consider ure-Na tablets, though will prescribe as outpatient as warranted  BMP, serum osmolality, urine osmolality, urine sodium and chloride and creatinine 3 days postdischarge, fax to my office  Follow-up with me in 1 week  Continue supportive care    Electronically signed by Anoop Morales MD on 9/26/2022

## 2022-09-26 NOTE — PLAN OF CARE
Problem: Chronic Conditions and Co-morbidities  Goal: Patient's chronic conditions and co-morbidity symptoms are monitored and maintained or improved  9/25/2022 2248 by Princess Escalera RN  Outcome: Progressing     Problem: Safety - Adult  Goal: Free from fall injury  9/25/2022 2248 by Princess Escalera RN  Outcome: Progressing     Problem: Skin/Tissue Integrity  Goal: Absence of new skin breakdown  Description: 1. Monitor for areas of redness and/or skin breakdown  2. Assess vascular access sites hourly  3. Every 4-6 hours minimum:  Change oxygen saturation probe site  4. Every 4-6 hours:  If on nasal continuous positive airway pressure, respiratory therapy assess nares and determine need for appliance change or resting period.   9/25/2022 2248 by Princess Escalera RN  Outcome: Progressing     Problem: Pain  Goal: Verbalizes/displays adequate comfort level or baseline comfort level  9/25/2022 2248 by Princess Escalera RN  Outcome: Progressing

## 2022-09-26 NOTE — PROGRESS NOTES
[facility] Quality Flow/Interdisciplinary Rounds Progress Note        Quality Flow Rounds held on September 26, 2022    Disciplines Attending:  Bedside Nurse, , , and Nursing Unit Leadership    Leonidas Chung was admitted on 9/20/2022  8:02 PM    Anticipated Discharge Date:  Expected Discharge Date: 09/27/22    Disposition:    Franklin Score:  Franklin Scale Score: 19    Readmission Risk              Risk of Unplanned Readmission:  27           Discussed patient goal for the day, patient clinical progression, and barriers to discharge.   The following Goal(s) of the Day/Commitment(s) have been identified:  report labs/diagnostics      Gaye Lawrence RN  September 26, 2022

## 2022-09-26 NOTE — PROGRESS NOTES
Coordination of care discussion and chart review with PM team. LSW met at bedside with pt. She is alert, oriented x4. She is waiting to speak with the doctors and hopes to go home with plans to follow up with [facility]. No immediate PM psychosocial needs identified for [employer].  will remain available for on-going psychosocial support, as needed.

## 2022-09-26 NOTE — CARE COORDINATION
Care Coordiantion  The patints o2 needs weaned doen to 2 liters prior to discharge. She only has home 02 2l set up at home and a nebulizer set up my [facility]. [facility] is following the patient and will start care on her 9/27/22 if she home. The patient does live alone . Her ride home is brother in law [place]. Will need home care orders for home pt ot nurse aid. Spoke to [place] this am and he will be out a town for a couple days in the next week and she has a follow up appt. He wanted to know if there was any transpiration he can set up for the patient ahead of time. [facility] service info was given to him .  Will follow

## 2022-09-26 NOTE — PROGRESS NOTES
Patient pulse ox on room air at rest 70%  Patient unable to ambulate on room air  Patient pulse ox 2L recovery 88%  Patient pulse ox 2L ambulating 72%  Patient pulse ox on 4L recovery 92%  Patient pulse ox on 4L ambulating 91%

## 2022-09-26 NOTE — PROGRESS NOTES
CLINICAL PHARMACY NOTE: MEDS TO BEDS    Total # of Prescriptions Filled: 8   The following medications were delivered to the patient:  Hydroxyzine 25mg  Vitamin b6 50mg  Metoprolol 50mg  Sodium chloride 1gm  Prednisone 20mg  Rosuvastatin 20mg  Losartan 25mg  Chest congestion 400mg    Additional Documentation:

## 2022-09-26 NOTE — DISCHARGE SUMMARY
[facility]   Discharge summary   Patient ID:  Gerson Melendrez  [id number]  81 y.o.  1950    Admit date: 9/20/2022    Discharge date and time: 9/26/2022    Admission Diagnoses:   Patient Active Problem List   Diagnosis    Congestive heart failure of unknown etiology ([facility] [address]    Congestive heart failure due to cardiomyopathy (HCC)    Hypoxia    Simple chronic bronchitis (HCC)    Hyponatremia    Primary hypertension    Hypothyroidism    GERD (gastroesophageal reflux disease)    Depression    Palliative care by specialist    Goals of care, counseling/discussion    Small cell lung cancer in [facility])    Lung mass    Closed fracture of left proximal humerus    Moderate protein-calorie malnutrition ([facility] [address]       Discharge Diagnoses: Small cell lung CA    Consults: nephrology and hematology/oncology    Procedures: Mediport insertion     Hospital Course:     Patient is a 77-year-old female admitted to ICU for  Severe hyponatremia, etiology consistent with metastatic small cell lung cancer   -Monitor labs  -Na+ 110 > 117 > 126 today, being checked every 6 hours  -Nephrology following  -Fluid restriction due to possible SIADH  -Urine studies  -Nephro recs, off IV fluids     Hypokalemia  -K+ 3.3, replaced, 4.4 today   -Monitor BMP     Hypotension  -initially needed IV levo, since has been discontinued     Leukocytosis, likely steroid-induced  -No clinical evidence of infection at this time  -Does not appear toxic, ongoing cough is present however, continue to monitor for developing pneumonia     Hx Diastolic heart failure  -Elevated proBNP 1,284  -Last echo in April 2021 > EF of 55 to 89%, stage I diastolic dysfunction, mild to moderate MR, trace TR  -Monitor I's and O's  -Daily weights     Recently diagnosed with lung cancer with mets to the liver-pathology completed as below  -Pathology noting 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  -Pulmonology following   -Palliative care consulted  -Heme-onc consulted > awaiting input   -Vascular surgery consulted for mediport placement > -port placed 9/23/2022  -No metastatic disease noted on bone scan 9/22/2022       Recent Labs     09/24/22  0403 09/25/22  0530 09/26/22  0535   WBC 14.0* 15.4* 11.7*   HGB 11.6 12.1 12.2   HCT 33.2* 35.9 35.0    336 308       Recent Labs     09/25/22  1108 09/25/22  1830 09/26/22  0535   * 132 127*   K 4.1 3.9 3.9   CL 90* 96* 92*   CO2 28 26 25   BUN 16 17 12   CREATININE 0.7 0.7 0.6   CALCIUM 9.0 8.9 8.8       XR CHEST PORTABLE    Result Date: 9/20/2022  EXAMINATION: ONE XRAY VIEW OF THE CHEST 9/20/2022 9:06 pm COMPARISON: 09/01/2022 HISTORY: ORDERING SYSTEM PROVIDED HISTORY: Shortness of breath TECHNOLOGIST PROVIDED HISTORY: Reason for exam:->Shortness of breath What reading provider will be dictating this exam?->CRC FINDINGS: The lungs are without acute focal process. There is no effusion or pneumothorax. The cardiomediastinal silhouette is without acute process. The osseous structures are without acute process. No acute process. CTA CHEST W CONTRAST    Result Date: 9/21/2022  EXAMINATION: CTA OF THE CHEST 9/20/2022 10:31 pm TECHNIQUE: CTA of the chest was performed after the administration of intravenous contrast.  Multiplanar reformatted images are provided for review. MIP images are provided for review. Automated exposure control, iterative reconstruction, and/or weight based adjustment of the mA/kV was utilized to reduce the radiation dose to as low as reasonably achievable. COMPARISON: September 3, 2022.  HISTORY: ORDERING SYSTEM PROVIDED HISTORY: dissection TECHNOLOGIST PROVIDED HISTORY: Reason for exam:->dissection Decision Support Exception - unselect if not a suspected or confirmed emergency medical condition->Emergency Medical Condition (MA) What reading provider will be dictating this exam?->CRC FINDINGS: Pulmonary Arteries: Pulmonary arteries are adequately opacified for evaluation. No evidence of intraluminal filling defect to suggest pulmonary embolism. Main pulmonary arteries are enlarged. Mediastinum: Extensive mediastinal lymphadenopathy and right hilar lymphadenopathy or infiltrative mass with secondary narrowing the right mainstem bronchus. Lungs/pleura: The lungs demonstrate atelectasis in the right middle lobe. No pulmonary edema. No evidence of pleural effusion or pneumothorax. Upper Abdomen: Limited images of the upper abdomen are unremarkable. Soft Tissues/Bones: No acute bone or soft tissue abnormality. No evidence of pulmonary embolism. Enlarged pulmonary arteries, correlate clinically for pulmonary artery hypertension. Infiltrative right mediastinal mass with lymphadenopathy or a large conglomerate of lymph nodes involving the right aspect of the mediastinum and right hilum with narrowing of the right mainstem bronchus. This finding significantly worsened from the prior examination dated September 3, 2022. Partial atelectasis in the right middle lobe. CTA ABDOMEN PELVIS W CONTRAST    Result Date: 9/21/2022  EXAMINATION: CTA OF THE ABDOMEN AND PELVIS WITH CONTRAST9/20/2022 10:31 pm TECHNIQUE: CTA of the abdomen and pelvis was performed with the administration of intravenous contrast. Multiplanar reformatted images are provided for review. MIP images are provided for review. Automated exposure control, iterative reconstruction, and/or weight based adjustment of the mA/kV was utilized to reduce the radiation dose to as low as reasonably achievable. COMPARISON: None HISTORY: ORDERING SYSTEM PROVIDED HISTORY: dissection TECHNOLOGIST PROVIDED HISTORY: Reason for exam:->dissection Decision Support Exception - unselect if not a suspected or confirmed emergency medical condition->Emergency Medical Condition (MA) What reading provider will be dictating this exam?->CRC FINDINGS: THORACIC STRUCTURES: Unremarkable.  LIVER:  The liver is normal in size, contour and attenuation. No focal mass. No intra or extrahepatic bile duct dilation. GALL BLADDER: Unremarkable. SPLEEN:  Unremarkable. PANCREAS:  Unremarkable. ADRENAL GLANDS:  Unremarkable. ESOPHAGUS AND STOMACH:  Unremarkable. BOWEL: Small bowel:  Unremarkable. Large bowel: The colon and rectum are of normal course and caliber. The appendix is within normal limits. There is no intraperitoneal free air or fluid. URINARY/GENITAL TRACT: Kidneys:  The kidneys are unremarkable. .  There is atrophy of the upper pole of left kidney. No renal mass or hydronephrosis bilaterally. Ureters: The ureters are normal course and caliber. There is no evidence of ureter calculus/calculi. URINARY BLADDER:  The urinary bladder is under distended with a Li catheter. REPRODUCTIVE ORGANS:  Unremarkable. BLOOD VESSELS: Extensive atherosclerotic disease of the abdominal aorta. The main and accessory right renal artery demonstrate mild-to-moderate atherosclerotic stenosis. 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 LYMPH NODES:  No evidence of intraabdominal or intrapelvic lymphadenopathy. ABDOMINAL WALL & SOFT TISSUES:  Unremarkable. OSSEOUS STRUCTURES: No acute osseous lesion. No acute intraabdominal or intrapelvic pathology. Atrophy of the upper pole of the left kidney. Chronic vascular changes as described above. Discharge Exam:    HEENT: NCAT,  PERRLA, No JVD  Heart:  RRR, no murmurs, gallops, or rubs.   Lungs:  CTA bilaterally, no wheeze, rales or rhonchi  Abd: bowel sounds present, nontender, nondistended, no masses  Extrem:  No clubbing, cyanosis, or edema    Disposition: home     Patient Condition at Discharge: Stable     Patient Instructions:      Medication List        START taking these medications      guaiFENesin 400 MG tablet  Take 1 tablet by mouth in the morning, at noon, and at bedtime     hydrOXYzine pamoate 25 MG capsule  Commonly known as: VISTARIL  Take 1 capsule by mouth every 8 hours as needed for Itching or Anxiety     losartan 25 MG tablet  Commonly known as: COZAAR  Take 1 tablet by mouth daily  Start taking on: September 27, 2022     predniSONE 20 MG tablet  Commonly known as: DELTASONE  Take 1 tablet by mouth 2 times daily for 10 days     pyridoxine 50 MG tablet  Commonly known as: B-6  Take 1 tablet by mouth daily  Start taking on: September 27, 2022     rosuvastatin 20 MG tablet  Commonly known as: CRESTOR  Take 1 tablet by mouth nightly     sodium chloride 1 g tablet  Take 2 tablets by mouth 3 times daily (with meals)            CHANGE how you take these medications      amLODIPine 5 MG tablet  Commonly known as: NORVASC  Take 1 tablet by mouth daily  Start taking on: September 27, 2022  What changed: when to take this     metoprolol succinate 50 MG extended release tablet  Commonly known as: TOPROL XL  Take 1 tablet by mouth daily  Start taking on: September 27, 2022  What changed:   medication strength  how much to take            CONTINUE taking these medications      * albuterol (2.5 MG/3ML) 0.083% nebulizer solution  Commonly known as: PROVENTIL     * albuterol sulfate  (90 Base) MCG/ACT inhaler  Commonly known as: PROVENTIL;VENTOLIN;PROAIR  Inhale 2 puffs into the lungs every 6 hours as needed for Wheezing     clonazePAM 1 MG tablet  Commonly known as: KLONOPIN     fluticasone 50 MCG/ACT nasal spray  Commonly known as: FLONASE     fluticasone-vilanterol 100-25 MCG/INH Aepb inhaler  Commonly known as: BREO ELLIPTA     levothyroxine 137 MCG tablet  Commonly known as: SYNTHROID     montelukast 10 MG tablet  Commonly known as: SINGULAIR     omeprazole 20 MG delayed release capsule  Commonly known as: PRILOSEC     potassium chloride 20 MEQ extended release tablet  Commonly known as: KLOR-CON M  Take 1 tablet by mouth daily           * This list has 2 medication(s) that are the same as other medications prescribed for you.  Read the directions carefully, and ask your doctor or other care provider to review them with you. STOP taking these medications      sertraline 50 MG tablet  Commonly known as: ZOLOFT               Where to Get Your Medications        These medications were sent to [facility] [address] [address] [address]      Phone: [phone]   amLODIPine 5 MG tablet  guaiFENesin 400 MG tablet  hydrOXYzine pamoate 25 MG capsule  losartan 25 MG tablet  metoprolol succinate 50 MG extended release tablet  predniSONE 20 MG tablet  pyridoxine 50 MG tablet  rosuvastatin 20 MG tablet  sodium chloride 1 g tablet       Activity: activity as tolerated  Diet: regular diet    Pt has been advised to: Follow-up with Ariane Bishop DO in 1 week. Follow-up with consultants as recommended by them    Note that over 30 minutes was spent in preparing discharge papers, discussing discharge with patient, medication review, etc.    Signed:  FELIPA Black CNP  9/26/2022  11:42 AM     Above note edited to reflect my thoughts     I personally saw, examined and provided care for the patient. Radiographs, labs and medication list were reviewed by me independently. The case was discussed in detail and plans for care were established. Review of KATIE Black   , documentation was conducted and revisions were made as appropriate directly by me. I agree with the above documented exam, problem list, and plan of care.      Clara Robison MD  9/26/2022

## 2022-09-26 NOTE — ANESTHESIA POSTPROCEDURE EVALUATION
Department of Anesthesiology  Postprocedure Note    Patient: Peter Hines  MRN: [id number]  YOB: 1950  Date of evaluation: 9/26/2022      Procedure Summary     Date: 09/23/22 Room / Location: [facility] OR 03 / [facility]    Anesthesia Start: 2393 Anesthesia Stop: 5851    Procedure: MEDI-PORT INSERTION (Left: Chest) Diagnosis:       Malignant neoplasm of lung, unspecified laterality, unspecified part of lung ([facility] [address]      (/)    Surgeons: Anna Soulier, MD Responsible Provider: Ivonne Gomez MD    Anesthesia Type: MAC ASA Status: 4          Anesthesia Type: No value filed.     Warren Phase I:      Warren Phase II:        Anesthesia Post Evaluation    Patient location during evaluation: PACU  Patient participation: complete - patient participated  Level of consciousness: awake and alert  Airway patency: patent  Nausea & Vomiting: no nausea and no vomiting  Complications: no  Cardiovascular status: hemodynamically stable and blood pressure returned to baseline  Respiratory status: acceptable  Hydration status: euvolemic

## 2022-09-26 NOTE — PROGRESS NOTES
Explained to patient that she is discharged and will need her portable tank brought here to go home with, per patient she does not have a portable tank.  made aware. Portable tank to be delivered to patient's room.

## 2022-09-28 ENCOUNTER — TELEPHONE (OUTPATIENT)
Dept: VASCULAR SURGERY | Age: 72
End: 2022-09-28

## 2022-10-09 ENCOUNTER — APPOINTMENT (OUTPATIENT)
Dept: CT IMAGING | Age: 72
DRG: 644 | End: 2022-10-09
Payer: MEDICARE

## 2022-10-09 ENCOUNTER — APPOINTMENT (OUTPATIENT)
Dept: GENERAL RADIOLOGY | Age: 72
DRG: 644 | End: 2022-10-09
Payer: MEDICARE

## 2022-10-09 ENCOUNTER — HOSPITAL ENCOUNTER (INPATIENT)
Age: 72
LOS: 5 days | Discharge: HOME HEALTH | DRG: 644 | End: 2022-10-14
Attending: EMERGENCY MEDICINE | Admitting: INTERNAL MEDICINE
Payer: MEDICARE

## 2022-10-09 DIAGNOSIS — E87.1 HYPONATREMIA: Primary | ICD-10-CM

## 2022-10-09 PROBLEM — Z72.0 TOBACCO ABUSE: Status: ACTIVE | Noted: 2022-10-09

## 2022-10-09 PROBLEM — R06.02 SHORTNESS OF BREATH: Status: ACTIVE | Noted: 2022-10-09

## 2022-10-09 LAB
ALBUMIN SERPL-MCNC: 3.7 G/DL (ref 3.5–5.2)
ALP BLD-CCNC: 109 U/L (ref 35–104)
ALT SERPL-CCNC: 17 U/L (ref 0–32)
ANION GAP SERPL CALCULATED.3IONS-SCNC: 8 MMOL/L (ref 7–16)
ANISOCYTOSIS: ABNORMAL
AST SERPL-CCNC: 18 U/L (ref 0–31)
B.E.: -1.5 MMOL/L (ref -3–3)
BACTERIA: NORMAL /HPF
BASOPHILS ABSOLUTE: 0 E9/L (ref 0–0.2)
BASOPHILS RELATIVE PERCENT: 0.1 % (ref 0–2)
BILIRUB SERPL-MCNC: 0.4 MG/DL (ref 0–1.2)
BILIRUBIN URINE: NEGATIVE
BLOOD, URINE: NEGATIVE
BUN BLDV-MCNC: 9 MG/DL (ref 6–23)
BURR CELLS: ABNORMAL
CALCIUM SERPL-MCNC: 8.2 MG/DL (ref 8.6–10.2)
CHLORIDE BLD-SCNC: 80 MMOL/L (ref 98–107)
CHLORIDE URINE RANDOM: 91 MMOL/L
CLARITY: CLEAR
CO2: 23 MMOL/L (ref 22–29)
COHB: 0.8 % (ref 0–1.5)
COLOR: YELLOW
CREAT SERPL-MCNC: 0.6 MG/DL (ref 0.5–1)
CRITICAL: ABNORMAL
DATE ANALYZED: ABNORMAL
DATE OF COLLECTION: ABNORMAL
EOSINOPHILS ABSOLUTE: 0.21 E9/L (ref 0.05–0.5)
EOSINOPHILS RELATIVE PERCENT: 1.8 % (ref 0–6)
FIO2: 60 %
GFR AFRICAN AMERICAN: >60
GFR NON-AFRICAN AMERICAN: >60 ML/MIN/1.73
GLUCOSE BLD-MCNC: 97 MG/DL (ref 74–99)
GLUCOSE URINE: NEGATIVE MG/DL
HCO3: 22.1 MMOL/L (ref 22–26)
HCT VFR BLD CALC: 32.5 % (ref 34–48)
HEMOGLOBIN: 11.9 G/DL (ref 11.5–15.5)
HHB: 0.7 % (ref 0–5)
KETONES, URINE: NEGATIVE MG/DL
LAB: ABNORMAL
LACTIC ACID: 1.2 MMOL/L (ref 0.5–2.2)
LEUKOCYTE ESTERASE, URINE: NEGATIVE
LYMPHOCYTES ABSOLUTE: 0.59 E9/L (ref 1.5–4)
LYMPHOCYTES RELATIVE PERCENT: 5.2 % (ref 20–42)
Lab: ABNORMAL
MCH RBC QN AUTO: 29.8 PG (ref 26–35)
MCHC RBC AUTO-ENTMCNC: 36.6 % (ref 32–34.5)
MCV RBC AUTO: 81.5 FL (ref 80–99.9)
METHB: 0.3 % (ref 0–1.5)
MODE: ABNORMAL
MONOCYTES ABSOLUTE: 0.58 E9/L (ref 0.1–0.95)
MONOCYTES RELATIVE PERCENT: 5.2 % (ref 2–12)
NEUTROPHILS ABSOLUTE: 10.3 E9/L (ref 1.8–7.3)
NEUTROPHILS RELATIVE PERCENT: 87.8 % (ref 43–80)
NITRITE, URINE: NEGATIVE
O2 CONTENT: 17.3 ML/DL
O2 SATURATION: 99.3 % (ref 92–98.5)
O2HB: 98.2 % (ref 94–97)
OPERATOR ID: 1868
OSMOLALITY URINE: 645 MOSM/KG (ref 300–900)
OVALOCYTES: ABNORMAL
PATIENT TEMP: 37 C
PCO2: 33.5 MMHG (ref 35–45)
PDW BLD-RTO: 14.1 FL (ref 11.5–15)
PEEP/CPAP: 6 CMH2O
PFO2: 2.85 MMHG/%
PH BLOOD GAS: 7.44 (ref 7.35–7.45)
PH UA: 6 (ref 5–9)
PLATELET # BLD: 263 E9/L (ref 130–450)
PMV BLD AUTO: 8.8 FL (ref 7–12)
PO2: 171 MMHG (ref 75–100)
POIKILOCYTES: ABNORMAL
POTASSIUM REFLEX MAGNESIUM: 4.4 MMOL/L (ref 3.5–5)
POTASSIUM, UR: 78.3 MMOL/L
PRO-BNP: 958 PG/ML (ref 0–125)
PROTEIN UA: NEGATIVE MG/DL
PS: 12 CMH20
RBC # BLD: 3.99 E12/L (ref 3.5–5.5)
RBC UA: NORMAL /HPF (ref 0–2)
REASON FOR REJECTION: NORMAL
REJECTED TEST: NORMAL
SARS-COV-2, NAAT: NOT DETECTED
SODIUM BLD-SCNC: 111 MMOL/L (ref 132–146)
SODIUM URINE: 67 MMOL/L
SOURCE, BLOOD GAS: ABNORMAL
SPECIFIC GRAVITY UA: >=1.03 (ref 1–1.03)
THB: 12.3 G/DL (ref 11.5–16.5)
TIME ANALYZED: 1627
TOTAL PROTEIN: 5.9 G/DL (ref 6.4–8.3)
TROPONIN, HIGH SENSITIVITY: 9 NG/L (ref 0–9)
UROBILINOGEN, URINE: 0.2 E.U./DL
WBC # BLD: 11.7 E9/L (ref 4.5–11.5)
WBC UA: NORMAL /HPF (ref 0–5)

## 2022-10-09 PROCEDURE — 82436 ASSAY OF URINE CHLORIDE: CPT

## 2022-10-09 PROCEDURE — 99285 EMERGENCY DEPT VISIT HI MDM: CPT

## 2022-10-09 PROCEDURE — 2000000000 HC ICU R&B

## 2022-10-09 PROCEDURE — 94640 AIRWAY INHALATION TREATMENT: CPT

## 2022-10-09 PROCEDURE — 85025 COMPLETE CBC W/AUTO DIFF WBC: CPT

## 2022-10-09 PROCEDURE — 84300 ASSAY OF URINE SODIUM: CPT

## 2022-10-09 PROCEDURE — 87635 SARS-COV-2 COVID-19 AMP PRB: CPT

## 2022-10-09 PROCEDURE — 84133 ASSAY OF URINE POTASSIUM: CPT

## 2022-10-09 PROCEDURE — 81001 URINALYSIS AUTO W/SCOPE: CPT

## 2022-10-09 PROCEDURE — 94660 CPAP INITIATION&MGMT: CPT

## 2022-10-09 PROCEDURE — 82805 BLOOD GASES W/O2 SATURATION: CPT

## 2022-10-09 PROCEDURE — 83935 ASSAY OF URINE OSMOLALITY: CPT

## 2022-10-09 PROCEDURE — 71275 CT ANGIOGRAPHY CHEST: CPT

## 2022-10-09 PROCEDURE — 93005 ELECTROCARDIOGRAM TRACING: CPT | Performed by: STUDENT IN AN ORGANIZED HEALTH CARE EDUCATION/TRAINING PROGRAM

## 2022-10-09 PROCEDURE — 71045 X-RAY EXAM CHEST 1 VIEW: CPT

## 2022-10-09 PROCEDURE — 83605 ASSAY OF LACTIC ACID: CPT

## 2022-10-09 PROCEDURE — 6370000000 HC RX 637 (ALT 250 FOR IP): Performed by: STUDENT IN AN ORGANIZED HEALTH CARE EDUCATION/TRAINING PROGRAM

## 2022-10-09 PROCEDURE — 6360000004 HC RX CONTRAST MEDICATION: Performed by: RADIOLOGY

## 2022-10-09 PROCEDURE — 80053 COMPREHEN METABOLIC PANEL: CPT

## 2022-10-09 PROCEDURE — 84484 ASSAY OF TROPONIN QUANT: CPT

## 2022-10-09 PROCEDURE — 83880 ASSAY OF NATRIURETIC PEPTIDE: CPT

## 2022-10-09 RX ORDER — IPRATROPIUM BROMIDE AND ALBUTEROL SULFATE 2.5; .5 MG/3ML; MG/3ML
3 SOLUTION RESPIRATORY (INHALATION) ONCE
Status: COMPLETED | OUTPATIENT
Start: 2022-10-09 | End: 2022-10-09

## 2022-10-09 RX ORDER — 3% SODIUM CHLORIDE 3 G/100ML
25 INJECTION, SOLUTION INTRAVENOUS CONTINUOUS
Status: ACTIVE | OUTPATIENT
Start: 2022-10-09 | End: 2022-10-10

## 2022-10-09 RX ADMIN — IPRATROPIUM BROMIDE AND ALBUTEROL SULFATE 3 AMPULE: .5; 2.5 SOLUTION RESPIRATORY (INHALATION) at 14:44

## 2022-10-09 RX ADMIN — IOPAMIDOL 75 ML: 755 INJECTION, SOLUTION INTRAVENOUS at 22:39

## 2022-10-09 ASSESSMENT — ENCOUNTER SYMPTOMS
NAUSEA: 0
SHORTNESS OF BREATH: 0
COUGH: 0
SORE THROAT: 0
PHOTOPHOBIA: 0
ABDOMINAL PAIN: 0
BACK PAIN: 0
DIARRHEA: 0

## 2022-10-09 ASSESSMENT — PAIN - FUNCTIONAL ASSESSMENT: PAIN_FUNCTIONAL_ASSESSMENT: NONE - DENIES PAIN

## 2022-10-09 NOTE — ED PROVIDER NOTES
HPI  81 y.o. female presenting for chief complaint of shortness of breath. Symptoms have been present for 1 day. They have been constant and moderate in severity. They are worsened by nothing and relieved by nothing. Patient states she has been feeling unwell since last night. She cannot specify how she does not feel well however. She was placed on CPAP by EMS, was not able to get a pulse ox on her home O2 requirement. Per EMS, patient had gurgling respirations. Patient denies any overt shortness of breath. She denies any fevers, chest pain, or abdominal pain.      --------------------------------------------- PAST HISTORY ---------------------------------------------  Past Medical History:  has a past medical history of Bronchitis, Hypertension, and Thyroid disease. Past Surgical History:  has a past surgical history that includes other surgical history; Tympanostomy tube placement; CT NEEDLE BIOPSY LIVER PERCUTANEOUS (9/6/2022); and Port Surgery (Left, 9/23/2022). Social History:  reports that she has been smoking cigarettes. She started smoking about 50 years ago. She has a 50.00 pack-year smoking history. She has never used smokeless tobacco. She reports that she does not drink alcohol and does not use drugs. Family History: family history is not on file. The patients home medications have been reviewed. Allergies: Patient has no known allergies. Review of Systems   Constitutional:  Negative for chills and fever. HENT:  Negative for congestion and sore throat. Eyes:  Negative for photophobia and visual disturbance. Respiratory:  Negative for cough and shortness of breath. Cardiovascular:  Negative for chest pain and leg swelling. Gastrointestinal:  Negative for abdominal pain, diarrhea and nausea. Genitourinary:  Negative for dysuria and flank pain. Musculoskeletal:  Negative for back pain and myalgias. Skin:  Negative for rash and wound.    Neurological:  Negative for dizziness, light-headedness and headaches. Physical Exam  Constitutional:       General: She is not in acute distress. Appearance: Normal appearance. She is not ill-appearing. HENT:      Head: Normocephalic and atraumatic. Right Ear: External ear normal.      Left Ear: External ear normal.      Nose: Nose normal.   Eyes:      Conjunctiva/sclera: Conjunctivae normal.   Cardiovascular:      Rate and Rhythm: Normal rate and regular rhythm. Pulmonary:      Effort: Pulmonary effort is normal. No respiratory distress. Breath sounds: Normal breath sounds. No stridor. No wheezing, rhonchi or rales. Comments: Tachypneic with some mildly increased work of breathing, diminished breath sounds diffusely with coarse breath sounds bilaterally  Abdominal:      General: There is no distension. Palpations: Abdomen is soft. Tenderness: There is no abdominal tenderness. Musculoskeletal:         General: No swelling or deformity. Right lower leg: No edema. Left lower leg: No edema. Skin:     General: Skin is warm and dry. Neurological:      General: No focal deficit present. Mental Status: She is alert. Psychiatric:         Mood and Affect: Mood normal.        Procedures      ED Course as of 10/10/22 0445   Sun Oct 09, 2022   1611 Patient signed out to me by Dr. Tasneem Diallo pending work up and results. Likely admission. [PP]   [id number] Patient noted to be hyponatremic on labs. Nephrology consulted. Urine studies pending. [PP]   6099 Dr. Darryl Gaytan recommends stat repeat BMP. Will call again for further recommendations when lab returns. [PP]   2057 Patient accepted to the ICU by Dr. Elkin Velázquez.  [PP]   2133 Patient accepted for admission by april [PP]      ED Course User Index  [PP] El Hernandez, DO       -------------------------------------------------- RESULTS -------------------------------------------------    LABS:  Results for orders placed or performed during the hospital encounter of 10/09/22   COVID-19, Rapid    Specimen: Nasopharyngeal Swab   Result Value Ref Range    SARS-CoV-2, NAAT Not Detected Not Detected   CBC with Auto Differential   Result Value Ref Range    WBC 11.7 (H) 4.5 - 11.5 E9/L    RBC 3.99 3.50 - 5.50 E12/L    Hemoglobin 11.9 11.5 - 15.5 g/dL    Hematocrit 32.5 (L) 34.0 - 48.0 %    MCV 81.5 80.0 - 99.9 fL    MCH 29.8 26.0 - 35.0 pg    MCHC 36.6 (H) 32.0 - 34.5 %    RDW 14.1 11.5 - 15.0 fL    Platelets 536 271 - 378 E9/L    MPV 8.8 7.0 - 12.0 fL    Neutrophils % 87.8 (H) 43.0 - 80.0 %    Lymphocytes % 5.2 (L) 20.0 - 42.0 %    Monocytes % 5.2 2.0 - 12.0 %    Eosinophils % 1.8 0.0 - 6.0 %    Basophils % 0.1 0.0 - 2.0 %    Neutrophils Absolute 10.30 (H) 1.80 - 7.30 E9/L    Lymphocytes Absolute 0.59 (L) 1.50 - 4.00 E9/L    Monocytes Absolute 0.58 0.10 - 0.95 E9/L    Eosinophils Absolute 0.21 0.05 - 0.50 E9/L    Basophils Absolute 0.00 0.00 - 0.20 E9/L    Anisocytosis 1+     Poikilocytes 2+     Yoly Cells 1+     Ovalocytes 1+    Urinalysis with Microscopic   Result Value Ref Range    Color, UA Yellow Straw/Yellow    Clarity, UA Clear Clear    Glucose, Ur Negative Negative mg/dL    Bilirubin Urine Negative Negative    Ketones, Urine Negative Negative mg/dL    Specific Gravity, UA >=1.030 1.005 - 1.030    Blood, Urine Negative Negative    pH, UA 6.0 5.0 - 9.0    Protein, UA Negative Negative mg/dL    Urobilinogen, Urine 0.2 <2.0 E.U./dL    Nitrite, Urine Negative Negative    Leukocyte Esterase, Urine Negative Negative    WBC, UA NONE 0 - 5 /HPF    RBC, UA NONE 0 - 2 /HPF    Bacteria, UA NONE SEEN None Seen /HPF   Lactic Acid   Result Value Ref Range    Lactic Acid 1.2 0.5 - 2.2 mmol/L   SPECIMEN REJECTION   Result Value Ref Range    Rejected Test CMPX, BNP, TRP     Reason for Rejection see below    Comprehensive Metabolic Panel w/ Reflex to MG   Result Value Ref Range    Sodium 111 (LL) 132 - 146 mmol/L    Potassium reflex Magnesium 4.4 3.5 - 5.0 mmol/L Chloride 80 (L) 98 - 107 mmol/L    CO2 23 22 - 29 mmol/L    Anion Gap 8 7 - 16 mmol/L    Glucose 97 74 - 99 mg/dL    BUN 9 6 - 23 mg/dL    Creatinine 0.6 0.5 - 1.0 mg/dL    GFR Non-African American >60 >=60 mL/min/1.73    GFR African American >60     Calcium 8.2 (L) 8.6 - 10.2 mg/dL    Total Protein 5.9 (L) 6.4 - 8.3 g/dL    Albumin 3.7 3.5 - 5.2 g/dL    Total Bilirubin 0.4 0.0 - 1.2 mg/dL    Alkaline Phosphatase 109 (H) 35 - 104 U/L    ALT 17 0 - 32 U/L    AST 18 0 - 31 U/L   Brain Natriuretic Peptide   Result Value Ref Range    Pro- (H) 0 - 125 pg/mL   Troponin   Result Value Ref Range    Troponin, High Sensitivity 9 0 - 9 ng/L   Blood Gas, Arterial   Result Value Ref Range    Date Analyzed 20221009     Time Analyzed 1627     Source: Blood Arterial     pH, Blood Gas 7.437 7.350 - 7.450    PCO2 33.5 (L) 35.0 - 45.0 mmHg    PO2 171.0 (H) 75.0 - 100.0 mmHg    HCO3 22.1 22.0 - 26.0 mmol/L    B.E. -1.5 -3.0 - 3.0 mmol/L    O2 Sat 99.3 (H) 92.0 - 98.5 %    PO2/FIO2 2.85 mmHg/%    O2Hb 98.2 (H) 94.0 - 97.0 %    COHb 0.8 0.0 - 1.5 %    MetHb 0.3 0.0 - 1.5 %    O2 Content 17.3 mL/dL    HHb 0.7 0.0 - 5.0 %    tHb (est) 12.3 11.5 - 16.5 g/dL    Mode BILEVEL     FIO2 60.0 %    Peep/Cpap 6.0 cmH2O    PS 12 cmH20    Date Of Collection      Time Collected      Pt Temp 37.0 C     ID [id number]     Lab 18441     Critical(s) Notified .  No Critical Values    Basic Metabolic Panel   Result Value Ref Range    Sodium 116 (LL) 132 - 146 mmol/L    Potassium 4.2 3.5 - 5.0 mmol/L    Chloride 82 (L) 98 - 107 mmol/L    CO2 25 22 - 29 mmol/L    Anion Gap 9 7 - 16 mmol/L    Glucose 60 (L) 74 - 99 mg/dL    BUN 9 6 - 23 mg/dL    Creatinine 0.7 0.5 - 1.0 mg/dL    GFR Non-African American >60 >=60 mL/min/1.73    GFR African American >60     Calcium 8.7 8.6 - 10.2 mg/dL   OSMOLALITY, URINE   Result Value Ref Range    Osmolality, Ur 645 300 - 900 mOsm/kg   URINE ELECTROLYTES   Result Value Ref Range    Sodium, Ur 67 Not Established mmol/L Potassium, Ur 78.3 Not Established mmol/L    Chloride 91 Not Established mmol/L       RADIOLOGY:  CTA PULMONARY W CONTRAST   Final Result   No evidence of pulmonary embolism. Large conglomerate of mediastinal and right hilar lymphadenopathy. There is   near complete occlusion of the right mainstem bronchus with atelectasis of   the right middle lobe      Numerous low-attenuation lesions in the liver likely representing metastatic   disease. Old ununited manubrial fracture. XR CHEST PORTABLE   Final Result   No acute process. EKG:  This EKG is signed and interpreted by me. Rate: 84  Rhythm: Sinus  Interpretation: No acute ST elevations or depressions, no acute T wave changes,   Comparison: stable as compared to patient's most recent EKG      ------------------------- NURSING NOTES AND VITALS REVIEWED ---------------------------  Date / Time Roomed:  10/9/2022  2:33 PM  ED Bed Assignment:  4425/4425-A    The nursing notes within the ED encounter and vital signs as below have been reviewed.      Patient Vitals for the past 24 hrs:   BP Temp Temp src Pulse Resp SpO2 Height Weight   10/10/22 0325 -- -- -- -- -- -- 5' 2\" (1.575 m) 152 lb 1.9 oz (69 kg)   10/10/22 0158 132/77 97.7 °F (36.5 °C) Oral 77 18 94 % -- --   10/10/22 0100 -- -- -- 84 22 93 % -- --   10/10/22 0000 -- -- -- 83 22 93 % -- --   10/09/22 2300 -- -- -- 81 16 98 % -- --   10/09/22 2200 (!) 113/94 -- -- 83 19 99 % -- --   10/09/22 2145 -- -- -- 81 17 100 % -- --   10/09/22 2130 -- -- -- 82 15 100 % -- --   10/09/22 2115 -- -- -- 80 24 100 % -- --   10/09/22 2100 (!) 158/69 -- -- 82 22 99 % -- --   10/09/22 2045 -- -- -- 85 21 (!) 87 % -- --   10/09/22 2030 (!) 146/71 -- -- 78 24 94 % -- --   10/09/22 2015 -- -- -- 83 20 98 % -- --   10/09/22 2000 -- -- -- 79 17 97 % -- --   10/09/22 1945 -- -- -- 76 18 99 % -- --   10/09/22 1930 -- -- -- 76 18 100 % -- --   10/09/22 1915 -- -- -- 77 15 100 % -- --   10/09/22 1900 -- -- -- 77 17 100 % -- --   10/09/22 1451 121/85 97.5 °F (36.4 °C) Axillary 77 24 100 % -- --   10/09/22 1444 -- -- -- -- 23 -- -- --       Oxygen Saturation Interpretation: Normal      MDM  Number of Diagnoses or Management Options  Hyponatremia  Diagnosis management comments: 80 yo female presenting with generalized illness. She denied shortness of breath to me on evaluation, however, she was noted to be tachypneic with some increased work of breathing and  diffusely diminished breath sounds, so she was placed on BiPAP and given duonebs. Labs and imaging ordered. Patient signed out to oncoming physician with dispo likely admission. Counseling:  I have spoken with the patient and discussed todays results, in addition to providing specific details for the plan of care and counseling regarding the diagnosis and prognosis. Their questions are answered at this time and they are agreeable with the plan of admission.    --------------------------------- ADDITIONAL PROVIDER NOTES ---------------------------------    This patient's ED course included: a personal history and physicial examination, re-evaluation prior to disposition, multiple bedside re-evaluations, cardiac monitoring, continuous pulse oximetry, and complex medical decision making and emergency management    This patient has remained hemodynamically stable during their ED course. Diagnosis:  1. Hyponatremia        Disposition:  Patient's disposition: Admit to CCU/ICU  Patient's condition is stable.       Elizabeth Clay MD  Resident  10/10/22 9026

## 2022-10-10 LAB
ANION GAP SERPL CALCULATED.3IONS-SCNC: 11 MMOL/L (ref 7–16)
ANION GAP SERPL CALCULATED.3IONS-SCNC: 8 MMOL/L (ref 7–16)
ANION GAP SERPL CALCULATED.3IONS-SCNC: 9 MMOL/L (ref 7–16)
ANION GAP SERPL CALCULATED.3IONS-SCNC: 9 MMOL/L (ref 7–16)
BASOPHILS ABSOLUTE: 0.05 E9/L (ref 0–0.2)
BASOPHILS RELATIVE PERCENT: 0.4 % (ref 0–2)
BUN BLDV-MCNC: 14 MG/DL (ref 6–23)
BUN BLDV-MCNC: 8 MG/DL (ref 6–23)
BUN BLDV-MCNC: 9 MG/DL (ref 6–23)
BUN BLDV-MCNC: 9 MG/DL (ref 6–23)
CALCIUM SERPL-MCNC: 8.2 MG/DL (ref 8.6–10.2)
CALCIUM SERPL-MCNC: 8.3 MG/DL (ref 8.6–10.2)
CALCIUM SERPL-MCNC: 8.5 MG/DL (ref 8.6–10.2)
CALCIUM SERPL-MCNC: 8.7 MG/DL (ref 8.6–10.2)
CHLORIDE BLD-SCNC: 82 MMOL/L (ref 98–107)
CHLORIDE BLD-SCNC: 83 MMOL/L (ref 98–107)
CHLORIDE BLD-SCNC: 85 MMOL/L (ref 98–107)
CHLORIDE BLD-SCNC: 85 MMOL/L (ref 98–107)
CO2: 22 MMOL/L (ref 22–29)
CO2: 24 MMOL/L (ref 22–29)
CO2: 24 MMOL/L (ref 22–29)
CO2: 25 MMOL/L (ref 22–29)
CREAT SERPL-MCNC: 0.6 MG/DL (ref 0.5–1)
CREAT SERPL-MCNC: 0.7 MG/DL (ref 0.5–1)
CREAT SERPL-MCNC: 0.9 MG/DL (ref 0.5–1)
CREAT SERPL-MCNC: 1.1 MG/DL (ref 0.5–1)
CREATININE URINE: 123 MG/DL (ref 29–226)
EKG ATRIAL RATE: 79 BPM
EKG P AXIS: 71 DEGREES
EKG P-R INTERVAL: 134 MS
EKG Q-T INTERVAL: 416 MS
EKG QRS DURATION: 92 MS
EKG QTC CALCULATION (BAZETT): 477 MS
EKG R AXIS: 66 DEGREES
EKG T AXIS: 99 DEGREES
EKG VENTRICULAR RATE: 79 BPM
EOSINOPHILS ABSOLUTE: 0.41 E9/L (ref 0.05–0.5)
EOSINOPHILS RELATIVE PERCENT: 3.5 % (ref 0–6)
GFR AFRICAN AMERICAN: 59
GFR AFRICAN AMERICAN: >60
GFR NON-AFRICAN AMERICAN: 49 ML/MIN/1.73
GFR NON-AFRICAN AMERICAN: >60 ML/MIN/1.73
GLUCOSE BLD-MCNC: 60 MG/DL (ref 74–99)
GLUCOSE BLD-MCNC: 70 MG/DL (ref 74–99)
GLUCOSE BLD-MCNC: 91 MG/DL (ref 74–99)
GLUCOSE BLD-MCNC: 91 MG/DL (ref 74–99)
HCT VFR BLD CALC: 31.1 % (ref 34–48)
HEMOGLOBIN: 11.2 G/DL (ref 11.5–15.5)
IMMATURE GRANULOCYTES #: 0.08 E9/L
IMMATURE GRANULOCYTES %: 0.7 % (ref 0–5)
LYMPHOCYTES ABSOLUTE: 1.06 E9/L (ref 1.5–4)
LYMPHOCYTES RELATIVE PERCENT: 9 % (ref 20–42)
MCH RBC QN AUTO: 29.6 PG (ref 26–35)
MCHC RBC AUTO-ENTMCNC: 36 % (ref 32–34.5)
MCV RBC AUTO: 82.3 FL (ref 80–99.9)
METER GLUCOSE: 116 MG/DL (ref 74–99)
METER GLUCOSE: 97 MG/DL (ref 74–99)
MONOCYTES ABSOLUTE: 1.15 E9/L (ref 0.1–0.95)
MONOCYTES RELATIVE PERCENT: 9.8 % (ref 2–12)
NEUTROPHILS ABSOLUTE: 9 E9/L (ref 1.8–7.3)
NEUTROPHILS RELATIVE PERCENT: 76.6 % (ref 43–80)
OSMOLALITY URINE: 581 MOSM/KG (ref 300–900)
OSMOLALITY: 234 MOSM/KG (ref 285–310)
PDW BLD-RTO: 13.5 FL (ref 11.5–15)
PLATELET # BLD: 261 E9/L (ref 130–450)
PMV BLD AUTO: 8.4 FL (ref 7–12)
POTASSIUM SERPL-SCNC: 3.7 MMOL/L (ref 3.5–5)
POTASSIUM SERPL-SCNC: 4 MMOL/L (ref 3.5–5)
POTASSIUM SERPL-SCNC: 4.1 MMOL/L (ref 3.5–5)
POTASSIUM SERPL-SCNC: 4.2 MMOL/L (ref 3.5–5)
PROCALCITONIN: 0.05 NG/ML (ref 0–0.08)
RBC # BLD: 3.78 E12/L (ref 3.5–5.5)
SODIUM BLD-SCNC: 116 MMOL/L (ref 132–146)
SODIUM BLD-SCNC: 117 MMOL/L (ref 132–146)
SODIUM BLD-SCNC: 118 MMOL/L (ref 132–146)
SODIUM BLD-SCNC: 118 MMOL/L (ref 132–146)
SODIUM BLD-SCNC: 120 MMOL/L (ref 132–146)
SODIUM URINE: 60 MMOL/L
WBC # BLD: 11.8 E9/L (ref 4.5–11.5)

## 2022-10-10 PROCEDURE — 85025 COMPLETE CBC W/AUTO DIFF WBC: CPT

## 2022-10-10 PROCEDURE — 83930 ASSAY OF BLOOD OSMOLALITY: CPT

## 2022-10-10 PROCEDURE — 36415 COLL VENOUS BLD VENIPUNCTURE: CPT

## 2022-10-10 PROCEDURE — 2580000003 HC RX 258

## 2022-10-10 PROCEDURE — 2700000000 HC OXYGEN THERAPY PER DAY

## 2022-10-10 PROCEDURE — 84145 PROCALCITONIN (PCT): CPT

## 2022-10-10 PROCEDURE — 2000000000 HC ICU R&B

## 2022-10-10 PROCEDURE — 6360000002 HC RX W HCPCS

## 2022-10-10 PROCEDURE — 84300 ASSAY OF URINE SODIUM: CPT

## 2022-10-10 PROCEDURE — 82570 ASSAY OF URINE CREATININE: CPT

## 2022-10-10 PROCEDURE — 83935 ASSAY OF URINE OSMOLALITY: CPT

## 2022-10-10 PROCEDURE — 84295 ASSAY OF SERUM SODIUM: CPT

## 2022-10-10 PROCEDURE — 82962 GLUCOSE BLOOD TEST: CPT

## 2022-10-10 PROCEDURE — 6370000000 HC RX 637 (ALT 250 FOR IP): Performed by: INTERNAL MEDICINE

## 2022-10-10 PROCEDURE — 2500000003 HC RX 250 WO HCPCS: Performed by: INTERNAL MEDICINE

## 2022-10-10 PROCEDURE — 6370000000 HC RX 637 (ALT 250 FOR IP)

## 2022-10-10 PROCEDURE — 80048 BASIC METABOLIC PNL TOTAL CA: CPT

## 2022-10-10 PROCEDURE — 94640 AIRWAY INHALATION TREATMENT: CPT

## 2022-10-10 PROCEDURE — 94660 CPAP INITIATION&MGMT: CPT

## 2022-10-10 RX ORDER — SODIUM CHLORIDE 9 MG/ML
INJECTION, SOLUTION INTRAVENOUS PRN
Status: DISCONTINUED | OUTPATIENT
Start: 2022-10-10 | End: 2022-10-14 | Stop reason: HOSPADM

## 2022-10-10 RX ORDER — ARFORMOTEROL TARTRATE 15 UG/2ML
15 SOLUTION RESPIRATORY (INHALATION) 2 TIMES DAILY
Status: DISCONTINUED | OUTPATIENT
Start: 2022-10-10 | End: 2022-10-14 | Stop reason: HOSPADM

## 2022-10-10 RX ORDER — SODIUM CHLORIDE 1000 MG
1 TABLET, SOLUBLE MISCELLANEOUS
Status: DISCONTINUED | OUTPATIENT
Start: 2022-10-10 | End: 2022-10-11

## 2022-10-10 RX ORDER — SODIUM CHLORIDE 0.9 % (FLUSH) 0.9 %
5-40 SYRINGE (ML) INJECTION PRN
Status: DISCONTINUED | OUTPATIENT
Start: 2022-10-10 | End: 2022-10-14 | Stop reason: HOSPADM

## 2022-10-10 RX ORDER — ACETAMINOPHEN 325 MG/1
650 TABLET ORAL EVERY 6 HOURS PRN
Status: DISCONTINUED | OUTPATIENT
Start: 2022-10-10 | End: 2022-10-14 | Stop reason: HOSPADM

## 2022-10-10 RX ORDER — HYDROXYZINE PAMOATE 25 MG/1
25 CAPSULE ORAL EVERY 8 HOURS PRN
Status: DISCONTINUED | OUTPATIENT
Start: 2022-10-10 | End: 2022-10-12

## 2022-10-10 RX ORDER — ONDANSETRON 2 MG/ML
4 INJECTION INTRAMUSCULAR; INTRAVENOUS EVERY 6 HOURS PRN
Status: DISCONTINUED | OUTPATIENT
Start: 2022-10-10 | End: 2022-10-14 | Stop reason: HOSPADM

## 2022-10-10 RX ORDER — GUAIFENESIN 400 MG/1
400 TABLET ORAL 3 TIMES DAILY
Status: DISCONTINUED | OUTPATIENT
Start: 2022-10-10 | End: 2022-10-14 | Stop reason: HOSPADM

## 2022-10-10 RX ORDER — CLONAZEPAM 0.5 MG/1
0.5 TABLET ORAL DAILY PRN
Status: DISCONTINUED | OUTPATIENT
Start: 2022-10-10 | End: 2022-10-12

## 2022-10-10 RX ORDER — ROSUVASTATIN CALCIUM 20 MG/1
20 TABLET, COATED ORAL NIGHTLY
Status: DISCONTINUED | OUTPATIENT
Start: 2022-10-10 | End: 2022-10-14 | Stop reason: HOSPADM

## 2022-10-10 RX ORDER — SODIUM CHLORIDE 0.9 % (FLUSH) 0.9 %
5-40 SYRINGE (ML) INJECTION EVERY 12 HOURS SCHEDULED
Status: DISCONTINUED | OUTPATIENT
Start: 2022-10-10 | End: 2022-10-14 | Stop reason: HOSPADM

## 2022-10-10 RX ORDER — POLYETHYLENE GLYCOL 3350 17 G/17G
17 POWDER, FOR SOLUTION ORAL DAILY PRN
Status: DISCONTINUED | OUTPATIENT
Start: 2022-10-10 | End: 2022-10-14 | Stop reason: HOSPADM

## 2022-10-10 RX ORDER — 3% SODIUM CHLORIDE 3 G/100ML
25 INJECTION, SOLUTION INTRAVENOUS CONTINUOUS
Status: ACTIVE | OUTPATIENT
Start: 2022-10-10 | End: 2022-10-10

## 2022-10-10 RX ORDER — BUDESONIDE 0.25 MG/2ML
250 INHALANT ORAL 2 TIMES DAILY
Status: DISCONTINUED | OUTPATIENT
Start: 2022-10-10 | End: 2022-10-14 | Stop reason: HOSPADM

## 2022-10-10 RX ORDER — ENOXAPARIN SODIUM 100 MG/ML
40 INJECTION SUBCUTANEOUS DAILY
Status: DISCONTINUED | OUTPATIENT
Start: 2022-10-10 | End: 2022-10-14 | Stop reason: HOSPADM

## 2022-10-10 RX ORDER — ACETAMINOPHEN 650 MG/1
650 SUPPOSITORY RECTAL EVERY 6 HOURS PRN
Status: DISCONTINUED | OUTPATIENT
Start: 2022-10-10 | End: 2022-10-14 | Stop reason: HOSPADM

## 2022-10-10 RX ORDER — PANTOPRAZOLE SODIUM 40 MG/1
40 TABLET, DELAYED RELEASE ORAL
Status: DISCONTINUED | OUTPATIENT
Start: 2022-10-10 | End: 2022-10-14 | Stop reason: HOSPADM

## 2022-10-10 RX ORDER — ONDANSETRON 4 MG/1
4 TABLET, ORALLY DISINTEGRATING ORAL EVERY 8 HOURS PRN
Status: DISCONTINUED | OUTPATIENT
Start: 2022-10-10 | End: 2022-10-14 | Stop reason: HOSPADM

## 2022-10-10 RX ORDER — LEVOTHYROXINE SODIUM 137 UG/1
137 TABLET ORAL DAILY
Status: DISCONTINUED | OUTPATIENT
Start: 2022-10-10 | End: 2022-10-14 | Stop reason: HOSPADM

## 2022-10-10 RX ORDER — IPRATROPIUM BROMIDE AND ALBUTEROL SULFATE 2.5; .5 MG/3ML; MG/3ML
1 SOLUTION RESPIRATORY (INHALATION) EVERY 4 HOURS PRN
Status: DISCONTINUED | OUTPATIENT
Start: 2022-10-10 | End: 2022-10-11

## 2022-10-10 RX ORDER — DEXTROSE MONOHYDRATE 100 MG/ML
INJECTION, SOLUTION INTRAVENOUS CONTINUOUS PRN
Status: DISCONTINUED | OUTPATIENT
Start: 2022-10-10 | End: 2022-10-14 | Stop reason: HOSPADM

## 2022-10-10 RX ADMIN — GUAIFENESIN 400 MG: 400 TABLET ORAL at 09:00

## 2022-10-10 RX ADMIN — POTASSIUM BICARBONATE 20 MEQ: 782 TABLET, EFFERVESCENT ORAL at 09:00

## 2022-10-10 RX ADMIN — CLONAZEPAM 0.5 MG: 0.5 TABLET ORAL at 07:02

## 2022-10-10 RX ADMIN — ENOXAPARIN SODIUM 40 MG: 100 INJECTION SUBCUTANEOUS at 09:02

## 2022-10-10 RX ADMIN — SODIUM CHLORIDE 1 G: 1 TABLET ORAL at 18:56

## 2022-10-10 RX ADMIN — Medication 10 ML: at 20:37

## 2022-10-10 RX ADMIN — HYDROXYZINE PAMOATE 25 MG: 25 CAPSULE ORAL at 14:32

## 2022-10-10 RX ADMIN — LEVOTHYROXINE SODIUM 137 MCG: 0.14 TABLET ORAL at 06:50

## 2022-10-10 RX ADMIN — ARFORMOTEROL TARTRATE 15 MCG: 15 SOLUTION RESPIRATORY (INHALATION) at 09:05

## 2022-10-10 RX ADMIN — ROSUVASTATIN CALCIUM 20 MG: 20 TABLET, FILM COATED ORAL at 20:37

## 2022-10-10 RX ADMIN — PANTOPRAZOLE SODIUM 40 MG: 40 TABLET, DELAYED RELEASE ORAL at 06:50

## 2022-10-10 RX ADMIN — ANTI-FUNGAL POWDER MICONAZOLE NITRATE TALC FREE: 1.42 POWDER TOPICAL at 20:37

## 2022-10-10 RX ADMIN — SODIUM CHLORIDE 1 G: 1 TABLET ORAL at 14:31

## 2022-10-10 RX ADMIN — GUAIFENESIN 400 MG: 400 TABLET ORAL at 20:37

## 2022-10-10 RX ADMIN — GUAIFENESIN 400 MG: 400 TABLET ORAL at 14:31

## 2022-10-10 RX ADMIN — Medication 10 ML: at 09:01

## 2022-10-10 RX ADMIN — BUDESONIDE 250 MCG: 0.25 SUSPENSION RESPIRATORY (INHALATION) at 09:05

## 2022-10-10 RX ADMIN — ANTI-FUNGAL POWDER MICONAZOLE NITRATE TALC FREE: 1.42 POWDER TOPICAL at 11:30

## 2022-10-10 ASSESSMENT — ENCOUNTER SYMPTOMS
PHOTOPHOBIA: 0
BACK PAIN: 0
EYE DISCHARGE: 0
COUGH: 1
SHORTNESS OF BREATH: 1
COLOR CHANGE: 0
CONSTIPATION: 0
FACIAL SWELLING: 0
APNEA: 0
ABDOMINAL DISTENTION: 0
RHINORRHEA: 0
CHEST TIGHTNESS: 0
VOICE CHANGE: 0
EYE PAIN: 0
BLOOD IN STOOL: 0
SORE THROAT: 0
STRIDOR: 1
SINUS PAIN: 0
ANAL BLEEDING: 0
NAUSEA: 0
EYE REDNESS: 0
ABDOMINAL PAIN: 0
EYE ITCHING: 0
WHEEZING: 1
SINUS PRESSURE: 0
RECTAL PAIN: 0
DIARRHEA: 0
TROUBLE SWALLOWING: 0
VOMITING: 0
CHOKING: 0

## 2022-10-10 ASSESSMENT — PAIN - FUNCTIONAL ASSESSMENT: PAIN_FUNCTIONAL_ASSESSMENT: NONE - DENIES PAIN

## 2022-10-10 NOTE — PROGRESS NOTES
Admitted to MICU room 4425. Alert and oriented x 4. Sodium level 116 drawn at 0125. Last sodium level was 111 at 4pm. Sodium chloride 3% wasn't given and need to clarify if still need to give since sodium levels increased. Message sent to Dr. Matt Ross.

## 2022-10-10 NOTE — H&P
[facility]  History and Physical      CHIEF COMPLAINT:    Chief Complaint   Patient presents with    Shortness of Breath        Patient of Bianca Elliott DO presents with:  Hyponatremia    History of Present Illness:   Patient is a 73-year-old female with a past medical history of hypertension, hypothyroidism, and recent diagnosis of lung CA with metastatic to her liver. Patient presented to the ED with complaints of shortness of breath that has been ongoing for 1 day. Patient was not able to obtain a pulse ox at home therefore she came to the ED. Per EMS patient had gurgling respirations. Patient was started on a BiPAP in the ED. Patient was recently discharged on 9/26 as she was admitted with hyponatremia at that time. Patient states that this is happened at least 3 times and was told it had to do with her recent cancer diagnosis. Patient follows with [facility] pulmonary. Patient was recently established with the [facility] and had a port placed on 9/23. Patient denies any chest pain, fever, chills, chest pain, abdominal pain or trouble with her bowels or bladder. ED work-up revealed sodium of 111, WBC 11.7, PCO2 of 33.5. CTA pulmonary revealed mediastinal and right hilar lymphadenopathy or near complete occlusion of the right mainstem bronchus as well as numerous low-attenuation lesions in the liver likely metastatic disease which was confirmed on her last visit. Nephrology was consulted and patient was admitted to ICU for further testing and treatment. On evaluation, she is extremely frustrated about recurrent hospitalizations. On evaluation, she is resting comfortably in no apparent acute distress. She indicates she was short of breath however she feels well now. She is aware of her diagnosis with primary lung cancer with metastatic disease. I explained to her that she may have recurrent hospitalizations until her symptoms are optimized.          REVIEW OF SYSTEMS:  Pertinent negatives are above in HPI. 10 point ROS otherwise negative.       Past Medical History:   Diagnosis Date    Bronchitis     Hypertension     Thyroid disease          Past Surgical History:   Procedure Laterality Date    CT NEEDLE BIOPSY LIVER PERCUTANEOUS  9/6/2022    CT NEEDLE BIOPSY LIVER PERCUTANEOUS 9/6/2022 Doretha Alston MD [facility] CT    OTHER SURGICAL HISTORY      states had something done right neck area per dr jaeger, might have been an abcess    PORT SURGERY Left 9/23/2022    MEDI-PORT INSERTION performed by Dallas Mendoza MD at [address] [place] [address]         Medications Prior to Admission:    Medications Prior to Admission: hydrOXYzine pamoate (VISTARIL) 25 MG capsule, Take 1 capsule by mouth every 8 hours as needed for Itching or Anxiety  rosuvastatin (CRESTOR) 20 MG tablet, Take 1 tablet by mouth nightly  losartan (COZAAR) 25 MG tablet, Take 1 tablet by mouth daily  metoprolol succinate (TOPROL XL) 50 MG extended release tablet, Take 1 tablet by mouth daily  amLODIPine (NORVASC) 5 MG tablet, Take 1 tablet by mouth daily  guaiFENesin 400 MG tablet, Take 1 tablet by mouth in the morning, at noon, and at bedtime  vitamin B-6 (B-6) 50 MG tablet, Take 1 tablet by mouth daily  sodium chloride 1 g tablet, Take 2 tablets by mouth 3 times daily (with meals)  fluticasone-vilanterol (BREO ELLIPTA) 100-25 MCG/INH AEPB inhaler, Inhale 1 puff into the lungs daily  albuterol (PROVENTIL) (2.5 MG/3ML) 0.083% nebulizer solution, Take 2.5 mg by nebulization 3 times daily as needed  fluticasone (FLONASE) 50 MCG/ACT nasal spray, 1 spray by Nasal route daily  montelukast (SINGULAIR) 10 MG tablet, Take 10 mg by mouth every morning  albuterol sulfate  (90 Base) MCG/ACT inhaler, Inhale 2 puffs into the lungs every 6 hours as needed for Wheezing  potassium chloride (KLOR-CON M) 20 MEQ extended release tablet, Take 1 tablet by mouth daily  omeprazole (PRILOSEC) 20 MG delayed release capsule, Take 20 mg by mouth daily  clonazePAM (KLONOPIN) 1 MG tablet, Take 0.5 mg by mouth daily as needed for Anxiety. levothyroxine (SYNTHROID) 137 MCG tablet, Take 137 mcg by mouth Daily     Note that the patient's home medications were reviewed and the above list is accurate to the best of my knowledge at the time of the exam.    Allergies:    Patient has no known allergies. Social History:    reports that she has been smoking cigarettes. She started smoking about 50 years ago. She has a 50.00 pack-year smoking history. She has never used smokeless tobacco. She reports that she does not drink alcohol and does not use drugs. Family History:   family history is not on file. PHYSICAL EXAM:    Vitals:  /61   Pulse 87   Temp 98.2 °F (36.8 °C)   Resp 21   Ht 5' 2\" (1.575 m)   Wt 152 lb 1.9 oz (69 kg)   SpO2 98%   BMI 27.82 kg/m²       General appearance: NAD, conversant  Eyes: Sclerae anicteric, PERRLA  HEENT: AT/NC, MMM  Neck: FROM, supple, no thyromegaly  Lymph: No cervical / supraclavicular lymphadenopathy  Lungs: Clear to auscultation, WOB normal  CV: RRR, no MRGs, no lower extremity edema  Abdomen: Soft, non-tender; no masses or HSM, +BS  Extremities: FROM without synovitis. No clubbing or cyanosis of the hands. Skin: no rash, induration, lesions, or ulcers  Psych: Calm and cooperative. Normal judgement and insight. Normal mood and affect. Neuro: Alert and interactive, face symmetric, speech fluent. LABS:  All labs reviewed.   Of note:  CBC with Differential:    Lab Results   Component Value Date/Time    WBC 11.8 10/10/2022 04:15 AM    RBC 3.78 10/10/2022 04:15 AM    HGB 11.2 10/10/2022 04:15 AM    HCT 31.1 10/10/2022 04:15 AM     10/10/2022 04:15 AM    MCV 82.3 10/10/2022 04:15 AM    MCH 29.6 10/10/2022 04:15 AM    MCHC 36.0 10/10/2022 04:15 AM    RDW 13.5 10/10/2022 04:15 AM    METASPCT 0.9 09/23/2022 04:05 AM    LYMPHOPCT 9.0 10/10/2022 04:15 AM    MONOPCT 9.8 10/10/2022 04:15 AM    MYELOPCT 0.9 09/21/2022 05:00 AM    BASOPCT 0.4 10/10/2022 04:15 AM    MONOSABS 1.15 10/10/2022 04:15 AM    LYMPHSABS 1.06 10/10/2022 04:15 AM    EOSABS 0.41 10/10/2022 04:15 AM    BASOSABS 0.05 10/10/2022 04:15 AM     CMP:    Lab Results   Component Value Date/Time     10/10/2022 08:15 AM    K 3.7 10/10/2022 04:15 AM    K 4.4 10/09/2022 04:00 PM    CL 83 10/10/2022 04:15 AM    CO2 22 10/10/2022 04:15 AM    BUN 8 10/10/2022 04:15 AM    CREATININE 0.6 10/10/2022 04:15 AM    GFRAA >60 10/10/2022 04:15 AM    LABGLOM >60 10/10/2022 04:15 AM    GLUCOSE 70 10/10/2022 04:15 AM    PROT 5.9 10/09/2022 04:00 PM    LABALBU 3.7 10/09/2022 04:00 PM    CALCIUM 8.2 10/10/2022 04:15 AM    BILITOT 0.4 10/09/2022 04:00 PM    ALKPHOS 109 10/09/2022 04:00 PM    AST 18 10/09/2022 04:00 PM    ALT 17 10/09/2022 04:00 PM       Imaging:  CXR: WNL    CTA pulmonary: No evidence of PE. Large conglomerate or mediastinal and right hilar lymphadenopathy. There is near complete occlusion of the right mainstem bronchus with atelectasis of the right middle lobe. Numerous low-attenuation lesions in the liver likely representing metastatic disease. Old ununited manubrial fracture. EKG:  I've personally reviewed the patient's EKG:  NSR    Telemetry:  I've personally reviewed the patient's telemetry:      ASSESSMENT/PLAN:  Principal Problem:    Hyponatremia  Active Problems:    Primary hypertension    Hypothyroidism    Tobacco abuse    Shortness of breath  Resolved Problems:    * No resolved hospital problems.  *    68-year-old female with a history of metastatic small cell lung CA with mets to the liver and who was recently discharged on 9/26 with severe hyponatremia is admitted to the ICU with    Hyponatremia from small cell lung cancer  Monitor labs-sodium 116 currently on admission was 111  3% sodium x4 hours per nephrology, avoid overcorrection to avoid cerebral pontine myelolysis  BMP every 6 hours  Watch mentation as she appears to be somewhat confused today  Nephrology following -await input  Hold offending agents  Vital signs per ICU protocol  Fluid restriction 1 L    Recent diagnosed with lung CA with mets to the liver  Port placed 9/23/2022  Patient has not started treatment  Overall poor prognosis due to aggressiveness of small cell lung cancer, now with metastatic disease  Consider palliation and comfort measures  Await hematology oncology and    Medication for other comorbidities continue as appropriate dose adjustment as necessary.     DVT prophylaxis  PT OT  Discharge planning         Code status: Limited   Requires inpatient level of care    Tori Tadeo MD

## 2022-10-10 NOTE — ED NOTES
Pt waiting for CT scan. Tolerating 4L nc spo2 between 93 and 96%.      Edith Crandall RN  10/09/22 9712

## 2022-10-10 NOTE — CONSULTS
[facility]  Internal Medicine Residency Program  Consult Note  MICU    Patient:  Obed Guy 81 y.o. female MRN: [id number]     Date of Service: 10/10/2022    Hospital Day: 2      Chief complaint: \"feeling unwell,\" shortness of breath, hyponatremia  History of Present Illness   The patient is a 81 y.o. 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 She states that this has happened at least 3 times now, and states she was told it had to do with her recent cancer diagnosis. Patient states she is following with a pulmonologist but cannot remember his name. She states recently established with the [facility] to start treatment for her lung cancer. She had a port placed in her left chest on 9/23. She states she is not feeling any fevers, chills, chest pain, abdominal pain, or trouble with her bowels or bladder. She is requesting Mucinex for her phlegm so she can breath better. She states she has several medications at home but cannot remember them at this time. In the ED labs were notable for Na of 111, WBC of 11.7, PCO2 of 33.5. CTA pulmonary revealed mediastinal and right hilar lymphadenopathy and near complete occlusion of the right mainstem bronchus as well as numerous low-attenuation lesions in the liver likely metastatic disease. Further chart review showed that the liver lesions were biopsied on Sept 6 and show neuroendocrine tumor of pulmonary origin, indicative of small cell carcinoma of the lung. Nephrology was consulted for management of severe hyponatremia and the patient was transferred to the ICU for further management.      Past Medical History:      Diagnosis Date    Bronchitis     Hypertension     Thyroid disease        Past Surgical History:        Procedure Laterality Date    CT NEEDLE BIOPSY LIVER PERCUTANEOUS  9/6/2022    CT NEEDLE BIOPSY LIVER PERCUTANEOUS 9/6/2022 Ingrid Padilla MD [facility] CT    OTHER SURGICAL HISTORY      states had something done right neck area per dr jaeger, might have been an abcess    PORT SURGERY Left 9/23/2022    MEDI-PORT INSERTION performed by Caryle Salvo, MD at [address] [place] [address]         Medications Prior to Admission:    Prior to Admission medications    Medication Sig Start Date End Date Taking?  Authorizing Provider   hydrOXYzine pamoate (VISTARIL) 25 MG capsule Take 1 capsule by mouth every 8 hours as needed for Itching or Anxiety 9/26/22 10/10/22  FELIPA Bergman CNP   rosuvastatin (CRESTOR) 20 MG tablet Take 1 tablet by mouth nightly 9/26/22   FELIPA Bergman CNP   losartan (COZAAR) 25 MG tablet Take 1 tablet by mouth daily 9/27/22   FELIPA Bergman CNP   metoprolol succinate (TOPROL XL) 50 MG extended release tablet Take 1 tablet by mouth daily 9/27/22   FELIPA Bergman CNP   amLODIPine (NORVASC) 5 MG tablet Take 1 tablet by mouth daily 9/27/22   FELIPA Bergman CNP   guaiFENesin 400 MG tablet Take 1 tablet by mouth in the morning, at noon, and at bedtime 9/26/22   FELIPA Bergman CNP   vitamin B-6 (B-6) 50 MG tablet Take 1 tablet by mouth daily 9/27/22   FELIPA Bergman CNP   sodium chloride 1 g tablet Take 2 tablets by mouth 3 times daily (with meals) 9/26/22   FELIPA Bergman CNP   fluticasone-vilanterol (BREO ELLIPTA) 100-25 MCG/INH AEPB inhaler Inhale 1 puff into the lungs daily    Historical Provider, MD   albuterol (PROVENTIL) (2.5 MG/3ML) 0.083% nebulizer solution Take 2.5 mg by nebulization 3 times daily as needed 10/1/21   Historical Provider, MD   fluticasone (FLONASE) 50 MCG/ACT nasal spray 1 spray by Nasal route daily 9/17/21   Historical Provider, MD   montelukast (SINGULAIR) 10 MG tablet Take 10 mg by mouth every morning 10/22/21   Historical Provider, MD   albuterol sulfate  (90 Base) MCG/ACT inhaler Inhale 2 puffs into the lungs every 6 hours as needed for Wheezing 11/11/21   Pedro Feliz MD   potassium chloride (KLOR-CON M) 20 MEQ extended release tablet Take 1 tablet by mouth daily 4/2/21   Willy Loyola MD   omeprazole (PRILOSEC) 20 MG delayed release capsule Take 20 mg by mouth daily    Historical Provider, MD   clonazePAM (KLONOPIN) 1 MG tablet Take 0.5 mg by mouth daily as needed for Anxiety. Historical Provider, MD   levothyroxine (SYNTHROID) 137 MCG tablet Take 137 mcg by mouth Daily     Historical Provider, MD       Allergies:  Patient has no known allergies. Social History:   TOBACCO:   reports that she has been smoking cigarettes. She started smoking about 50 years ago. She has a 50.00 pack-year smoking history. She has never used smokeless tobacco.  ETOH:   reports no history of alcohol use. Family History:   History reviewed. No pertinent family history. REVIEW OF SYSTEMS:    Constitutional: No fever, no chills, no change in weight; good appetite  HEENT: No blurred vision, no ear problems, no sore throat, no rhinorrhea. Respiratory: +cough, no sputum production, no pleuritic chest pain, +shortness of breath  Cardiology: No angina, + dyspnea on exertion, no paroxysmal nocturnal dyspnea, no orthopnea, no palpitation, no leg swelling. Gastroenterology: No dysphagia, no reflux; no abdominal pain, no nausea or vomiting; no constipation or diarrhea.  No hematochezia   Genitourinary: No dysuria, no frequency, hesitancy; no hematuria  Musculoskeletal: no joint pain, no myalgia, no change in range of movement  Neurology: no focal weakness in extremities, + slurred speech, no double vision, no tingling or numbness sensation  Endocrinology: no temperature intolerance, no polyphagia, polydipsia or polyuria  Hematology: no increased bleeding, no bruising, no lymphadenopathy  Skin: no skin changes noticed by patient  Psychology: no depressed mood, no suicidal ideation    Physical Exam   Vitals: /64   Pulse 84   Temp (!) 96.6 °F (35.9 °C) (Temporal)   Resp 20   Ht 5' 2\" (1.575 m)   Wt 152 lb 1.9 oz (69 kg)   SpO2 94%   BMI 27.82 kg/m²           General Appearance: alert and oriented to person, place and time and in no acute distress  Skin: warm and dry, no rash or erythema  Head: normocephalic and atraumatic  Eyes: pupils equal, round, and reactive to light, extraocular eye movements intact, conjunctivae normal  Neck: neck supple and non tender without mass   Pulmonary/Chest: decreased breath sounds noted- bibasilar  Cardiovascular: normal rate, regular rhythm, and no murmurs  Abdomen: soft, non-tender, non-distended, normal bowel sounds, no masses or organomegaly  Extremities: no cyanosis and no edema  Musculoskeletal: normal range of motion, no joint swelling, deformity or tenderness  Neurologic: speech abnormal- slurred     Lines     site day    Art line   None    TLC None    PICC None    Hemoaccess None    Oxygen:    nasal canula at 3L/min    ABG:     Lab Results   Component Value Date/Time    PH 7.437 10/09/2022 04:27 PM    PCO2 33.5 10/09/2022 04:27 PM    PO2 171.0 10/09/2022 04:27 PM    HCO3 22.1 10/09/2022 04:27 PM    BE -1.5 10/09/2022 04:27 PM    THB 12.3 10/09/2022 04:27 PM    O2SAT 99.3 10/09/2022 04:27 PM     Labs and Imaging Studies   Basic Labs  CBC:   Lab Results   Component Value Date/Time    WBC 11.8 10/10/2022 04:15 AM    RBC 3.78 10/10/2022 04:15 AM    HGB 11.2 10/10/2022 04:15 AM    HCT 31.1 10/10/2022 04:15 AM    MCV 82.3 10/10/2022 04:15 AM    RDW 13.5 10/10/2022 04:15 AM     10/10/2022 04:15 AM     CMP:  Lab Results   Component Value Date/Time     10/10/2022 08:15 AM    K 3.7 10/10/2022 04:15 AM    K 4.4 10/09/2022 04:00 PM    CL 83 10/10/2022 04:15 AM    CO2 22 10/10/2022 04:15 AM    BUN 8 10/10/2022 04:15 AM    PROT 5.9 10/09/2022 04:00 PM       Imaging Studies:     Echo Complete    Result Date: regurgitation. Pulmonic Valve  The pulmonic valve was not well visualized. No evidence of any pulmonic regurgitation. No evidence of pulmonic valve stenosis. Pericardial Effusion  No evidence for hemodynamically significant pericardial effusion. Pleural Effusion  No evidence of pleural effusion. Aorta  Aortic root dimension within normal limits. Miscellaneous  The inferior vena cava diameter is normal with normal respiratory  variation. Conclusions   Summary  Mildly dilated left ventricle. Ejection fraction is visually estimated at 60-65%. 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 Severe concentric left ventricular hypertrophy. Indeterminate diastolic function. Global longitudinal peak strain -12.5% (low due basal aneurysm)  Normal right ventricular size and function. TAPSE 25 mm. Mild mitral regurgitation is present. No hemodynamically significant aortic stenosis is present. Unable to estimate PA systolic pressure. No evidence for hemodynamically significant pericardial effusion. Consider cardiac MRI further evaluation of LV basal aneurysm.    Signature   ----------------------------------------------------------------  Electronically signed by Karen Mitchell MD(Interpreting  physician) on 09/22/2022 06:37 PM  ----------------------------------------------------------------  M-Mode/2D Measurements & Calculations   LV Diastolic    LV Systolic Dimension: 4.1   AV Cusp Separation: 1.6 cmLA  Dimension: 5.3  cm                           Dimension: 3.2 cmAO Root  cm              LV Volume Diastolic: 083.9   Dimension: 2.9 cm  LV FS:22.6 %    ml  LV PW           LV Volume Systolic: 18.9 ml  Diastolic: 1.4  LV EDV/LV EDV Index: 132.7  cm              ml/77 ml/m^2LV ESV/LV ESV    RV Diastolic Dimension: 1.8  LV PW Systolic: Index: 54.7 MQ/00HU/ m^2     cm  1.4 cm          EF Calculated: 43.1 %  Septum          LV Mass Index: 140 l/min*m^2 LA/Aorta: 1.1  Diastolic: 0.9  LV Length: 8.1 cm  cm                                           LA volume/Index: 26.4 ml  Septum          LVOT: 1.9 cm                 /42.48TL/K^8  Systolic: 1.7                                RA Area: 11.3 cm^2  cm                                               IVC Expiration: 1.7 cm  LV Mass: 241.96  g  Doppler Measurements & Calculations   MV Peak E-Wave: 1.37 AV Peak Velocity: 1.73 LVOT Peak Velocity: 1.27 m/s  m/s                  m/s                    LVOT Mean Velocity: 0.85 m/s                       AV Peak Gradient:      LVOT Peak Gradient: 6.4  MV Peak Gradient:    11.92 mmHg             mmHgLVOT Mean Gradient: 3.4  7.5 mmHg             AV Mean Velocity: 1.35 mmHg  MV Mean Gradient:    m/s  3.7 mmHg             AV Mean Gradient: 7.8  MV Mean Velocity:    mmHg  0.85 m/s             AV VTI: 32.9 cm  MV Deceleration      AV Area  Time: 176.3 msec     (Continuity):1.96 cm^2 PV Peak Velocity: 1.11 m/s  MV P1/2t: 45.6 msec                         PV Peak Gradient: 4.93 mmHg  MVA by PHT:4.82 cm^2 LVOT VTI: 22.7 cm      PV Mean Velocity: 0.75 m/s  MV Area              IVRT: 147.6 msec       PV Mean Gradient: 2.6 mmHg  (continuity): 2.5  cm^2  MV E' Septal  Velocity: 0.09 m/s  MV E' Lateral  Velocity: 13 m/s  [email or web address] [id number]    XR SHOULDER LEFT (MIN 2 VIEWS)    Result Date: 9/23/2022  EXAMINATION: THREE XRAY VIEWS OF THE LEFT SHOULDER 9/23/2022 2:13 pm COMPARISON: None. HISTORY: ORDERING SYSTEM PROVIDED HISTORY: pain TECHNOLOGIST PROVIDED HISTORY: Reason for exam:->pain What reading provider will be dictating this exam?->CRC FINDINGS: Impacted fracture involving the humeral neck. Humeral head maintains articulation with the glenoid. Acromioclavicular joint is intact. Impacted left humeral neck fracture.      XR CHEST PORTABLE    Result Date: 10/9/2022  EXAMINATION: ONE XRAY VIEW OF THE CHEST 10/9/2022 4:04 pm COMPARISON: 09/24/2022 HISTORY: ORDERING SYSTEM PROVIDED HISTORY: shortness of breath TECHNOLOGIST PROVIDED HISTORY: Reason for exam:->shortness of breath What reading provider will be dictating this exam?->CRC FINDINGS: The lungs are without acute focal process. There is no effusion or pneumothorax. The cardiomediastinal silhouette is without acute process. The osseous structures are without acute process. Left-sided port a catheter tip in the right atrium 4.0 cm from the caval atrial junction. Scoliosis. No acute process. XR CHEST PORTABLE    Result Date: 9/24/2022  EXAMINATION: ONE XRAY VIEW OF THE CHEST 9/24/2022 10:52 pm COMPARISON: Chest series from September 22, 2022 HISTORY: ORDERING SYSTEM PROVIDED HISTORY: post L IJ port placement TECHNOLOGIST PROVIDED HISTORY: Reason for exam:->post L IJ port placement What reading provider will be dictating this exam?->CRC FINDINGS: Left anterior chest wall cardiac support device with distal tip projecting in the vicinity of the right atrium. Background coarse interstitial markings. Mild lung hyperinflation. No new airspace disease, pleural effusions, or pneumothoraces. Atherosclerotic disease in the thoracic aorta. Cardiac silhouette is prominent. Normal pulmonary vascularity. Osseous mineralization is decreased. No acute osseous or soft tissue findings. 1.  Left chest wall MediPort without complicating features. No obvious pneumothorax. 2.  Stable coarsened interstitial markings in lung hyperinflation. Stable atherosclerotic disease. No acute cardiopulmonary pathology.      XR CHEST PORTABLE    Result Date: 9/22/2022  EXAMINATION: ONE XRAY VIEW OF THE CHEST 9/22/2022 2:52 am COMPARISON: CT chest 20 September 2022 HISTORY: ORDERING SYSTEM PROVIDED HISTORY: SOB, crackles on physical exam; assess for volume overload TECHNOLOGIST PROVIDED HISTORY: Reason for exam:->SOB, crackles on physical exam; assess for volume overload What reading provider will be dictating this exam?->CRC FINDINGS: Single AP upright portable chest demonstrates S shaped scoliotic curvature. The lungs are mildly hyperexpanded with increased markings at the lung bases with mild blunting of the left costophrenic angle. Persistent fullness in the right mediastinal region. There is no evidence of a pneumothorax. The upper abdomen appears unremarkable. Findings consistent with infiltrative process involving the right mediastinum as noted on the previous CT chest.  No evidence of a pneumothorax. XR CHEST PORTABLE    Result Date: 9/20/2022  EXAMINATION: ONE XRAY VIEW OF THE CHEST 9/20/2022 9:06 pm COMPARISON: 09/01/2022 HISTORY: ORDERING SYSTEM PROVIDED HISTORY: Shortness of breath TECHNOLOGIST PROVIDED HISTORY: Reason for exam:->Shortness of breath What reading provider will be dictating this exam?->CRC FINDINGS: The lungs are without acute focal process. There is no effusion or pneumothorax. The cardiomediastinal silhouette is without acute process. The osseous structures are without acute process. No acute process. CTA CHEST W CONTRAST    Result Date: 9/21/2022  EXAMINATION: CTA OF THE CHEST 9/20/2022 10:31 pm TECHNIQUE: CTA of the chest was performed after the administration of intravenous contrast.  Multiplanar reformatted images are provided for review. MIP images are provided for review. Automated exposure control, iterative reconstruction, and/or weight based adjustment of the mA/kV was utilized to reduce the radiation dose to as low as reasonably achievable. COMPARISON: September 3, 2022.  HISTORY: ORDERING SYSTEM PROVIDED HISTORY: dissection TECHNOLOGIST PROVIDED HISTORY: Reason for exam:->dissection Decision Support Exception - unselect if not a suspected or confirmed emergency medical condition->Emergency Medical Condition (MA) What reading provider will be dictating this exam?->CRC FINDINGS: Pulmonary Arteries: Pulmonary arteries are adequately opacified for evaluation. No evidence of intraluminal filling defect to suggest pulmonary embolism. Main pulmonary arteries are enlarged. Mediastinum: Extensive mediastinal lymphadenopathy and right hilar lymphadenopathy or infiltrative mass with secondary narrowing the right mainstem bronchus. Lungs/pleura: The lungs demonstrate atelectasis in the right middle lobe. No pulmonary edema. No evidence of pleural effusion or pneumothorax. Upper Abdomen: Limited images of the upper abdomen are unremarkable. Soft Tissues/Bones: No acute bone or soft tissue abnormality. No evidence of pulmonary embolism. Enlarged pulmonary arteries, correlate clinically for pulmonary artery hypertension. Infiltrative right mediastinal mass with lymphadenopathy or a large conglomerate of lymph nodes involving the right aspect of the mediastinum and right hilum with narrowing of the right mainstem bronchus. This finding significantly worsened from the prior examination dated September 3, 2022. Partial atelectasis in the right middle lobe. CTA ABDOMEN PELVIS W CONTRAST    Result Date: 9/21/2022  EXAMINATION: CTA OF THE ABDOMEN AND PELVIS WITH CONTRAST9/20/2022 10:31 pm TECHNIQUE: CTA of the abdomen and pelvis was performed with the administration of intravenous contrast. Multiplanar reformatted images are provided for review. MIP images are provided for review. Automated exposure control, iterative reconstruction, and/or weight based adjustment of the mA/kV was utilized to reduce the radiation dose to as low as reasonably achievable. COMPARISON: None HISTORY: ORDERING SYSTEM PROVIDED HISTORY: dissection TECHNOLOGIST PROVIDED HISTORY: Reason for exam:->dissection Decision Support Exception - unselect if not a suspected or confirmed emergency medical condition->Emergency Medical Condition (MA) What reading provider will be dictating this exam?->CRC FINDINGS: THORACIC STRUCTURES: Unremarkable.  LIVER:  The liver is normal in size, contour and attenuation. No focal mass. No intra or extrahepatic bile duct dilation. GALL BLADDER: Unremarkable. SPLEEN:  Unremarkable. PANCREAS:  Unremarkable. ADRENAL GLANDS:  Unremarkable. ESOPHAGUS AND STOMACH:  Unremarkable. BOWEL: Small bowel:  Unremarkable. Large bowel: The colon and rectum are of normal course and caliber. The appendix is within normal limits. There is no intraperitoneal free air or fluid. URINARY/GENITAL TRACT: Kidneys:  The kidneys are unremarkable. .  There is atrophy of the upper pole of left kidney. No renal mass or hydronephrosis bilaterally. Ureters: The ureters are normal course and caliber. There is no evidence of ureter calculus/calculi. URINARY BLADDER:  The urinary bladder is under distended with a Li catheter. REPRODUCTIVE ORGANS:  Unremarkable. BLOOD VESSELS: Extensive atherosclerotic disease of the abdominal aorta. The main and accessory right renal artery demonstrate mild-to-moderate atherosclerotic stenosis. 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 LYMPH NODES:  No evidence of intraabdominal or intrapelvic lymphadenopathy. ABDOMINAL WALL & SOFT TISSUES:  Unremarkable. OSSEOUS STRUCTURES: No acute osseous lesion. No acute intraabdominal or intrapelvic pathology. Atrophy of the upper pole of the left kidney. Chronic vascular changes as described above. CTA PULMONARY W CONTRAST    Result Date: 10/9/2022  EXAMINATION: CTA OF THE CHEST 10/9/2022 10:23 pm TECHNIQUE: CTA of the chest was performed after the administration of intravenous contrast.  Multiplanar reformatted images are provided for review. MIP images are provided for review. Automated exposure control, iterative reconstruction, and/or weight based adjustment of the mA/kV was utilized to reduce the radiation dose to as low as reasonably achievable. COMPARISON: None.  HISTORY: ORDERING SYSTEM PROVIDED HISTORY: r/o PE TECHNOLOGIST PROVIDED FINDINGS: Chest CT of September 20 has been reviewed. There are displaced healing fractures in the right 5, 6 and 7 ribs. There are areas of focal increased uptake of the radionuclide which correlates with these fractures. There are old fractures in the posterior aspect of the right 8 and 9 ribs. There are areas of focal increased uptake adrenal glide which correlates with these rib fractures. There is a healing fracture of the lower 3rd of the manubrium of the sternum. There is area of more intense uptake adrenal glide which correlate with these fracture. There is a impacted the subcapital or infra capital fracture of the proximal left humerus which correlates with increased uptake the radionuclide in this area. There is a S shaped scoliotic curvature for the thoracolumbar spine with the mid right thoracic convexity, a more discrete left thoracolumbar convexity, and a mid left lumbar convexity. Increased uptake of the nuclide in the L2-3 level relates with more advanced degenerative disc disease the reactive bone sclerosis at that. The increased uptake adrenal glide across T7-T8 relate with spondylosis more prominent to the left of the midline and the discrete in these uptake in other levels of the lumbar spine also correlate degenerative changes. Some increased activity seen in the knees and in the right shoulder elbow joints and wrist joints correlate with degenerative changes. There are limitation in the evaluation of the lower sacral spine and symphysis of the pubis do residual activity the bladder. .     1. No pattern of metastatic bone disease. 2.  Multiple trauma injuries are seen in the right ribcage, left humerus subcapital region, and degenerative changes are seen in multiple levels in the thoracolumbar spine.  RECOMMENDATIONS: Unavailable       Resident's Assessment and Plan     Leonidas Chung is a 81 y.o. female with past medical history of metastatic small cell lung cancer and multiple admissions for hyponatremia who presents with a chief complaint of hyponatremia. 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   Na 111 on admission  Improved to 116 without intervention   Osmolality 234  No signs on clinical exam of abnormal volume status  Fluid restriction 1L  Hold 3% saline  BMP q4 hrs  Goal for < 8 Na correction in 24 hrs  Nephrology following, appreciate recs  Endocrine  Hx of hypothyroidism   On Synthroid 137 mcg at home  Continue home Synthroid  Heme/Onc  Metastatic small cell lung cancer   Previously admitted with hyponatremia, CT showing mass in the lung  Biopsy of liver mets showing high-grade neuroendocrine carcinoma, pulmonary origin  Port in left chest placed on 9/23/2022 to begin treatment with [facility] outpatient inhalers  Pulmicort, Toshia Cervantes while inpatient  Follows with pulmonology at [address] [place] [address] Ne consult  MSK   Hx of multiple fractures; ribs, sternum, humerus in the setting of metastatic disease and concern for malnutrition  Consider dietary consult  Psych   Hx of tobacco use  Hx of depression  Hx of anxiety  Patient on Vistaril 25 mg q 8 hr PRN and Klonopin 1 0.5 mg daily PRN at home  Continue anxiety medications while inpatient  Other  Discussed CODE STATUS with patient on admission. She states she does not want chest compressions as they would do too much damage to her body. She states she does want medications to restart her heart and would like shocks if they are necessary. The patient does not want to be intubated for any reason.  CODE STATUS was changed to LIMITED CODE - yes to meds and shocks only    PT/OT evaluation: not indicated at this time  DVT prophylaxis/ GI prophylaxis: lovenox/protonix  Disposition: Patient remains critically ill and requires ICU level care. Roosevelt Guadalupe DO, PGY-1   Attending physician: Dr. Emily Parks  Case discussed with Dr. Tory Hernandez I personally saw, examined and provided care for the patient. Radiographs, labs and medication list were reviewed by me independently. Review of Residents documentation was conducted and revisions were made as appropriate. I agree with the above documented exam, problem list and plan of care. Hyponatremia secondary to SIADH with extensive stage small cell carcinoma    Close monitoring of serial sodium with avoidance of overcorrection.   Bronchodilators  Consult oncology    CCT excluding procedures 35 minutes    Felecia Lopez DO

## 2022-10-10 NOTE — CONSULTS
Nephrology Consult  [facility]  Kelsie Mendoza MD    CC:   hyponatremia    HPI:   the pt is a 82 yo female with a pmh of htn, hyperlipdiemia, chf, hypothyroidism, gerd, lung cancer with siadh and liver lesions, tobacco abuse, who presented with sob. Labs showed na 111 repeat 116, k 4.4, co2 23, bun 9, cr 0.6, ca 8.2, wbc 11.8, hgb 11.2, plt 261. Cta chest showed no PE. She was ordered nacl 2 g tid as outpt. She is seen in the icu lying prone watching tv.she says she is hard of hearing. She feels weak.      PMH:    Past Medical History:   Diagnosis Date    Bronchitis     Hypertension     Thyroid disease        Patient Active Problem List   Diagnosis    Congestive heart failure of unknown etiology ([facility] [facility]    Congestive heart failure due to cardiomyopathy (HCC)    Hypoxia    Simple chronic bronchitis (HCC)    Hyponatremia    Primary hypertension    Hypothyroidism    GERD (gastroesophageal reflux disease)    Depression    Palliative care by specialist    Goals of care, counseling/discussion    Small cell lung cancer in adult [facility])    Lung mass    Closed fracture of left proximal humerus    Moderate protein-calorie malnutrition (HCC)    Tobacco abuse    Shortness of breath       Meds:     sodium chloride flush  5-40 mL IntraVENous 2 times per day    enoxaparin  40 mg SubCUTAneous Daily    levothyroxine  137 mcg Oral Daily    rosuvastatin  20 mg Oral Nightly    Arformoterol Tartrate  15 mcg Nebulization BID    budesonide  250 mcg Nebulization BID    pantoprazole  40 mg Oral QAM AC    miconazole   Topical BID    guaiFENesin  400 mg Oral TID    potassium bicarb-citric acid  20 mEq Oral Daily        sodium chloride      dextrose         Meds prn:     sodium chloride flush, sodium chloride, ondansetron **OR** ondansetron, polyethylene glycol, acetaminophen **OR** acetaminophen, glucose, dextrose bolus **OR** dextrose bolus, glucagon (rDNA), dextrose, clonazePAM, hydrOXYzine pamoate, ipratropium-albuterol    Meds prior to admission:     No current facility-administered medications on file prior to encounter. Current Outpatient Medications on File Prior to Encounter   Medication Sig Dispense Refill    hydrOXYzine pamoate (VISTARIL) 25 MG capsule Take 1 capsule by mouth every 8 hours as needed for Itching or Anxiety 28 capsule 0    rosuvastatin (CRESTOR) 20 MG tablet Take 1 tablet by mouth nightly 30 tablet 3    losartan (COZAAR) 25 MG tablet Take 1 tablet by mouth daily 30 tablet 3    metoprolol succinate (TOPROL XL) 50 MG extended release tablet Take 1 tablet by mouth daily 30 tablet 3    amLODIPine (NORVASC) 5 MG tablet Take 1 tablet by mouth daily 30 tablet 3    guaiFENesin 400 MG tablet Take 1 tablet by mouth in the morning, at noon, and at bedtime 56 tablet 0    vitamin B-6 (B-6) 50 MG tablet Take 1 tablet by mouth daily 30 tablet 3    sodium chloride 1 g tablet Take 2 tablets by mouth 3 times daily (with meals) 90 tablet 3    fluticasone-vilanterol (BREO ELLIPTA) 100-25 MCG/INH AEPB inhaler Inhale 1 puff into the lungs daily      albuterol (PROVENTIL) (2.5 MG/3ML) 0.083% nebulizer solution Take 2.5 mg by nebulization 3 times daily as needed      fluticasone (FLONASE) 50 MCG/ACT nasal spray 1 spray by Nasal route daily      montelukast (SINGULAIR) 10 MG tablet Take 10 mg by mouth every morning      albuterol sulfate  (90 Base) MCG/ACT inhaler Inhale 2 puffs into the lungs every 6 hours as needed for Wheezing 1 each 3    potassium chloride (KLOR-CON M) 20 MEQ extended release tablet Take 1 tablet by mouth daily 60 tablet 3    omeprazole (PRILOSEC) 20 MG delayed release capsule Take 20 mg by mouth daily      clonazePAM (KLONOPIN) 1 MG tablet Take 0.5 mg by mouth daily as needed for Anxiety. levothyroxine (SYNTHROID) 137 MCG tablet Take 137 mcg by mouth Daily          Allergies:    Patient has no known allergies. Social History:     reports that she has been smoking cigarettes.  She started smoking about 50 years ago. She has a 50.00 pack-year smoking history. She has never used smokeless tobacco. She reports that she does not drink alcohol and does not use drugs. Family History:     History reviewed. No pertinent family history.     ROS:     General: no fever, chills   Heent: no nasal congestion, sore throat   Resp: sob  Cardiac: no cp , le edema, palpitations  Gi: no nausea, vomiting, melena, abd pain, hematemesis  Gu: no hematuria, dysuria   Neruo: no numbness, weakness, headache, blurry vision   Endocrine:  no h/o dm  Derm: no rash , petechia  Heme: no epistaxis, bruising  All other sx negative     Physical Exam:      Patient Vitals for the past 24 hrs:   BP Temp Temp src Pulse Resp SpO2 Height Weight   10/10/22 0900 -- 98.2 °F (36.8 °C) -- -- -- -- -- --   10/10/22 0859 -- -- -- 87 21 98 % -- --   10/10/22 0858 -- -- -- -- 20 -- -- --   10/10/22 0700 130/61 -- -- 82 20 99 % -- --   10/10/22 0600 (!) 100/59 -- -- 85 19 93 % -- 152 lb 1.9 oz (69 kg)   10/10/22 0500 135/64 -- -- 84 20 94 % -- --   10/10/22 0400 (!) 121/34 -- -- 76 15 91 % -- --   10/10/22 0325 -- -- -- -- -- -- 5' 2\" (1.575 m) 152 lb 1.9 oz (69 kg)   10/10/22 0300 131/71 (!) 96.6 °F (35.9 °C) Temporal 77 22 98 % -- --   10/10/22 0158 132/77 97.7 °F (36.5 °C) Oral 77 18 94 % -- --   10/10/22 0100 -- -- -- 84 22 93 % -- --   10/10/22 0000 -- -- -- 83 22 93 % -- --   10/09/22 2300 -- -- -- 81 16 98 % -- --   10/09/22 2200 (!) 113/94 -- -- 83 19 99 % -- --   10/09/22 2145 -- -- -- 81 17 100 % -- --   10/09/22 2130 -- -- -- 82 15 100 % -- --   10/09/22 2115 -- -- -- 80 24 100 % -- --   10/09/22 2100 (!) 158/69 -- -- 82 22 99 % -- --   10/09/22 2045 -- -- -- 85 21 (!) 87 % -- --   10/09/22 2030 (!) 146/71 -- -- 78 24 94 % -- --   10/09/22 2015 -- -- -- 83 20 98 % -- --   10/09/22 2000 -- -- -- 79 17 97 % -- --   10/09/22 1945 -- -- -- 76 18 99 % -- --   10/09/22 1930 -- -- -- 76 18 100 % -- --   10/09/22 1915 -- -- -- 77 15 100 % -- --   10/09/22 1900 -- -- -- 77 17 100 % -- --   10/09/22 1451 121/85 97.5 °F (36.4 °C) Axillary 77 24 100 % -- --   10/09/22 1444 -- -- -- -- 23 -- -- --         Intake/Output Summary (Last 24 hours) at 10/10/2022 1115  Last data filed at 10/10/2022 0911  Gross per 24 hour   Intake 220 ml   Output 0 ml   Net 220 ml       Constitutional: Patient in no acute distress   Head: normocephalic, atraumatic   Neck: supple, no jvd  Cardiovascular: regular rate and rhythm, no murmurs, gallops, or rubs   Respiratory: Clear, no rales, rhochi, or wheezes,   Gastrointestinal: soft, nontender, nondistended, no hepatosplenomegaly  Ext: no edema  Neuro: aaox3  Skin: dry, no rash   Back: nontender    Data:    Recent Labs     10/09/22  1449 10/10/22  0415   WBC 11.7* 11.8*   HGB 11.9 11.2*   HCT 32.5* 31.1*   MCV 81.5 82.3    261       Recent Labs     10/09/22  1600 10/10/22  0125 10/10/22  0415 10/10/22  0815   * 116* 116* 116*   K 4.4 4.2 3.7  --    CL 80* 82* 83*  --    CO2 23 25 22  --    CREATININE 0.6 0.7 0.6  --    BUN 9 9 8  --    LABGLOM >60 >60 >60  --    GLUCOSE 97 60* 70*  --    CALCIUM 8.2* 8.7 8.2*  --        Vit D, 25-Hydroxy   Date Value Ref Range Status   09/23/2022 54 30 - 100 ng/mL Final     Comment:     <20 ng/mL. ........... Dagoberto Emery Deficient  20-30 ng/mL. ......... Dagoberto Emery Insufficient   ng/mL. ........ Dagoberto Emery Sufficient  >100 ng/mL. .......... Dagoberto Emery Toxic         PTH   Date Value Ref Range Status   09/23/2022 40 15 - 65 pg/mL Final       Recent Labs     10/09/22  1600   ALT 17   AST 18   ALKPHOS 109*   BILITOT 0.4       Recent Labs     10/09/22  1600   LABALBU 3.7       No results found for: FERRITIN, IRON, TIBC    Vitamin B-12   Date Value Ref Range Status   09/21/2022 445 211 - 946 pg/mL Final       Folate   Date Value Ref Range Status   09/21/2022 10.4 4.8 - 24.2 ng/mL Final         Lab Results   Component Value Date/Time    COLORU Yellow 10/09/2022 03:59 PM    NITRU Negative 10/09/2022 03:59 PM    GLUCOSEU Negative 10/09/2022 03:59 PM    Morrie Chiang Negative 10/09/2022 03:59 PM    UROBILINOGEN 0.2 10/09/2022 03:59 PM    BILIRUBINUR Negative 10/09/2022 03:59 PM       Lab Results   Component Value Date/Time    ADAM 464 10/09/2022 03:59 PM       No components found for: URIC    No results found for: LIPIDPAN      Assessment and Plan:    Hyponatremia  Na 111>116 without tx  3% saline this am  Check na q 3  Send urine lytes  H/o siadh with lung cancer  Fluid restrict 1 L  Start na cl tabs    2. Htn  Follow on her outpt coar toprol norvasc    3.sob  Cta neg for PE  Has luung cancer>liver>?bone  Port placed 9/2022  Heme onc to see    Roverto Rose.  Swati Judge MD

## 2022-10-10 NOTE — HOME CARE
Patient is active with [facility] for skilled nursing. Will need home care orders at discharge.  Anne Marie Bernal lpn

## 2022-10-10 NOTE — PROGRESS NOTES
Subjective: The patient is awake and alert, seems to have appropriate mentation this afternoon  Hard of hearing    Breathing feels the same  Denies chest pain, angina, abdominal discomfort. No nausea or vomiting. Tolerating diet. Objective:    /61   Pulse 87   Temp 98.2 °F (36.8 °C)   Resp 21   Ht 5' 2\" (1.575 m)   Wt 152 lb 1.9 oz (69 kg)   SpO2 98%   BMI 27.82 kg/m²     General: NAD  HEENT: No thrush or mucositis, EOMI  Heart:  Sinus tachycardia on monitor, no murmurs, gallops, or rubs. Lungs:  Decreased BS bilaterally with significant wheezing and rhonchi.  No rales, wet cough  Abd: BS present, nontender, nondistended, no masses  Extrem:  No clubbing, cyanosis, or edema  Lymphatics: No palpable adenopathy in cervical and supraclavicular regions  Skin: Intact, no petechia or purpura    CBC with Differential:    Lab Results   Component Value Date/Time    WBC 11.8 10/10/2022 04:15 AM    RBC 3.78 10/10/2022 04:15 AM    HGB 11.2 10/10/2022 04:15 AM    HCT 31.1 10/10/2022 04:15 AM     10/10/2022 04:15 AM    MCV 82.3 10/10/2022 04:15 AM    MCH 29.6 10/10/2022 04:15 AM    MCHC 36.0 10/10/2022 04:15 AM    RDW 13.5 10/10/2022 04:15 AM    METASPCT 0.9 09/23/2022 04:05 AM    LYMPHOPCT 9.0 10/10/2022 04:15 AM    MONOPCT 9.8 10/10/2022 04:15 AM    MYELOPCT 0.9 09/21/2022 05:00 AM    BASOPCT 0.4 10/10/2022 04:15 AM    MONOSABS 1.15 10/10/2022 04:15 AM    LYMPHSABS 1.06 10/10/2022 04:15 AM    EOSABS 0.41 10/10/2022 04:15 AM    BASOSABS 0.05 10/10/2022 04:15 AM     CMP:    Lab Results   Component Value Date/Time     10/10/2022 08:15 AM    K 3.7 10/10/2022 04:15 AM    K 4.4 10/09/2022 04:00 PM    CL 83 10/10/2022 04:15 AM    CO2 22 10/10/2022 04:15 AM    BUN 8 10/10/2022 04:15 AM    CREATININE 0.6 10/10/2022 04:15 AM    GFRAA >60 10/10/2022 04:15 AM    LABGLOM >60 10/10/2022 04:15 AM    GLUCOSE 70 10/10/2022 04:15 AM    PROT 5.9 10/09/2022 04:00 PM    LABALBU 3.7 10/09/2022 04:00 PM    CALCIUM 8.2 10/10/2022 04:15 AM    BILITOT 0.4 10/09/2022 04:00 PM    ALKPHOS 109 10/09/2022 04:00 PM    AST 18 10/09/2022 04:00 PM    ALT 17 10/09/2022 04:00 PM          Current Facility-Administered Medications:     sodium chloride flush 0.9 % injection 5-40 mL, 5-40 mL, IntraVENous, 2 times per day, Norma Carlos, DO, 10 mL at 10/10/22 0901    sodium chloride flush 0.9 % injection 5-40 mL, 5-40 mL, IntraVENous, PRN, Norma Carlos, DO    0.9 % sodium chloride infusion, , IntraVENous, PRN, Norma Abdi-Don, DO    enoxaparin (LOVENOX) injection 40 mg, 40 mg, SubCUTAneous, Daily, Norma Gonzalez, DO, 40 mg at 10/10/22 0902    ondansetron (ZOFRAN-ODT) disintegrating tablet 4 mg, 4 mg, Oral, Q8H PRN **OR** ondansetron (ZOFRAN) injection 4 mg, 4 mg, IntraVENous, Q6H PRN, Norma Gonzalez, DO    polyethylene glycol (GLYCOLAX) packet 17 g, 17 g, Oral, Daily PRN, Norma Gonzalez, DO    acetaminophen (TYLENOL) tablet 650 mg, 650 mg, Oral, Q6H PRN **OR** acetaminophen (TYLENOL) suppository 650 mg, 650 mg, Rectal, Q6H PRN, Norma Gonzalez, DO    glucose chewable tablet 16 g, 4 tablet, Oral, PRN, Norma Patton-Don, DO    dextrose bolus 10% 125 mL, 125 mL, IntraVENous, PRN **OR** dextrose bolus 10% 250 mL, 250 mL, IntraVENous, PRN, Norma Gonzalez, DO    glucagon (rDNA) injection 1 mg, 1 mg, SubCUTAneous, PRN, Norma Abdi-Don, DO    dextrose 10 % infusion, , IntraVENous, Continuous PRN, Norma Gonzalez, DO    clonazePAM (KLONOPIN) tablet 0.5 mg, 0.5 mg, Oral, Daily PRN, Norma Gonzalez, , 0.5 mg at 10/10/22 0702    hydrOXYzine pamoate (VISTARIL) capsule 25 mg, 25 mg, Oral, Q8H PRN, Norma Gonzalez DO    levothyroxine (SYNTHROID) tablet 137 mcg, 137 mcg, Oral, Daily, Norma Gonzalez DO, 137 mcg at 10/10/22 0650    rosuvastatin (CRESTOR) tablet 20 mg, 20 mg, Oral, Nightly, Norma Gonzalez DO    Arformoterol Tartrate (BROVANA) nebulizer solution 15 mcg, 15 mcg, Nebulization, BID, Norma Abdi-Ochoa, DO, 15 mcg at 10/10/22 0905    budesonide (PULMICORT) nebulizer suspension 250 mcg, 250 mcg, Nebulization, BID, Norma Abdi-Ochoa, DO, 250 mcg at 10/10/22 0905    ipratropium-albuterol (DUONEB) nebulizer solution 1 ampule, 1 ampule, Inhalation, Q4H PRN, Norma Abdi-Ochoa, DO    pantoprazole (PROTONIX) tablet 40 mg, 40 mg, Oral, QAM AC, Norma Abdi-Ochoa, DO, 40 mg at 10/10/22 0650    miconazole (MICOTIN) 2 % powder, , Topical, BID, Barbara Young MD, Given at 10/10/22 1130    guaiFENesin tablet 400 mg, 400 mg, Oral, TID, Barbara Young MD, 400 mg at 10/10/22 0900    potassium bicarb-citric acid (EFFER-K) effervescent tablet 20 mEq, 20 mEq, Oral, Daily, Norma Abdi-Ochoa, DO, 20 mEq at 10/10/22 0900    CTA PULMONARY W CONTRAST   Final Result   No evidence of pulmonary embolism. Large conglomerate of mediastinal and right hilar lymphadenopathy. There is   near complete occlusion of the right mainstem bronchus with atelectasis of   the right middle lobe      Numerous low-attenuation lesions in the liver likely representing metastatic   disease. Old ununited manubrial fracture. XR CHEST PORTABLE   Final Result   No acute process. Assessment:    Principal Problem:    Hyponatremia  Active Problems:    Primary hypertension    Hypothyroidism    Tobacco abuse    Shortness of breath  Resolved Problems:    * No resolved hospital problems. *    72-year-old female with extended stage small cell lung carcinoma of R hilum with biopsy proven liver metastasis dx 9/6/22. Also has CHF and COPD. No evidence of metastasis to the bones on bone scan 9/22/22. Sp Mediport placement 9/23/22. 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   Follow-up with Dr[facility] Claudio Brunson at the [facility] after discharge.      Electronically signed by ZAFAR Kumar on 10/10/2022 at 12:00 PM

## 2022-10-10 NOTE — ACP (ADVANCE CARE PLANNING)
Advance Care Planning   Healthcare Decision Maker:    Primary Decision Maker: Annalise Escamilla - [phone]    Secondary Decision Maker: Tunde Clark - Niece/Nephew - [phone]    Supplemental (Other) Decision Maker: Jeana Yoo - Other - [phone]    Click here to complete Healthcare Decision Makers including selection of the Healthcare Decision Maker Relationship (ie \"Primary\").

## 2022-10-10 NOTE — PLAN OF CARE

## 2022-10-10 NOTE — CONSULTS
[facility]  Internal Medicine Rotation  Consult  MICU    Patient:  Shira Tinoco 81 y.o. female MRN: [id number]     Date of Service: 10/10/2022    Hospital Day: 2      Chief complaint: Shortness of breath and dizziness, malaise, and hyponatremia. History of Present Illness   The patient is a 81 y.o. female with a history of chronic bronchitis, hypertension, hyperlipidemia, hypothyroidism, diastolic heart failure, GERD, depression, a 50 pack year smoking history, small cell carcinoma of the lung, mediport placement (left) on 9/23/22, and multiple recent readmissions to the hospital for hyponatremia. Frustrated, she reports that this is her third admission and she did not understand why her cancer made her hyponatremic. She notes that her treatment for her cancer has been delayed due to her recurrent admissions. She is asking for Mucinex to \"help her breathing\". She does not recall her home medications. ED Course: Patient arrived in the ED with dizziness and shortness of breath and was assessed via a personal history, physical examination, multiple re-evaluations, cardiac monitoring, continuous pulse ox, complex medical decision making, and emergency management. Labs revealed hyponatremia (111), WBC (11.7), and pCO2 (33.5). Pulmonary CTA revealed right hilar lymphadenopathy, mediastinal lymphadenopathy, and complete occlusion of the right mainstem bronchus. Given her history, nephrology was consulted and she was transferred to the ICU during the night. ED Meds: Patient was given ipratropium-albuterol nebulizer solution 3 ampule on 10/9/22 at 1444. ED Fluids: Patient was given no fluids.     Past Medical History:      Diagnosis Date    Bronchitis     Hypertension     Thyroid disease        Past Surgical History:        Procedure Laterality Date    CT NEEDLE BIOPSY LIVER PERCUTANEOUS  9/6/2022    CT NEEDLE BIOPSY LIVER PERCUTANEOUS 9/6/2022 Gillian Tello MD [facility]    OTHER SURGICAL HISTORY      states had something done right neck area per dr jaeger, might have been an abcess    PORT SURGERY Left 9/23/2022    MEDI-PORT INSERTION performed by Riri Ball MD at [address] [place] [address]         Medications Prior to Admission:    Prior to Admission medications    Medication Sig Start Date End Date Taking?  Authorizing Provider   hydrOXYzine pamoate (VISTARIL) 25 MG capsule Take 1 capsule by mouth every 8 hours as needed for Itching or Anxiety 9/26/22 10/10/22  [facility], APRN - CNP   rosuvastatin (CRESTOR) 20 MG tablet Take 1 tablet by mouth nightly 9/26/22   [facility], APRN - CNP   losartan (COZAAR) 25 MG tablet Take 1 tablet by mouth daily 9/27/22   [facility], APRN - CNP   metoprolol succinate (TOPROL XL) 50 MG extended release tablet Take 1 tablet by mouth daily 9/27/22   [facility], APRN - CNP   amLODIPine (NORVASC) 5 MG tablet Take 1 tablet by mouth daily 9/27/22   [facility], APRN - CNP   guaiFENesin 400 MG tablet Take 1 tablet by mouth in the morning, at noon, and at bedtime 9/26/22   [facility], APRN - CNP   vitamin B-6 (B-6) 50 MG tablet Take 1 tablet by mouth daily 9/27/22   [facility], APRN - CNP   sodium chloride 1 g tablet Take 2 tablets by mouth 3 times daily (with meals) 9/26/22   [facility], APRN - CNP   fluticasone-vilanterol (BREO ELLIPTA) 100-25 MCG/INH AEPB inhaler Inhale 1 puff into the lungs daily    Historical Provider, MD   albuterol (PROVENTIL) (2.5 MG/3ML) 0.083% nebulizer solution Take 2.5 mg by nebulization 3 times daily as needed 10/1/21   Historical Provider, MD   fluticasone (FLONASE) 50 MCG/ACT nasal spray 1 spray by Nasal route daily 9/17/21   Historical Provider, MD   montelukast (SINGULAIR) 10 MG tablet Take 10 mg by mouth every morning 10/22/21   Historical Provider, MD   albuterol sulfate  (90 Base) MCG/ACT inhaler Inhale 2 puffs into the lungs every 6 hours as needed for Wheezing 11/11/21 Sahra Lim MD   potassium chloride (KLOR-CON M) 20 MEQ extended release tablet Take 1 tablet by mouth daily 21   Amari Encarnacion MD   omeprazole (PRILOSEC) 20 MG delayed release capsule Take 20 mg by mouth daily    Historical Provider, MD   clonazePAM (KLONOPIN) 1 MG tablet Take 0.5 mg by mouth daily as needed for Anxiety. Historical Provider, MD   levothyroxine (SYNTHROID) 137 MCG tablet Take 137 mcg by mouth Daily     Historical Provider, MD       Allergies:  Patient has no known allergies. Social History:   The patient reports feeling depressed for 10 years ever since her son . She has been estranged from her brother. She maintains close relationships with her brother and sister in law, but she stated that Nancy Luciano are busy with their lives and can't help all the time. \" The patient prematurely ended the social history after these comments. TOBACCO:   reports that she has been smoking cigarettes. She started smoking about 50 years ago. She has a 50.00 pack-year smoking history. She has never used smokeless tobacco.  ETOH:   reports no history of alcohol use. OCCUPATION: Retired     Family History:   History reviewed. No pertinent family history. REVIEW OF SYSTEMS:  Review of Systems   Constitutional:  Positive for activity change, fatigue and unexpected weight change (likely due to cancer). Negative for appetite change, chills, diaphoresis and fever. HENT:  Positive for congestion, drooling and hearing loss. Negative for dental problem, ear discharge, ear pain, facial swelling, mouth sores, nosebleeds, postnasal drip, rhinorrhea, sinus pressure, sinus pain, sneezing, sore throat, tinnitus, trouble swallowing and voice change. Eyes:  Negative for photophobia, pain, discharge, redness, itching and visual disturbance. Respiratory:  Positive for cough, shortness of breath, wheezing and stridor. Negative for apnea, choking and chest tightness.     Cardiovascular:  Negative for chest pain, palpitations and leg swelling. Gastrointestinal:  Negative for abdominal distention, abdominal pain, anal bleeding, blood in stool, constipation, diarrhea, nausea, rectal pain and vomiting. Endocrine: Negative for cold intolerance, heat intolerance, polydipsia, polyphagia and polyuria. Genitourinary:  Negative for difficulty urinating, dysuria, enuresis, flank pain, frequency, urgency, vaginal bleeding, vaginal discharge and vaginal pain. Musculoskeletal:  Positive for gait problem. Negative for arthralgias, back pain, joint swelling, myalgias, neck pain and neck stiffness. Skin:  Negative for color change, pallor, rash and wound. Allergic/Immunologic: Negative for environmental allergies, food allergies and immunocompromised state. Neurological:  Positive for dizziness. Negative for tremors, seizures, syncope, facial asymmetry, speech difficulty, weakness, light-headedness, numbness and headaches. Hematological:  Does not bruise/bleed easily. Psychiatric/Behavioral:  Positive for dysphoric mood and sleep disturbance. Negative for agitation, behavioral problems, confusion, hallucinations and self-injury. The patient is nervous/anxious. The patient is not hyperactive. Physical Exam   Vitals: BP (!) 106/58   Pulse 81   Temp 98.2 °F (36.8 °C)   Resp 17   Ht 5' 2\" (1.575 m)   Wt 152 lb 1.9 oz (69 kg)   SpO2 92%   BMI 27.82 kg/m²   Physical Exam  Vitals reviewed. Constitutional:       Appearance: She is ill-appearing. She is not toxic-appearing or diaphoretic. HENT:      Head: Normocephalic and atraumatic. Right Ear: Tympanic membrane, ear canal and external ear normal. There is impacted cerumen. Left Ear: Tympanic membrane, ear canal and external ear normal. There is impacted cerumen. Nose: Congestion present. No rhinorrhea. Mouth/Throat:      Mouth: Mucous membranes are dry. Pharynx: Oropharynx is clear.  No oropharyngeal exudate or posterior oropharyngeal erythema. Eyes:      General: No scleral icterus. Right eye: No discharge. Left eye: No discharge. Extraocular Movements: Extraocular movements intact. Conjunctiva/sclera: Conjunctivae normal.      Pupils: Pupils are equal, round, and reactive to light. Cardiovascular:      Rate and Rhythm: Normal rate and regular rhythm. Pulses: Normal pulses. Heart sounds: No murmur heard. No friction rub. No gallop. Pulmonary:      Effort: Respiratory distress present. Breath sounds: No stridor. Rhonchi and rales present. No wheezing. Chest:      Chest wall: No tenderness. Abdominal:      General: Abdomen is flat. Bowel sounds are normal. There is no distension. Palpations: Abdomen is soft. There is no mass. Tenderness: There is no abdominal tenderness. There is no right CVA tenderness, left CVA tenderness, guarding or rebound. Hernia: No hernia is present. Musculoskeletal:         General: No deformity or signs of injury. Right lower leg: No edema. Left lower leg: No edema. Comments: Limited RoM due to weakness. Skin:     General: Skin is warm and dry. Capillary Refill: Capillary refill takes less than 2 seconds. Coloration: Skin is not jaundiced or pale. Findings: No bruising, erythema, lesion or rash. Neurological:      General: No focal deficit present. Mental Status: She is alert and oriented to person, place, and time. Cranial Nerves: No cranial nerve deficit. Sensory: No sensory deficit. Motor: No weakness. Coordination: Coordination normal.      Gait: Gait normal.      Deep Tendon Reflexes: Reflexes normal.   Psychiatric:      Comments: Patient is audibly and visibly depressed. Says she's been depressed for ten years since her son , and her current medical state has exacerbated her depression. She seems lethargic and her thought content is currently pessimistic.        Lines     site day Art line   None    TLC None    PICC None    Hemoaccess None    Oxygen:    nasal canula at 3L/min (FiO2:60%)    ABG:     Lab Results   Component Value Date/Time    PH 7.437 10/09/2022 04:27 PM    PCO2 33.5 10/09/2022 04:27 PM    PO2 171.0 10/09/2022 04:27 PM    HCO3 22.1 10/09/2022 04:27 PM    BE -1.5 10/09/2022 04:27 PM    THB 12.3 10/09/2022 04:27 PM    O2SAT 99.3 10/09/2022 04:27 PM     Labs and Imaging Studies   Basic Labs  CBC:   Lab Results   Component Value Date/Time    WBC 11.8 10/10/2022 04:15 AM    RBC 3.78 10/10/2022 04:15 AM    HGB 11.2 10/10/2022 04:15 AM    HCT 31.1 10/10/2022 04:15 AM    MCV 82.3 10/10/2022 04:15 AM    RDW 13.5 10/10/2022 04:15 AM     10/10/2022 04:15 AM     BMP:    Lab Results   Component Value Date/Time     10/10/2022 02:25 PM    K 4.0 10/10/2022 11:03 AM    K 4.4 10/09/2022 04:00 PM    CL 85 10/10/2022 11:03 AM    CO2 24 10/10/2022 11:03 AM    BUN 9 10/10/2022 11:03 AM     CMP:  Lab Results   Component Value Date/Time     10/10/2022 02:25 PM    K 4.0 10/10/2022 11:03 AM    K 4.4 10/09/2022 04:00 PM    CL 85 10/10/2022 11:03 AM    CO2 24 10/10/2022 11:03 AM    BUN 9 10/10/2022 11:03 AM    PROT 5.9 10/09/2022 04:00 PM     TSH:    Lab Results   Component Value Date/Time    TSH 3.970 09/21/2022 12:32 AM       Imaging Studies:     Echo Complete    Result Date: 9/22/2022  Transthoracic Echocardiography Report (TTE)  Demographics   Patient Name    Andrea Bautista Gender            Female   Medical Record  [id number]    Room Number       7157  Number   Account #       [de-identified]   Procedure Date    09/22/2022   Corporate ID                Maribel Sánchez MD                              Physician   Accession       [id number]  Referring  Number                      Physician   Date of Birth   1950  Sonographer       Rossy Solares RDCS   Age             81 year(s)  Interpreting      [id number]                              Physician         Physician Cardiology                                                Hafsa Webster MD                               Any Other  Procedure Type of Study   TTE procedure  Procedure Date Date: 09/22/2022 Start: 02:53 PM Study Location: Portable Technical Quality: Adequate visualization Indications:Pre-chemotherapy. Patient Status: Routine Height: 62 inches Weight: 158 pounds BSA: 1.73 m^2 BMI: 28.9 kg/m^2 BP: 162/89 mmHg  Findings   Left Ventricle  Mildly dilated left ventricle. Ejection fraction is visually estimated at 60-65%. 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 Severe concentric left ventricular hypertrophy. Indeterminate diastolic function. Global longitudinal peak strain -12.5% (low due basal aneurysm)   Right Ventricle  Normal right ventricular size and function. TAPSE 25 mm. Left Atrium  Left atrium is of normal size. Right Atrium  Normal right atrium size. Mitral Valve  Mild mitral annular calcification. No evidence of mitral valve stenosis. Mild mitral regurgitation is present. Tricuspid Valve  The tricuspid valve appears structurally normal.  No evidence of tricuspid regurgitation. Unable to estimate PA systolic pressure. Aortic Valve  The aortic valve is trileaflet. No hemodynamically significant aortic stenosis is present. No evidence of aortic valve regurgitation. Pulmonic Valve  The pulmonic valve was not well visualized. No evidence of any pulmonic regurgitation. No evidence of pulmonic valve stenosis. Pericardial Effusion  No evidence for hemodynamically significant pericardial effusion. Pleural Effusion  No evidence of pleural effusion. Aorta  Aortic root dimension within normal limits. Miscellaneous  The inferior vena cava diameter is normal with normal respiratory  variation. Conclusions   Summary  Mildly dilated left ventricle. Ejection fraction is visually estimated at 60-65%. 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 Severe concentric left ventricular hypertrophy. Indeterminate diastolic function. Global longitudinal peak strain -12.5% (low due basal aneurysm)  Normal right ventricular size and function. TAPSE 25 mm. Mild mitral regurgitation is present. No hemodynamically significant aortic stenosis is present. Unable to estimate PA systolic pressure. No evidence for hemodynamically significant pericardial effusion. Consider cardiac MRI further evaluation of LV basal aneurysm.    Signature   ----------------------------------------------------------------  Electronically signed by Hafsa Webster MD(Interpreting  physician) on 09/22/2022 06:37 PM  ----------------------------------------------------------------  M-Mode/2D Measurements & Calculations   LV Diastolic    LV Systolic Dimension: 4.1   AV Cusp Separation: 1.6 cmLA  Dimension: 5.3  cm                           Dimension: 3.2 cmAO Root  cm              LV Volume Diastolic: 675.7   Dimension: 2.9 cm  LV FS:22.6 %    ml  LV PW           LV Volume Systolic: 34.7 ml  Diastolic: 1.4  LV EDV/LV EDV Index: 132.7  cm              ml/77 ml/m^2LV ESV/LV ESV    RV Diastolic Dimension: 1.8  LV PW Systolic: Index: 41.7 PU/33UM/ m^2     cm  1.4 cm          EF Calculated: 43.1 %  Septum          LV Mass Index: 140 l/min*m^2 LA/Aorta: 1.1  Diastolic: 0.9  LV Length: 8.1 cm  cm                                           LA volume/Index: 26.4 ml  Septum          LVOT: 1.9 cm                 /28.37GH/X^1  Systolic: 1.7                                RA Area: 11.3 cm^2  cm                                               IVC Expiration: 1.7 cm  LV Mass: 241.96  g  Doppler Measurements & Calculations   MV Peak E-Wave: 1.37 AV Peak Velocity: 1.73 LVOT Peak Velocity: 1.27 m/s  m/s                  m/s                    LVOT Mean Velocity: 0.85 m/s                       AV Peak Gradient:      LVOT Peak Gradient: 6.4  MV Peak Gradient:    11.92 mmHg             mmHgLVOT Mean Gradient: 3.4 7.5 mmHg             AV Mean Velocity: 1.35 mmHg  MV Mean Gradient:    m/s  3.7 mmHg             AV Mean Gradient: 7.8  MV Mean Velocity:    mmHg  0.85 m/s             AV VTI: 32.9 cm  MV Deceleration      AV Area  Time: 176.3 msec     (Continuity):1.96 cm^2 PV Peak Velocity: 1.11 m/s  MV P1/2t: 45.6 msec                         PV Peak Gradient: 4.93 mmHg  MVA by PHT:4.82 cm^2 LVOT VTI: 22.7 cm      PV Mean Velocity: 0.75 m/s  MV Area              IVRT: 147.6 msec       PV Mean Gradient: 2.6 mmHg  (continuity): 2.5  cm^2  MV E' Septal  Velocity: 0.09 m/s  MV E' Lateral  Velocity: 13 m/s  [email or web address] [id number]    XR SHOULDER LEFT (MIN 2 VIEWS)    Result Date: 9/23/2022  EXAMINATION: THREE XRAY VIEWS OF THE LEFT SHOULDER 9/23/2022 2:13 pm COMPARISON: None. HISTORY: ORDERING SYSTEM PROVIDED HISTORY: pain TECHNOLOGIST PROVIDED HISTORY: Reason for exam:->pain What reading provider will be dictating this exam?->CRC FINDINGS: Impacted fracture involving the humeral neck. Humeral head maintains articulation with the glenoid. Acromioclavicular joint is intact. Impacted left humeral neck fracture. XR CHEST PORTABLE    Result Date: 10/9/2022  EXAMINATION: ONE XRAY VIEW OF THE CHEST 10/9/2022 4:04 pm COMPARISON: 09/24/2022 HISTORY: ORDERING SYSTEM PROVIDED HISTORY: shortness of breath TECHNOLOGIST PROVIDED HISTORY: Reason for exam:->shortness of breath What reading provider will be dictating this exam?->CRC FINDINGS: The lungs are without acute focal process. There is no effusion or pneumothorax. The cardiomediastinal silhouette is without acute process. The osseous structures are without acute process. Left-sided port a catheter tip in the right atrium 4.0 cm from the caval atrial junction. Scoliosis. No acute process.      XR CHEST PORTABLE    Result Date: 9/24/2022  EXAMINATION: ONE XRAY VIEW OF THE CHEST 9/24/2022 10:52 pm COMPARISON: Chest series from September 22, 2022 HISTORY: ORDERING SYSTEM PROVIDED HISTORY: post L IJ port placement TECHNOLOGIST PROVIDED HISTORY: Reason for exam:->post L IJ port placement What reading provider will be dictating this exam?->CRC FINDINGS: Left anterior chest wall cardiac support device with distal tip projecting in the vicinity of the right atrium. Background coarse interstitial markings. Mild lung hyperinflation. No new airspace disease, pleural effusions, or pneumothoraces. Atherosclerotic disease in the thoracic aorta. Cardiac silhouette is prominent. Normal pulmonary vascularity. Osseous mineralization is decreased. No acute osseous or soft tissue findings. 1.  Left chest wall MediPort without complicating features. No obvious pneumothorax. 2.  Stable coarsened interstitial markings in lung hyperinflation. Stable atherosclerotic disease. No acute cardiopulmonary pathology. XR CHEST PORTABLE    Result Date: 9/22/2022  EXAMINATION: ONE XRAY VIEW OF THE CHEST 9/22/2022 2:52 am COMPARISON: CT chest 20 September 2022 HISTORY: ORDERING SYSTEM PROVIDED HISTORY: SOB, crackles on physical exam; assess for volume overload TECHNOLOGIST PROVIDED HISTORY: Reason for exam:->SOB, crackles on physical exam; assess for volume overload What reading provider will be dictating this exam?->CRC FINDINGS: Single AP upright portable chest demonstrates S shaped scoliotic curvature. The lungs are mildly hyperexpanded with increased markings at the lung bases with mild blunting of the left costophrenic angle. Persistent fullness in the right mediastinal region. There is no evidence of a pneumothorax. The upper abdomen appears unremarkable. Findings consistent with infiltrative process involving the right mediastinum as noted on the previous CT chest.  No evidence of a pneumothorax.      XR CHEST PORTABLE    Result Date: 9/20/2022  EXAMINATION: ONE XRAY VIEW OF THE CHEST 9/20/2022 9:06 pm COMPARISON: 09/01/2022 HISTORY: ORDERING SYSTEM PROVIDED HISTORY: Shortness of breath TECHNOLOGIST PROVIDED HISTORY: Reason for exam:->Shortness of breath What reading provider will be dictating this exam?->CRC FINDINGS: The lungs are without acute focal process. There is no effusion or pneumothorax. The cardiomediastinal silhouette is without acute process. The osseous structures are without acute process. No acute process. CTA CHEST W CONTRAST    Result Date: 9/21/2022  EXAMINATION: CTA OF THE CHEST 9/20/2022 10:31 pm TECHNIQUE: CTA of the chest was performed after the administration of intravenous contrast.  Multiplanar reformatted images are provided for review. MIP images are provided for review. Automated exposure control, iterative reconstruction, and/or weight based adjustment of the mA/kV was utilized to reduce the radiation dose to as low as reasonably achievable. COMPARISON: September 3, 2022. HISTORY: ORDERING SYSTEM PROVIDED HISTORY: dissection TECHNOLOGIST PROVIDED HISTORY: Reason for exam:->dissection Decision Support Exception - unselect if not a suspected or confirmed emergency medical condition->Emergency Medical Condition (MA) What reading provider will be dictating this exam?->CRC FINDINGS: Pulmonary Arteries: Pulmonary arteries are adequately opacified for evaluation. No evidence of intraluminal filling defect to suggest pulmonary embolism. Main pulmonary arteries are enlarged. Mediastinum: Extensive mediastinal lymphadenopathy and right hilar lymphadenopathy or infiltrative mass with secondary narrowing the right mainstem bronchus. Lungs/pleura: The lungs demonstrate atelectasis in the right middle lobe. No pulmonary edema. No evidence of pleural effusion or pneumothorax. Upper Abdomen: Limited images of the upper abdomen are unremarkable. Soft Tissues/Bones: No acute bone or soft tissue abnormality. No evidence of pulmonary embolism.  Enlarged pulmonary arteries, correlate clinically for pulmonary artery hypertension. Infiltrative right mediastinal mass with lymphadenopathy or a large conglomerate of lymph nodes involving the right aspect of the mediastinum and right hilum with narrowing of the right mainstem bronchus. This finding significantly worsened from the prior examination dated September 3, 2022. Partial atelectasis in the right middle lobe. CTA ABDOMEN PELVIS W CONTRAST    Result Date: 9/21/2022  EXAMINATION: CTA OF THE ABDOMEN AND PELVIS WITH CONTRAST9/20/2022 10:31 pm TECHNIQUE: CTA of the abdomen and pelvis was performed with the administration of intravenous contrast. Multiplanar reformatted images are provided for review. MIP images are provided for review. Automated exposure control, iterative reconstruction, and/or weight based adjustment of the mA/kV was utilized to reduce the radiation dose to as low as reasonably achievable. COMPARISON: None HISTORY: ORDERING SYSTEM PROVIDED HISTORY: dissection TECHNOLOGIST PROVIDED HISTORY: Reason for exam:->dissection Decision Support Exception - unselect if not a suspected or confirmed emergency medical condition->Emergency Medical Condition (MA) What reading provider will be dictating this exam?->CRC FINDINGS: THORACIC STRUCTURES: Unremarkable. LIVER:  The liver is normal in size, contour and attenuation. No focal mass. No intra or extrahepatic bile duct dilation. GALL BLADDER: Unremarkable. SPLEEN:  Unremarkable. PANCREAS:  Unremarkable. ADRENAL GLANDS:  Unremarkable. ESOPHAGUS AND STOMACH:  Unremarkable. BOWEL: Small bowel:  Unremarkable. Large bowel: The colon and rectum are of normal course and caliber. The appendix is within normal limits. There is no intraperitoneal free air or fluid. URINARY/GENITAL TRACT: Kidneys:  The kidneys are unremarkable. .  There is atrophy of the upper pole of left kidney. No renal mass or hydronephrosis bilaterally. Ureters:   The ureters are normal course and caliber. There is no evidence of ureter calculus/calculi. URINARY BLADDER:  The urinary bladder is under distended with a Il catheter. REPRODUCTIVE ORGANS:  Unremarkable. BLOOD VESSELS: Extensive atherosclerotic disease of the abdominal aorta. The main and accessory right renal artery demonstrate mild-to-moderate atherosclerotic stenosis. 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 LYMPH NODES:  No evidence of intraabdominal or intrapelvic lymphadenopathy. ABDOMINAL WALL & SOFT TISSUES:  Unremarkable. OSSEOUS STRUCTURES: No acute osseous lesion. No acute intraabdominal or intrapelvic pathology. Atrophy of the upper pole of the left kidney. Chronic vascular changes as described above. CTA PULMONARY W CONTRAST    Result Date: 10/9/2022  EXAMINATION: CTA OF THE CHEST 10/9/2022 10:23 pm TECHNIQUE: CTA of the chest was performed after the administration of intravenous contrast.  Multiplanar reformatted images are provided for review. MIP images are provided for review. Automated exposure control, iterative reconstruction, and/or weight based adjustment of the mA/kV was utilized to reduce the radiation dose to as low as reasonably achievable. COMPARISON: None. HISTORY: ORDERING SYSTEM PROVIDED HISTORY: r/o PE TECHNOLOGIST PROVIDED HISTORY: Reason for exam:->r/o PE Decision Support Exception - unselect if not a suspected or confirmed emergency medical condition->Emergency Medical Condition (MA) What reading provider will be dictating this exam?->CRC FINDINGS: Pulmonary Arteries: Pulmonary arteries are adequately opacified for evaluation. No evidence of intraluminal filling defect to suggest pulmonary embolism. Main pulmonary artery is normal in caliber. Mediastinum: There is extensive right hilar and mediastinal lymphadenopathy. There is near complete occlusion of the right mainstem bronchus. There is postobstructive atelectasis of the right middle lobe. The heart and pericardium demonstrate no acute abnormality. There is no acute abnormality of the thoracic aorta. Lungs/pleura: The lungs are without acute process. No focal consolidation or pulmonary edema. No evidence of pleural effusion or pneumothorax. Upper Abdomen: Numerous low-attenuation lesions throughout the liver likely representing metastatic disease. . Soft Tissues/Bones: There is a old manubrial fracture, IN     No evidence of pulmonary embolism. Large conglomerate of mediastinal and right hilar lymphadenopathy. There is near complete occlusion of the right mainstem bronchus with atelectasis of the right middle lobe Numerous low-attenuation lesions in the liver likely representing metastatic disease. Old ununited manubrial fracture. NM BONE SCAN WHOLE BODY    Result Date: 9/22/2022  EXAMINATION: WHOLE BODY BONE SCAN  9/22/2022 TECHNIQUE: The patient was injected intravenously with 27 mCi of 99 mTc MDP and scintigraphy of the entire skeleton was performed approximately three hours later. COMPARISON: Patient did not have previous imaging studies for comparison. HISTORY: ORDERING SYSTEM PROVIDED HISTORY: evaluate for  bone metastasis TECHNOLOGIST PROVIDED HISTORY: Reason for exam:->evaluate for  bone metastasis What reading provider will be dictating this exam?->CRC FINDINGS: Chest CT of September 20 has been reviewed. There are displaced healing fractures in the right 5, 6 and 7 ribs. There are areas of focal increased uptake of the radionuclide which correlates with these fractures. There are old fractures in the posterior aspect of the right 8 and 9 ribs. There are areas of focal increased uptake adrenal glide which correlates with these rib fractures. There is a healing fracture of the lower 3rd of the manubrium of the sternum. There is area of more intense uptake adrenal glide which correlate with these fracture.  There is a impacted the subcapital or infra capital fracture of the proximal left humerus which correlates with increased uptake the radionuclide in this area. There is a S shaped scoliotic curvature for the thoracolumbar spine with the mid right thoracic convexity, a more discrete left thoracolumbar convexity, and a mid left lumbar convexity. Increased uptake of the nuclide in the L2-3 level relates with more advanced degenerative disc disease the reactive bone sclerosis at that. The increased uptake adrenal glide across T7-T8 relate with spondylosis more prominent to the left of the midline and the discrete in these uptake in other levels of the lumbar spine also correlate degenerative changes. Some increased activity seen in the knees and in the right shoulder elbow joints and wrist joints correlate with degenerative changes. There are limitation in the evaluation of the lower sacral spine and symphysis of the pubis do residual activity the bladder. .     1. No pattern of metastatic bone disease. 2.  Multiple trauma injuries are seen in the right ribcage, left humerus subcapital region, and degenerative changes are seen in multiple levels in the thoracolumbar spine. RECOMMENDATIONS: Unavailable       EKG: normal sinus rhythm, prolonged QT interval, significant changes from previous tracings. Student's Assessment and Plan     Susan Westbrook is a 81 y.o. female with a history of chronic bronchitis, hypertension, hyperlipidemia, hypothyroidism, diastolic heart failure, GERD, depression, a 50 pack year smoking history, small cell carcinoma of the lung, mediport placement (left) on 9/23/22, and multiple recent readmissions to the hospital for hyponatremia.     Cardio   Hypertension  Currently Normotensive  Hold home medications at this time  History of Hyperlipidemia  Rosuvastatin 20mg daily    GI   GERD  PROTONIX 40mg daily for stay  Renal   Hypotonic euvolemic hyponatremia (SIADH) 2/2 small cell carcinoma of the lung  Sodium measured at 111mmol/L followed by independent improvement to 116  Osmolality measured at 234  Fluid restriction of 1L  Hold 3% saline for now  Order a BMP every 4 hours  Monitor for further improvements  Nephrology consulted  Endocrine   Hypothyroidism  On levothyroxine 137mg tablet PO daily    Heme/Onc   Small Cell Carcinoma of the Lung w/ metastasis to the liver. Recurrent admissions due to severe hyponatremia. Surgical pathology of liver nodule revealed metastatic small cell carcinoma of pulmonary origin  Utilize inpatient inhalers:  Ipratropium-albuterol nebulizer soultion 3 ampule, Arformoterol Tartrate nebulizer solution 15mcg twice daily, Budesonide nebulizer suspension 250mcg twice daily  Follow with pulmonology at [facility]  Consider Heme Onc consult  MSK   Difficulty walking  Consider brining on PT to help patient move about  Patient laments being trapped in bed  May help her mood as well. Psych   Depression  The patient reports being depressed  Her son  ten years ago. Her brother is estranged  She feels isolated from her in-laws  She feels responsible for the development of her cancer  Hx of Tobacco Use  Patient still reports \"wanting a cigarette\" despite knowing the ramifications, and the addiction disgusts her.   Hx of Anxiety  Continue anxiety medications  Vistaril 25mg every 8 hours as needed  Klonopin 0.5mg daily PRN        PT/OT evaluation: Not warranted at this time  DVT prophylaxis/ GI prophylaxis: LOVENOX 40mg PO daily/Pantoprazole 40mg PO daily  Disposition: Admit to MICU    Judy Wise 87 3  Attending physician: Dr. Yasmin Brannon DO

## 2022-10-11 LAB
ANION GAP SERPL CALCULATED.3IONS-SCNC: 10 MMOL/L (ref 7–16)
ANION GAP SERPL CALCULATED.3IONS-SCNC: 10 MMOL/L (ref 7–16)
ANION GAP SERPL CALCULATED.3IONS-SCNC: 12 MMOL/L (ref 7–16)
ANION GAP SERPL CALCULATED.3IONS-SCNC: 7 MMOL/L (ref 7–16)
ANION GAP SERPL CALCULATED.3IONS-SCNC: 9 MMOL/L (ref 7–16)
BASOPHILS ABSOLUTE: 0.12 E9/L (ref 0–0.2)
BASOPHILS RELATIVE PERCENT: 1.1 % (ref 0–2)
BUN BLDV-MCNC: 10 MG/DL (ref 6–23)
BUN BLDV-MCNC: 12 MG/DL (ref 6–23)
BUN BLDV-MCNC: 14 MG/DL (ref 6–23)
CALCIUM SERPL-MCNC: 8.3 MG/DL (ref 8.6–10.2)
CALCIUM SERPL-MCNC: 8.3 MG/DL (ref 8.6–10.2)
CALCIUM SERPL-MCNC: 8.4 MG/DL (ref 8.6–10.2)
CALCIUM SERPL-MCNC: 8.4 MG/DL (ref 8.6–10.2)
CALCIUM SERPL-MCNC: 8.7 MG/DL (ref 8.6–10.2)
CHLORIDE BLD-SCNC: 84 MMOL/L (ref 98–107)
CHLORIDE BLD-SCNC: 87 MMOL/L (ref 98–107)
CHLORIDE BLD-SCNC: 89 MMOL/L (ref 98–107)
CO2: 21 MMOL/L (ref 22–29)
CO2: 22 MMOL/L (ref 22–29)
CO2: 23 MMOL/L (ref 22–29)
CO2: 24 MMOL/L (ref 22–29)
CO2: 25 MMOL/L (ref 22–29)
CREAT SERPL-MCNC: 0.6 MG/DL (ref 0.5–1)
CREAT SERPL-MCNC: 0.7 MG/DL (ref 0.5–1)
CREAT SERPL-MCNC: 0.9 MG/DL (ref 0.5–1)
EOSINOPHILS ABSOLUTE: 0.52 E9/L (ref 0.05–0.5)
EOSINOPHILS RELATIVE PERCENT: 4.6 % (ref 0–6)
GFR AFRICAN AMERICAN: >60
GFR NON-AFRICAN AMERICAN: >60 ML/MIN/1.73
GLUCOSE BLD-MCNC: 117 MG/DL (ref 74–99)
GLUCOSE BLD-MCNC: 126 MG/DL (ref 74–99)
GLUCOSE BLD-MCNC: 133 MG/DL (ref 74–99)
GLUCOSE BLD-MCNC: 73 MG/DL (ref 74–99)
GLUCOSE BLD-MCNC: 84 MG/DL (ref 74–99)
HCT VFR BLD CALC: 36.7 % (ref 34–48)
HEMOGLOBIN: 12.7 G/DL (ref 11.5–15.5)
IMMATURE GRANULOCYTES #: 0.05 E9/L
IMMATURE GRANULOCYTES %: 0.4 % (ref 0–5)
LYMPHOCYTES ABSOLUTE: 0.66 E9/L (ref 1.5–4)
LYMPHOCYTES RELATIVE PERCENT: 5.8 % (ref 20–42)
MCH RBC QN AUTO: 29.3 PG (ref 26–35)
MCHC RBC AUTO-ENTMCNC: 34.6 % (ref 32–34.5)
MCV RBC AUTO: 84.8 FL (ref 80–99.9)
MONOCYTES ABSOLUTE: 0.93 E9/L (ref 0.1–0.95)
MONOCYTES RELATIVE PERCENT: 8.2 % (ref 2–12)
NEUTROPHILS ABSOLUTE: 9.06 E9/L (ref 1.8–7.3)
NEUTROPHILS RELATIVE PERCENT: 79.9 % (ref 43–80)
PDW BLD-RTO: 13.7 FL (ref 11.5–15)
PLATELET # BLD: 264 E9/L (ref 130–450)
PMV BLD AUTO: 8.5 FL (ref 7–12)
POTASSIUM SERPL-SCNC: 3.7 MMOL/L (ref 3.5–5)
POTASSIUM SERPL-SCNC: 4 MMOL/L (ref 3.5–5)
POTASSIUM SERPL-SCNC: 4 MMOL/L (ref 3.5–5)
POTASSIUM SERPL-SCNC: 4.1 MMOL/L (ref 3.5–5)
POTASSIUM SERPL-SCNC: 4.2 MMOL/L (ref 3.5–5)
RBC # BLD: 4.33 E12/L (ref 3.5–5.5)
SODIUM BLD-SCNC: 117 MMOL/L (ref 132–146)
SODIUM BLD-SCNC: 118 MMOL/L (ref 132–146)
SODIUM BLD-SCNC: 119 MMOL/L (ref 132–146)
SODIUM BLD-SCNC: 119 MMOL/L (ref 132–146)
SODIUM BLD-SCNC: 120 MMOL/L (ref 132–146)
SODIUM BLD-SCNC: 122 MMOL/L (ref 132–146)
WBC # BLD: 11.3 E9/L (ref 4.5–11.5)

## 2022-10-11 PROCEDURE — 6370000000 HC RX 637 (ALT 250 FOR IP): Performed by: INTERNAL MEDICINE

## 2022-10-11 PROCEDURE — 6370000000 HC RX 637 (ALT 250 FOR IP)

## 2022-10-11 PROCEDURE — 84295 ASSAY OF SERUM SODIUM: CPT

## 2022-10-11 PROCEDURE — 97530 THERAPEUTIC ACTIVITIES: CPT

## 2022-10-11 PROCEDURE — 85025 COMPLETE CBC W/AUTO DIFF WBC: CPT

## 2022-10-11 PROCEDURE — 6360000002 HC RX W HCPCS

## 2022-10-11 PROCEDURE — 97166 OT EVAL MOD COMPLEX 45 MIN: CPT

## 2022-10-11 PROCEDURE — 2580000003 HC RX 258

## 2022-10-11 PROCEDURE — 80048 BASIC METABOLIC PNL TOTAL CA: CPT

## 2022-10-11 PROCEDURE — 36415 COLL VENOUS BLD VENIPUNCTURE: CPT

## 2022-10-11 PROCEDURE — 94660 CPAP INITIATION&MGMT: CPT

## 2022-10-11 PROCEDURE — 1200000000 HC SEMI PRIVATE

## 2022-10-11 PROCEDURE — 94640 AIRWAY INHALATION TREATMENT: CPT

## 2022-10-11 PROCEDURE — 2700000000 HC OXYGEN THERAPY PER DAY

## 2022-10-11 RX ORDER — IPRATROPIUM BROMIDE AND ALBUTEROL SULFATE 2.5; .5 MG/3ML; MG/3ML
1 SOLUTION RESPIRATORY (INHALATION) 4 TIMES DAILY
Status: DISCONTINUED | OUTPATIENT
Start: 2022-10-11 | End: 2022-10-14 | Stop reason: HOSPADM

## 2022-10-11 RX ORDER — SODIUM CHLORIDE 1000 MG
2 TABLET, SOLUBLE MISCELLANEOUS
Status: DISCONTINUED | OUTPATIENT
Start: 2022-10-11 | End: 2022-10-14 | Stop reason: HOSPADM

## 2022-10-11 RX ADMIN — LEVOTHYROXINE SODIUM 137 MCG: 0.14 TABLET ORAL at 05:38

## 2022-10-11 RX ADMIN — BUDESONIDE 250 MCG: 0.25 SUSPENSION RESPIRATORY (INHALATION) at 09:02

## 2022-10-11 RX ADMIN — GUAIFENESIN 400 MG: 400 TABLET ORAL at 20:42

## 2022-10-11 RX ADMIN — Medication 10 ML: at 09:32

## 2022-10-11 RX ADMIN — MICONAZOLE NITRATE: 20 CREAM TOPICAL at 23:56

## 2022-10-11 RX ADMIN — Medication 10 ML: at 23:55

## 2022-10-11 RX ADMIN — BUDESONIDE 250 MCG: 0.25 SUSPENSION RESPIRATORY (INHALATION) at 20:29

## 2022-10-11 RX ADMIN — MICONAZOLE NITRATE: 20 CREAM TOPICAL at 12:07

## 2022-10-11 RX ADMIN — POTASSIUM BICARBONATE 20 MEQ: 782 TABLET, EFFERVESCENT ORAL at 09:31

## 2022-10-11 RX ADMIN — CLONAZEPAM 0.5 MG: 0.5 TABLET ORAL at 05:38

## 2022-10-11 RX ADMIN — IPRATROPIUM BROMIDE AND ALBUTEROL SULFATE 1 AMPULE: 2.5; .5 SOLUTION RESPIRATORY (INHALATION) at 13:08

## 2022-10-11 RX ADMIN — ARFORMOTEROL TARTRATE 15 MCG: 15 SOLUTION RESPIRATORY (INHALATION) at 09:02

## 2022-10-11 RX ADMIN — CLONAZEPAM 0.5 MG: 0.5 TABLET ORAL at 00:53

## 2022-10-11 RX ADMIN — ARFORMOTEROL TARTRATE 15 MCG: 15 SOLUTION RESPIRATORY (INHALATION) at 20:29

## 2022-10-11 RX ADMIN — SODIUM CHLORIDE 2 G: 1 TABLET ORAL at 17:16

## 2022-10-11 RX ADMIN — SODIUM CHLORIDE 2 G: 1 TABLET ORAL at 12:42

## 2022-10-11 RX ADMIN — ENOXAPARIN SODIUM 40 MG: 100 INJECTION SUBCUTANEOUS at 09:30

## 2022-10-11 RX ADMIN — PANTOPRAZOLE SODIUM 40 MG: 40 TABLET, DELAYED RELEASE ORAL at 05:38

## 2022-10-11 RX ADMIN — GUAIFENESIN 400 MG: 400 TABLET ORAL at 09:31

## 2022-10-11 RX ADMIN — IPRATROPIUM BROMIDE AND ALBUTEROL SULFATE 1 AMPULE: 2.5; .5 SOLUTION RESPIRATORY (INHALATION) at 20:29

## 2022-10-11 RX ADMIN — ROSUVASTATIN CALCIUM 20 MG: 20 TABLET, FILM COATED ORAL at 20:42

## 2022-10-11 RX ADMIN — IPRATROPIUM BROMIDE AND ALBUTEROL SULFATE 1 AMPULE: 2.5; .5 SOLUTION RESPIRATORY (INHALATION) at 15:37

## 2022-10-11 NOTE — PROGRESS NOTES
Lab Results   Component Value Date/Time     10/11/2022 05:00 AM    K 4.0 10/11/2022 05:00 AM    K 4.4 10/09/2022 04:00 PM    CL 87 10/11/2022 05:00 AM    CO2 22 10/11/2022 05:00 AM    BUN 12 10/11/2022 05:00 AM    CREATININE 0.7 10/11/2022 05:00 AM    GFRAA >60 10/11/2022 05:00 AM    LABGLOM >60 10/11/2022 05:00 AM    GLUCOSE 73 10/11/2022 05:00 AM    PROT 5.9 10/09/2022 04:00 PM    LABALBU 3.7 10/09/2022 04:00 PM    CALCIUM 8.4 10/11/2022 05:00 AM    BILITOT 0.4 10/09/2022 04:00 PM    ALKPHOS 109 10/09/2022 04:00 PM    AST 18 10/09/2022 04:00 PM    ALT 17 10/09/2022 04:00 PM     U/A:    Lab Results   Component Value Date/Time    COLORU Yellow 10/09/2022 03:59 PM    PROTEINU Negative 10/09/2022 03:59 PM    PHUR 6.0 10/09/2022 03:59 PM    WBCUA NONE 10/09/2022 03:59 PM    RBCUA NONE 10/09/2022 03:59 PM    BACTERIA NONE SEEN 10/09/2022 03:59 PM    CLARITYU Clear 10/09/2022 03:59 PM    SPECGRAV >=1.030 10/09/2022 03:59 PM    LEUKOCYTESUR Negative 10/09/2022 03:59 PM    UROBILINOGEN 0.2 10/09/2022 03:59 PM    BILIRUBINUR Negative 10/09/2022 03:59 PM    BLOODU Negative 10/09/2022 03:59 PM    GLUCOSEU Negative 10/09/2022 03:59 PM     ABG:    Lab Results   Component Value Date/Time    PH 7.437 10/09/2022 04:27 PM    PCO2 33.5 10/09/2022 04:27 PM    PO2 171.0 10/09/2022 04:27 PM    HCO3 22.1 10/09/2022 04:27 PM    BE -1.5 10/09/2022 04:27 PM    O2SAT 99.3 10/09/2022 04:27 PM       Imaging Studies:  CXR: ***  CT scan: ***    Student's Assessment and Plan     Patient is a     Assessment:  ***    Plan:  ***      PT/OT: Not warranted at this time  DVT/GI ppx: LOVENOX 40mg injection daily/Pantoprazole 40mg PO daily  Disposition: Continue care      Judy Zamudio Chris 87 3  Attending physician: Dr. Fabiana Cevallos

## 2022-10-11 NOTE — PROGRESS NOTES
Subjective:  Patient feeling better  Hard of hearing    Breathing feels a little better  Denies chest pain, angina, abdominal discomfort. No nausea or vomiting. Tolerating diet. Objective:    /77   Pulse 100   Temp 98.8 °F (37.1 °C) (Axillary)   Resp 30   Ht 5' 2\" (1.575 m)   Wt 152 lb 1.9 oz (69 kg)   SpO2 93%   BMI 27.82 kg/m²     General: NAD  HEENT: No thrush or mucositis, EOMI  Heart:  Sinus tachycardia, no murmurs, gallops, or rubs. Lungs:  Decreased BS bilaterally with wheezing and rhonchi.  No rales, wet cough  Abd: BS present, nontender, nondistended, no masses  Extrem:  No clubbing, cyanosis, or edema  Lymphatics: No palpable adenopathy in cervical and supraclavicular regions  Skin: Intact, no petechia or purpura    CBC with Differential:    Lab Results   Component Value Date/Time    WBC 11.3 10/11/2022 05:00 AM    RBC 4.33 10/11/2022 05:00 AM    HGB 12.7 10/11/2022 05:00 AM    HCT 36.7 10/11/2022 05:00 AM     10/11/2022 05:00 AM    MCV 84.8 10/11/2022 05:00 AM    MCH 29.3 10/11/2022 05:00 AM    MCHC 34.6 10/11/2022 05:00 AM    RDW 13.7 10/11/2022 05:00 AM    METASPCT 0.9 09/23/2022 04:05 AM    LYMPHOPCT 5.8 10/11/2022 05:00 AM    MONOPCT 8.2 10/11/2022 05:00 AM    MYELOPCT 0.9 09/21/2022 05:00 AM    BASOPCT 1.1 10/11/2022 05:00 AM    MONOSABS 0.93 10/11/2022 05:00 AM    LYMPHSABS 0.66 10/11/2022 05:00 AM    EOSABS 0.52 10/11/2022 05:00 AM    BASOSABS 0.12 10/11/2022 05:00 AM     CMP:    Lab Results   Component Value Date/Time     10/11/2022 05:00 AM    K 4.0 10/11/2022 05:00 AM    K 4.4 10/09/2022 04:00 PM    CL 87 10/11/2022 05:00 AM    CO2 22 10/11/2022 05:00 AM    BUN 12 10/11/2022 05:00 AM    CREATININE 0.7 10/11/2022 05:00 AM    GFRAA >60 10/11/2022 05:00 AM    LABGLOM >60 10/11/2022 05:00 AM    GLUCOSE 73 10/11/2022 05:00 AM    PROT 5.9 10/09/2022 04:00 PM    LABALBU 3.7 10/09/2022 04:00 PM    CALCIUM 8.4 10/11/2022 05:00 AM    BILITOT 0.4 10/09/2022 04:00 PM ALKPHOS 109 10/09/2022 04:00 PM    AST 18 10/09/2022 04:00 PM    ALT 17 10/09/2022 04:00 PM          Current Facility-Administered Medications:     miconazole (MICOTIN) 2 % cream, , Topical, BID, Tatiana Louis MD    ipratropium-albuterol (DUONEB) nebulizer solution 1 ampule, 1 ampule, Inhalation, 4x daily, Tatiana Louis MD    sodium chloride flush 0.9 % injection 5-40 mL, 5-40 mL, IntraVENous, 2 times per day, Norma Gonzalez, DO, 10 mL at 10/11/22 0932    sodium chloride flush 0.9 % injection 5-40 mL, 5-40 mL, IntraVENous, PRN, Norma Gonzalez DO    0.9 % sodium chloride infusion, , IntraVENous, PRN, Norma Gonzalez, DO    enoxaparin (LOVENOX) injection 40 mg, 40 mg, SubCUTAneous, Daily, Norma Gonzalez, , 40 mg at 10/11/22 0930    ondansetron (ZOFRAN-ODT) disintegrating tablet 4 mg, 4 mg, Oral, Q8H PRN **OR** ondansetron (ZOFRAN) injection 4 mg, 4 mg, IntraVENous, Q6H PRN, Norma Gonzalez, DO    polyethylene glycol (GLYCOLAX) packet 17 g, 17 g, Oral, Daily PRN, Norma Gonzalez, DO    acetaminophen (TYLENOL) tablet 650 mg, 650 mg, Oral, Q6H PRN **OR** acetaminophen (TYLENOL) suppository 650 mg, 650 mg, Rectal, Q6H PRN, Norma Gonzalez, DO    glucose chewable tablet 16 g, 4 tablet, Oral, PRN, Norma Gonzalez, DO    dextrose bolus 10% 125 mL, 125 mL, IntraVENous, PRN **OR** dextrose bolus 10% 250 mL, 250 mL, IntraVENous, PRN, Norma Gonzalez, DO    glucagon (rDNA) injection 1 mg, 1 mg, SubCUTAneous, PRN, Norma Gonzalez, DO    dextrose 10 % infusion, , IntraVENous, Continuous PRN, Norma Gonzalez DO    clonazePAM (KLONOPIN) tablet 0.5 mg, 0.5 mg, Oral, Daily PRN, Norma Gonzalez DO, 0.5 mg at 10/11/22 0538    hydrOXYzine pamoate (VISTARIL) capsule 25 mg, 25 mg, Oral, Q8H PRN, Norma Gonzalez DO, 25 mg at 10/10/22 1432    levothyroxine (SYNTHROID) tablet 137 mcg, 137 mcg, Oral, Daily, Norma Gonzalez DO, 137 mcg at 10/11/22 0538    rosuvastatin (CRESTOR) tablet 20 mg, 20 mg, Oral, Nightly, Norma Abdi-Ochoa, DO, 20 mg at 10/10/22 2037    Arformoterol Tartrate (BROVANA) nebulizer solution 15 mcg, 15 mcg, Nebulization, BID, Norma Abdi-Ochoa, DO, 15 mcg at 10/11/22 0902    budesonide (PULMICORT) nebulizer suspension 250 mcg, 250 mcg, Nebulization, BID, Norma Abdi-Ochoa, DO, 250 mcg at 10/11/22 0902    pantoprazole (PROTONIX) tablet 40 mg, 40 mg, Oral, QAM AC, Norma Abdi-Ochoa, DO, 40 mg at 10/11/22 0538    guaiFENesin tablet 400 mg, 400 mg, Oral, TID, Sven Bosworth, MD, 400 mg at 10/11/22 0931    potassium bicarb-citric acid (EFFER-K) effervescent tablet 20 mEq, 20 mEq, Oral, Daily, Norma Abdi-Ochoa, DO, 20 mEq at 10/11/22 0931    sodium chloride tablet 1 g, 1 g, Oral, TID WC, Houston Gilliland MD, 1 g at 10/10/22 1856    CTA PULMONARY W CONTRAST   Final Result   No evidence of pulmonary embolism. Large conglomerate of mediastinal and right hilar lymphadenopathy. There is   near complete occlusion of the right mainstem bronchus with atelectasis of   the right middle lobe      Numerous low-attenuation lesions in the liver likely representing metastatic   disease. Old ununited manubrial fracture. XR CHEST PORTABLE   Final Result   No acute process. Assessment:    Principal Problem:    Hyponatremia  Active Problems:    Primary hypertension    Hypothyroidism    Tobacco abuse    Shortness of breath  Resolved Problems:    * No resolved hospital problems. *    53-year-old female with extended stage small cell lung carcinoma of R hilum with biopsy proven liver metastasis dx 9/6/22. Also has CHF and COPD. No evidence of metastasis to the bones on bone scan 9/22/22. Sp Mediport placement 9/23/22. Plan:  -hyponatremia likely SIADH with extended stage SCC of the lung  -metastatic small cell lung cancer    I will discuss plan to begin treatment with attending.   Follow-up with  Joshua Cerrato at the [facility] after discharge. Electronically signed by ZAFAR Guido on 10/11/2022 at 10:25 AM     Addendum: Case reviewed and staffed with PA. 81year old female with stage IV small cell with liver mets and SIADH/hyponatremia. Dr. Lucila Olvera planning future chemotherapy and will be rounding tomorrow. Typically will give outpatient if patient stable.     Karla Alcala MD

## 2022-10-11 NOTE — PROGRESS NOTES
[address]       FUAP:61/35/0419                                                               Patient Name: Elidia Mast  MRN: [id number]  : 1950  Room: [address] [place] [address]    Evaluating OT: Marybeth Haney, DELMERD,  OTR/L; [id number]    Referring Provider: Yvonne Askew MD   Specific Provider Orders/Date: OT eval and treat (10/11/22)       Diagnosis: Hyponatremia [E87.1]     Reason for admission: Pt admitted with hyponatremia and SOB. Surgery/Procedures: None this admission     Pertinent Medical History:    Past Medical History:   Diagnosis Date    Bronchitis     Hypertension     Thyroid disease         *Precautions:  Fall Risk, [place]    Assessment of current deficits   [x] Functional mobility  [x]ADLs  [x] Strength               []Cognition   [x] Functional transfers   [x] IADLs         [x] Safety Awareness   [x]Endurance   [] Fine Coordination        [x] ROM     [] Vision/perception   []Sensation    []Gross Motor Coordination [x] Balance   [] Delirium                  []Motor Control     [] Communication    OT PLAN OF CARE   OT POC based on physician orders, patient diagnosis and results of clinical assessment.        Frequency/Duration: 1-3 days/wk for 1-2 weeks PRN    Specific OT Treatment Interventions to include:   * Instruction/training on adapted ADL techniques and AE recommendations to increase functional independence within precautions       * Training on energy conservation strategies, correct breathing pattern and techniques to improve independence/tolerance for self-care routine  * Functional transfer/mobility training/DME recommendations for increased independence, safety, and fall prevention  * Patient/Family education to increase follow through with safety techniques and functional independence  * Recommendation of environmental modifications for increased safety with functional transfers/mobility and ADLs  * Cognitive retraining/development of therapeutic activities to improve problem solving, judgement, memory, and attention for increased safety/participation in ADL/IADL tasks  * Therapeutic exercise to improve motor endurance, ROM, and functional strength for ADLs/functional transfers  * Therapeutic activities to facilitate/challenge dynamic balance, stand tolerance for increased safety and independence with ADLs  * Therapeutic activities to facilitate gross/fine motor skills for increased independence with ADLs  * Positioning to improve skin integrity, interaction with environment and functional independence      Recommended Adaptive Equipment: grab bars     Home Living: Pt lives alone  in a 2 story home with 4 step(s) to enter and 1 rail(s); bed/bath on 2nd floor with 8 steps up. Bathroom setup: tub/shower, standard commode  Equipment owned: Shower chair    Prior Level of Function: Ind with ADLs; Ind with IADLs. No AD for functional mobility. Driving: Yes  Occupation: None this admission    Pt reports she will have \"People to help\" her at d/c. Pain Level: pt c/o 0/10 pain this session stating \"I just feel horrible. \"    Cognition: A&O: 4/4 ; Follows 2 step commands with encouragement. Memory: G-   Comprehension: G   Problem solving: G-   Judgement/safety: F+               Communication skills: WFL           Vision: WFL               Glasses: Yes                                                   Hearing: [place]     RASS: 0  CAM-ICU: (NT) Delirium    UE Assessment:  Hand Dominance: Right [x]  Left []     ROM Strength STM goal: PRN   RUE  WFL Grossly 4/5  : WFL   Increased overall RUE strength for participation in ADLs     LUE WFL Grossly 4/5  : WFL Increased overall RUE strength for participation in ADLs       Sensation: No c/o numbness/tingling in extremities.   Tone: WNL  Edema: unremarkable     Functional Assessment:  AM-PAC Daily Activity Raw Score: 17/24 Initial Eval Status  Date: 10/11/22 Treatment Status  Date:  STGs = LTGs  Time frame: 7-14 days   Feeding Ind                             Grooming Min A  In standing                      Ind        UB dressing/bathing Min A                      Ind       LB dressing/bathing Mod A                      Ind        Toileting Min A                      Ind     Bed Mobility  Supine to sit:   SBA    Sit to supine:   SBA                        Ind     Functional Transfers Sit to stand:   SBA    Stand to sit:   SBA    Stand Pivot:   NT                        Ind     Functional Mobility Min A with HHA   for few steps from EOB. Pt with dizziness, impaired safety awareness, and 1 LOB during activity requiring assist to correct. Pt resistant to command following and easily agitated. Ind        Balance Sitting:     Static: Ind    Dynamic:SBA  Standing: Min A  Sitting:     Static:  ind    Dynamic: ind  Standing: ind                   Endurance/Activity Tolerance   fair tolerance with light activity. WFL  For full ADL/IADL tasks   Visual/  Perceptual   WFL                     Vitals:   HR at rest: 99 bpm HR at end of session: 96 bpm   SpO2 at rest: 97% SpO2 at end of session 93%   BP at rest: 114/65 mmHg BP at end of session --/-- mmHg       Treatment: OT treatment provided this date includes:     Instruction/training on safe bed mobility/functional mobility/transfer techniques: with focus on safety, body mechanics, and balance. Sitting/Standing Balance/Tolerance: Pt sat at EOB for ~10 min to increase balance and activity tolerance during ADLs and facilitate proper posture and positioning. Therapeutic activity: to challenge dynamic sitting/standing balance and endurance to promote safety during ADL tasks and functional transfers and mobility.     Line management and environmental modifications made prior to and end of session to ensure patient safety and to increase efficiency of session. Skilled monitoring of HR, O2 saturation, blood pressure and patient's response to activity performed throughout session. Comments: Pt case discussed in rounds, OK from RN to see patient. Upon arrival, patient supine in bed. Pt pleasant and agreeable to participate in therapy session with encouragement. Pt demo fair tolerance with fair understanding of education/techniques. At end of session, patient properly positioned in bed for transport all lines and tubes intact. Nursing present for patient transport. Patient presents with decreased ROM/strength, activity tolerance, dynamic balance, functional mobility limiting completion of ADLs and safety. Pt can benefit from continued skilled OT services to increase safety, functional independence and quality of life. Rehab Potential: Good for established goals    Patient / Family Goal: to return to [facility]    Patient and/or family were instructed/educated on diagnosis, prognosis/goals and plan of care. Patient demonstrated fair understanding. Evaluation Complexity: Moderate     History: Expanded chart review of consults, imaging, and psychosocial history related to current functional performance. Exam: 5+ performance deficits identified limiting functional independence and safe return home   Assistance/Modification: Min/mod assistance or modifications required to perform tasks. May have comorbidities that affect occupational performance. [] Malnutrition indicators have been identified and nursing has been notified to ensure a dietitian consult is ordered.       Time In:1420             Time Out: 1443         Total Treatment time: 8 min   Min Units   OT Eval Low 39323     OT Eval Medium 17518 x    OT Eval High [id number]     OT Re-Eval [id number]     Therapeutic Ex [id number]     Therapeutic Activities [id number]   ADL/Self Care 53071     Orthotic Management 16908     Neuro Re-Ed 61159     Non-Billable Time        Evaluation time includes thorough review of current medical information, gathering information on past medical history/social history and prior level of function, completion of standardized testing/informal observation of tasks, assessment of data and development of POC/Goals.      JUHI Hannah,  OTR/L; [id number]

## 2022-10-11 NOTE — PROGRESS NOTES
[facility]   Progress note      Subjective: The patient is awake and alert. Sitting up in bed in ICU setting  No acute complaints today  Feels better  Objective:    /77   Pulse 100   Temp 98.8 °F (37.1 °C) (Axillary)   Resp 30   Ht 5' 2\" (1.575 m)   Wt 152 lb 1.9 oz (69 kg)   SpO2 93%   BMI 27.82 kg/m²     In: 300 [P.O.:300]  Out: 1425   In: 300   Out: 1425 [Urine:1425]    General appearance: NAD, conversant  HEENT: AT/NC, MMM  Neck: FROM, supple  Lungs: Clear to auscultation  CV: RRR, no MRGs-right-sided port in place  Vasc: Radial pulses 2+  Abdomen: Soft, non-tender; no masses or HSM  Extremities: No peripheral edema or digital cyanosis  Skin: no rash, lesions or ulcers  Psych: Alert and oriented to person, place and time  Neuro: Alert and interactive     Recent Labs     10/09/22  1449 10/10/22  0415 10/11/22  0500   WBC 11.7* 11.8* 11.3   HGB 11.9 11.2* 12.7   HCT 32.5* 31.1* 36.7    261 264       Recent Labs     10/10/22  1704 10/10/22  2033 10/10/22  2358 10/11/22  0500   * 120* 117* 119*   K 4.1  --  4.2 4.0   CL 85*  --  87* 87*   CO2 24  --  23 22   BUN 14  --  14 12   CREATININE 1.1*  --  0.9 0.7   CALCIUM 8.3*  --  8.3* 8.4*       Assessment:    Principal Problem:    Hyponatremia  Active Problems:    Primary hypertension    Hypothyroidism    Tobacco abuse    Shortness of breath  Resolved Problems:    * No resolved hospital problems.  *      Plan:    68-year-old female with a history of metastatic small cell lung CA with mets to the liver and who was recently discharged on 9/26 with severe hyponatremia is admitted once again to the ICU with     10/10/2022 recurrent/persistent hyponatremia from small cell lung cancer/SIADH  Monitor labs-sodium 116 currently on admission was 111  3% sodium x4 hours per nephrology, avoid overcorrection to avoid cerebral pontine myelolysis  BMP every 6 hours  Watch mentation as she appears to be somewhat confused today  Nephrology following -await input  Hold offending agents  Vital signs per ICU protocol  Fluid restriction 1 L    10/11/2022  Sodium level increased to 119 today  Patient much more alert and awake sitting up in chair in no apparent acute distress  Indicates she is feeling better but quite frustrated  Patient likely would benefit from staying on salt tabs and definitely  Would consider initiating chemotherapy given that she had a Mediport placed last admission, to see how she tolerates prior to discharge        Code Status: Pulmonary critical care, renal, oncology  Consultants:    DVT Prophylaxis   PT/OT  Discharge [id number] Leni Recinos MD  10:14 AM  10/11/2022

## 2022-10-11 NOTE — PROGRESS NOTES
Comprehensive Nutrition Assessment    Type and Reason for Visit:  Initial, Consult    Nutrition Recommendations/Plan:     Lift CHO diet restriction ? glucose controlled/ no hx Dm  Start Oral Nutrition Supplements to comply with fluid restriction        Malnutrition Assessment:  Malnutrition Status: Moderate malnutrition (10/11/22 1518)    Context:  Chronic Illness     Findings of the 6 clinical characteristics of malnutrition:  Energy Intake:  75% or less estimated energy requirements for 1 month or longer  Weight Loss:  No significant weight loss (x 2 mon, unable to assess further)     Body Fat Loss:  Mild body fat loss Orbital   Muscle Mass Loss:  Mild muscle mass loss Temples (temporalis), Clavicles (pectoralis & deltoids)  Fluid Accumulation:  No significant fluid accumulation    Strength:  Not Performed    Nutrition Assessment:    Pt admit w/ re-current hyponatremia in the setting of metastatic disease. Recent Lung CA dx w/ mets to liver s/p mediport placement 9/23. PMHx CHF & COPD. PO intake average ~50-75%. Pt meets criteria for Moderate Malnutrition. Will add Magic Cup BID & Gelatein daily for protein variety. Nutrition Related Findings:     Pt alert, -I/O's, no edema, weakness, active BS, hyponatremia ()    Wound Type: None       Current Nutrition Intake & Therapies:    Average Meal Intake: 51-75% (average)    ADULT DIET; Regular; 5 carb choices (75 gm/meal); 1000 ml    Anthropometric Measures:  Height: 5' 2\" (157.5 cm)  Ideal Body Weight (IBW): 110 lbs (50 kg)    Admission Body Weight: 152 lb (68.9 kg) (10/10 first measured)  Current Body Weight: 152 lb (68.9 kg) (10/10 actual), 138.2 % IBW. Current BMI (kg/m2): 27.8  Usual Body Weight: 151 lb (68.5 kg) (7/2022 EMR measured wt from office visit)  % Weight Change (Calculated): 0.7 wt stable x 2 mon                    BMI Categories: Overweight (BMI 25.0-29. 9)    Estimated Daily Nutrient Needs:  Energy Requirements Based On: Formula  Weight Used for Energy Requirements: Current  Energy (kcal/day): MSJ 1164 x 1.3 SF= 7207-5651  Weight Used for Protein Requirements: Ideal  Protein (g/day): 1.3-1.5 g/kg IBW; 65-75  Method Used for Fluid Requirements: 1 ml/kcal  Fluid (ml/day): 8761-8646    Nutrition Diagnosis:   Moderate malnutrition, In context of chronic illness related to catabolic illness (Lung CA w/ mets) as evidenced by poor intake prior to admission, mild muscle loss, mild loss of subcutaneous fat    Nutrition Interventions:   Nutrition Education/Counseling: Education not indicated  Coordination of Nutrition Care: Continue to monitor while inpatient      Goals:    Goals: PO intake 75% or greater, by next RD assessment      Nutrition Monitoring and Evaluation:     Food/Nutrient Intake Outcomes: Food and Nutrient Intake, Supplement Intake  Physical Signs/Symptoms Outcomes: Biochemical Data, Nutrition Focused Physical Findings, Skin, Weight, GI Status, Fluid Status or Edema    Discharge Planning:    Continue Oral Nutrition Supplement     Cathy Briceno RD, LD  Contact: Ext [phone]

## 2022-10-11 NOTE — PROGRESS NOTES
[address] [facility]  Department of Internal Medicine   Internal Medicine Residency   MICU Progress Note    Patient:  Ashley Blevins 81 y.o. female  MRN: [id number]     Date of Service: 10/11/2022    Allergy: Patient has no known allergies. Subjective     Patient sitting comfortably on edge of bed on examination. She indicates that she is feeling well. She states she is eating well. Her fatigue and shortness of breath have improved, but worsen acutely with any exertion. She otherwise has no complaints this AM.     24h change: Sodium 117 > 120 > 117 > 119 overnight  ROS: Denies Fever/chills/CP/SOB/N/V/D/C/Dysuria/Blood in stool or urine  Objective     VS: /77   Pulse 100   Temp 98.8 °F (37.1 °C) (Axillary)   Resp 30   Ht 5' 2\" (1.575 m)   Wt 152 lb 1.9 oz (69 kg)   SpO2 93%   BMI 27.82 kg/m²           I & O - 24hr:   Intake/Output Summary (Last 24 hours) at 10/11/2022 0915  Last data filed at 10/11/2022 0500  Gross per 24 hour   Intake 200 ml   Output 1425 ml   Net -1225 ml       Physical Exam:  General Appearance: alert and cooperative  Neck: supple, symmetrical, trachea midline  Lung: rhonchi bibasilar  Heart: regular rate and rhythm, S1, S2 normal, no murmur, click, rub or gallop  Abdomen: soft, non-tender; bowel sounds normal; no masses,  no organomegaly  Extremities:  extremities normal, atraumatic, no cyanosis or edema  Musculoskeletal: No joint swelling, no muscle tenderness. ROM normal in all joints of extremities.    Neurologic: Mental status: Alert, oriented, thought content appropriate    Lines     site day    Art line   None    TLC None    PICC None    Hemoaccess None    Oxygen:    nasal canula at 3L/min    Lab Results   Component Value Date/Time    PH 7.437 10/09/2022 04:27 PM    PCO2 33.5 10/09/2022 04:27 PM    PO2 171.0 10/09/2022 04:27 PM    HCO3 22.1 10/09/2022 04:27 PM    BE -1.5 10/09/2022 04:27 PM    THB 12.3 10/09/2022 04:27 PM    O2SAT 99.3 10/09/2022 04:27 PM        Medications Nutrition:   Adult diet      Patient currently has     DVT prophylaxis/ GI prophylaxis,      Labs   CBC:   Lab Results   Component Value Date/Time    WBC 11.3 10/11/2022 05:00 AM    RBC 4.33 10/11/2022 05:00 AM    HGB 12.7 10/11/2022 05:00 AM    HCT 36.7 10/11/2022 05:00 AM    MCV 84.8 10/11/2022 05:00 AM    MCH 29.3 10/11/2022 05:00 AM    MCHC 34.6 10/11/2022 05:00 AM    RDW 13.7 10/11/2022 05:00 AM     10/11/2022 05:00 AM    MPV 8.5 10/11/2022 05:00 AM     CMP:    Lab Results   Component Value Date/Time     10/11/2022 08:01 AM    K 4.0 10/11/2022 05:00 AM    K 4.4 10/09/2022 04:00 PM    CL 87 10/11/2022 05:00 AM    CO2 22 10/11/2022 05:00 AM    BUN 12 10/11/2022 05:00 AM    CREATININE 0.7 10/11/2022 05:00 AM    GFRAA >60 10/11/2022 05:00 AM    LABGLOM >60 10/11/2022 05:00 AM    GLUCOSE 73 10/11/2022 05:00 AM    PROT 5.9 10/09/2022 04:00 PM    LABALBU 3.7 10/09/2022 04:00 PM    CALCIUM 8.4 10/11/2022 05:00 AM    BILITOT 0.4 10/09/2022 04:00 PM    ALKPHOS 109 10/09/2022 04:00 PM    AST 18 10/09/2022 04:00 PM    ALT 17 10/09/2022 04:00 PM           Resident's Assessment and Plan     Vasiliy Benavidez is a 81 y.o. female with past medical history of metastatic small cell lung cancer and multiple admissions for hyponatremia who presents with a chief complaint of hyponatremia.         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   Na 111 on admission, serum osmolality 234  Now 122 this AM  No signs on clinical exam of abnormal volume status  Fluid restriction 1L  Follow Na q3 hrs  Continue 1 g NaCl tabled TID with meals  Nephrology following, appreciate recs  Endocrine  Hx of hypothyroidism   On Synthroid 137 mcg at home  Continue home Synthroid  Heme/Onc  Metastatic small cell lung cancer   Previously admitted with hyponatremia, CT showing mass in the lung  Biopsy of liver mets showing high-grade neuroendocrine carcinoma, pulmonary origin  Port in left chest placed on 9/23/2022 to begin treatment with [facility] outpatient inhalers  Pulmicort, Brovana, Duoneb while inpatient; consider Symbicort per patient preference for mouthpiece for breathing treatments  Follows with pulmonology at [address] [place] [address] to follow outpatient at [facility] after discharge  MSK   Hx of multiple fractures; ribs, sternum, humerus in the setting of metastatic disease and concern for malnutrition  Dietary consult placed   PT/OT consult   Psych   Hx of tobacco use  Hx of depression  Hx of anxiety  Patient on Vistaril 25 mg q 8 hr PRN and Klonopin 1 0.5 mg daily PRN at home  Continue anxiety medications while inpatient      DVT ppx: lovenox  GI ppx: protonix  Code status: limited - yes to shocks and meds    Disposition: Stable for transfer out of the ICU    Roxana Acevedo DO, PGY-1    Attending physician: Dr. Karly Brush     I personally saw, examined and provided care for the patient. Radiographs, labs and medication list were reviewed by me independently. Review of Residents documentation was conducted and revisions were made as appropriate. I agree with the above documented exam, problem list and plan of care.         CCT excluding procedures 31 minutes    Shaji Hansen,

## 2022-10-11 NOTE — CARE COORDINATION
10/11:  Update CM Note:  Pt presented to the ER for SOB. Pt was recently dx with Lung Ca with metastatic to her liver. Pt was recently dc home on 9/26 with [address] DME for 02- 4L/NC. Pt's NA was 111. Pt was transferred to MICU. Pt is on 3 L/NC at 98%. Cm met with the pt bedside. Pt's PCP is Dr. Jami Mann & uses [facility]. Pt has a mediport & is following with the [facility]. Pt is a Limited code. Pt expressed her frustration with NA levels & multiple admissions. Pt lives alone in a house with 4 steps to enter. The bed/bathroom are on the 2nd fl with 8 steps. PTA pt independent who normally gets around using the walls & furniture. Pt's dc plan is to return home. Pt is active with [facility] for [facility]. Will resumption of care order on dc & to possible add PT/OT. Pending PT/OT evals. Sw/CM will continue to follow for dc planning.  Electronically signed by iHlary Santos RN on 10/11/2022 at 12:53 PM

## 2022-10-11 NOTE — PROGRESS NOTES
[facility]  Nephrology Attending Progress Note  Zohra Wilson. MD Haylee        SUBJECTIVE:     10/10:  the pt is a 80 yo female with a pmh of htn, hyperlipdiemia, chf, hypothyroidism, gerd, lung cancer with siadh and liver lesions, tobacco abuse, who presented with sob. Labs showed na 111 repeat 116, k 4.4, co2 23, bun 9, cr 0.6, ca 8.2, wbc 11.8, hgb 11.2, plt 261. Cta chest showed no PE. She was ordered nacl 2 g tid as outpt. She is seen in the icu lying prone watching tv.she says she is hard of hearing. She feels weak. 10/11: pt seen in icu. Sp 3% saline yesterday. No cp or sob. Sitting in chair      PROBLEM LIST:    Patient Active Problem List   Diagnosis    Congestive heart failure of unknown etiology ([facility] [address]    Congestive heart failure due to cardiomyopathy (HCC)    Hypoxia    Simple chronic bronchitis (HCC)    Hyponatremia    Primary hypertension    Hypothyroidism    GERD (gastroesophageal reflux disease)    Depression    Palliative care by specialist    Goals of care, counseling/discussion    Small cell lung cancer in adult [facility])    Lung mass    Closed fracture of left proximal humerus    Moderate protein-calorie malnutrition (HCC)    Tobacco abuse    Shortness of breath        PAST MEDICAL HISTORY:    Past Medical History:   Diagnosis Date    Bronchitis     Hypertension     Thyroid disease        DIET:    ADULT DIET;  Regular; 5 carb choices (75 gm/meal); 1000 ml     PHYSICAL EXAM:     Patient Vitals for the past 24 hrs:   BP Temp Temp src Pulse Resp SpO2   10/11/22 0600 128/77 -- -- 100 30 93 %   10/11/22 0500 135/79 -- -- (!) 106 22 92 %   10/11/22 0400 (!) 167/82 -- -- 96 19 100 %   10/11/22 0300 (!) 87/52 -- -- 95 18 100 %   10/11/22 0200 110/72 -- -- (!) 101 19 95 %   10/11/22 0100 (!) 156/101 -- -- (!) 113 (!) 31 --   10/11/22 0000 (!) 121/50 -- -- (!) 108 18 100 %   10/10/22 2300 116/62 -- -- (!) 102 17 98 %   10/10/22 2200 (!) 161/81 -- -- (!) 102 21 100 %   10/10/22 2100 105/66 -- -- (!) 105 22 92 %   10/10/22 2000 (!) 94/52 98.8 °F (37.1 °C) Axillary 95 19 98 %   10/10/22 1900 (!) 139/91 -- -- (!) 104 28 98 %   10/10/22 1800 (!) 100/52 -- -- 100 17 99 %   10/10/22 1700 98/68 -- -- (!) 103 19 94 %   10/10/22 1600 80/64 99 °F (37.2 °C) Temporal (!) 103 20 97 %   10/10/22 1500 114/64 -- -- 94 18 99 %   10/10/22 1400 125/61 -- -- (!) 103 17 100 %   10/10/22 1300 (!) 106/50 -- -- 88 20 100 %   10/10/22 1200 (!) 88/51 98.8 °F (37.1 °C) Temporal 84 18 97 %   @      Intake/Output Summary (Last 24 hours) at 10/11/2022 1121  Last data filed at 10/11/2022 0500  Gross per 24 hour   Intake 200 ml   Output 1425 ml   Net -1225 ml         Wt Readings from Last 3 Encounters:   10/10/22 152 lb 1.9 oz (69 kg)   09/26/22 144 lb 9.6 oz (65.6 kg)   09/01/22 161 lb (73 kg)       Constitutional:  Pt is in no acute distress  Head: normocephalic, atraumatic  Neck: no JVD  Cardiovascular: regular rate and rhythm, no murmurs, gallops, or rubs  Respiratory:  No rales, rhochi, or wheezes  Gastrointestinal:  Soft, nontender, nondistended, bowel sounds x 4  Ext: no edema  Skin: dry, no rash  Neuro: aaox3    MEDS (scheduled):    miconazole   Topical BID    ipratropium-albuterol  1 ampule Inhalation 4x daily    sodium chloride flush  5-40 mL IntraVENous 2 times per day    enoxaparin  40 mg SubCUTAneous Daily    levothyroxine  137 mcg Oral Daily    rosuvastatin  20 mg Oral Nightly    Arformoterol Tartrate  15 mcg Nebulization BID    budesonide  250 mcg Nebulization BID    pantoprazole  40 mg Oral QAM AC    guaiFENesin  400 mg Oral TID    potassium bicarb-citric acid  20 mEq Oral Daily    sodium chloride  1 g Oral TID WC       MEDS (infusions):   sodium chloride      dextrose         MEDS (prn):  sodium chloride flush, sodium chloride, ondansetron **OR** ondansetron, polyethylene glycol, acetaminophen **OR** acetaminophen, glucose, dextrose bolus **OR** dextrose bolus, glucagon (rDNA), dextrose, clonazePAM, hydrOXYzine pamoate    DATA:    Recent Labs     10/09/22  1449 10/10/22  0415 10/11/22  0500   WBC 11.7* 11.8* 11.3   HGB 11.9 11.2* 12.7   HCT 32.5* 31.1* 36.7   MCV 81.5 82.3 84.8    261 264     Recent Labs     10/09/22  1600 10/10/22  0125 10/10/22  1704 10/10/22  2033 10/10/22  2358 10/11/22  0500 10/11/22  0801   *   < > 117*   < > 117* 119* 122*   K 4.4   < > 4.1  --  4.2 4.0  --    CL 80*   < > 85*  --  87* 87*  --    CO2 23   < > 24  --  23 22  --    BUN 9   < > 14  --  14 12  --    CREATININE 0.6   < > 1.1*  --  0.9 0.7  --    LABGLOM >60   < > 49  --  >60 >60  --    GLUCOSE 97   < > 91  --  84 73*  --    CALCIUM 8.2*   < > 8.3*  --  8.3* 8.4*  --    ALT 17  --   --   --   --   --   --    AST 18  --   --   --   --   --   --    BILITOT 0.4  --   --   --   --   --   --    ALKPHOS 109*  --   --   --   --   --   --     < > = values in this interval not displayed. Lab Results   Component Value Date    LABALBU 3.7 10/09/2022    LABALBU 4.2 09/26/2022    LABALBU 4.3 09/20/2022     Lab Results   Component Value Date    TSH 3.970 09/21/2022       Iron Studies  No results found for: IRON, TIBC, FERRITIN  Vitamin B-12   Date Value Ref Range Status   09/21/2022 445 211 - 946 pg/mL Final     Folate   Date Value Ref Range Status   09/21/2022 10.4 4.8 - 24.2 ng/mL Final       Vit D, 25-Hydroxy   Date Value Ref Range Status   09/23/2022 54 30 - 100 ng/mL Final     Comment:     <20 ng/mL. ........... Valri Dykes Deficient  20-30 ng/mL. ......... Valri Dykes Insufficient   ng/mL. ........ Valri Dykes Sufficient  >100 ng/mL. .......... Valri Dykes Toxic       PTH   Date Value Ref Range Status   09/23/2022 40 15 - 65 pg/mL Final       No components found for: URIC    Lab Results   Component Value Date/Time    COLORU Yellow 10/09/2022 03:59 PM    NITRU Negative 10/09/2022 03:59 PM    GLUCOSEU Negative 10/09/2022 03:59 PM    KETUA Negative 10/09/2022 03:59 PM    UROBILINOGEN 0.2 10/09/2022 03:59 PM    BILIRUBINUR Negative 10/09/2022 03:59 PM       No results found for: LIPIDPAN      IMPRESSION/RECOMMENDATIONS:      Hyponatremia  Na 111>116 without tx  3% saline on 10/10> 122  Check na q 3  urine lytes c/w hypovolemia  U na 60, u osm 581 u cr 60  H/o siadh with lung cancer  Fluid restrict 1 L  Started na cl tabs, inc today to 2 tid     2. Htn  Follow on her outpt cozzar toprol norvasc     3.sob  Cta neg for PE  Has luung cancer>liver>?bone  Port placed 9/2022  Heme onc to see       Deonte Isbell.  Mabel Li MD

## 2022-10-12 LAB
ANION GAP SERPL CALCULATED.3IONS-SCNC: 10 MMOL/L (ref 7–16)
ANION GAP SERPL CALCULATED.3IONS-SCNC: 10 MMOL/L (ref 7–16)
ANION GAP SERPL CALCULATED.3IONS-SCNC: 14 MMOL/L (ref 7–16)
BASOPHILS ABSOLUTE: 0 E9/L (ref 0–0.2)
BASOPHILS RELATIVE PERCENT: 1 % (ref 0–2)
BUN BLDV-MCNC: 11 MG/DL (ref 6–23)
BUN BLDV-MCNC: 7 MG/DL (ref 6–23)
BUN BLDV-MCNC: 8 MG/DL (ref 6–23)
BURR CELLS: ABNORMAL
CALCIUM SERPL-MCNC: 8.6 MG/DL (ref 8.6–10.2)
CALCIUM SERPL-MCNC: 9 MG/DL (ref 8.6–10.2)
CALCIUM SERPL-MCNC: 9.1 MG/DL (ref 8.6–10.2)
CHLORIDE BLD-SCNC: 87 MMOL/L (ref 98–107)
CHLORIDE BLD-SCNC: 90 MMOL/L (ref 98–107)
CHLORIDE BLD-SCNC: 91 MMOL/L (ref 98–107)
CO2: 20 MMOL/L (ref 22–29)
CO2: 23 MMOL/L (ref 22–29)
CO2: 24 MMOL/L (ref 22–29)
CREAT SERPL-MCNC: 0.5 MG/DL (ref 0.5–1)
CREAT SERPL-MCNC: 0.5 MG/DL (ref 0.5–1)
CREAT SERPL-MCNC: 0.6 MG/DL (ref 0.5–1)
EOSINOPHILS ABSOLUTE: 0.85 E9/L (ref 0.05–0.5)
EOSINOPHILS RELATIVE PERCENT: 9.6 % (ref 0–6)
GFR AFRICAN AMERICAN: >60
GFR NON-AFRICAN AMERICAN: >60 ML/MIN/1.73
GLUCOSE BLD-MCNC: 110 MG/DL (ref 74–99)
GLUCOSE BLD-MCNC: 112 MG/DL (ref 74–99)
GLUCOSE BLD-MCNC: 77 MG/DL (ref 74–99)
HCT VFR BLD CALC: 34.2 % (ref 34–48)
HEMOGLOBIN: 11.6 G/DL (ref 11.5–15.5)
LYMPHOCYTES ABSOLUTE: 0.8 E9/L (ref 1.5–4)
LYMPHOCYTES RELATIVE PERCENT: 8.7 % (ref 20–42)
MCH RBC QN AUTO: 29.4 PG (ref 26–35)
MCHC RBC AUTO-ENTMCNC: 33.9 % (ref 32–34.5)
MCV RBC AUTO: 86.6 FL (ref 80–99.9)
METER GLUCOSE: 102 MG/DL (ref 74–99)
MONOCYTES ABSOLUTE: 0.18 E9/L (ref 0.1–0.95)
MONOCYTES RELATIVE PERCENT: 1.7 % (ref 2–12)
NEUTROPHILS ABSOLUTE: 7.12 E9/L (ref 1.8–7.3)
NEUTROPHILS RELATIVE PERCENT: 80 % (ref 43–80)
OVALOCYTES: ABNORMAL
PDW BLD-RTO: 14 FL (ref 11.5–15)
PLATELET # BLD: 280 E9/L (ref 130–450)
PMV BLD AUTO: 8.8 FL (ref 7–12)
POIKILOCYTES: ABNORMAL
POTASSIUM SERPL-SCNC: 3.8 MMOL/L (ref 3.5–5)
POTASSIUM SERPL-SCNC: 4 MMOL/L (ref 3.5–5)
POTASSIUM SERPL-SCNC: 4.2 MMOL/L (ref 3.5–5)
RBC # BLD: 3.95 E12/L (ref 3.5–5.5)
SODIUM BLD-SCNC: 120 MMOL/L (ref 132–146)
SODIUM BLD-SCNC: 121 MMOL/L (ref 132–146)
SODIUM BLD-SCNC: 124 MMOL/L (ref 132–146)
SODIUM BLD-SCNC: 125 MMOL/L (ref 132–146)
SODIUM BLD-SCNC: 125 MMOL/L (ref 132–146)
SODIUM BLD-SCNC: 126 MMOL/L (ref 132–146)
SODIUM BLD-SCNC: 128 MMOL/L (ref 132–146)
WBC # BLD: 8.9 E9/L (ref 4.5–11.5)

## 2022-10-12 PROCEDURE — 6360000002 HC RX W HCPCS

## 2022-10-12 PROCEDURE — 94640 AIRWAY INHALATION TREATMENT: CPT

## 2022-10-12 PROCEDURE — 6370000000 HC RX 637 (ALT 250 FOR IP): Performed by: INTERNAL MEDICINE

## 2022-10-12 PROCEDURE — 84295 ASSAY OF SERUM SODIUM: CPT

## 2022-10-12 PROCEDURE — 82962 GLUCOSE BLOOD TEST: CPT

## 2022-10-12 PROCEDURE — 2700000000 HC OXYGEN THERAPY PER DAY

## 2022-10-12 PROCEDURE — 85025 COMPLETE CBC W/AUTO DIFF WBC: CPT

## 2022-10-12 PROCEDURE — 36415 COLL VENOUS BLD VENIPUNCTURE: CPT

## 2022-10-12 PROCEDURE — 6370000000 HC RX 637 (ALT 250 FOR IP)

## 2022-10-12 PROCEDURE — 80048 BASIC METABOLIC PNL TOTAL CA: CPT

## 2022-10-12 PROCEDURE — 1200000000 HC SEMI PRIVATE

## 2022-10-12 PROCEDURE — 2580000003 HC RX 258

## 2022-10-12 PROCEDURE — 6370000000 HC RX 637 (ALT 250 FOR IP): Performed by: NURSE PRACTITIONER

## 2022-10-12 PROCEDURE — 97161 PT EVAL LOW COMPLEX 20 MIN: CPT

## 2022-10-12 RX ORDER — CLONAZEPAM 0.5 MG/1
0.5 TABLET ORAL EVERY 8 HOURS PRN
Status: DISCONTINUED | OUTPATIENT
Start: 2022-10-12 | End: 2022-10-14 | Stop reason: HOSPADM

## 2022-10-12 RX ORDER — HYDROXYZINE PAMOATE 25 MG/1
25 CAPSULE ORAL EVERY 12 HOURS PRN
Status: DISCONTINUED | OUTPATIENT
Start: 2022-10-12 | End: 2022-10-14 | Stop reason: HOSPADM

## 2022-10-12 RX ADMIN — CLONAZEPAM 0.5 MG: 0.5 TABLET ORAL at 05:20

## 2022-10-12 RX ADMIN — IPRATROPIUM BROMIDE AND ALBUTEROL SULFATE 1 AMPULE: 2.5; .5 SOLUTION RESPIRATORY (INHALATION) at 20:04

## 2022-10-12 RX ADMIN — GUAIFENESIN 400 MG: 400 TABLET ORAL at 08:10

## 2022-10-12 RX ADMIN — GUAIFENESIN 400 MG: 400 TABLET ORAL at 14:24

## 2022-10-12 RX ADMIN — BUDESONIDE 250 MCG: 0.25 SUSPENSION RESPIRATORY (INHALATION) at 08:18

## 2022-10-12 RX ADMIN — POTASSIUM BICARBONATE 20 MEQ: 782 TABLET, EFFERVESCENT ORAL at 08:10

## 2022-10-12 RX ADMIN — MICONAZOLE NITRATE: 20 CREAM TOPICAL at 20:45

## 2022-10-12 RX ADMIN — ROSUVASTATIN CALCIUM 20 MG: 20 TABLET, FILM COATED ORAL at 20:44

## 2022-10-12 RX ADMIN — IPRATROPIUM BROMIDE AND ALBUTEROL SULFATE 1 AMPULE: 2.5; .5 SOLUTION RESPIRATORY (INHALATION) at 15:57

## 2022-10-12 RX ADMIN — Medication 10 ML: at 08:11

## 2022-10-12 RX ADMIN — PANTOPRAZOLE SODIUM 40 MG: 40 TABLET, DELAYED RELEASE ORAL at 05:20

## 2022-10-12 RX ADMIN — Medication 10 ML: at 20:44

## 2022-10-12 RX ADMIN — MICONAZOLE NITRATE: 20 CREAM TOPICAL at 08:12

## 2022-10-12 RX ADMIN — HYDROXYZINE PAMOATE 25 MG: 25 CAPSULE ORAL at 00:22

## 2022-10-12 RX ADMIN — SODIUM CHLORIDE 2 G: 1 TABLET ORAL at 08:10

## 2022-10-12 RX ADMIN — SODIUM CHLORIDE 2 G: 1 TABLET ORAL at 17:33

## 2022-10-12 RX ADMIN — BUDESONIDE 250 MCG: 0.25 SUSPENSION RESPIRATORY (INHALATION) at 20:04

## 2022-10-12 RX ADMIN — IPRATROPIUM BROMIDE AND ALBUTEROL SULFATE 1 AMPULE: 2.5; .5 SOLUTION RESPIRATORY (INHALATION) at 08:18

## 2022-10-12 RX ADMIN — ENOXAPARIN SODIUM 40 MG: 100 INJECTION SUBCUTANEOUS at 08:10

## 2022-10-12 RX ADMIN — ARFORMOTEROL TARTRATE 15 MCG: 15 SOLUTION RESPIRATORY (INHALATION) at 20:04

## 2022-10-12 RX ADMIN — GUAIFENESIN 400 MG: 400 TABLET ORAL at 20:44

## 2022-10-12 RX ADMIN — LEVOTHYROXINE SODIUM 137 MCG: 0.14 TABLET ORAL at 05:20

## 2022-10-12 RX ADMIN — HYDROXYZINE PAMOATE 25 MG: 25 CAPSULE ORAL at 14:24

## 2022-10-12 RX ADMIN — ARFORMOTEROL TARTRATE 15 MCG: 15 SOLUTION RESPIRATORY (INHALATION) at 08:18

## 2022-10-12 RX ADMIN — CLONAZEPAM 0.5 MG: 0.5 TABLET ORAL at 20:44

## 2022-10-12 RX ADMIN — SODIUM CHLORIDE 2 G: 1 TABLET ORAL at 12:04

## 2022-10-12 NOTE — PROGRESS NOTES
Physical Therapy   Initial Assessment     Name: Rola Godwin  : 1950  MRN: [id number]      Date of Service: 10/12/2022    Evaluating PT:  Cesar Tian Rigo Portillo, [id number]    Room #:  6100/8417-Q  Diagnosis:  Hyponatremia [E87.1]  PMHx/PSHx:     has a past medical history of Bronchitis, Hypertension, and Thyroid disease. has a past surgical history that includes other surgical history; Tympanostomy tube placement; CT NEEDLE BIOPSY LIVER PERCUTANEOUS (2022); and Port Surgery (Left, 2022). Precautions:  O2    SUBJECTIVE:    Pt lives alone in a 2 story home with 4 stairs to enter and 1 rail, rail on steps to get upstairs as well. Pt ambulated with no AD PTA. OBJECTIVE:   Initial Evaluation  Date: 10/12/22 Treatment Short Term/ Long Term   Goals   AM-PAC 6 Clicks 40/56     Was pt agreeable to Eval/treatment? Yes     Does pt have pain? Denied     Bed Mobility  Rolling: NT  Supine to sit: NT  Sit to supine: NT  Scooting: NT     Transfers Sit to stand: Independent  Stand to sit: Independent  Stand pivot: Independent     Ambulation    40 feet with no AD with Supervision  150 feet with no AD with [place]   Stair negotiation: ascended and descended  NT  12 steps with 1 rail with Modified [place]   BLE ROM WNL     BLE Strength Grossly 4/5     Balance Sitting: Independent  Standing: Supervision  Independent     Pt is A & O x 4  Sensation:  Denied numbness/tingling  Edema:  None noted in BLE    Patient education  Pt educated on importance of mobility.      Patient response to education:   Pt verbalized understanding Pt demonstrated skill Pt requires further education in this area   Yes Yes Reinforce     ASSESSMENT:    Conditions Requiring Skilled Therapeutic Intervention:    [x]Decreased strength     []Decreased ROM  [x]Decreased functional mobility  [x]Decreased balance   [x]Decreased endurance   []Decreased posture  []Decreased sensation  []Decreased coordination   []Decreased vision  [x]Decreased safety awareness   []Increased pain       Comments: The pt was initially unwilling to participate, stated she prefers to get up in the morning and she had just awoken. The pt was agreeable with education, able to ambulate to and from the hallway with no AD, short B step length with slow gait speed, steady but extreme MILLER noted, the pt was able to return to the EOB with no issues and left resting comfortably. The pt was left with call light in reach and all needs met. Treatment:  Patient practiced and was instructed in the following treatment:    Pt education: cued on PT role and importance of mobility in the room, cues for energy conservation. Pt's/ family goals   1. To return home    Prognosis is good for reaching above PT goals. Patient and or family understand(s) diagnosis, prognosis, and plan of care. Yes    PHYSICAL THERAPY PLAN OF CARE:    PT POC is established based on physician order and patient diagnosis     Referring provider/PT Order:  Bernardo Rutherford MD  Diagnosis:  Hyponatremia [E87.1]  Specific instructions for next treatment:  Progress ambulation distance and attempt stair negotiation.      Current Treatment Recommendations:     [x] Strengthening to improve independence with functional mobility   [] ROM to improve independence with functional mobility   [x] Balance Training to improve static/dynamic balance and to reduce fall risk  [x] Endurance Training to improve activity tolerance during functional mobility   [x] Transfer Training to improve safety and independence with all functional transfers   [x] Gait Training to improve gait mechanics, endurance and assess need for appropriate assistive device  [x] Stair Training in preparation for safe discharge home and/or into the community   [] Positioning to prevent skin breakdown and contractures  [x] Safety and Education Training   [] Patient/Caregiver Education   [] HEP  [] Other     PT long term treatment goals are located in above grid    Frequency of treatments: 2-5x/week x 1-2 weeks. Time in  1425  Time out  1435    Total Treatment Time  0 minutes     Evaluation Time includes thorough review of current medical information, gathering information on past medical history/social history and prior level of function, completion of standardized testing/informal observation of tasks, assessment of data and education on plan of care and goals. CPT codes:  [x] Low Complexity PT evaluation 89653  [] Moderate Complexity PT evaluation 91524  [] High Complexity PT evaluation 42725  [] PT Re-evaluation 57165  [] Gait training 74250 0 minutes  [] Manual therapy 30091 0 minutes  [] Therapeutic activities 46693 0 minutes  [] Therapeutic exercises 95697 0 minutes  [] Neuromuscular reeducation 96942 0 minutes     Tay Andrew.  [place], [address] [facility]  [id number]

## 2022-10-12 NOTE — PROGRESS NOTES
[facility]   Progress note      Subjective: The patient is awake and alert. Transferred out of ICU 10/11  Teary-eyed today secondary to her diagnosis and generalized feeling of not doing well    Objective:    BP (!) 150/77   Pulse (!) 120   Temp 98.4 °F (36.9 °C) (Oral)   Resp 18   Ht 5' 2\" (1.575 m)   Wt 152 lb 1.9 oz (69 kg)   SpO2 98%   BMI 27.82 kg/m²     In: 240 [P.O.:240]  Out: -   In: 240   Out: -     General appearance: NAD, conversant  HEENT: AT/NC, MMM  Neck: FROM, supple  Lungs: diminished, 2L NC  CV: RRR, no MRGs-right-sided port in place  Vasc: Radial pulses 2+  Abdomen: Soft, non-tender; no masses or HSM  Extremities: No peripheral edema or digital cyanosis  Skin: no rash, lesions or ulcers  Psych: Alert and oriented to person, place and time  Neuro: Alert and interactive     Recent Labs     10/10/22  0415 10/11/22  0500 10/12/22  0410   WBC 11.8* 11.3 8.9   HGB 11.2* 12.7 11.6   HCT 31.1* 36.7 34.2    264 280         Recent Labs     10/11/22  2305 10/12/22  0215 10/12/22  0410 10/12/22  0808 10/12/22  1027   *   < > 120* 125* 124*   K 4.1  --  4.0  --  3.8   CL 89*  --  87*  --  90*   CO2 24  --  23  --  24   BUN 10  --  8  --  7   CREATININE 0.6  --  0.5  --  0.5   CALCIUM 8.3*  --  9.0  --  8.6    < > = values in this interval not displayed. Assessment:    Principal Problem:    Hyponatremia  Active Problems:    Primary hypertension    Hypothyroidism    Moderate protein-calorie malnutrition (HCC)    Tobacco abuse    Shortness of breath  Resolved Problems:    * No resolved hospital problems.  *      Plan:    33-year-old female with a history of metastatic small cell lung CA with mets to the liver and who was recently discharged on 9/26 with severe hyponatremia is admitted once again to the ICU with     10/10/2022 recurrent/persistent hyponatremia from small cell lung cancer/SIADH  Monitor labs-sodium 116 currently on admission was 111  3% sodium x4 hours per nephrology, avoid overcorrection to avoid cerebral pontine myelolysis  BMP every 6 hours  Watch mentation as she appears to be somewhat confused today  Nephrology following -await input  Hold offending agents  Vital signs per ICU protocol  Fluid restriction 1 L    10/11/2022  Sodium level increased to 119 today  Patient much more alert and awake sitting up in chair in no apparent acute distress  Indicates she is feeling better but quite frustrated  Patient likely would benefit from staying on salt tabs indefinitely  Would consider initiating chemotherapy given that she had a Mediport placed last admission, to see how she tolerates prior to discharge    10/12:  -Na+ levels continue to improve, 124 today  -Awaiting input from oncology for possible initiation of chemotherapy inpatient. Code Status: Limited   Consultants:  Pulmonary, critical care (signed/off), renal, oncology  DVT Prophylaxis Lovenox   PT/OT  Discharge planning       FELIPA Olmos CNP  3:16 PM  10/12/2022     Above note edited to reflect my thoughts     I personally saw, examined and provided care for the patient. Radiographs, labs and medication list were reviewed by me independently. The case was discussed in detail and plans for care were established. Review of KATIE Olmos   , documentation was conducted and revisions were made as appropriate directly by me. I agree with the above documented exam, problem list, and plan of care.      Alida Guzmán MD  10/12/2022

## 2022-10-12 NOTE — CONSULTS
[facility]  [address], [address]  [facility], [address] [place] [address]    Pulmonary Consultation      Reason for Consult:  hypoxia    Requesting Physician:  Bianca Elliott DO    CHIEF COMPLAINT:  hypoxia    History Obtained From:  patient, electronic medical record    HISTORY OF PRESENT ILLNESS:                The patient is a 81 y.o. female with significant past medical history of metastatic small cell ca who presents with increased confusion and dizziness. Has been in hospital 2x before this for a similar problem with hyponatremia which is what she is here for again. When asked about respiratory function, claims didn't have problems with worsening respiratory function or cough. Admitted to ICU and transferred out today. Currently on 2 li NC, conversant and oriented but slightly off with conversation, claims ok with breathing with not much cough, lying down in bed.     Past Medical History:        Diagnosis Date    Bronchitis     Hypertension     Thyroid disease        Past Surgical History:        Procedure Laterality Date    CT NEEDLE BIOPSY LIVER PERCUTANEOUS  9/6/2022    CT NEEDLE BIOPSY LIVER PERCUTANEOUS 9/6/2022 Rehana Steele MD [facility]    OTHER SURGICAL HISTORY      states had something done right neck area per dr jaeger, might have been an abcess    PORT SURGERY Left 9/23/2022    MEDI-PORT INSERTION performed by Shanta Medrano MD at [facility].         Current Medications:    Current Facility-Administered Medications: miconazole (MICOTIN) 2 % cream, , Topical, BID  ipratropium-albuterol (DUONEB) nebulizer solution 1 ampule, 1 ampule, Inhalation, 4x daily  sodium chloride tablet 2 g, 2 g, Oral, TID WC  sodium chloride flush 0.9 % injection 5-40 mL, 5-40 mL, IntraVENous, 2 times per day  sodium chloride flush 0.9 % injection 5-40 mL, 5-40 mL, IntraVENous, PRN  0.9 % sodium chloride infusion, , IntraVENous, equal, round and reactive to light, extra ocular muscles intact, sclera clear, conjunctiva normal  ENT:  Normocephalic, without obvious abnormality, atraumatic, sinuses nontender on palpation, external ears without lesions, oral pharynx with moist mucus membranes, tonsils without erythema or exudates, gums normal and good dentition. LUNGS:  bilateral ronchi with cough  CARDIOVASCULAR:  Normal apical impulse, regular rate and rhythm, normal S1 and S2, no S3 or S4, and no murmur noted  ABDOMEN:  No scars, normal bowel sounds, soft, non-distended, non-tender, no masses palpated, no hepatosplenomegally  MUSCULOSKELETAL:  minimal bipedal edema  NEUROLOGIC:  Awake, alert, oriented to name, place and time. Cranial nerves II-XII are grossly intact. DATA:    CBC:   Recent Labs     10/10/22  0415 10/11/22  0500 10/12/22  0410   WBC 11.8* 11.3 8.9   HGB 11.2* 12.7 11.6   HCT 31.1* 36.7 34.2   MCV 82.3 84.8 86.6    264 280       BMP:  Recent Labs     10/11/22  2305 10/12/22  0215 10/12/22  0410 10/12/22  0808 10/12/22  1027   *   < > 120* 125* 124*   K 4.1  --  4.0  --  3.8   CL 89*  --  87*  --  90*   CO2 24  --  23  --  24   BUN 10  --  8  --  7   CREATININE 0.6  --  0.5  --  0.5    < > = values in this interval not displayed. ALB:3,BILIDIR:3,BILITOT:3,ALKPHOS:3)@    PT/INR: No results for input(s): PROTIME, INR in the last 72 hours.     ABG:   Recent Labs     10/09/22  1627   PH 7.437   PO2 171.0*   PCO2 33.5*   HCO3 22.1   BE -1.5   O2SAT 99.3*   METHB 0.3   O2HB 98.2*   COHB 0.8   O2CON 17.3   HHB 0.7   THB 12.3     FiO2 : 60 %       Radiology Review:  CTA chest reviewed with (-) PE, similar bulky mediastinal adenopathy and atelectasis right middle lobe compared to previous    IMPRESSION/RECOMMENDATIONS:      Metastatic small cell ca  Hyponatremia  COPD  Hypoxia    Cont with oxygen, taper as tolerated  Fluid restriction and Na supplement as per Renal  Cont with nebs, observe respiratory function  Cancer treatment options as per Oncology  OOB to chair, PT/OT      Time at the bedside, reviewing labs and radiographs, reviewing notes and consultations, discussing with staff and family was more than 55 minutes. Thanks for letting us see this patient in consultation. Please contact us with any questions. Office [phone] or after hours through [facility], x [phone].

## 2022-10-12 NOTE — PROGRESS NOTES
Subjective:  Patient feeling better  Hard of hearing    Breathing feels a little better  Denies chest pain, angina, abdominal discomfort. No nausea or vomiting. Tolerating diet. Objective:    /63   Pulse (!) 103   Temp 98 °F (36.7 °C) (Oral)   Resp 18   Ht 5' 2\" (1.575 m)   Wt 152 lb 1.9 oz (69 kg)   SpO2 100%   BMI 27.82 kg/m²     General: NAD  HEENT: No thrush or mucositis, EOMI  Heart:  Sinus tachycardia, no murmurs, gallops, or rubs. Lungs:  Decreased BS bilaterally with wheezing and rhonchi.  No rales, wet cough  Abd: BS present, nontender, nondistended, no masses  Extrem:  No clubbing, cyanosis, or edema  Lymphatics: No palpable adenopathy in cervical and supraclavicular regions  Skin: Intact, no petechia or purpura    CBC with Differential:    Lab Results   Component Value Date/Time    WBC 8.9 10/12/2022 04:10 AM    RBC 3.95 10/12/2022 04:10 AM    HGB 11.6 10/12/2022 04:10 AM    HCT 34.2 10/12/2022 04:10 AM     10/12/2022 04:10 AM    MCV 86.6 10/12/2022 04:10 AM    MCH 29.4 10/12/2022 04:10 AM    MCHC 33.9 10/12/2022 04:10 AM    RDW 14.0 10/12/2022 04:10 AM    METASPCT 0.9 09/23/2022 04:05 AM    LYMPHOPCT 8.7 10/12/2022 04:10 AM    MONOPCT 1.7 10/12/2022 04:10 AM    MYELOPCT 0.9 09/21/2022 05:00 AM    BASOPCT 1.0 10/12/2022 04:10 AM    MONOSABS 0.18 10/12/2022 04:10 AM    LYMPHSABS 0.80 10/12/2022 04:10 AM    EOSABS 0.85 10/12/2022 04:10 AM    BASOSABS 0.00 10/12/2022 04:10 AM     CMP:    Lab Results   Component Value Date/Time     10/12/2022 08:08 AM    K 4.0 10/12/2022 04:10 AM    K 4.4 10/09/2022 04:00 PM    CL 87 10/12/2022 04:10 AM    CO2 23 10/12/2022 04:10 AM    BUN 8 10/12/2022 04:10 AM    CREATININE 0.5 10/12/2022 04:10 AM    GFRAA >60 10/12/2022 04:10 AM    LABGLOM >60 10/12/2022 04:10 AM    GLUCOSE 77 10/12/2022 04:10 AM    PROT 5.9 10/09/2022 04:00 PM    LABALBU 3.7 10/09/2022 04:00 PM    CALCIUM 9.0 10/12/2022 04:10 AM    BILITOT 0.4 10/09/2022 04:00 PM    ALKPHOS 109 10/09/2022 04:00 PM    AST 18 10/09/2022 04:00 PM    ALT 17 10/09/2022 04:00 PM          Current Facility-Administered Medications:     miconazole (MICOTIN) 2 % cream, , Topical, BID, Marjorie Barthel, MD, Given at 10/12/22 4552    ipratropium-albuterol (DUONEB) nebulizer solution 1 ampule, 1 ampule, Inhalation, 4x daily, Marjorie Barthel, MD, 1 ampule at 10/12/22 0818    sodium chloride tablet 2 g, 2 g, Oral, TID WC, Erasto Bravo MD, 2 g at 10/12/22 0810    sodium chloride flush 0.9 % injection 5-40 mL, 5-40 mL, IntraVENous, 2 times per day, Norma Gonzalez DO, 10 mL at 10/12/22 0811    sodium chloride flush 0.9 % injection 5-40 mL, 5-40 mL, IntraVENous, PRN, Norma Gonzalez DO    0.9 % sodium chloride infusion, , IntraVENous, PRN, Norma Gonzalez, DO    enoxaparin (LOVENOX) injection 40 mg, 40 mg, SubCUTAneous, Daily, Norma Gonzalez DO, 40 mg at 10/12/22 0810    ondansetron (ZOFRAN-ODT) disintegrating tablet 4 mg, 4 mg, Oral, Q8H PRN **OR** ondansetron (ZOFRAN) injection 4 mg, 4 mg, IntraVENous, Q6H PRN, Norma Gonzalez DO    polyethylene glycol (GLYCOLAX) packet 17 g, 17 g, Oral, Daily PRN, Norma Gonzalez, DO    acetaminophen (TYLENOL) tablet 650 mg, 650 mg, Oral, Q6H PRN **OR** acetaminophen (TYLENOL) suppository 650 mg, 650 mg, Rectal, Q6H PRN, Norma Gonzalez, DO    glucose chewable tablet 16 g, 4 tablet, Oral, PRN, Norma Patton-Don, DO    dextrose bolus 10% 125 mL, 125 mL, IntraVENous, PRN **OR** dextrose bolus 10% 250 mL, 250 mL, IntraVENous, PRN, Norma Gonzalez,     glucagon (rDNA) injection 1 mg, 1 mg, SubCUTAneous, PRN, Norma Gonzalez,     dextrose 10 % infusion, , IntraVENous, Continuous PRN, Norma Gonzalez DO    clonazePAM (KLONOPIN) tablet 0.5 mg, 0.5 mg, Oral, Daily PRN, Norma Gonzalez DO, 0.5 mg at 10/12/22 0520    hydrOXYzine pamoate (VISTARIL) capsule 25 mg, 25 mg, Oral, Q8H PRN, Norma Gonzalez DO, 25 mg at 10/12/22 0022    levothyroxine (SYNTHROID) tablet 137 mcg, 137 mcg, Oral, Daily, Norma Abdi-Ochoa, DO, 137 mcg at 10/12/22 0520    rosuvastatin (CRESTOR) tablet 20 mg, 20 mg, Oral, Nightly, Norma Abdi-Ochoa, DO, 20 mg at 10/11/22 2042    Arformoterol Tartrate (BROVANA) nebulizer solution 15 mcg, 15 mcg, Nebulization, BID, Norma Abdi-Ocoha, DO, 15 mcg at 10/12/22 0818    budesonide (PULMICORT) nebulizer suspension 250 mcg, 250 mcg, Nebulization, BID, Norma Abdi-Ochoa, DO, 250 mcg at 10/12/22 0818    pantoprazole (PROTONIX) tablet 40 mg, 40 mg, Oral, QAM AC, Norma Abdi-Ochoa, DO, 40 mg at 10/12/22 0520    guaiFENesin tablet 400 mg, 400 mg, Oral, TID, Hiren Odom MD, 400 mg at 10/12/22 0810    potassium bicarb-citric acid (EFFER-K) effervescent tablet 20 mEq, 20 mEq, Oral, Daily, Norma Abdi-Ochoa, DO, 20 mEq at 10/12/22 0810    CTA PULMONARY W CONTRAST   Final Result   No evidence of pulmonary embolism. Large conglomerate of mediastinal and right hilar lymphadenopathy. There is   near complete occlusion of the right mainstem bronchus with atelectasis of   the right middle lobe      Numerous low-attenuation lesions in the liver likely representing metastatic   disease. Old ununited manubrial fracture. XR CHEST PORTABLE   Final Result   No acute process. Assessment:    Principal Problem:    Hyponatremia  Active Problems:    Primary hypertension    Hypothyroidism    Moderate protein-calorie malnutrition (HCC)    Tobacco abuse    Shortness of breath  Resolved Problems:    * No resolved hospital problems. *    74-year-old female with extended stage small cell lung carcinoma of R hilum with biopsy proven liver metastasis dx 9/6/22. Also has CHF and COPD. No evidence of metastasis to the bones on bone scan 9/22/22. Sp Mediport placement 9/23/22.     Plan:  -hyponatremia likely SIADH with extended stage SCC of the lung  -metastatic small cell lung cancer    I discussed with Dr. Yessenia Guerrero and to follow-up next week and begin treatment. Follow-up with Dr. Maksim Herrera at the [facility] after discharge. Electronically signed by ZAFAR Aguilar on 10/12/2022 at 9:58 AM     Patient seen and examined. Case discussed with PA. She has extensive stage small cell lung cancer. Recommendation to start treatment as soon as possible. She will receive -16 and carboplatin along with atezolizumab. Treatment will help with regards to her SIADH as well. We will continue following with you.     Maksim Herrera MD

## 2022-10-12 NOTE — PROGRESS NOTES
[facility]  Nephrology Progress Note    Patient's Name: Elijah Raya    History of Present Illness from 10/10 consult Note:    \"the pt is a 82 yo female with a pmh of htn, hyperlipdiemia, chf, hypothyroidism, gerd, lung cancer with siadh and liver lesions, tobacco abuse, who presented with sob. Labs showed na 111 repeat 116, k 4.4, co2 23, bun 9, cr 0.6, ca 8.2, wbc 11.8, hgb 11.2, plt 261. Cta chest showed no PE. She was ordered nacl 2 g tid as outpt. She is seen in the icu lying prone watching tv.she says she is hard of hearing. She feels weak. \"    Subjective:    10/12: Patient was seen and examined. She is lethargic, however she wakes up and she denies any chest pain or shortness of breath. She denies any abdominal pain or nausea.     PMH:    Past Medical History:   Diagnosis Date    Bronchitis     Hypertension     Thyroid disease        Patient Active Problem List   Diagnosis    Congestive heart failure of unknown etiology ([facility] [address]    Congestive heart failure due to cardiomyopathy (HCC)    Hypoxia    Simple chronic bronchitis (HCC)    Hyponatremia    Primary hypertension    Hypothyroidism    GERD (gastroesophageal reflux disease)    Depression    Palliative care by specialist    Goals of care, counseling/discussion    Small cell lung cancer in adult [facility])    Lung mass    Closed fracture of left proximal humerus    Moderate protein-calorie malnutrition (HCC)    Tobacco abuse    Shortness of breath       Meds:     miconazole   Topical BID    ipratropium-albuterol  1 ampule Inhalation 4x daily    sodium chloride  2 g Oral TID WC    sodium chloride flush  5-40 mL IntraVENous 2 times per day    enoxaparin  40 mg SubCUTAneous Daily    levothyroxine  137 mcg Oral Daily    rosuvastatin  20 mg Oral Nightly    Arformoterol Tartrate  15 mcg Nebulization BID    budesonide  250 mcg Nebulization BID    pantoprazole  40 mg Oral QAM AC    guaiFENesin  400 mg Oral TID    potassium bicarb-citric acid  20 mEq Oral Daily sodium chloride      dextrose         Meds prn:     sodium chloride flush, sodium chloride, ondansetron **OR** ondansetron, polyethylene glycol, acetaminophen **OR** acetaminophen, glucose, dextrose bolus **OR** dextrose bolus, glucagon (rDNA), dextrose, clonazePAM, hydrOXYzine pamoate    Meds prior to admission:     No current facility-administered medications on file prior to encounter. Current Outpatient Medications on File Prior to Encounter   Medication Sig Dispense Refill    rosuvastatin (CRESTOR) 20 MG tablet Take 1 tablet by mouth nightly 30 tablet 3    losartan (COZAAR) 25 MG tablet Take 1 tablet by mouth daily 30 tablet 3    metoprolol succinate (TOPROL XL) 50 MG extended release tablet Take 1 tablet by mouth daily 30 tablet 3    amLODIPine (NORVASC) 5 MG tablet Take 1 tablet by mouth daily 30 tablet 3    guaiFENesin 400 MG tablet Take 1 tablet by mouth in the morning, at noon, and at bedtime 56 tablet 0    vitamin B-6 (B-6) 50 MG tablet Take 1 tablet by mouth daily 30 tablet 3    sodium chloride 1 g tablet Take 2 tablets by mouth 3 times daily (with meals) 90 tablet 3    fluticasone-vilanterol (BREO ELLIPTA) 100-25 MCG/INH AEPB inhaler Inhale 1 puff into the lungs daily      albuterol (PROVENTIL) (2.5 MG/3ML) 0.083% nebulizer solution Take 2.5 mg by nebulization 3 times daily as needed      fluticasone (FLONASE) 50 MCG/ACT nasal spray 1 spray by Nasal route daily      montelukast (SINGULAIR) 10 MG tablet Take 10 mg by mouth every morning      albuterol sulfate  (90 Base) MCG/ACT inhaler Inhale 2 puffs into the lungs every 6 hours as needed for Wheezing 1 each 3    potassium chloride (KLOR-CON M) 20 MEQ extended release tablet Take 1 tablet by mouth daily 60 tablet 3    omeprazole (PRILOSEC) 20 MG delayed release capsule Take 20 mg by mouth daily      clonazePAM (KLONOPIN) 1 MG tablet Take 0.5 mg by mouth daily as needed for Anxiety.       levothyroxine (SYNTHROID) 137 MCG tablet Take 137 mcg by mouth Daily          Allergies:    Patient has no known allergies. Social History:     reports that she has been smoking cigarettes. She started smoking about 50 years ago. She has a 50.00 pack-year smoking history. She has never used smokeless tobacco. She reports that she does not drink alcohol and does not use drugs. Family History:     History reviewed. No pertinent family history. Physical Exam:      Patient Vitals for the past 24 hrs:   BP Temp Temp src Pulse Resp SpO2 Height   10/12/22 0736 133/63 98 °F (36.7 °C) Oral (!) 103 18 100 % --   10/11/22 2030 128/83 98.2 °F (36.8 °C) Oral 93 18 100 % --   10/11/22 1537 -- -- -- (!) 103 18 98 % --   10/11/22 1451 -- -- -- -- -- -- 5' 2\" (1.575 m)   10/11/22 1400 114/65 -- -- 94 22 (!) 88 % --   10/11/22 1300 112/66 -- -- 93 15 95 % --   10/11/22 1200 138/66 98.4 °F (36.9 °C) Temporal 100 17 98 % --   10/11/22 1100 (!) 108/51 -- -- (!) 103 23 94 % --   10/11/22 1000 138/80 -- -- 95 14 100 % --   10/11/22 0900 130/65 -- -- 98 20 100 % --       No intake or output data in the 24 hours ending 10/12/22 0829    General: Awake, alert, no acute distress  Neck: No JVD noted  Lungs: Clear bilaterally upper, diminished to the bases bilaterally. Unlabored  CV: Regular rate and rhythm. No rub  Abd: Soft, nontender, nondistended. Active bowel sounds  Skin: Warm and dry.   No rash on exposed extremities  Ext: No edema   Neuro: Awake, answers questions appropriately    Data:    Recent Labs     10/10/22  0415 10/11/22  0500 10/12/22  0410   WBC 11.8* 11.3 8.9   HGB 11.2* 12.7 11.6   HCT 31.1* 36.7 34.2   MCV 82.3 84.8 86.6    264 280       Recent Labs     10/11/22  1744 10/11/22  1947 10/11/22  2305 10/12/22  0215 10/12/22  0410   *   < > 122* 121* 120*   K 3.7  --  4.1  --  4.0   CL 87*  --  89*  --  87*   CO2 21*  --  24  --  23   CREATININE 0.7  --  0.6  --  0.5   BUN 10  --  10  --  8   LABGLOM >60  --  >60  --  >60   GLUCOSE 126*  --  117*  --  77 CALCIUM 8.7  --  8.3*  --  9.0    < > = values in this interval not displayed. Vit D, 25-Hydroxy   Date Value Ref Range Status   09/23/2022 54 30 - 100 ng/mL Final     Comment:     <20 ng/mL. ........... [employer] Deficient  20-30 ng/mL. ......... [employer] Insufficient   ng/mL. ........ [employer] Sufficient  >100 ng/mL. .......... [employer] Toxic         PTH   Date Value Ref Range Status   09/23/2022 40 15 - 65 pg/mL Final       Recent Labs     10/09/22  1600   ALT 17   AST 18   ALKPHOS 109*   BILITOT 0.4       Recent Labs     10/09/22  1600   LABALBU 3.7       No results found for: FERRITIN, IRON, TIBC    Vitamin B-12   Date Value Ref Range Status   09/21/2022 445 211 - 946 pg/mL Final       Folate   Date Value Ref Range Status   09/21/2022 10.4 4.8 - 24.2 ng/mL Final       Lab Results   Component Value Date/Time    COLORU Yellow 10/09/2022 03:59 PM    NITRU Negative 10/09/2022 03:59 PM    GLUCOSEU Negative 10/09/2022 03:59 PM    KETUA Negative 10/09/2022 03:59 PM    UROBILINOGEN 0.2 10/09/2022 03:59 PM    BILIRUBINUR Negative 10/09/2022 03:59 PM       Lab Results   Component Value Date/Time    OSMOU 581 10/10/2022 05:04 PM       No components found for: URIC    No results found for: LIPIDPAN    Assessment and Plans:    Hyponatremia  Urine electrolytes consistent with hypovolemia  History of SIADH with lung cancer  Sodium as low as 111 on 10/9--> 124 today  Urine sodium 60, urine osmolality 581, urine creatinine 123  1000 mL fluid restriction  On sodium chloride 2 g oral 3 times daily  Strict I&O  Monitor labs    2. Hypertension, essential  BP goal<130/80  BP near goal  Monitor Bps    3.   Shortness of breath/small cell lung carcinoma  CTA pulmonary 10/9 no PE, large conglomerate of mediastinal/hilar lymphadenopathy right, numerous lesions in the liver  CXR 10/9 no acute process  Hematology/oncology following    Pal Hassan, APRN - CNP  Pt seen and examined agree with above  On na cl tabs  Very slow rise in na  Mike Valdovinos MD

## 2022-10-12 NOTE — PLAN OF CARE
Problem: Chronic Conditions and Co-morbidities  Goal: Patient's chronic conditions and co-morbidity symptoms are monitored and maintained or improved  10/12/2022 0028 by Fatou Blue RN  Outcome: Progressing  10/11/2022 1239 by Pat Llanos RN  Outcome: Progressing     Problem: Discharge Planning  Goal: Discharge to home or other facility with appropriate resources  10/12/2022 0028 by Fatou Blue RN  Outcome: Progressing  10/11/2022 1239 by Pat Llanos RN  Outcome: Progressing     Problem: Safety - Adult  Goal: Free from fall injury  10/12/2022 0028 by Fatou Blue RN  Outcome: Progressing  10/11/2022 1239 by Pat Llanos RN  Outcome: Progressing     Problem: ABCDS Injury Assessment  Goal: Absence of physical injury  10/12/2022 0028 by Fatou Blue RN  Outcome: Progressing  10/11/2022 1239 by Pat Llanos RN  Outcome: Progressing     Problem: Skin/Tissue Integrity  Goal: Absence of new skin breakdown  Description: 1. Monitor for areas of redness and/or skin breakdown  2. Assess vascular access sites hourly  3. Every 4-6 hours minimum:  Change oxygen saturation probe site  4. Every 4-6 hours:  If on nasal continuous positive airway pressure, respiratory therapy assess nares and determine need for appliance change or resting period.   10/12/2022 0028 by Fatou Blue RN  Outcome: Progressing  10/11/2022 1239 by Pat Llanos RN  Outcome: Progressing     Problem: Nutrition Deficit:  Goal: Optimize nutritional status  Outcome: Progressing

## 2022-10-13 LAB
ANION GAP SERPL CALCULATED.3IONS-SCNC: 10 MMOL/L (ref 7–16)
ANION GAP SERPL CALCULATED.3IONS-SCNC: 11 MMOL/L (ref 7–16)
BUN BLDV-MCNC: 11 MG/DL (ref 6–23)
BUN BLDV-MCNC: 8 MG/DL (ref 6–23)
CALCIUM SERPL-MCNC: 9.2 MG/DL (ref 8.6–10.2)
CALCIUM SERPL-MCNC: 9.4 MG/DL (ref 8.6–10.2)
CHLORIDE BLD-SCNC: 92 MMOL/L (ref 98–107)
CHLORIDE BLD-SCNC: 94 MMOL/L (ref 98–107)
CO2: 24 MMOL/L (ref 22–29)
CO2: 25 MMOL/L (ref 22–29)
CREAT SERPL-MCNC: 0.6 MG/DL (ref 0.5–1)
CREAT SERPL-MCNC: 0.6 MG/DL (ref 0.5–1)
GFR AFRICAN AMERICAN: >60
GFR AFRICAN AMERICAN: >60
GFR NON-AFRICAN AMERICAN: >60 ML/MIN/1.73
GFR NON-AFRICAN AMERICAN: >60 ML/MIN/1.73
GLUCOSE BLD-MCNC: 122 MG/DL (ref 74–99)
GLUCOSE BLD-MCNC: 75 MG/DL (ref 74–99)
POTASSIUM SERPL-SCNC: 3.9 MMOL/L (ref 3.5–5)
POTASSIUM SERPL-SCNC: 4 MMOL/L (ref 3.5–5)
REASON FOR REJECTION: NORMAL
REJECTED TEST: NORMAL
SODIUM BLD-SCNC: 126 MMOL/L (ref 132–146)
SODIUM BLD-SCNC: 128 MMOL/L (ref 132–146)
SODIUM BLD-SCNC: 128 MMOL/L (ref 132–146)

## 2022-10-13 PROCEDURE — 2580000003 HC RX 258

## 2022-10-13 PROCEDURE — 84295 ASSAY OF SERUM SODIUM: CPT

## 2022-10-13 PROCEDURE — 94640 AIRWAY INHALATION TREATMENT: CPT

## 2022-10-13 PROCEDURE — 36415 COLL VENOUS BLD VENIPUNCTURE: CPT

## 2022-10-13 PROCEDURE — 6360000002 HC RX W HCPCS

## 2022-10-13 PROCEDURE — 1200000000 HC SEMI PRIVATE

## 2022-10-13 PROCEDURE — 6370000000 HC RX 637 (ALT 250 FOR IP): Performed by: INTERNAL MEDICINE

## 2022-10-13 PROCEDURE — 2700000000 HC OXYGEN THERAPY PER DAY

## 2022-10-13 PROCEDURE — 80048 BASIC METABOLIC PNL TOTAL CA: CPT

## 2022-10-13 PROCEDURE — 6370000000 HC RX 637 (ALT 250 FOR IP)

## 2022-10-13 PROCEDURE — 6370000000 HC RX 637 (ALT 250 FOR IP): Performed by: NURSE PRACTITIONER

## 2022-10-13 RX ORDER — HYDROXYZINE PAMOATE 25 MG/1
25 CAPSULE ORAL EVERY 12 HOURS PRN
Qty: 28 CAPSULE | Refills: 0 | Status: SHIPPED | OUTPATIENT
Start: 2022-10-13 | End: 2022-10-14 | Stop reason: SDUPTHER

## 2022-10-13 RX ADMIN — BUDESONIDE 250 MCG: 0.25 SUSPENSION RESPIRATORY (INHALATION) at 07:38

## 2022-10-13 RX ADMIN — GUAIFENESIN 400 MG: 400 TABLET ORAL at 19:59

## 2022-10-13 RX ADMIN — IPRATROPIUM BROMIDE AND ALBUTEROL SULFATE 1 AMPULE: 2.5; .5 SOLUTION RESPIRATORY (INHALATION) at 11:46

## 2022-10-13 RX ADMIN — Medication 10 ML: at 10:24

## 2022-10-13 RX ADMIN — ARFORMOTEROL TARTRATE 15 MCG: 15 SOLUTION RESPIRATORY (INHALATION) at 19:20

## 2022-10-13 RX ADMIN — LEVOTHYROXINE SODIUM 137 MCG: 0.14 TABLET ORAL at 05:59

## 2022-10-13 RX ADMIN — ROSUVASTATIN CALCIUM 20 MG: 20 TABLET, FILM COATED ORAL at 19:59

## 2022-10-13 RX ADMIN — GUAIFENESIN 400 MG: 400 TABLET ORAL at 12:55

## 2022-10-13 RX ADMIN — CLONAZEPAM 0.5 MG: 0.5 TABLET ORAL at 05:59

## 2022-10-13 RX ADMIN — HYDROXYZINE PAMOATE 25 MG: 25 CAPSULE ORAL at 16:46

## 2022-10-13 RX ADMIN — POTASSIUM BICARBONATE 20 MEQ: 782 TABLET, EFFERVESCENT ORAL at 10:21

## 2022-10-13 RX ADMIN — HYDROXYZINE PAMOATE 25 MG: 25 CAPSULE ORAL at 02:53

## 2022-10-13 RX ADMIN — Medication 10 ML: at 23:17

## 2022-10-13 RX ADMIN — SODIUM CHLORIDE 2 G: 1 TABLET ORAL at 12:55

## 2022-10-13 RX ADMIN — ENOXAPARIN SODIUM 40 MG: 100 INJECTION SUBCUTANEOUS at 10:21

## 2022-10-13 RX ADMIN — MICONAZOLE NITRATE: 20 CREAM TOPICAL at 23:16

## 2022-10-13 RX ADMIN — IPRATROPIUM BROMIDE AND ALBUTEROL SULFATE 1 AMPULE: 2.5; .5 SOLUTION RESPIRATORY (INHALATION) at 07:37

## 2022-10-13 RX ADMIN — SODIUM CHLORIDE 2 G: 1 TABLET ORAL at 10:20

## 2022-10-13 RX ADMIN — CLONAZEPAM 0.5 MG: 0.5 TABLET ORAL at 16:46

## 2022-10-13 RX ADMIN — PANTOPRAZOLE SODIUM 40 MG: 40 TABLET, DELAYED RELEASE ORAL at 05:59

## 2022-10-13 RX ADMIN — IPRATROPIUM BROMIDE AND ALBUTEROL SULFATE 1 AMPULE: 2.5; .5 SOLUTION RESPIRATORY (INHALATION) at 19:21

## 2022-10-13 RX ADMIN — GUAIFENESIN 400 MG: 400 TABLET ORAL at 10:21

## 2022-10-13 RX ADMIN — ARFORMOTEROL TARTRATE 15 MCG: 15 SOLUTION RESPIRATORY (INHALATION) at 07:38

## 2022-10-13 RX ADMIN — SODIUM CHLORIDE 2 G: 1 TABLET ORAL at 16:59

## 2022-10-13 RX ADMIN — BUDESONIDE 250 MCG: 0.25 SUSPENSION RESPIRATORY (INHALATION) at 19:21

## 2022-10-13 RX ADMIN — IPRATROPIUM BROMIDE AND ALBUTEROL SULFATE 1 AMPULE: 2.5; .5 SOLUTION RESPIRATORY (INHALATION) at 15:57

## 2022-10-13 ASSESSMENT — PAIN SCALES - GENERAL
PAINLEVEL_OUTOF10: 0
PAINLEVEL_OUTOF10: 0

## 2022-10-13 NOTE — PROGRESS NOTES
Subjective:  Patient feeling better, she is ready for discharge  Hard of hearing    Breathing feels a little better  Denies chest pain, angina, abdominal discomfort. No nausea or vomiting. Tolerating diet. Objective:    BP (!) 142/87   Pulse (!) 102   Temp 98.1 °F (36.7 °C) (Oral)   Resp 18   Ht 5' 2\" (1.575 m)   Wt 152 lb 1.9 oz (69 kg)   SpO2 97%   BMI 27.82 kg/m²     General: NAD  HEENT: No thrush or mucositis, EOMI  Heart:  Sinus tachycardia, no murmurs, gallops, or rubs. Lungs:  Decreased BS bilaterally with wheezing and rhonchi.  No rales, wet cough  Abd: BS present, nontender, nondistended, no masses  Extrem:  No clubbing, cyanosis, or edema  Lymphatics: No palpable adenopathy in cervical and supraclavicular regions  Skin: Intact, no petechia or purpura    CBC with Differential:    Lab Results   Component Value Date/Time    WBC 8.9 10/12/2022 04:10 AM    RBC 3.95 10/12/2022 04:10 AM    HGB 11.6 10/12/2022 04:10 AM    HCT 34.2 10/12/2022 04:10 AM     10/12/2022 04:10 AM    MCV 86.6 10/12/2022 04:10 AM    MCH 29.4 10/12/2022 04:10 AM    MCHC 33.9 10/12/2022 04:10 AM    RDW 14.0 10/12/2022 04:10 AM    METASPCT 0.9 09/23/2022 04:05 AM    LYMPHOPCT 8.7 10/12/2022 04:10 AM    MONOPCT 1.7 10/12/2022 04:10 AM    MYELOPCT 0.9 09/21/2022 05:00 AM    BASOPCT 1.0 10/12/2022 04:10 AM    MONOSABS 0.18 10/12/2022 04:10 AM    LYMPHSABS 0.80 10/12/2022 04:10 AM    EOSABS 0.85 10/12/2022 04:10 AM    BASOSABS 0.00 10/12/2022 04:10 AM     CMP:    Lab Results   Component Value Date/Time     10/13/2022 02:05 AM    K 4.0 10/12/2022 11:12 PM    K 4.4 10/09/2022 04:00 PM    CL 94 10/12/2022 11:12 PM    CO2 24 10/12/2022 11:12 PM    BUN 11 10/12/2022 11:12 PM    CREATININE 0.6 10/12/2022 11:12 PM    GFRAA >60 10/12/2022 11:12 PM    LABGLOM >60 10/12/2022 11:12 PM    GLUCOSE 122 10/12/2022 11:12 PM    PROT 5.9 10/09/2022 04:00 PM    LABALBU 3.7 10/09/2022 04:00 PM    CALCIUM 9.4 10/12/2022 11:12 PM    BILITOT 0.4 10/09/2022 04:00 PM    ALKPHOS 109 10/09/2022 04:00 PM    AST 18 10/09/2022 04:00 PM    ALT 17 10/09/2022 04:00 PM          Current Facility-Administered Medications:     clonazePAM (KLONOPIN) tablet 0.5 mg, 0.5 mg, Oral, Q8H PRN, Elizebeth Alison, APRN - CNP, 0.5 mg at 10/13/22 0559    hydrOXYzine pamoate (VISTARIL) capsule 25 mg, 25 mg, Oral, Q12H PRN, Elizebeth Alison, APRN - CNP, 25 mg at 10/13/22 0253    miconazole (MICOTIN) 2 % cream, , Topical, BID, Rach Rees MD, Given at 10/12/22 2045    ipratropium-albuterol (DUONEB) nebulizer solution 1 ampule, 1 ampule, Inhalation, 4x daily, Rach Rees MD, 1 ampule at 10/13/22 0737    sodium chloride tablet 2 g, 2 g, Oral, TID WC, Alexandru Noonan MD, 2 g at 10/12/22 1733    sodium chloride flush 0.9 % injection 5-40 mL, 5-40 mL, IntraVENous, 2 times per day, Norma Gonzalez DO, 10 mL at 10/12/22 2044    sodium chloride flush 0.9 % injection 5-40 mL, 5-40 mL, IntraVENous, PRN, Norma Gonzalez DO    0.9 % sodium chloride infusion, , IntraVENous, PRN, Norma Gonzalez, DO    enoxaparin (LOVENOX) injection 40 mg, 40 mg, SubCUTAneous, Daily, Norma Gonzalez DO, 40 mg at 10/12/22 0810    ondansetron (ZOFRAN-ODT) disintegrating tablet 4 mg, 4 mg, Oral, Q8H PRN **OR** ondansetron (ZOFRAN) injection 4 mg, 4 mg, IntraVENous, Q6H PRN, Norma Gonzalez, DO    polyethylene glycol (GLYCOLAX) packet 17 g, 17 g, Oral, Daily PRN, Norma Gonzalez, DO    acetaminophen (TYLENOL) tablet 650 mg, 650 mg, Oral, Q6H PRN **OR** acetaminophen (TYLENOL) suppository 650 mg, 650 mg, Rectal, Q6H PRN, Norma Gonzalez, DO    glucose chewable tablet 16 g, 4 tablet, Oral, PRN, Norma Gonzalez, DO    dextrose bolus 10% 125 mL, 125 mL, IntraVENous, PRN **OR** dextrose bolus 10% 250 mL, 250 mL, IntraVENous, PRN, Norma Gonzalez, DO    glucagon (rDNA) injection 1 mg, 1 mg, SubCUTAneous, PRN, Norma Gonzalez, DO    dextrose 10 % infusion, , IntraVENous, Continuous PRN, Norma Abdi-Ochoa, DO    levothyroxine (SYNTHROID) tablet 137 mcg, 137 mcg, Oral, Daily, Norma Abdi-Ochoa, DO, 137 mcg at 10/13/22 0559    rosuvastatin (CRESTOR) tablet 20 mg, 20 mg, Oral, Nightly, Norma Abdi-Ochoa, DO, 20 mg at 10/12/22 2044    Arformoterol Tartrate (BROVANA) nebulizer solution 15 mcg, 15 mcg, Nebulization, BID, Norma Abdi-Ochoa, DO, 15 mcg at 10/13/22 0738    budesonide (PULMICORT) nebulizer suspension 250 mcg, 250 mcg, Nebulization, BID, Norma Abdi-Ochoa, DO, 250 mcg at 10/13/22 0738    pantoprazole (PROTONIX) tablet 40 mg, 40 mg, Oral, QAM AC, Norma Abdi-Ochoa, DO, 40 mg at 10/13/22 0559    guaiFENesin tablet 400 mg, 400 mg, Oral, TID, Ani Barba MD, 400 mg at 10/12/22 2044    potassium bicarb-citric acid (EFFER-K) effervescent tablet 20 mEq, 20 mEq, Oral, Daily, Norma Abdi-Ochoa, DO, 20 mEq at 10/12/22 0810    CTA PULMONARY W CONTRAST   Final Result   No evidence of pulmonary embolism. Large conglomerate of mediastinal and right hilar lymphadenopathy. There is   near complete occlusion of the right mainstem bronchus with atelectasis of   the right middle lobe      Numerous low-attenuation lesions in the liver likely representing metastatic   disease. Old ununited manubrial fracture. XR CHEST PORTABLE   Final Result   No acute process. Assessment:    Principal Problem:    Hyponatremia  Active Problems:    Primary hypertension    Hypothyroidism    Moderate protein-calorie malnutrition (HCC)    Tobacco abuse    Shortness of breath  Resolved Problems:    * No resolved hospital problems. *    81-year-old female with extended stage small cell lung carcinoma of R hilum with biopsy proven liver metastasis dx 9/6/22. Also has CHF and COPD. No evidence of metastasis to the bones on bone scan 9/22/22. Sp Mediport placement 9/23/22.     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 Narayan Tam at the [facility] after discharge. Patient is scheduled for an appointment and treatment Monday October 17th at 9:00.       Electronically signed by ZAFAR Berrios on 10/13/2022 at 8:29 AM

## 2022-10-13 NOTE — PROGRESS NOTES
[facility]   Progress note      Subjective: The patient is awake and alert. Transferred out of ICU 10/11  Resting in bed visiting with family. Objective:    /81   Pulse (!) 108   Temp 97.8 °F (36.6 °C) (Oral)   Resp 18   Ht 5' 2\" (1.575 m)   Wt 152 lb 1.9 oz (69 kg)   SpO2 100%   BMI 27.82 kg/m²     In: 640 [P.O.:640]  Out: 500   In: 640   Out: 500 [Urine:500]    General appearance: NAD, conversant  HEENT: AT/NC, MMM  Neck: FROM, supple  Lungs: diminished, 2L NC  CV: RRR, no MRGs-right-sided port in place  Vasc: Radial pulses 2+  Abdomen: Soft, non-tender; no masses or HSM  Extremities: No peripheral edema or digital cyanosis  Skin: no rash, lesions or ulcers  Psych: Alert and oriented to person, place and time  Neuro: Alert and interactive     Recent Labs     10/11/22  0500 10/12/22  0410   WBC 11.3 8.9   HGB 12.7 11.6   HCT 36.7 34.2    280         Recent Labs     10/12/22  1634 10/12/22  1955 10/12/22  2312 10/13/22  0205 10/13/22  0805   *   < > 128* 126* 128*   K 4.2  --  4.0  --  3.9   CL 91*  --  94*  --  92*   CO2 20*  --  24  --  25   BUN 11  --  11  --  8   CREATININE 0.6  --  0.6  --  0.6   CALCIUM 9.1  --  9.4  --  9.2    < > = values in this interval not displayed. Assessment:    Principal Problem:    Hyponatremia  Active Problems:    Primary hypertension    Hypothyroidism    Moderate protein-calorie malnutrition (HCC)    Tobacco abuse    Shortness of breath  Resolved Problems:    * No resolved hospital problems.  *      Plan:    82-year-old female with a history of metastatic small cell lung CA with mets to the liver and who was recently discharged on 9/26 with severe hyponatremia is admitted once again to the ICU with     10/10/2022 recurrent/persistent hyponatremia from small cell lung cancer/SIADH  Monitor labs-sodium 116 currently on admission was 111  3% sodium x4 hours per nephrology, avoid overcorrection to avoid cerebral pontine myelolysis  BMP every 6 hours  Watch mentation as she appears to be somewhat confused today  Nephrology following -await input  Hold offending agents  Vital signs per ICU protocol  Fluid restriction 1 L    10/11/2022  Sodium level increased to 119 today  Patient much more alert and awake sitting up in chair in no apparent acute distress  Indicates she is feeling better but quite frustrated  Patient likely would benefit from staying on salt tabs indefinitely  Would consider initiating chemotherapy given that she had a Mediport placed last admission, to see how she tolerates prior to discharge    10/12:  -Na+ levels continue to improve, 124 today  -Awaiting input from oncology for possible initiation of chemotherapy inpatient. 10/13:  -Na+ 128 today, nephro would like to wait for morning labs prior to discharge.   -Hem/onc with no plans for inpatient chemo, follow up planned for Monday. Code Status: Limited   Consultants:  Pulmonary, critical care (signed/off), renal, oncology  DVT Prophylaxis Lovenox   PT/OT  Discharge planning       FELIPA Bergman CNP  4:54 PM  10/13/2022     Above note edited to reflect my thoughts     I personally saw, examined and provided care for the patient. Radiographs, labs and medication list were reviewed by me independently. The case was discussed in detail and plans for care were established. Review of KATIE Bergman   , documentation was conducted and revisions were made as appropriate directly by me. I agree with the above documented exam, problem list, and plan of care.      Baby Seip, MD  9:00 PM  10/13/2022

## 2022-10-13 NOTE — DISCHARGE SUMMARY
[facility]   Discharge summary   Patient ID:  Miky Cardenas  [id number]  81 y.o.  1950    Admit date: 10/9/2022    Discharge date and time: 10/14/22    Admission Diagnoses:   Patient Active Problem List   Diagnosis    Congestive heart failure of unknown etiology ([facility] [address]    Congestive heart failure due to cardiomyopathy (HCC)    Hypoxia    Simple chronic bronchitis (HCC)    Hyponatremia    Primary hypertension    Hypothyroidism    GERD (gastroesophageal reflux disease)    Depression    Palliative care by specialist    Goals of care, counseling/discussion    Small cell lung cancer in [facility])    Lung mass    Closed fracture of left proximal humerus    Moderate protein-calorie malnutrition (HCC)    Tobacco abuse    Shortness of breath       Discharge Diagnoses: Hyponatremia    Consults: pulmonary/intensive care, nephrology, and hematology/oncology    Procedures: none    Hospital Course:     29-year-old female with a history of metastatic small cell lung CA with mets to the liver and who was recently discharged on 9/26 with severe hyponatremia is admitted once again to the ICU with     10/10/2022 recurrent/persistent hyponatremia from small cell lung cancer/SIADH  Monitor labs-sodium 116 currently on admission was 111  3% sodium x4 hours per nephrology, avoid overcorrection to avoid cerebral pontine myelolysis  BMP every 6 hours  Watch mentation as she appears to be somewhat confused today  Nephrology following -await input  Hold offending agents  Vital signs per ICU protocol  Fluid restriction 1 L     10/11/2022  Sodium level increased to 119 today  Patient much more alert and awake sitting up in chair in no apparent acute distress  Indicates she is feeling better but quite frustrated  Patient likely would benefit from staying on salt tabs indefinitely  Would consider initiating chemotherapy given that she had a Mediport placed last admission, to see how she tolerates prior to discharge 10/12:  -Na+ levels continue to improve, 124 today  -Awaiting input from oncology for possible initiation of chemotherapy inpatient    10/13:  -Na+ 128   -Hem/onc with no plans for inpatient chemo, follow up planned for Monday.   -discharge tomorrow as nephrology would like to keep another night     10/14:  -Na+ 128 on discharge  -Follow up with hem/onc on Monday and Nephro as an outpatient     Recent Labs     10/11/22  0500 10/12/22  0410   WBC 11.3 8.9   HGB 12.7 11.6   HCT 36.7 34.2    280       Recent Labs     10/12/22  1634 10/12/22  1955 10/12/22  2312 10/13/22  0205 10/13/22  0805   *   < > 128* 126* 128*   K 4.2  --  4.0  --  3.9   CL 91*  --  94*  --  92*   CO2 20*  --  24  --  25   BUN 11  --  11  --  8   CREATININE 0.6  --  0.6  --  0.6   CALCIUM 9.1  --  9.4  --  9.2    < > = values in this interval not displayed. XR CHEST PORTABLE    Result Date: 10/9/2022  EXAMINATION: ONE XRAY VIEW OF THE CHEST 10/9/2022 4:04 pm COMPARISON: 09/24/2022 HISTORY: ORDERING SYSTEM PROVIDED HISTORY: shortness of breath TECHNOLOGIST PROVIDED HISTORY: Reason for exam:->shortness of breath What reading provider will be dictating this exam?->CRC FINDINGS: The lungs are without acute focal process. There is no effusion or pneumothorax. The cardiomediastinal silhouette is without acute process. The osseous structures are without acute process. Left-sided port a catheter tip in the right atrium 4.0 cm from the caval atrial junction. Scoliosis. No acute process. CTA PULMONARY W CONTRAST    Result Date: 10/9/2022  EXAMINATION: CTA OF THE CHEST 10/9/2022 10:23 pm TECHNIQUE: CTA of the chest was performed after the administration of intravenous contrast.  Multiplanar reformatted images are provided for review. MIP images are provided for review.  Automated exposure control, iterative reconstruction, and/or weight based adjustment of the mA/kV was utilized to reduce the radiation dose to as low as reasonably achievable. COMPARISON: None. HISTORY: ORDERING SYSTEM PROVIDED HISTORY: r/o PE TECHNOLOGIST PROVIDED HISTORY: Reason for exam:->r/o PE Decision Support Exception - unselect if not a suspected or confirmed emergency medical condition->Emergency Medical Condition (MA) What reading provider will be dictating this exam?->CRC FINDINGS: Pulmonary Arteries: Pulmonary arteries are adequately opacified for evaluation. No evidence of intraluminal filling defect to suggest pulmonary embolism. Main pulmonary artery is normal in caliber. Mediastinum: There is extensive right hilar and mediastinal lymphadenopathy. There is near complete occlusion of the right mainstem bronchus. There is postobstructive atelectasis of the right middle lobe. The heart and pericardium demonstrate no acute abnormality. There is no acute abnormality of the thoracic aorta. Lungs/pleura: The lungs are without acute process. No focal consolidation or pulmonary edema. No evidence of pleural effusion or pneumothorax. Upper Abdomen: Numerous low-attenuation lesions throughout the liver likely representing metastatic disease. . Soft Tissues/Bones: There is a old manubrial fracture, IN     No evidence of pulmonary embolism. Large conglomerate of mediastinal and right hilar lymphadenopathy. There is near complete occlusion of the right mainstem bronchus with atelectasis of the right middle lobe Numerous low-attenuation lesions in the liver likely representing metastatic disease. Old ununited manubrial fracture. Discharge Exam:    HEENT: NCAT,  PERRLA, No JVD  Heart:  RRR, no murmurs, gallops, or rubs.   Lungs:  CTA bilaterally, no wheeze, rales or rhonchi  Abd: bowel sounds present, nontender, nondistended, no masses  Extrem:  No clubbing, cyanosis, or edema    Disposition: home     Patient Condition at Discharge: Stable     Patient Instructions:      Medication List        CHANGE how you take these medications      hydrOXYzine pamoate 25 MG capsule  Commonly known as: VISTARIL  Take 1 capsule by mouth every 12 hours as needed for Itching or Anxiety  What changed: when to take this            CONTINUE taking these medications      * albuterol (2.5 MG/3ML) 0.083% nebulizer solution  Commonly known as: PROVENTIL     * albuterol sulfate  (90 Base) MCG/ACT inhaler  Commonly known as: PROVENTIL;VENTOLIN;PROAIR  Inhale 2 puffs into the lungs every 6 hours as needed for Wheezing     clonazePAM 1 MG tablet  Commonly known as: KLONOPIN     fluticasone 50 MCG/ACT nasal spray  Commonly known as: FLONASE     fluticasone-vilanterol 100-25 MCG/INH Aepb inhaler  Commonly known as: BREO ELLIPTA     guaiFENesin 400 MG tablet  Take 1 tablet by mouth in the morning, at noon, and at bedtime     levothyroxine 137 MCG tablet  Commonly known as: SYNTHROID     metoprolol succinate 50 MG extended release tablet  Commonly known as: TOPROL XL  Take 1 tablet by mouth daily     montelukast 10 MG tablet  Commonly known as: SINGULAIR     omeprazole 20 MG delayed release capsule  Commonly known as: PRILOSEC     potassium chloride 20 MEQ extended release tablet  Commonly known as: KLOR-CON M  Take 1 tablet by mouth daily     pyridoxine 50 MG tablet  Commonly known as: B-6  Take 1 tablet by mouth daily     rosuvastatin 20 MG tablet  Commonly known as: CRESTOR  Take 1 tablet by mouth nightly     sodium chloride 1 g tablet  Take 2 tablets by mouth 3 times daily (with meals)           * This list has 2 medication(s) that are the same as other medications prescribed for you. Read the directions carefully, and ask your doctor or other care provider to review them with you. STOP taking these medications      amLODIPine 5 MG tablet  Commonly known as: NORVASC     losartan 25 MG tablet  Commonly known as: COZAAR               Where to Get Your Medications        These medications were sent to [facility] [facility].  - [address]   [address] [facility]      Phone: [phone]   hydrOXYzine pamoate 25 MG capsule       Activity: activity as tolerated  Diet: regular diet    Pt has been advised to: Follow-up with Beronica Cooney DO in 1 week. Follow-up with consultants as recommended by them    Note that over 30 minutes was spent in preparing discharge papers, discussing discharge with patient, medication review, etc.    Signed:  FELIPA Flowers CNP  10/14/22  12:03 pm     Above note edited to reflect my thoughts     I personally saw, examined and provided care for the patient. Radiographs, labs and medication list were reviewed by me independently. The case was discussed in detail and plans for care were established. Review of [employer], APRN-CNP   , documentation was conducted and revisions were made as appropriate directly by me. I agree with the above documented exam, problem list, and plan of care.      Warden Nata MD  12:04 PM  10/14/2022

## 2022-10-13 NOTE — PROGRESS NOTES
[facility]  Nephrology Progress Note    Patient's Name: Lottie Blake    History of Present Illness from 10/10 consult Note:    \"the pt is a 82 yo female with a pmh of htn, hyperlipdiemia, chf, hypothyroidism, gerd, lung cancer with siadh and liver lesions, tobacco abuse, who presented with sob. Labs showed na 111 repeat 116, k 4.4, co2 23, bun 9, cr 0.6, ca 8.2, wbc 11.8, hgb 11.2, plt 261. Cta chest showed no PE. She was ordered nacl 2 g tid as outpt. She is seen in the icu lying prone watching tv.she says she is hard of hearing. She feels weak. \"    Subjective:    10/13: Patient was seen and examined. She reports that she feels alright. She denies any chest pain or shortness of breath. She denies any abdominal pain or nausea.     PMH:    Past Medical History:   Diagnosis Date    Bronchitis     Hypertension     Thyroid disease        Patient Active Problem List   Diagnosis    Congestive heart failure of unknown etiology ([facility] [address]    Congestive heart failure due to cardiomyopathy (HCC)    Hypoxia    Simple chronic bronchitis (HCC)    Hyponatremia    Primary hypertension    Hypothyroidism    GERD (gastroesophageal reflux disease)    Depression    Palliative care by specialist    Goals of care, counseling/discussion    Small cell lung cancer in adult [facility])    Lung mass    Closed fracture of left proximal humerus    Moderate protein-calorie malnutrition (HCC)    Tobacco abuse    Shortness of breath       Meds:     miconazole   Topical BID    ipratropium-albuterol  1 ampule Inhalation 4x daily    sodium chloride  2 g Oral TID WC    sodium chloride flush  5-40 mL IntraVENous 2 times per day    enoxaparin  40 mg SubCUTAneous Daily    levothyroxine  137 mcg Oral Daily    rosuvastatin  20 mg Oral Nightly    Arformoterol Tartrate  15 mcg Nebulization BID    budesonide  250 mcg Nebulization BID    pantoprazole  40 mg Oral QAM AC    guaiFENesin  400 mg Oral TID    potassium bicarb-citric acid  20 mEq Oral Daily        sodium chloride      dextrose         Meds prn:     clonazePAM, hydrOXYzine pamoate, sodium chloride flush, sodium chloride, ondansetron **OR** ondansetron, polyethylene glycol, acetaminophen **OR** acetaminophen, glucose, dextrose bolus **OR** dextrose bolus, glucagon (rDNA), dextrose    Meds prior to admission:     No current facility-administered medications on file prior to encounter. Current Outpatient Medications on File Prior to Encounter   Medication Sig Dispense Refill    rosuvastatin (CRESTOR) 20 MG tablet Take 1 tablet by mouth nightly 30 tablet 3    losartan (COZAAR) 25 MG tablet Take 1 tablet by mouth daily 30 tablet 3    metoprolol succinate (TOPROL XL) 50 MG extended release tablet Take 1 tablet by mouth daily 30 tablet 3    amLODIPine (NORVASC) 5 MG tablet Take 1 tablet by mouth daily 30 tablet 3    guaiFENesin 400 MG tablet Take 1 tablet by mouth in the morning, at noon, and at bedtime 56 tablet 0    vitamin B-6 (B-6) 50 MG tablet Take 1 tablet by mouth daily 30 tablet 3    sodium chloride 1 g tablet Take 2 tablets by mouth 3 times daily (with meals) 90 tablet 3    fluticasone-vilanterol (BREO ELLIPTA) 100-25 MCG/INH AEPB inhaler Inhale 1 puff into the lungs daily      albuterol (PROVENTIL) (2.5 MG/3ML) 0.083% nebulizer solution Take 2.5 mg by nebulization 3 times daily as needed      fluticasone (FLONASE) 50 MCG/ACT nasal spray 1 spray by Nasal route daily      montelukast (SINGULAIR) 10 MG tablet Take 10 mg by mouth every morning      albuterol sulfate  (90 Base) MCG/ACT inhaler Inhale 2 puffs into the lungs every 6 hours as needed for Wheezing 1 each 3    potassium chloride (KLOR-CON M) 20 MEQ extended release tablet Take 1 tablet by mouth daily 60 tablet 3    omeprazole (PRILOSEC) 20 MG delayed release capsule Take 20 mg by mouth daily      clonazePAM (KLONOPIN) 1 MG tablet Take 0.5 mg by mouth daily as needed for Anxiety.       levothyroxine (SYNTHROID) 137 MCG tablet Take 137 mcg by mouth Daily          Allergies:    Patient has no known allergies. Social History:     reports that she has been smoking cigarettes. She started smoking about 50 years ago. She has a 50.00 pack-year smoking history. She has never used smokeless tobacco. She reports that she does not drink alcohol and does not use drugs. Family History:     History reviewed. No pertinent family history. Physical Exam:      Patient Vitals for the past 24 hrs:   BP Temp Temp src Pulse Resp SpO2   10/13/22 0758 (!) 142/87 98.1 °F (36.7 °C) Oral (!) 102 18 97 %   10/12/22 1930 121/62 98.5 °F (36.9 °C) Oral (!) 108 17 99 %   10/12/22 1548 (!) 111/54 98.2 °F (36.8 °C) Oral (!) 119 18 98 %   10/12/22 1511 (!) 150/77 98.4 °F (36.9 °C) Oral (!) 120 18 98 %           Intake/Output Summary (Last 24 hours) at 10/13/2022 0844  Last data filed at 10/13/2022 3811  Gross per 24 hour   Intake 400 ml   Output 500 ml   Net -100 ml       General: Awake, alert, no acute distress  Neck: No JVD noted  Lungs: Crackles bilaterally upper, diminished to the bases bilaterally. Unlabored  CV: Regular rate and rhythm. No rub  Abd: Soft, nontender, nondistended. Active bowel sounds  Skin: Warm and dry. No rash on exposed extremities  Ext: No edema   Neuro: Awake, answers questions appropriately    Data:    Recent Labs     10/11/22  0500 10/12/22  0410   WBC 11.3 8.9   HGB 12.7 11.6   HCT 36.7 34.2   MCV 84.8 86.6    280         Recent Labs     10/12/22  1027 10/12/22  1345 10/12/22  1634 10/12/22  1955 10/12/22  2312 10/13/22  0205   *   < > 125* 128* 128* 126*   K 3.8  --  4.2  --  4.0  --    CL 90*  --  91*  --  94*  --    CO2 24  --  20*  --  24  --    CREATININE 0.5  --  0.6  --  0.6  --    BUN 7  --  11  --  11  --    LABGLOM >60  --  >60  --  >60  --    GLUCOSE 112*  --  110*  --  122*  --    CALCIUM 8.6  --  9.1  --  9.4  --     < > = values in this interval not displayed.          Vit D, 25-Hydroxy   Date Value Ref Range Status 09/23/2022 54 30 - 100 ng/mL Final     Comment:     <20 ng/mL. ........... Julianna Cabral Deficient  20-30 ng/mL. ......... Julianna Dower Insufficient   ng/mL. ........ Julianna Dower Sufficient  >100 ng/mL. .......... Julianna Cabral Toxic         PTH   Date Value Ref Range Status   09/23/2022 40 15 - 65 pg/mL Final       No results for input(s): ALT, AST, ALKPHOS, BILITOT, BILIDIR in the last 72 hours. No results for input(s): LABALBU in the last 72 hours. No results found for: FERRITIN, IRON, TIBC    Vitamin B-12   Date Value Ref Range Status   09/21/2022 445 211 - 946 pg/mL Final       Folate   Date Value Ref Range Status   09/21/2022 10.4 4.8 - 24.2 ng/mL Final       Lab Results   Component Value Date/Time    COLORU Yellow 10/09/2022 03:59 PM    NITRU Negative 10/09/2022 03:59 PM    GLUCOSEU Negative 10/09/2022 03:59 PM    KETUA Negative 10/09/2022 03:59 PM    UROBILINOGEN 0.2 10/09/2022 03:59 PM    BILIRUBINUR Negative 10/09/2022 03:59 PM       Lab Results   Component Value Date/Time    OSMOU 581 10/10/2022 05:04 PM       No components found for: URIC    No results found for: LIPIDPAN    Assessment and Plans:    Hyponatremia  Urine electrolytes consistent with hypovolemia  History of SIADH with lung cancer  Sodium as low as 111 on 10/9--> 126 today  Urine sodium 60, urine osmolality 581, urine creatinine 123  1000 mL fluid restriction  On sodium chloride 2 g oral 3 times daily  Strict I&O  Monitor labs    2. Hypertension, essential  BP goal<130/80  BP near goal  Monitor Bps    3.   Shortness of breath/small cell lung carcinoma  CTA pulmonary 10/9 no PE, large conglomerate of mediastinal/hilar lymphadenopathy right, numerous lesions in the liver  CXR 10/9 no acute process  Hematology/oncology and pulmonology following    Lroeta Laser, APRN - CNP  Pt seen and examined agree with above  Na slowly improving  Continue oral na tabs  Lucas Castellani, MD

## 2022-10-13 NOTE — PROGRESS NOTES
[facility] [address] [facility]  [address] [facility], [address], [address] [facility]      Pulmonary Progress Note      SUBJECTIVE:  claims stable with respiratory function, on 4 li NC, lying down on left side, better compared to admission, (?) discharge today    OBJECTIVE    Medications    Continuous Infusions:   sodium chloride      dextrose         Scheduled Meds:   miconazole   Topical BID    ipratropium-albuterol  1 ampule Inhalation 4x daily    sodium chloride  2 g Oral TID WC    sodium chloride flush  5-40 mL IntraVENous 2 times per day    enoxaparin  40 mg SubCUTAneous Daily    levothyroxine  137 mcg Oral Daily    rosuvastatin  20 mg Oral Nightly    Arformoterol Tartrate  15 mcg Nebulization BID    budesonide  250 mcg Nebulization BID    pantoprazole  40 mg Oral QAM AC    guaiFENesin  400 mg Oral TID    potassium bicarb-citric acid  20 mEq Oral Daily       PRN Meds:sodium chloride, clonazePAM, hydrOXYzine pamoate, sodium chloride flush, sodium chloride, ondansetron **OR** ondansetron, polyethylene glycol, acetaminophen **OR** acetaminophen, glucose, dextrose bolus **OR** dextrose bolus, glucagon (rDNA), dextrose    Physical    VITALS:  BP (!) 142/87   Pulse (!) 102   Temp 98.1 °F (36.7 °C) (Oral)   Resp 18   Ht 5' 2\" (1.575 m)   Wt 152 lb 1.9 oz (69 kg)   SpO2 97%   BMI 27.82 kg/m²     24HR INTAKE/OUTPUT:      Intake/Output Summary (Last 24 hours) at 10/13/2022 1536  Last data filed at 10/13/2022 7282  Gross per 24 hour   Intake 400 ml   Output 500 ml   Net -100 ml       24HR PULSE OXIMETRY RANGE:    SpO2  Av %  Min: 97 %  Max: 99 %    General appearance: alert, appears stated age, and cooperative  Lungs: rhonchi bibasilar  Heart: regular rate and rhythm, S1, S2 normal, no murmur, click, rub or gallop  Abdomen: soft, non-tender; bowel sounds normal; no masses,  no organomegaly  Extremities: extremities normal, atraumatic, no cyanosis or edema  Neurologic: Mental status: Alert, oriented, thought content appropriate    Data    CBC:   Recent Labs     10/11/22  0500 10/12/22  0410   WBC 11.3 8.9   HGB 12.7 11.6   HCT 36.7 34.2   MCV 84.8 86.6    280       BMP:  Recent Labs     10/12/22  1634 10/12/22  1955 10/12/22  2312 10/13/22  0205 10/13/22  0805   *   < > 128* 126* 128*   K 4.2  --  4.0  --  3.9   CL 91*  --  94*  --  92*   CO2 20*  --  24  --  25   BUN 11  --  11  --  8   CREATININE 0.6  --  0.6  --  0.6    < > = values in this interval not displayed. ALB:3,BILIDIR:3,BILITOT:3,ALKPHOS:3)@    PT/INR: No results for input(s): PROTIME, INR in the last 72 hours. ABG:   No results for input(s): PH, PO2, PCO2, HCO3, BE, O2SAT, METHB, O2HB, COHB, O2CON, HHB, THB in the last 72 hours. FiO2 : 60 %       Radiology/Other tests reviewed: none    Assessment:     Principal Problem:    Hyponatremia  Active Problems:    Primary hypertension    Hypothyroidism    Moderate protein-calorie malnutrition (HCC)    Tobacco abuse    Shortness of breath  Resolved Problems:    * No resolved hospital problems. *      Plan:       Cont with oxygen, taper as tolerated  Fluid restriction and Na supplement as per Renal  Cont with nebs, can resume usual medications as out-pt  For cancer treatment next week as per Oncology  Can be discharged from pulmonary pov      Time at the bedside, reviewing labs and radiographs, reviewing notes and consultations, discussing with staff and family was more than 35 minutes. Thanks for letting us see this patient in consultation. Please contact us with any questions. Office [phone] or after hours through [employer], x [phone].

## 2022-10-14 VITALS
TEMPERATURE: 98.2 F | DIASTOLIC BLOOD PRESSURE: 71 MMHG | WEIGHT: 157.04 LBS | RESPIRATION RATE: 24 BRPM | BODY MASS INDEX: 28.9 KG/M2 | OXYGEN SATURATION: 95 % | SYSTOLIC BLOOD PRESSURE: 129 MMHG | HEIGHT: 62 IN | HEART RATE: 113 BPM

## 2022-10-14 LAB
ANION GAP SERPL CALCULATED.3IONS-SCNC: 11 MMOL/L (ref 7–16)
BUN BLDV-MCNC: 7 MG/DL (ref 6–23)
CALCIUM SERPL-MCNC: 9.1 MG/DL (ref 8.6–10.2)
CHLORIDE BLD-SCNC: 92 MMOL/L (ref 98–107)
CO2: 25 MMOL/L (ref 22–29)
CREAT SERPL-MCNC: 0.6 MG/DL (ref 0.5–1)
GFR AFRICAN AMERICAN: >60
GFR NON-AFRICAN AMERICAN: >60 ML/MIN/1.73
GLUCOSE BLD-MCNC: 93 MG/DL (ref 74–99)
POTASSIUM SERPL-SCNC: 3.8 MMOL/L (ref 3.5–5)
SODIUM BLD-SCNC: 128 MMOL/L (ref 132–146)

## 2022-10-14 PROCEDURE — 6370000000 HC RX 637 (ALT 250 FOR IP): Performed by: INTERNAL MEDICINE

## 2022-10-14 PROCEDURE — 6360000002 HC RX W HCPCS

## 2022-10-14 PROCEDURE — 94640 AIRWAY INHALATION TREATMENT: CPT

## 2022-10-14 PROCEDURE — 6370000000 HC RX 637 (ALT 250 FOR IP)

## 2022-10-14 PROCEDURE — 6370000000 HC RX 637 (ALT 250 FOR IP): Performed by: NURSE PRACTITIONER

## 2022-10-14 PROCEDURE — 2580000003 HC RX 258

## 2022-10-14 PROCEDURE — 2700000000 HC OXYGEN THERAPY PER DAY

## 2022-10-14 PROCEDURE — 80048 BASIC METABOLIC PNL TOTAL CA: CPT

## 2022-10-14 PROCEDURE — 36415 COLL VENOUS BLD VENIPUNCTURE: CPT

## 2022-10-14 RX ORDER — HYDROXYZINE PAMOATE 25 MG/1
25 CAPSULE ORAL EVERY 12 HOURS PRN
Qty: 28 CAPSULE | Refills: 0 | Status: SHIPPED | OUTPATIENT
Start: 2022-10-14 | End: 2022-10-28

## 2022-10-14 RX ADMIN — ENOXAPARIN SODIUM 40 MG: 100 INJECTION SUBCUTANEOUS at 09:20

## 2022-10-14 RX ADMIN — CLONAZEPAM 0.5 MG: 0.5 TABLET ORAL at 00:07

## 2022-10-14 RX ADMIN — GUAIFENESIN 400 MG: 400 TABLET ORAL at 09:20

## 2022-10-14 RX ADMIN — PANTOPRAZOLE SODIUM 40 MG: 40 TABLET, DELAYED RELEASE ORAL at 05:20

## 2022-10-14 RX ADMIN — CLONAZEPAM 0.5 MG: 0.5 TABLET ORAL at 09:28

## 2022-10-14 RX ADMIN — MICONAZOLE NITRATE: 20 CREAM TOPICAL at 07:38

## 2022-10-14 RX ADMIN — IPRATROPIUM BROMIDE AND ALBUTEROL SULFATE 1 AMPULE: 2.5; .5 SOLUTION RESPIRATORY (INHALATION) at 16:00

## 2022-10-14 RX ADMIN — IPRATROPIUM BROMIDE AND ALBUTEROL SULFATE 1 AMPULE: 2.5; .5 SOLUTION RESPIRATORY (INHALATION) at 09:41

## 2022-10-14 RX ADMIN — Medication 10 ML: at 09:23

## 2022-10-14 RX ADMIN — GUAIFENESIN 400 MG: 400 TABLET ORAL at 13:22

## 2022-10-14 RX ADMIN — ARFORMOTEROL TARTRATE 15 MCG: 15 SOLUTION RESPIRATORY (INHALATION) at 09:41

## 2022-10-14 RX ADMIN — SODIUM CHLORIDE 2 G: 1 TABLET ORAL at 13:22

## 2022-10-14 RX ADMIN — LEVOTHYROXINE SODIUM 137 MCG: 0.14 TABLET ORAL at 05:26

## 2022-10-14 RX ADMIN — POTASSIUM BICARBONATE 20 MEQ: 782 TABLET, EFFERVESCENT ORAL at 09:20

## 2022-10-14 RX ADMIN — HYDROXYZINE PAMOATE 25 MG: 25 CAPSULE ORAL at 05:20

## 2022-10-14 RX ADMIN — IPRATROPIUM BROMIDE AND ALBUTEROL SULFATE 1 AMPULE: 2.5; .5 SOLUTION RESPIRATORY (INHALATION) at 12:47

## 2022-10-14 RX ADMIN — SODIUM CHLORIDE 2 G: 1 TABLET ORAL at 09:20

## 2022-10-14 RX ADMIN — BUDESONIDE 250 MCG: 0.25 SUSPENSION RESPIRATORY (INHALATION) at 09:41

## 2022-10-14 ASSESSMENT — PAIN SCALES - GENERAL: PAINLEVEL_OUTOF10: 0

## 2022-10-14 NOTE — PROGRESS NOTES
[facility] [address] [facility]  [address] [facility], [address], [address]      Pulmonary Progress Note      SUBJECTIVE:  claims stable with respiratory function, found on RA with NC line not connected to oxygen but nebulizer line was, suppose to be on 4 li NC, saturating 65% when checked but went back up on 90% when reconnected, (?) discharge today    OBJECTIVE    Medications    Continuous Infusions:   sodium chloride      dextrose         Scheduled Meds:   miconazole   Topical BID    ipratropium-albuterol  1 ampule Inhalation 4x daily    sodium chloride  2 g Oral TID WC    sodium chloride flush  5-40 mL IntraVENous 2 times per day    enoxaparin  40 mg SubCUTAneous Daily    levothyroxine  137 mcg Oral Daily    rosuvastatin  20 mg Oral Nightly    Arformoterol Tartrate  15 mcg Nebulization BID    budesonide  250 mcg Nebulization BID    pantoprazole  40 mg Oral QAM AC    guaiFENesin  400 mg Oral TID    potassium bicarb-citric acid  20 mEq Oral Daily       PRN Meds:sodium chloride, clonazePAM, hydrOXYzine pamoate, sodium chloride flush, sodium chloride, ondansetron **OR** ondansetron, polyethylene glycol, acetaminophen **OR** acetaminophen, glucose, dextrose bolus **OR** dextrose bolus, glucagon (rDNA), dextrose    Physical    VITALS:  BP (!) 170/77   Pulse (!) 108   Temp 97.8 °F (36.6 °C) (Oral)   Resp 30   Ht 5' 2\" (1.575 m)   Wt 157 lb 0.6 oz (71.2 kg)   SpO2 93%   BMI 28.72 kg/m²     24HR INTAKE/OUTPUT:      Intake/Output Summary (Last 24 hours) at 10/14/2022 1333  Last data filed at 10/13/2022 2110  Gross per 24 hour   Intake 720 ml   Output --   Net 720 ml         24HR PULSE OXIMETRY RANGE:    SpO2  Av.8 %  Min: 93 %  Max: 100 %    General appearance: alert, appears stated age, and cooperative  Lungs: rhonchi bibasilar  Heart: regular rate and rhythm, S1, S2 normal, no murmur, click, rub or gallop  Abdomen: soft, non-tender; bowel sounds normal; no masses,  no organomegaly  Extremities: extremities normal, atraumatic, no cyanosis or edema  Neurologic: Mental status: Alert, oriented, thought content appropriate    Data    CBC:   Recent Labs     10/12/22  0410   WBC 8.9   HGB 11.6   HCT 34.2   MCV 86.6            BMP:  Recent Labs     10/12/22  2312 10/13/22  0205 10/13/22  0805 10/14/22  0859   * 126* 128* 128*   K 4.0  --  3.9 3.8   CL 94*  --  92* 92*   CO2 24  --  25 25   BUN 11  --  8 7   CREATININE 0.6  --  0.6 0.6      ALB:3,BILIDIR:3,BILITOT:3,ALKPHOS:3)@    PT/INR: No results for input(s): PROTIME, INR in the last 72 hours. ABG:   No results for input(s): PH, PO2, PCO2, HCO3, BE, O2SAT, METHB, O2HB, COHB, O2CON, HHB, THB in the last 72 hours. FiO2 : 60 %       Radiology/Other tests reviewed: none    Assessment:     Principal Problem:    Hyponatremia  Active Problems:    Primary hypertension    Hypothyroidism    Moderate protein-calorie malnutrition (HCC)    Tobacco abuse    Shortness of breath  Resolved Problems:    * No resolved hospital problems. *      Plan:       Cont with oxygen, taper as tolerated, cont as out-pt  Fluid restriction and Na supplement as per Renal  Cont with nebs, can resume usual medications as out-pt  For cancer treatment next week as per Oncology  Can be discharged from pulmonary pov      Time at the bedside, reviewing labs and radiographs, reviewing notes and consultations, discussing with staff and family was more than 35 minutes. Thanks for letting us see this patient in consultation. Please contact us with any questions. Office [phone] or after hours through [employer], x [phone].

## 2022-10-14 NOTE — CARE COORDINATION
Care Coordination: Pt transfer from [facility]. Per attending note, no plans for INpt Chemo, will follow up outpt on Monday and follow up with Renal outpt as well. DC order on chart. Per Previous note, pt is active with  [facility] and will need orders to resume care and add ptx and otx as well.     Liat Worley

## 2022-10-14 NOTE — PROGRESS NOTES
[facility]  Nephrology Progress Note    Patient's Name: Shira Tinoco    History of Present Illness from 10/10 consult Note:    \"the pt is a 80 yo female with a pmh of htn, hyperlipdiemia, chf, hypothyroidism, gerd, lung cancer with siadh and liver lesions, tobacco abuse, who presented with sob. Labs showed na 111 repeat 116, k 4.4, co2 23, bun 9, cr 0.6, ca 8.2, wbc 11.8, hgb 11.2, plt 261. Cta chest showed no PE. She was ordered nacl 2 g tid as outpt. She is seen in the icu lying prone watching tv.she says she is hard of hearing. She feels weak. \"    Subjective:    10/14: Patient was seen and examined. She reports that she feels tired. She denies any chest pain or shortness of breath.   She denies any abdominal pain or nausea    PMH:    Past Medical History:   Diagnosis Date    Bronchitis     Hypertension     Thyroid disease        Patient Active Problem List   Diagnosis    Congestive heart failure of unknown etiology ([facility] [address]    Congestive heart failure due to cardiomyopathy (HCC)    Hypoxia    Simple chronic bronchitis (HCC)    Hyponatremia    Primary hypertension    Hypothyroidism    GERD (gastroesophageal reflux disease)    Depression    Palliative care by specialist    Goals of care, counseling/discussion    Small cell lung cancer in adult [facility])    Lung mass    Closed fracture of left proximal humerus    Moderate protein-calorie malnutrition (HCC)    Tobacco abuse    Shortness of breath       Meds:     miconazole   Topical BID    ipratropium-albuterol  1 ampule Inhalation 4x daily    sodium chloride  2 g Oral TID WC    sodium chloride flush  5-40 mL IntraVENous 2 times per day    enoxaparin  40 mg SubCUTAneous Daily    levothyroxine  137 mcg Oral Daily    rosuvastatin  20 mg Oral Nightly    Arformoterol Tartrate  15 mcg Nebulization BID    budesonide  250 mcg Nebulization BID    pantoprazole  40 mg Oral QAM AC    guaiFENesin  400 mg Oral TID    potassium bicarb-citric acid  20 mEq Oral Daily        sodium chloride      dextrose         Meds prn:     sodium chloride, clonazePAM, hydrOXYzine pamoate, sodium chloride flush, sodium chloride, ondansetron **OR** ondansetron, polyethylene glycol, acetaminophen **OR** acetaminophen, glucose, dextrose bolus **OR** dextrose bolus, glucagon (rDNA), dextrose    Meds prior to admission:     No current facility-administered medications on file prior to encounter. Current Outpatient Medications on File Prior to Encounter   Medication Sig Dispense Refill    rosuvastatin (CRESTOR) 20 MG tablet Take 1 tablet by mouth nightly 30 tablet 3    metoprolol succinate (TOPROL XL) 50 MG extended release tablet Take 1 tablet by mouth daily 30 tablet 3    guaiFENesin 400 MG tablet Take 1 tablet by mouth in the morning, at noon, and at bedtime 56 tablet 0    vitamin B-6 (B-6) 50 MG tablet Take 1 tablet by mouth daily 30 tablet 3    sodium chloride 1 g tablet Take 2 tablets by mouth 3 times daily (with meals) 90 tablet 3    fluticasone-vilanterol (BREO ELLIPTA) 100-25 MCG/INH AEPB inhaler Inhale 1 puff into the lungs daily      albuterol (PROVENTIL) (2.5 MG/3ML) 0.083% nebulizer solution Take 2.5 mg by nebulization 3 times daily as needed      fluticasone (FLONASE) 50 MCG/ACT nasal spray 1 spray by Nasal route daily      montelukast (SINGULAIR) 10 MG tablet Take 10 mg by mouth every morning      albuterol sulfate  (90 Base) MCG/ACT inhaler Inhale 2 puffs into the lungs every 6 hours as needed for Wheezing 1 each 3    potassium chloride (KLOR-CON M) 20 MEQ extended release tablet Take 1 tablet by mouth daily 60 tablet 3    omeprazole (PRILOSEC) 20 MG delayed release capsule Take 20 mg by mouth daily      clonazePAM (KLONOPIN) 1 MG tablet Take 0.5 mg by mouth daily as needed for Anxiety. levothyroxine (SYNTHROID) 137 MCG tablet Take 137 mcg by mouth Daily          Allergies:    Patient has no known allergies. Social History:     reports that she has been smoking cigarettes.  She started smoking about 50 years ago. She has a 50.00 pack-year smoking history. She has never used smokeless tobacco. She reports that she does not drink alcohol and does not use drugs. Family History:     History reviewed. No pertinent family history. Physical Exam:      Patient Vitals for the past 24 hrs:   BP Temp Temp src Pulse Resp SpO2 Weight   10/14/22 0752 (!) 170/77 97.8 °F (36.6 °C) Oral (!) 103 18 97 % --   10/14/22 0353 -- -- -- -- -- -- 157 lb 0.6 oz (71.2 kg)   10/13/22 1945 (!) 140/64 97.9 °F (36.6 °C) Oral (!) 109 18 100 % --   10/13/22 1925 -- -- -- -- -- 97 % --   10/13/22 1557 130/81 97.8 °F (36.6 °C) Oral (!) 108 18 100 % --           Intake/Output Summary (Last 24 hours) at 10/14/2022 0841  Last data filed at 10/13/2022 2110  Gross per 24 hour   Intake 720 ml   Output --   Net 720 ml         General: Awake, alert, no acute distress  Neck: No JVD noted  Lungs: Crackles bilaterally upper, diminished to the bases bilaterally. Unlabored  CV: Regular rate and rhythm. No rub  Abd: Soft, nontender, nondistended. Active bowel sounds  Skin: Warm and dry. No rash on exposed extremities  Ext: No edema   Neuro: Awake, answers questions appropriately    Data:    Recent Labs     10/12/22  0410   WBC 8.9   HGB 11.6   HCT 34.2   MCV 86.6            Recent Labs     10/12/22  1634 10/12/22  1955 10/12/22  2312 10/13/22  0205 10/13/22  0805   *   < > 128* 126* 128*   K 4.2  --  4.0  --  3.9   CL 91*  --  94*  --  92*   CO2 20*  --  24  --  25   CREATININE 0.6  --  0.6  --  0.6   BUN 11  --  11  --  8   LABGLOM >60  --  >60  --  >60   GLUCOSE 110*  --  122*  --  75   CALCIUM 9.1  --  9.4  --  9.2    < > = values in this interval not displayed. Vit D, 25-Hydroxy   Date Value Ref Range Status   09/23/2022 54 30 - 100 ng/mL Final     Comment:     <20 ng/mL. ........... Gunjan Quiver Deficient  20-30 ng/mL. ......... Gunjan Quiver Insufficient   ng/mL. ........ Gunjan Quiver Sufficient  >100 ng/mL. .......... Gunjan Quiver Toxic         PTH Date Value Ref Range Status   09/23/2022 40 15 - 65 pg/mL Final       No results for input(s): ALT, AST, ALKPHOS, BILITOT, BILIDIR in the last 72 hours. No results for input(s): LABALBU in the last 72 hours. No results found for: FERRITIN, IRON, TIBC    Vitamin B-12   Date Value Ref Range Status   09/21/2022 445 211 - 946 pg/mL Final       Folate   Date Value Ref Range Status   09/21/2022 10.4 4.8 - 24.2 ng/mL Final       Lab Results   Component Value Date/Time    COLORU Yellow 10/09/2022 03:59 PM    NITRU Negative 10/09/2022 03:59 PM    GLUCOSEU Negative 10/09/2022 03:59 PM    KETUA Negative 10/09/2022 03:59 PM    UROBILINOGEN 0.2 10/09/2022 03:59 PM    BILIRUBINUR Negative 10/09/2022 03:59 PM       Lab Results   Component Value Date/Time    OSMOU 581 10/10/2022 05:04 PM       No components found for: URIC    No results found for: LIPIDPAN    Assessment and Plans:    Hyponatremia  Urine electrolytes consistent with hypovolemia  History of SIADH with lung cancer  Sodium as low as 111 on 10/9--> 128 on 10/13--> 128 today  Urine sodium 60, urine osmolality 581, urine creatinine 123  1000 mL fluid restriction  On sodium chloride 2 g oral 3 times daily  Strict I&O  Monitor labs    2. Hypertension, essential  BP goal<130/80  BP above goal  Monitor Bps    3.   Shortness of breath/small cell lung carcinoma  CTA pulmonary 10/9 no PE, large conglomerate of mediastinal/hilar lymphadenopathy right, numerous lesions in the liver  CXR 10/9 no acute process  Hematology/oncology and pulmonology following    [facility], APRN - CNP    Pt seen and examined agree with above  Na up to [address] for dc on nacl 2 g tid  Bmp q week  Glendy Arredondo MD

## 2022-10-14 NOTE — PROGRESS NOTES
Double flow meter set up in patient's room. Last breathing treatment given at 1600. Patient on 3 L NC the whole time treatment was given and afterwards.

## 2022-10-14 NOTE — PLAN OF CARE
Problem: Chronic Conditions and Co-morbidities  Goal: Patient's chronic conditions and co-morbidity symptoms are monitored and maintained or improved  10/13/2022 2325 by Mila Cabral RN  Outcome: Progressing  10/13/2022 1100 by Elias Pike RN  Outcome: Progressing     Problem: Discharge Planning  Goal: Discharge to home or other facility with appropriate resources  10/13/2022 2325 by Mila Cabral RN  Outcome: Progressing  10/13/2022 1100 by Elias Pike RN  Outcome: Progressing     Problem: Safety - Adult  Goal: Free from fall injury  10/13/2022 2325 by Mila Cabral RN  Outcome: Progressing  10/13/2022 1100 by Elias Pike RN  Outcome: Progressing     Problem: ABCDS Injury Assessment  Goal: Absence of physical injury  10/13/2022 2325 by Mila Cabral RN  Outcome: Progressing  10/13/2022 1100 by Elias Pike RN  Outcome: Progressing     Problem: Skin/Tissue Integrity  Goal: Absence of new skin breakdown  Description: 1. Monitor for areas of redness and/or skin breakdown  2. Assess vascular access sites hourly  3. Every 4-6 hours minimum:  Change oxygen saturation probe site  4. Every 4-6 hours:  If on nasal continuous positive airway pressure, respiratory therapy assess nares and determine need for appliance change or resting period.   10/13/2022 2325 by Mila Cabral RN  Outcome: Progressing  10/13/2022 1100 by Elias Pike RN  Outcome: Progressing     Problem: Nutrition Deficit:  Goal: Optimize nutritional status  10/13/2022 2325 by Mila Cabral RN  Outcome: Progressing  10/13/2022 1100 by Elias Pike RN  Outcome: Progressing

## 2022-10-14 NOTE — PROGRESS NOTES
This RT was asked when the patient had their last breathing treatment by RN assigned at this time d/t physician being at bedside and the patient having low SpO2. I stated patient had her last breathing treatment at 9:40am 2hrs prior to physician being at bedside and was placed back on her 3LNC SpO2 was 94%. RN stated patient must have accidentally rolled on her side and unplugged her nasal cannula. Second breathing treatment given at 12:47pm SpO2 93-96% before, during and after breathing treatment. Patient on 3LNC at all time. Patient has double-flowmeter in room.

## 2022-10-14 NOTE — PLAN OF CARE
Problem: Chronic Conditions and Co-morbidities  Goal: Patient's chronic conditions and co-morbidity symptoms are monitored and maintained or improved  10/14/2022 1056 by Lillian Fletcher RN  Outcome: Progressing  10/13/2022 2325 by Lara Hyman RN  Outcome: Progressing     Problem: Discharge Planning  Goal: Discharge to home or other facility with appropriate resources  10/14/2022 1056 by Lillian Fletcher RN  Outcome: Progressing  10/13/2022 2325 by Lara Hyman RN  Outcome: Progressing     Problem: Safety - Adult  Goal: Free from fall injury  10/14/2022 1056 by Lillian Fletcher RN  Outcome: Progressing  10/13/2022 2325 by Lara Hyman RN  Outcome: Progressing     Problem: ABCDS Injury Assessment  Goal: Absence of physical injury  10/14/2022 1056 by Lillian Fletcher RN  Outcome: Progressing  10/13/2022 2325 by Lara Hyman RN  Outcome: Progressing     Problem: Skin/Tissue Integrity  Goal: Absence of new skin breakdown  Description: 1. Monitor for areas of redness and/or skin breakdown  2. Assess vascular access sites hourly  3. Every 4-6 hours minimum:  Change oxygen saturation probe site  4. Every 4-6 hours:  If on nasal continuous positive airway pressure, respiratory therapy assess nares and determine need for appliance change or resting period.   10/14/2022 1056 by Lillian Fletcher RN  Outcome: Progressing  10/13/2022 2325 by Lara Hyman RN  Outcome: Progressing     Problem: Nutrition Deficit:  Goal: Optimize nutritional status  10/14/2022 1056 by Lillian Fletcher RN  Outcome: Progressing  10/13/2022 2325 by Lara Hyman RN  Outcome: Progressing

## 2022-10-22 LAB — PTH RELATED PEPTIDE: <2 PMOL/L (ref 0–3.4)

## 2022-12-13 ENCOUNTER — HOSPITAL ENCOUNTER (INPATIENT)
Age: 72
LOS: 4 days | Discharge: HOME | End: 2022-12-17
Attending: STUDENT IN AN ORGANIZED HEALTH CARE EDUCATION/TRAINING PROGRAM
Payer: MEDICARE

## 2022-12-13 DIAGNOSIS — E87.1 HYPONATREMIA: Primary | ICD-10-CM

## 2022-12-13 LAB
ALBUMIN SERPL-MCNC: 4 G/DL (ref 3.5–5.2)
ALP BLD-CCNC: 122 U/L (ref 35–104)
ALT SERPL-CCNC: 8 U/L (ref 0–32)
ANION GAP SERPL CALCULATED.3IONS-SCNC: 10 MMOL/L (ref 7–16)
ANION GAP SERPL CALCULATED.3IONS-SCNC: 10 MMOL/L (ref 7–16)
ANION GAP SERPL CALCULATED.3IONS-SCNC: 9 MMOL/L (ref 7–16)
AST SERPL-CCNC: 15 U/L (ref 0–31)
BILIRUB SERPL-MCNC: 0.3 MG/DL (ref 0–1.2)
BILIRUBIN URINE: NEGATIVE
BLOOD, URINE: NEGATIVE
BUN BLDV-MCNC: 5 MG/DL (ref 6–23)
BUN BLDV-MCNC: 6 MG/DL (ref 6–23)
BUN BLDV-MCNC: 6 MG/DL (ref 6–23)
CALCIUM SERPL-MCNC: 8.3 MG/DL (ref 8.6–10.2)
CALCIUM SERPL-MCNC: 8.4 MG/DL (ref 8.6–10.2)
CALCIUM SERPL-MCNC: 8.7 MG/DL (ref 8.6–10.2)
CHLORIDE BLD-SCNC: 85 MMOL/L (ref 98–107)
CHLORIDE BLD-SCNC: 85 MMOL/L (ref 98–107)
CHLORIDE BLD-SCNC: 88 MMOL/L (ref 98–107)
CHLORIDE URINE RANDOM: 134 MMOL/L
CLARITY: CLEAR
CO2: 21 MMOL/L (ref 22–29)
CO2: 23 MMOL/L (ref 22–29)
CO2: 23 MMOL/L (ref 22–29)
COLOR: YELLOW
CORTISOL TOTAL: 4.23 MCG/DL (ref 2.68–18.4)
CREAT SERPL-MCNC: 0.4 MG/DL (ref 0.5–1)
CREAT SERPL-MCNC: 0.4 MG/DL (ref 0.5–1)
CREAT SERPL-MCNC: 0.5 MG/DL (ref 0.5–1)
CREATININE URINE: 52 MG/DL (ref 29–226)
CREATININE URINE: 52 MG/DL (ref 29–226)
GFR SERPL CREATININE-BSD FRML MDRD: >60 ML/MIN/1.73
GLUCOSE BLD-MCNC: 84 MG/DL (ref 74–99)
GLUCOSE BLD-MCNC: 84 MG/DL (ref 74–99)
GLUCOSE BLD-MCNC: 95 MG/DL (ref 74–99)
GLUCOSE URINE: NEGATIVE MG/DL
KETONES, URINE: NEGATIVE MG/DL
LEUKOCYTE ESTERASE, URINE: NEGATIVE
NITRITE, URINE: NEGATIVE
OSMOLALITY URINE: 483 MOSM/KG (ref 300–900)
PH UA: 6.5 (ref 5–9)
POTASSIUM SERPL-SCNC: 3.5 MMOL/L (ref 3.5–5)
POTASSIUM SERPL-SCNC: 3.6 MMOL/L (ref 3.5–5)
POTASSIUM SERPL-SCNC: 3.9 MMOL/L (ref 3.5–5)
POTASSIUM, UR: 38 MMOL/L
PROTEIN PROTEIN: 11 MG/DL (ref 0–12)
PROTEIN UA: NEGATIVE MG/DL
PROTEIN/CREAT RATIO: 0.2
PROTEIN/CREAT RATIO: 0.2 (ref 0–0.2)
REASON FOR REJECTION: NORMAL
REASON FOR REJECTION: NORMAL
REJECTED TEST: NORMAL
REJECTED TEST: NORMAL
SODIUM BLD-SCNC: 118 MMOL/L (ref 132–146)
SODIUM URINE: 145 MMOL/L
SPECIFIC GRAVITY UA: 1.01 (ref 1–1.03)
T4 FREE: 1.11 NG/DL (ref 0.93–1.7)
TOTAL PROTEIN: 6.3 G/DL (ref 6.4–8.3)
TSH SERPL DL<=0.05 MIU/L-ACNC: 18.28 UIU/ML (ref 0.27–4.2)
UREA NITROGEN, UR: 332 MG/DL (ref 800–1666)
URIC ACID, SERUM: 1.7 MG/DL (ref 2.4–5.7)
UROBILINOGEN, URINE: 0.2 E.U./DL

## 2022-12-13 PROCEDURE — 84439 ASSAY OF FREE THYROXINE: CPT

## 2022-12-13 PROCEDURE — 2060000000 HC ICU INTERMEDIATE R&B

## 2022-12-13 PROCEDURE — 80048 BASIC METABOLIC PNL TOTAL CA: CPT

## 2022-12-13 PROCEDURE — 36415 COLL VENOUS BLD VENIPUNCTURE: CPT

## 2022-12-13 PROCEDURE — 81003 URINALYSIS AUTO W/O SCOPE: CPT

## 2022-12-13 PROCEDURE — 82570 ASSAY OF URINE CREATININE: CPT

## 2022-12-13 PROCEDURE — 93005 ELECTROCARDIOGRAM TRACING: CPT | Performed by: PHYSICIAN ASSISTANT

## 2022-12-13 PROCEDURE — 82436 ASSAY OF URINE CHLORIDE: CPT

## 2022-12-13 PROCEDURE — 6360000002 HC RX W HCPCS: Performed by: FAMILY MEDICINE

## 2022-12-13 PROCEDURE — 80053 COMPREHEN METABOLIC PANEL: CPT

## 2022-12-13 PROCEDURE — 84540 ASSAY OF URINE/UREA-N: CPT

## 2022-12-13 PROCEDURE — 84443 ASSAY THYROID STIM HORMONE: CPT

## 2022-12-13 PROCEDURE — 84300 ASSAY OF URINE SODIUM: CPT

## 2022-12-13 PROCEDURE — 2580000003 HC RX 258: Performed by: INTERNAL MEDICINE

## 2022-12-13 PROCEDURE — 99285 EMERGENCY DEPT VISIT HI MDM: CPT

## 2022-12-13 PROCEDURE — 6370000000 HC RX 637 (ALT 250 FOR IP): Performed by: FAMILY MEDICINE

## 2022-12-13 PROCEDURE — 82533 TOTAL CORTISOL: CPT

## 2022-12-13 PROCEDURE — 83935 ASSAY OF URINE OSMOLALITY: CPT

## 2022-12-13 PROCEDURE — 84133 ASSAY OF URINE POTASSIUM: CPT

## 2022-12-13 PROCEDURE — 84156 ASSAY OF PROTEIN URINE: CPT

## 2022-12-13 PROCEDURE — 84550 ASSAY OF BLOOD/URIC ACID: CPT

## 2022-12-13 RX ORDER — ONDANSETRON 2 MG/ML
4 INJECTION INTRAMUSCULAR; INTRAVENOUS EVERY 6 HOURS PRN
Status: DISCONTINUED | OUTPATIENT
Start: 2022-12-13 | End: 2022-12-17 | Stop reason: HOSPADM

## 2022-12-13 RX ORDER — METOPROLOL SUCCINATE 50 MG/1
50 TABLET, EXTENDED RELEASE ORAL DAILY
Status: DISCONTINUED | OUTPATIENT
Start: 2022-12-13 | End: 2022-12-17 | Stop reason: HOSPADM

## 2022-12-13 RX ORDER — ONDANSETRON 4 MG/1
4 TABLET, ORALLY DISINTEGRATING ORAL EVERY 8 HOURS PRN
Status: DISCONTINUED | OUTPATIENT
Start: 2022-12-13 | End: 2022-12-17 | Stop reason: HOSPADM

## 2022-12-13 RX ORDER — ALBUTEROL SULFATE 2.5 MG/3ML
2.5 SOLUTION RESPIRATORY (INHALATION) 3 TIMES DAILY PRN
Status: DISCONTINUED | OUTPATIENT
Start: 2022-12-13 | End: 2022-12-13

## 2022-12-13 RX ORDER — ALBUTEROL SULFATE 90 UG/1
2 AEROSOL, METERED RESPIRATORY (INHALATION) EVERY 6 HOURS PRN
Status: DISCONTINUED | OUTPATIENT
Start: 2022-12-13 | End: 2022-12-13 | Stop reason: SDUPTHER

## 2022-12-13 RX ORDER — SODIUM CHLORIDE 1000 MG
2 TABLET, SOLUBLE MISCELLANEOUS
Status: DISCONTINUED | OUTPATIENT
Start: 2022-12-13 | End: 2022-12-17 | Stop reason: HOSPADM

## 2022-12-13 RX ORDER — BUDESONIDE 0.25 MG/2ML
250 INHALANT ORAL 2 TIMES DAILY
Status: DISCONTINUED | OUTPATIENT
Start: 2022-12-13 | End: 2022-12-17 | Stop reason: HOSPADM

## 2022-12-13 RX ORDER — 3% SODIUM CHLORIDE 3 G/100ML
25 INJECTION, SOLUTION INTRAVENOUS CONTINUOUS
Status: DISPENSED | OUTPATIENT
Start: 2022-12-13 | End: 2022-12-13

## 2022-12-13 RX ORDER — ALBUTEROL SULFATE 2.5 MG/3ML
2.5 SOLUTION RESPIRATORY (INHALATION) EVERY 6 HOURS PRN
Status: DISCONTINUED | OUTPATIENT
Start: 2022-12-13 | End: 2022-12-17 | Stop reason: HOSPADM

## 2022-12-13 RX ORDER — ROSUVASTATIN CALCIUM 20 MG/1
20 TABLET, COATED ORAL NIGHTLY
Status: DISCONTINUED | OUTPATIENT
Start: 2022-12-13 | End: 2022-12-17 | Stop reason: HOSPADM

## 2022-12-13 RX ORDER — MONTELUKAST SODIUM 10 MG/1
10 TABLET ORAL EVERY MORNING
Status: DISCONTINUED | OUTPATIENT
Start: 2022-12-14 | End: 2022-12-17 | Stop reason: HOSPADM

## 2022-12-13 RX ORDER — ARFORMOTEROL TARTRATE 15 UG/2ML
15 SOLUTION RESPIRATORY (INHALATION) 2 TIMES DAILY
Status: DISCONTINUED | OUTPATIENT
Start: 2022-12-13 | End: 2022-12-17 | Stop reason: HOSPADM

## 2022-12-13 RX ORDER — ENOXAPARIN SODIUM 100 MG/ML
40 INJECTION SUBCUTANEOUS DAILY
Status: DISCONTINUED | OUTPATIENT
Start: 2022-12-13 | End: 2022-12-17 | Stop reason: HOSPADM

## 2022-12-13 RX ORDER — CLONAZEPAM 0.5 MG/1
0.5 TABLET ORAL DAILY PRN
Status: DISCONTINUED | OUTPATIENT
Start: 2022-12-13 | End: 2022-12-17 | Stop reason: HOSPADM

## 2022-12-13 RX ORDER — LEVOTHYROXINE SODIUM 137 UG/1
137 TABLET ORAL DAILY
Status: DISCONTINUED | OUTPATIENT
Start: 2022-12-14 | End: 2022-12-17 | Stop reason: HOSPADM

## 2022-12-13 RX ORDER — PANTOPRAZOLE SODIUM 40 MG/1
40 TABLET, DELAYED RELEASE ORAL
Status: DISCONTINUED | OUTPATIENT
Start: 2022-12-14 | End: 2022-12-17 | Stop reason: HOSPADM

## 2022-12-13 RX ORDER — FLUTICASONE PROPIONATE 50 MCG
1 SPRAY, SUSPENSION (ML) NASAL DAILY
Status: DISCONTINUED | OUTPATIENT
Start: 2022-12-13 | End: 2022-12-17 | Stop reason: HOSPADM

## 2022-12-13 RX ORDER — GUAIFENESIN 400 MG/1
400 TABLET ORAL 3 TIMES DAILY
Status: DISCONTINUED | OUTPATIENT
Start: 2022-12-13 | End: 2022-12-17 | Stop reason: HOSPADM

## 2022-12-13 RX ADMIN — ENOXAPARIN SODIUM 40 MG: 100 INJECTION SUBCUTANEOUS at 20:05

## 2022-12-13 RX ADMIN — GUAIFENESIN 400 MG: 400 TABLET ORAL at 21:14

## 2022-12-13 RX ADMIN — ROSUVASTATIN 20 MG: 20 TABLET, FILM COATED ORAL at 21:22

## 2022-12-13 RX ADMIN — SODIUM CHLORIDE 25 ML/HR: 3 INJECTION, SOLUTION INTRAVENOUS at 20:10

## 2022-12-13 RX ADMIN — METOPROLOL SUCCINATE 50 MG: 50 TABLET, EXTENDED RELEASE ORAL at 20:03

## 2022-12-13 ASSESSMENT — PAIN - FUNCTIONAL ASSESSMENT: PAIN_FUNCTIONAL_ASSESSMENT: NONE - DENIES PAIN

## 2022-12-13 NOTE — ED NOTES
Department of Emergency Medicine  FIRST PROVIDER TRIAGE NOTE             Independent MLP           12/13/22  11:35 AM EST    Date of Encounter: 12/13/22   MRN: [id number]      HPI: Kathy Cronin is a 81 y.o. female who presents to the ED for No chief complaint on file. Pt presenting with low sodium-120, denies complaints at this time. ROS: Negative for cp or sob. PE: Gen Appearance/Constitutional: alert  HEENT: NC/NT. PERRLA,  Airway patent. Initial Plan of Care: All treatment areas with department are currently occupied. Plan to order/Initiate the following while awaiting opening in ED: labs, EKG, and imaging studies.   Initiate Treatment-Testing, Proceed toTreatment Area When Bed Available for ED Attending/MLP to Continue Care    Electronically signed by Silva Ovalle PA-C   DD: 12/13/22      Silva Ovalle PA-C  12/13/22 0025

## 2022-12-13 NOTE — ED NOTES
Notified charge RN that patient needs bed for critical sodium.  No beds at this time will call when bed becomes available     Judy Barksdale PA-C  12/13/22 [id number]

## 2022-12-13 NOTE — CONSULTS
Nephrology Consult Note  Patient's Name: Yaritza Lomeli  5:54 PM  12/13/2022      Reason for Consult:  Hyponatremia  Requesting Physician:  Tammy العراقي DO    Chief Complaint:  abnormal labs  History Obtained From:  patient and EHR    History of Present Ilness:    Yaritza Lomeli is a 81 y.o. 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 She is known to our group from prior admissions. Patient presents to ED with complaints of abnormal labs. According to patient she recently had labs performed 2 days earlier and received a call from her oncologist office indicating that her sodium was low and that she needed to present to ED. She states that she feels well. She has a nonproductive cough. She denies fever or chills. She denies excessive water drinking. Initial vital signs on presentation were stable. Laboratory data was significant for sodium 118, potassium 3.5, chloride 88, CO2 21, BUN 5, creatinine 0.4, calcium 8.3. Patient was admitted here 10/9-10/14 for severe hyponatremia at the time presenting at 111 mEq/L. Sodium on discharge at the time was 128. Medication at discharge included sodium chloride tablets at 2 g 3 times daily. She is being admitted for further management. Past Medical History:   Diagnosis Date    Bronchitis     Hypertension     Thyroid disease        Past Surgical History:   Procedure Laterality Date    CT NEEDLE BIOPSY LIVER PERCUTANEOUS  9/6/2022    CT NEEDLE BIOPSY LIVER PERCUTANEOUS 9/6/2022 Marisela Winter MD [facility]    OTHER SURGICAL HISTORY      states had something done right neck area per dr jaeger, might have been an abcess    PORT SURGERY Left 9/23/2022    MEDI-PORT INSERTION performed by Riri Ball MD at [address] [place] [address].         No family history on file.      reports that she has been smoking cigarettes. She started smoking about 50 years ago. She has a 50.00 pack-year smoking history. She has never used smokeless tobacco. She reports that she does not drink alcohol and does not use drugs. Allergies:  Patient has no known allergies. Current Medications:    No current facility-administered medications for this encounter. Review of Systems:   Pertinent items are noted in HPI. Physical exam:  Vitals:    12/13/22 1607   BP: (!) 145/80   Pulse: 81   Resp: 20   Temp:    SpO2: 99%           General: No distress. Alert. Eyes: PERRL. No sclera icterus. No conjunctival injection. ENT: No discharge. Pharynx clear. Neck: Trachea midline. Normal thyroid. Lungs: bilateral rhonchi port left upper chest  CV: Regular rate. Regular rhythm. No murmur or rub. .   Abd: Non-tender. Non-distended. No masses. No organmegaly. Normal bowel sounds. Skin: Warm and dry. No nodule on exposed extremities. No rash on exposed extremities. Ext: No cyanosis, clubbing, edema   Neuro: Awake. Follows commands. Positive pupils/gag/corneals. Normal pain response.       Data:   Labs:  Lab Results   Component Value Date     (LL) 12/13/2022     (L) 10/14/2022     (L) 10/13/2022    K 3.9 12/13/2022    K 3.8 10/14/2022    K 3.9 10/13/2022    CL 85 (L) 12/13/2022    CO2 23 12/13/2022    CO2 25 10/14/2022    CO2 25 10/13/2022    CREATININE 0.5 12/13/2022    CREATININE 0.6 10/14/2022    CREATININE 0.6 10/13/2022    BUN 6 12/13/2022    BUN 7 10/14/2022    BUN 8 10/13/2022    GLUCOSE 95 12/13/2022    GLUCOSE 93 10/14/2022    GLUCOSE 75 10/13/2022    PHOS 2.5 09/26/2022    PHOS 2.9 09/25/2022    PHOS 3.1 09/24/2022    WBC 8.9 10/12/2022    WBC 11.3 10/11/2022    WBC 11.8 (H) 10/10/2022    HGB 11.6 10/12/2022    HGB 12.7 10/11/2022    HGB 11.2 (L) 10/10/2022    HCT 34.2 10/12/2022    HCT 36.7 10/11/2022    HCT 31.1 (L) 10/10/2022    MCV 86.6 10/12/2022     10/12/2022         Imaging:  No results found.    Assessment/Plans    1. Chronic hyponatremia with worsening; known history of SIADH; questionable compliance with sodium chloride tablets   Check urine indices  Serum osmolality  TSH, cortisol, uric acid  Fluid restriction  Will give a short course of hypertonic saline  Monitor labs and urine output closely    2. Lung cancer with metastasis on active chemotherapy    3.   Hypertension  Blood pressure appears to be controlled  Continue current antihypertensive meds    Will follow  Thanks        Harper Jonas MD  5:54 PM  12/13/2022

## 2022-12-14 LAB
ALBUMIN SERPL-MCNC: 3.8 G/DL (ref 3.5–5.2)
ALP BLD-CCNC: 115 U/L (ref 35–104)
ALT SERPL-CCNC: 7 U/L (ref 0–32)
ANION GAP SERPL CALCULATED.3IONS-SCNC: 7 MMOL/L (ref 7–16)
ANION GAP SERPL CALCULATED.3IONS-SCNC: 7 MMOL/L (ref 7–16)
ANION GAP SERPL CALCULATED.3IONS-SCNC: 8 MMOL/L (ref 7–16)
ANION GAP SERPL CALCULATED.3IONS-SCNC: 9 MMOL/L (ref 7–16)
AST SERPL-CCNC: 15 U/L (ref 0–31)
BASOPHILS ABSOLUTE: 0.05 E9/L (ref 0–0.2)
BASOPHILS RELATIVE PERCENT: 2.3 % (ref 0–2)
BILIRUB SERPL-MCNC: 0.3 MG/DL (ref 0–1.2)
BUN BLDV-MCNC: 10 MG/DL (ref 6–23)
BUN BLDV-MCNC: 4 MG/DL (ref 6–23)
BUN BLDV-MCNC: 6 MG/DL (ref 6–23)
BUN BLDV-MCNC: 8 MG/DL (ref 6–23)
CALCIUM SERPL-MCNC: 7.1 MG/DL (ref 8.6–10.2)
CALCIUM SERPL-MCNC: 8.4 MG/DL (ref 8.6–10.2)
CALCIUM SERPL-MCNC: 8.5 MG/DL (ref 8.6–10.2)
CALCIUM SERPL-MCNC: 9.3 MG/DL (ref 8.6–10.2)
CHLORIDE BLD-SCNC: 87 MMOL/L (ref 98–107)
CHLORIDE BLD-SCNC: 89 MMOL/L (ref 98–107)
CHLORIDE BLD-SCNC: 92 MMOL/L (ref 98–107)
CHLORIDE BLD-SCNC: 97 MMOL/L (ref 98–107)
CO2: 21 MMOL/L (ref 22–29)
CO2: 22 MMOL/L (ref 22–29)
CO2: 23 MMOL/L (ref 22–29)
CO2: 23 MMOL/L (ref 22–29)
CORTISOL TOTAL: 15.58 MCG/DL (ref 2.68–18.4)
CREAT SERPL-MCNC: 0.4 MG/DL (ref 0.5–1)
CREAT SERPL-MCNC: 0.5 MG/DL (ref 0.5–1)
CREAT SERPL-MCNC: 0.6 MG/DL (ref 0.5–1)
CREAT SERPL-MCNC: 0.6 MG/DL (ref 0.5–1)
EOSINOPHILS ABSOLUTE: 0.06 E9/L (ref 0.05–0.5)
EOSINOPHILS RELATIVE PERCENT: 2.8 % (ref 0–6)
GFR SERPL CREATININE-BSD FRML MDRD: >60 ML/MIN/1.73
GLUCOSE BLD-MCNC: 108 MG/DL (ref 74–99)
GLUCOSE BLD-MCNC: 113 MG/DL (ref 74–99)
GLUCOSE BLD-MCNC: 134 MG/DL (ref 74–99)
GLUCOSE BLD-MCNC: 75 MG/DL (ref 74–99)
HCT VFR BLD CALC: 30.9 % (ref 34–48)
HEMOGLOBIN: 11 G/DL (ref 11.5–15.5)
IMMATURE GRANULOCYTES #: 0.01 E9/L
IMMATURE GRANULOCYTES %: 0.5 % (ref 0–5)
LYMPHOCYTES ABSOLUTE: 0.36 E9/L (ref 1.5–4)
LYMPHOCYTES RELATIVE PERCENT: 16.8 % (ref 20–42)
MAGNESIUM: 1.6 MG/DL (ref 1.6–2.6)
MCH RBC QN AUTO: 27.8 PG (ref 26–35)
MCHC RBC AUTO-ENTMCNC: 35.6 % (ref 32–34.5)
MCV RBC AUTO: 78 FL (ref 80–99.9)
MONOCYTES ABSOLUTE: 0.11 E9/L (ref 0.1–0.95)
MONOCYTES RELATIVE PERCENT: 5.1 % (ref 2–12)
NEUTROPHILS ABSOLUTE: 1.55 E9/L (ref 1.8–7.3)
NEUTROPHILS RELATIVE PERCENT: 72.5 % (ref 43–80)
OSMOLALITY: 256 MOSM/KG (ref 285–310)
OVALOCYTES: ABNORMAL
PDW BLD-RTO: 15.5 FL (ref 11.5–15)
PLATELET # BLD: 249 E9/L (ref 130–450)
PMV BLD AUTO: 8.7 FL (ref 7–12)
POIKILOCYTES: ABNORMAL
POTASSIUM REFLEX MAGNESIUM: 4.6 MMOL/L (ref 3.5–5)
POTASSIUM SERPL-SCNC: 3.4 MMOL/L (ref 3.5–5)
POTASSIUM SERPL-SCNC: 3.7 MMOL/L (ref 3.5–5)
POTASSIUM SERPL-SCNC: 3.9 MMOL/L (ref 3.5–5)
RBC # BLD: 3.96 E12/L (ref 3.5–5.5)
SODIUM BLD-SCNC: 117 MMOL/L (ref 132–146)
SODIUM BLD-SCNC: 120 MMOL/L (ref 132–146)
SODIUM BLD-SCNC: 123 MMOL/L (ref 132–146)
SODIUM BLD-SCNC: 125 MMOL/L (ref 132–146)
TOTAL PROTEIN: 6 G/DL (ref 6.4–8.3)
WBC # BLD: 2.1 E9/L (ref 4.5–11.5)

## 2022-12-14 PROCEDURE — 80048 BASIC METABOLIC PNL TOTAL CA: CPT

## 2022-12-14 PROCEDURE — 85025 COMPLETE CBC W/AUTO DIFF WBC: CPT

## 2022-12-14 PROCEDURE — 83930 ASSAY OF BLOOD OSMOLALITY: CPT

## 2022-12-14 PROCEDURE — 2060000000 HC ICU INTERMEDIATE R&B

## 2022-12-14 PROCEDURE — 2580000003 HC RX 258: Performed by: INTERNAL MEDICINE

## 2022-12-14 PROCEDURE — 94664 DEMO&/EVAL PT USE INHALER: CPT

## 2022-12-14 PROCEDURE — 80053 COMPREHEN METABOLIC PANEL: CPT

## 2022-12-14 PROCEDURE — 51702 INSERT TEMP BLADDER CATH: CPT

## 2022-12-14 PROCEDURE — 83735 ASSAY OF MAGNESIUM: CPT

## 2022-12-14 PROCEDURE — 6370000000 HC RX 637 (ALT 250 FOR IP): Performed by: NURSE PRACTITIONER

## 2022-12-14 PROCEDURE — 36415 COLL VENOUS BLD VENIPUNCTURE: CPT

## 2022-12-14 PROCEDURE — 82533 TOTAL CORTISOL: CPT

## 2022-12-14 PROCEDURE — 94640 AIRWAY INHALATION TREATMENT: CPT

## 2022-12-14 PROCEDURE — 6370000000 HC RX 637 (ALT 250 FOR IP): Performed by: FAMILY MEDICINE

## 2022-12-14 PROCEDURE — 6360000002 HC RX W HCPCS: Performed by: FAMILY MEDICINE

## 2022-12-14 RX ORDER — 3% SODIUM CHLORIDE 3 G/100ML
25 INJECTION, SOLUTION INTRAVENOUS CONTINUOUS
Status: DISCONTINUED | OUTPATIENT
Start: 2022-12-14 | End: 2022-12-14

## 2022-12-14 RX ORDER — 3% SODIUM CHLORIDE 3 G/100ML
25 INJECTION, SOLUTION INTRAVENOUS CONTINUOUS
Status: DISPENSED | OUTPATIENT
Start: 2022-12-14 | End: 2022-12-14

## 2022-12-14 RX ORDER — LANOLIN ALCOHOL/MO/W.PET/CERES
3 CREAM (GRAM) TOPICAL NIGHTLY PRN
Status: DISCONTINUED | OUTPATIENT
Start: 2022-12-14 | End: 2022-12-17 | Stop reason: HOSPADM

## 2022-12-14 RX ADMIN — GUAIFENESIN 400 MG: 400 TABLET ORAL at 20:28

## 2022-12-14 RX ADMIN — MONTELUKAST 10 MG: 10 TABLET, FILM COATED ORAL at 09:40

## 2022-12-14 RX ADMIN — GUAIFENESIN 400 MG: 400 TABLET ORAL at 09:40

## 2022-12-14 RX ADMIN — LEVOTHYROXINE SODIUM 137 MCG: 0.14 TABLET ORAL at 13:11

## 2022-12-14 RX ADMIN — SODIUM CHLORIDE 25 ML/HR: 3 INJECTION, SOLUTION INTRAVENOUS at 04:36

## 2022-12-14 RX ADMIN — BUDESONIDE 250 MCG: 0.25 SUSPENSION RESPIRATORY (INHALATION) at 12:07

## 2022-12-14 RX ADMIN — ROSUVASTATIN 20 MG: 20 TABLET, FILM COATED ORAL at 20:28

## 2022-12-14 RX ADMIN — ENOXAPARIN SODIUM 40 MG: 100 INJECTION SUBCUTANEOUS at 09:41

## 2022-12-14 RX ADMIN — METOPROLOL SUCCINATE 50 MG: 50 TABLET, EXTENDED RELEASE ORAL at 09:40

## 2022-12-14 RX ADMIN — GUAIFENESIN 400 MG: 400 TABLET ORAL at 14:04

## 2022-12-14 RX ADMIN — MELATONIN 3 MG ORAL TABLET 3 MG: 3 TABLET ORAL at 20:28

## 2022-12-14 RX ADMIN — ARFORMOTEROL TARTRATE 15 MCG: 15 SOLUTION RESPIRATORY (INHALATION) at 12:05

## 2022-12-14 RX ADMIN — PANTOPRAZOLE SODIUM 40 MG: 40 TABLET, DELAYED RELEASE ORAL at 09:40

## 2022-12-14 RX ADMIN — SODIUM CHLORIDE 25 ML/HR: 3 INJECTION, SOLUTION INTRAVENOUS at 14:06

## 2022-12-14 RX ADMIN — CLONAZEPAM 0.5 MG: 0.5 TABLET ORAL at 12:58

## 2022-12-14 ASSESSMENT — ENCOUNTER SYMPTOMS
NAUSEA: 0
PHOTOPHOBIA: 0
DIARRHEA: 0
COUGH: 1
VOICE CHANGE: 0
WHEEZING: 0
VOMITING: 0
RHINORRHEA: 0
ABDOMINAL PAIN: 0
SHORTNESS OF BREATH: 0
TROUBLE SWALLOWING: 0

## 2022-12-14 ASSESSMENT — PAIN - FUNCTIONAL ASSESSMENT
PAIN_FUNCTIONAL_ASSESSMENT: NONE - DENIES PAIN
PAIN_FUNCTIONAL_ASSESSMENT: NONE - DENIES PAIN

## 2022-12-14 NOTE — ED NOTES
Pt ambulated to bathroom, steady gait     Catina Ledalila, [address] [facility]  12/14/22 9216

## 2022-12-14 NOTE — ED NOTES
36- ED RN did make this provider aware that the sodium was 118 and patient was just completing the 3% saline IV infusion. I did make nursing aware that patient had nephrology on consult and she will need to consult nephrology for further IV management regarding the hyponatremia. [id number] on IV infusion orders from nephrology, she reports nephrology still is not call back. I did make her aware as well as the covering unit secretary which is actually one of the charge nurses that we will need to continue to page nephrology for correct orders. [id number]-return phone call from Dr. Jett Deshpande making her aware of the sodium level of 118, repeat BMP is now pending.      Piedad Ramos, APRN - CNP  12/14/22 1036

## 2022-12-14 NOTE — ED NOTES
Per verbal order from Dr.Paget, 3% fluids stopped due to sodium being [address], RN  12/14/22 7435

## 2022-12-14 NOTE — ED NOTES
Nan Thompsno notified of critical sodium result, 118.  Awaiting further orders       Any Tellez RN  12/14/22 0001

## 2022-12-14 NOTE — PROGRESS NOTES
[facility]  Nephrology Progress Note    Patient's Name: Lucrecia Meyers    History of Present Illness from 12/13 consult Note:    Sofia Padron is a 81 y.o. 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 She is known to our group from prior admissions. Patient presents to ED with complaints of abnormal labs. According to patient she recently had labs performed 2 days earlier and received a call from her oncologist office indicating that her sodium was low and that she needed to present to ED. She states that she feels well. She has a nonproductive cough. She denies fever or chills. She denies excessive water drinking. Initial vital signs on presentation were stable. Laboratory data was significant for sodium 118, potassium 3.5, chloride 88, CO2 21, BUN 5, creatinine 0.4, calcium 8.3. Patient was admitted here 10/9-10/14 for severe hyponatremia at the time presenting at 111 mEq/L. Sodium on discharge at the time was 128. Medication at discharge included sodium chloride tablets at 2 g 3 times daily. She is being admitted for further management. \"    Subjective:    12/14: Patient was seen and examined. She denies any shortness of breath. She denies any abdominal pain or nausea.     PMH:    Past Medical History:   Diagnosis Date    Bronchitis     Hypertension     Thyroid disease        Patient Active Problem List   Diagnosis    Congestive heart failure of unknown etiology ([address]    Congestive heart failure due to cardiomyopathy ([facility] [address]    Hypoxia    Simple chronic bronchitis (HCC)    Hyponatremia    Primary hypertension    Hypothyroidism    GERD (gastroesophageal reflux disease)    Depression    Palliative care by specialist    Goals of care, counseling/discussion    Small cell lung cancer in [facility])    Lung mass    Closed fracture of left proximal humerus    Moderate protein-calorie malnutrition ([facility] [address]    Tobacco abuse    Shortness of breath       Diet:    ADULT DIET; Regular    Meds:     urea  15 g Oral TID    fluticasone  1 spray Nasal Daily    guaiFENesin  400 mg Oral TID    levothyroxine  137 mcg Oral Daily    metoprolol succinate  50 mg Oral Daily    montelukast  10 mg Oral QAM    pantoprazole  40 mg Oral QAM AC    rosuvastatin  20 mg Oral Nightly    [Held by provider] sodium chloride  2 g Oral TID WC    enoxaparin  40 mg SubCUTAneous Daily    budesonide  250 mcg Nebulization BID    Arformoterol Tartrate  15 mcg Nebulization BID           Meds prn:     clonazePAM, ondansetron **OR** ondansetron, albuterol    Meds prior to admission:     No current facility-administered medications on file prior to encounter.      Current Outpatient Medications on File Prior to Encounter   Medication Sig Dispense Refill    rosuvastatin (CRESTOR) 20 MG tablet Take 1 tablet by mouth nightly 30 tablet 3    metoprolol succinate (TOPROL XL) 50 MG extended release tablet Take 1 tablet by mouth daily 30 tablet 3    guaiFENesin 400 MG tablet Take 1 tablet by mouth in the morning, at noon, and at bedtime 56 tablet 0    vitamin B-6 (B-6) 50 MG tablet Take 1 tablet by mouth daily 30 tablet 3    sodium chloride 1 g tablet Take 2 tablets by mouth 3 times daily (with meals) 90 tablet 3    fluticasone-vilanterol (BREO ELLIPTA) 100-25 MCG/INH AEPB inhaler Inhale 1 puff into the lungs daily      albuterol (PROVENTIL) (2.5 MG/3ML) 0.083% nebulizer solution Take 2.5 mg by nebulization 3 times daily as needed      fluticasone (FLONASE) 50 MCG/ACT nasal spray 1 spray by Nasal route daily      montelukast (SINGULAIR) 10 MG tablet Take 10 mg by mouth every morning      albuterol sulfate  (90 Base) MCG/ACT inhaler Inhale 2 puffs into the lungs every 6 hours as needed for Wheezing 1 each 3    potassium chloride (KLOR-CON M) 20 MEQ extended release tablet Take 1 tablet by mouth daily 60 tablet 3    omeprazole (PRILOSEC) 20 MG delayed release capsule Take 20 mg by mouth daily      clonazePAM (KLONOPIN) 1 MG tablet Take 0.5 mg by mouth daily as needed for Anxiety. levothyroxine (SYNTHROID) 137 MCG tablet Take 137 mcg by mouth Daily          Allergies:    Patient has no known allergies. Social History:     reports that she has been smoking cigarettes. She started smoking about 50 years ago. She has a 50.00 pack-year smoking history. She has never used smokeless tobacco. She reports that she does not drink alcohol and does not use drugs. Family History:     History reviewed. No pertinent family history. Physical Exam:      Patient Vitals for the past 24 hrs:   BP Temp Temp src Pulse Resp SpO2   12/14/22 1209 -- -- -- 84 24 --   12/14/22 0753 130/61 97 °F (36.1 °C) Temporal 81 -- 98 %   12/14/22 0645 (!) 120/57 -- -- (!) 12 12 96 %   12/14/22 0309 (!) 147/75 97.3 °F (36.3 °C) Oral 82 17 97 %   12/13/22 2346 135/73 -- -- 82 21 97 %   12/13/22 2100 98/69 -- -- 96 16 96 %   12/13/22 2003 116/65 -- -- 99 -- --   12/13/22 1835 125/61 -- -- 96 -- --   12/13/22 1607 (!) 145/80 -- -- 81 20 99 %         Intake/Output Summary (Last 24 hours) at 12/14/2022 1235  Last data filed at 12/14/2022 0508  Gross per 24 hour   Intake 88.2 ml   Output 380 ml   Net -291.8 ml       General: Awake, alert, no acute distress  Neck: No JVD noted  Lungs: Clear bilaterally upper, diminished to the bases bilaterally. Unlabored  CV: Regular rate and rhythm. No rub  Abd: Soft, nontender, nondistended. Active bowel sounds  Skin: Warm and dry.   No rash on exposed extremities  Ext: No edema   Neuro: Awake, answers questions appropriately    Data:    Recent Labs     12/14/22  0618   WBC 2.1*   HGB 11.0*   HCT 30.9*   MCV 78.0*          Recent Labs     12/14/22  0344 12/14/22  0618 12/14/22  1134   * 120* 117*   K 3.4* 4.6 3.7   CL 97* 89* 87*   CO2 21* 22 23   CREATININE 0.4* 0.6 0.5   BUN 4* 6 8   LABGLOM >60 >60 >60   GLUCOSE 75 134* 113*   CALCIUM 7.1* 8.5* 8.4*       Vit D, 25-Hydroxy   Date Value Ref Range Status   09/23/2022 54 30 - 100 ng/mL Final     Comment:     <20 ng/mL. ........... [employer] Deficient  20-30 ng/mL. ......... [employer] Insufficient   ng/mL. ........ [employer] Sufficient  >100 ng/mL. .......... [employer] Toxic         PTH   Date Value Ref Range Status   09/23/2022 40 15 - 65 pg/mL Final       Recent Labs     12/13/22  1243 12/14/22  0618   ALT 8 7   AST 15 15   ALKPHOS 122* 115*   BILITOT 0.3 0.3       Recent Labs     12/13/22  1243 12/14/22  0618   LABALBU 4.0 3.8       No results found for: FERRITIN, IRON, TIBC    Vitamin B-12   Date Value Ref Range Status   09/21/2022 445 211 - 946 pg/mL Final       Folate   Date Value Ref Range Status   09/21/2022 10.4 4.8 - 24.2 ng/mL Final       Lab Results   Component Value Date/Time    COLORU Yellow 12/13/2022 12:43 PM    NITRU Negative 12/13/2022 12:43 PM    GLUCOSEU Negative 12/13/2022 12:43 PM    KETUA Negative 12/13/2022 12:43 PM    UROBILINOGEN 0.2 12/13/2022 12:43 PM    BILIRUBINUR Negative 12/13/2022 12:43 PM       Lab Results   Component Value Date/Time    OSMOU 483 12/13/2022 12:43 PM       No components found for: URIC    No results found for: LIPIDPAN    Assessment and Plans:    Chronic hyponatremia  With known history of SIADH; questionable compliance with NaCl tablets  Uric acid 1.7 on 12/13, TSH 18.28, cortisol 4.23   urine sodium 145, urine creatinine 52, urine urea 332, urine osmolality 483  Check serum osmolality  Sodium 117 on 12/14--> 117 today  On Ure-na 15 g oral 3 times daily-on hold  fluid restriction  S/p 3% sodium chloride solution on 12/13 and 12/14  Strict I&O  Monitor BMP every 6 hours    2. Essential hypertension  BP goal<130/80  BP near goal  Monitor on current regimen    3.   Lung cancer   Follows with the [facility] FELIPA BROCK CNP    Patient seen and examined all key components of the physical performed independently , case discussed with NP, all pertinent labs and radiologic tests personally reviewed agree with above.     Hyponatremia due to SIADH from lung cancer, exacerbated by hypothyroidism ; she also states that she was only taking the NaCl tabs when she ate leading to missed doses  Will repeat course of hypertonic saline; fluid restriction  Monitor labs  Need accurate I/Os; kasandra ordered for short term    Harper Jonas MD

## 2022-12-14 NOTE — ED PROVIDER NOTES
81y.o. year old female presenting to the emergency room with concerns of hyponatremia. Reports that symptom's onset on lab work done yesterday. Worsen with none. Improves with none severity of 0 out of 10, with no radiation . Symptoms are constant in timing. Symptoms described as sodium of 120. associated symptoms of none. Patient in  no acute distress. Patient with lab work done at [facility] demonstrating sodium of 120 told to come into ER. History of small cell lung cancer. Previous history of hyponatremia    Chief Complaint   Patient presents with    Abnormal Lab     Low na+ 120denying cp denying n/v/d denying dizziness denying sob       Review of Systems   Constitutional:  Negative for chills, fatigue and fever. HENT:  Negative for congestion, rhinorrhea, trouble swallowing and voice change. Eyes:  Negative for photophobia and visual disturbance. Respiratory:  Positive for cough. Negative for shortness of breath and wheezing. Cardiovascular:  Negative for chest pain and palpitations. Gastrointestinal:  Negative for abdominal pain, diarrhea, nausea and vomiting. Genitourinary:  Negative for decreased urine volume and dysuria. Musculoskeletal:  Negative for arthralgias. Skin:  Negative for rash and wound. Neurological:  Negative for dizziness, syncope and headaches. Psychiatric/Behavioral:  Negative for behavioral problems and confusion. The patient is not nervous/anxious. Physical Exam  Vitals reviewed. Constitutional:       General: She is not in acute distress. Appearance: She is not diaphoretic. HENT:      Head: Normocephalic. Right Ear: External ear normal.      Left Ear: External ear normal.      Nose: Nose normal.      Mouth/Throat:      Pharynx: Oropharynx is clear. Eyes:      General: No scleral icterus. Right eye: No discharge. Left eye: No discharge.       Conjunctiva/sclera: Conjunctivae normal.   Cardiovascular:      Rate and Rhythm: Normal rate and regular rhythm. Heart sounds: No friction rub. Pulmonary:      Effort: Pulmonary effort is normal. No respiratory distress. Breath sounds: No stridor. No rales. Abdominal:      General: There is no distension. Palpations: Abdomen is soft. Tenderness: There is no abdominal tenderness. There is no guarding or rebound. Musculoskeletal:         General: No tenderness or deformity. Cervical back: Normal range of motion and neck supple. No rigidity or tenderness. Right lower leg: No edema. Left lower leg: No edema. Skin:     General: Skin is warm. Coloration: Skin is not jaundiced. Findings: No erythema. Neurological:      Mental Status: She is alert and oriented to person, place, and time. Sensory: No sensory deficit. Motor: No weakness. Psychiatric:         Mood and Affect: Mood normal.         Behavior: Behavior normal.        Procedures     No orders to display       EKG: This EKG is signed by emergency department physician. Rate: 90  Rhythm: Sinus  Interpretation: sinus arrhythmia  Comparison: changes compared to previous EKG     MDM  Number of Diagnoses or Management Options  Diagnosis management comments: 11year-old female presenting to the emergency department complaints of hyponatremia. Outpatient lab work with sodium 120. Sodium 118. Patient denies any acute symptoms at this time. Spoke to nephrology, discussed patient Dr. Haider Acosta will put in orders. Spoke to [facility], discussed patient will plan to admit patient at this time. UA ordered, urine osmolality protein creatinine ratio and other urine studies pending.          ED Course as of 12/14/22 0249   Tue Dec 13, 2022   1636 Spoke with Dr. Haider Acosta he will follow patient for hyponatremia   [SS]      ED Course User Index  [SS] Matti Huerta MD        ED Course as of 12/14/22 0249   Tue Dec 13, 2022   1636 Spoke with Dr. Haider Acosta he will follow patient for hyponatremia   [SS]      ED Course User Index  [SS] Denia Muñoz MD       --------------------------------------------- PAST HISTORY ---------------------------------------------  Past Medical History:  has a past medical history of Bronchitis, Hypertension, and Thyroid disease. Past Surgical History:  has a past surgical history that includes other surgical history; Tympanostomy tube placement; CT NEEDLE BIOPSY LIVER PERCUTANEOUS (9/6/2022); and Port Surgery (Left, 9/23/2022). Social History:  reports that she has been smoking cigarettes. She started smoking about 50 years ago. She has a 50.00 pack-year smoking history. She has never used smokeless tobacco. She reports that she does not drink alcohol and does not use drugs. Family History: family history is not on file. The patients home medications have been reviewed. Allergies: Patient has no known allergies.     -------------------------------------------------- RESULTS -------------------------------------------------    LABS:  Results for orders placed or performed during the hospital encounter of 12/13/22   Comprehensive Metabolic Panel   Result Value Ref Range    Sodium 118 (LL) 132 - 146 mmol/L    Potassium 3.9 3.5 - 5.0 mmol/L    Chloride 85 (L) 98 - 107 mmol/L    CO2 23 22 - 29 mmol/L    Anion Gap 10 7 - 16 mmol/L    Glucose 95 74 - 99 mg/dL    BUN 6 6 - 23 mg/dL    Creatinine 0.5 0.5 - 1.0 mg/dL    Est, Glom Filt Rate >60 >=60 mL/min/1.73    Calcium 8.7 8.6 - 10.2 mg/dL    Total Protein 6.3 (L) 6.4 - 8.3 g/dL    Albumin 4.0 3.5 - 5.2 g/dL    Total Bilirubin 0.3 0.0 - 1.2 mg/dL    Alkaline Phosphatase 122 (H) 35 - 104 U/L    ALT 8 0 - 32 U/L    AST 15 0 - 31 U/L   Urinalysis   Result Value Ref Range    Color, UA Yellow Straw/Yellow    Clarity, UA Clear Clear    Glucose, Ur Negative Negative mg/dL    Bilirubin Urine Negative Negative    Ketones, Urine Negative Negative mg/dL    Specific Gravity, UA 1.010 1.005 - 1.030    Blood, Urine Negative Negative    pH, UA 6.5 5.0 - 9.0    Protein, UA Negative Negative mg/dL    Urobilinogen, Urine 0.2 <2.0 E.U./dL    Nitrite, Urine Negative Negative    Leukocyte Esterase, Urine Negative Negative   SPECIMEN REJECTION   Result Value Ref Range    Rejected Test CBCWD     Reason for Rejection see below    URINE ELECTROLYTES   Result Value Ref Range    Sodium, Ur 145 Not Established mmol/L    Potassium, Ur 38.0 Not Established mmol/L    Chloride 134 Not Established mmol/L   PROTEIN / CREATININE RATIO, URINE   Result Value Ref Range    Protein, Ur 11 0 - 12 mg/dL    Creatinine, Ur 52 29 - 226 mg/dL    Protein/Creat Ratio 0.2 0.0 - 0.2    Protein/Creat Ratio 0.2    CREATININE, RANDOM URINE   Result Value Ref Range    Creatinine, Ur 52 29 - 226 mg/dL   OSMOLALITY, URINE   Result Value Ref Range    Osmolality, Ur 483 300 - 900 mOsm/kg   Urea nitrogen, urine   Result Value Ref Range    Urea Nitrogen, Ur 332 (L) 800 - 1666 mg/dL   Basic Metabolic Panel   Result Value Ref Range    Sodium 118 (LL) 132 - 146 mmol/L    Potassium 3.6 3.5 - 5.0 mmol/L    Chloride 85 (L) 98 - 107 mmol/L    CO2 23 22 - 29 mmol/L    Anion Gap 10 7 - 16 mmol/L    Glucose 84 74 - 99 mg/dL    BUN 6 6 - 23 mg/dL    Creatinine 0.4 (L) 0.5 - 1.0 mg/dL    Est, Glom Filt Rate >60 >=60 mL/min/1.73    Calcium 8.4 (L) 8.6 - 10.2 mg/dL   TSH   Result Value Ref Range    TSH 18.280 (H) 0.270 - 4.200 uIU/mL   Uric Acid   Result Value Ref Range    Uric Acid, Serum 1.7 (L) 2.4 - 5.7 mg/dL   Cortisol Total   Result Value Ref Range    Cortisol 4.23 2.68 - 18.40 mcg/dL   T4, Free   Result Value Ref Range    T4 Free 1.11 0.93 - 1.70 ng/dL   SPECIMEN REJECTION   Result Value Ref Range    Rejected Test AS     Reason for Rejection see below    Basic Metabolic Panel   Result Value Ref Range    Sodium 118 (LL) 132 - 146 mmol/L    Potassium 3.5 3.5 - 5.0 mmol/L    Chloride 88 (L) 98 - 107 mmol/L    CO2 21 (L) 22 - 29 mmol/L    Anion Gap 9 7 - 16 mmol/L    Glucose 84 74 - 99 mg/dL    BUN 5 (L) 6 - 23 mg/dL    Creatinine 0.4 (L) 0.5 - 1.0 mg/dL    EstAurelio Filt Rate >60 >=60 mL/min/1.73    Calcium 8.3 (L) 8.6 - 10.2 mg/dL   EKG 12 Lead   Result Value Ref Range    Ventricular Rate 90 BPM    Atrial Rate 90 BPM    P-R Interval 148 ms    QRS Duration 92 ms    Q-T Interval 388 ms    QTc Calculation (Bazett) 474 ms    P Axis 84 degrees    R Axis 79 degrees    T Axis 78 degrees       RADIOLOGY:  No orders to display           ------------------------- NURSING NOTES AND VITALS REVIEWED ---------------------------  Date / Time Roomed:  12/13/2022  3:21 PM  ED Bed Assignment:  07/07    The nursing notes within the ED encounter and vital signs as below have been reviewed. Patient Vitals for the past 24 hrs:   BP Temp Pulse Resp SpO2 Weight   12/13/22 2346 135/73 -- 82 21 97 % --   12/13/22 2100 98/69 -- 96 16 96 % --   12/13/22 2003 116/65 -- 99 -- -- --   12/13/22 1835 125/61 -- 96 -- -- --   12/13/22 1607 (!) 145/80 -- 81 20 99 % --   12/13/22 1135 124/74 -- 93 16 96 % 157 lb (71.2 kg)   12/13/22 1048 -- 97.2 °F (36.2 °C) -- -- -- --       Oxygen Saturation Interpretation: Normal    ------------------------------------------ PROGRESS NOTES ------------------------------------------  Re-evaluation(s):   Patients symptoms show no change  Repeat physical examination is not changed    Counseling:  I have spoken with the patient and discussed todays results, in addition to providing specific details for the plan of care and counseling regarding the diagnosis and prognosis. Their questions are answered at this time and they are agreeable with the plan of admission.    --------------------------------- ADDITIONAL PROVIDER NOTES ---------------------------------  Consultations:   Spoke with Dr. Bibi Lopez. Discussed case. They will admit the patient.   This patient's ED course included: a personal history and physicial examination, re-evaluation prior to disposition, multiple bedside re-evaluations, IV medications, cardiac monitoring, and continuous pulse oximetry    This patient has remained hemodynamically stable during their ED course. Diagnosis:  1. Hyponatremia        Disposition:  Patient's disposition: Admit  Patient's condition is stable. Attending was present and available throughout encounter including all critical portions;  See Attending Note/Attestation for Final [address], DO  Resident  12/14/22 0628

## 2022-12-14 NOTE — ED NOTES
Patient pox dropped to 88% sleeping. 2 liters n/c applied, pox back up to 97%.       Armida Gomes RN  12/13/22 8012

## 2022-12-14 NOTE — ED NOTES
Dr Paget called back, results given to physician over the phone. . physician states she will be putting additional orders in for IV fluids and medications. Vitals stable, nad.       Luma Saavedra RN  12/14/22 5170

## 2022-12-14 NOTE — ACP (ADVANCE CARE PLANNING)
Advance Care Planning   Healthcare Decision Maker:    Primary Decision Maker: Kira Umaña - [phone]    Secondary Decision Maker: Teja Carl - Niece/Nephew - [phone]    Supplemental (Other) Decision Maker: Lucila Hernandez - Other - [phone]    Click here to complete Healthcare Decision Makers including selection of the Healthcare Decision Maker Relationship (ie \"Primary\"). Today we documented Decision Maker(s) consistent with ACP documents on file.        Jean Reid, L.S.W.  [phone]

## 2022-12-15 PROBLEM — E44.0 MODERATE PROTEIN-CALORIE MALNUTRITION (HCC): Status: ACTIVE | Noted: 2022-09-24

## 2022-12-15 LAB
ANION GAP SERPL CALCULATED.3IONS-SCNC: 11 MMOL/L (ref 7–16)
ANION GAP SERPL CALCULATED.3IONS-SCNC: 7 MMOL/L (ref 7–16)
ANION GAP SERPL CALCULATED.3IONS-SCNC: 9 MMOL/L (ref 7–16)
BUN BLDV-MCNC: 18 MG/DL (ref 6–23)
BUN BLDV-MCNC: 22 MG/DL (ref 6–23)
BUN BLDV-MCNC: 9 MG/DL (ref 6–23)
CALCIUM SERPL-MCNC: 8.8 MG/DL (ref 8.6–10.2)
CALCIUM SERPL-MCNC: 9.2 MG/DL (ref 8.6–10.2)
CALCIUM SERPL-MCNC: 9.5 MG/DL (ref 8.6–10.2)
CHLORIDE BLD-SCNC: 90 MMOL/L (ref 98–107)
CHLORIDE BLD-SCNC: 92 MMOL/L (ref 98–107)
CHLORIDE BLD-SCNC: 93 MMOL/L (ref 98–107)
CO2: 22 MMOL/L (ref 22–29)
CO2: 22 MMOL/L (ref 22–29)
CO2: 23 MMOL/L (ref 22–29)
CREAT SERPL-MCNC: 0.6 MG/DL (ref 0.5–1)
CREAT SERPL-MCNC: 0.7 MG/DL (ref 0.5–1)
CREAT SERPL-MCNC: 0.7 MG/DL (ref 0.5–1)
EKG ATRIAL RATE: 90 BPM
EKG P AXIS: 84 DEGREES
EKG P-R INTERVAL: 148 MS
EKG Q-T INTERVAL: 388 MS
EKG QRS DURATION: 92 MS
EKG QTC CALCULATION (BAZETT): 474 MS
EKG R AXIS: 79 DEGREES
EKG T AXIS: 78 DEGREES
EKG VENTRICULAR RATE: 90 BPM
GFR SERPL CREATININE-BSD FRML MDRD: >60 ML/MIN/1.73
GLUCOSE BLD-MCNC: 127 MG/DL (ref 74–99)
GLUCOSE BLD-MCNC: 83 MG/DL (ref 74–99)
GLUCOSE BLD-MCNC: 87 MG/DL (ref 74–99)
POTASSIUM SERPL-SCNC: 4.1 MMOL/L (ref 3.5–5)
POTASSIUM SERPL-SCNC: 4.1 MMOL/L (ref 3.5–5)
POTASSIUM SERPL-SCNC: 4.2 MMOL/L (ref 3.5–5)
SODIUM BLD-SCNC: 123 MMOL/L (ref 132–146)

## 2022-12-15 PROCEDURE — 80048 BASIC METABOLIC PNL TOTAL CA: CPT

## 2022-12-15 PROCEDURE — 97161 PT EVAL LOW COMPLEX 20 MIN: CPT

## 2022-12-15 PROCEDURE — 6370000000 HC RX 637 (ALT 250 FOR IP): Performed by: FAMILY MEDICINE

## 2022-12-15 PROCEDURE — 6370000000 HC RX 637 (ALT 250 FOR IP): Performed by: INTERNAL MEDICINE

## 2022-12-15 PROCEDURE — 93010 ELECTROCARDIOGRAM REPORT: CPT | Performed by: INTERNAL MEDICINE

## 2022-12-15 PROCEDURE — 2060000000 HC ICU INTERMEDIATE R&B

## 2022-12-15 PROCEDURE — 94640 AIRWAY INHALATION TREATMENT: CPT

## 2022-12-15 PROCEDURE — 6360000002 HC RX W HCPCS: Performed by: FAMILY MEDICINE

## 2022-12-15 PROCEDURE — 36415 COLL VENOUS BLD VENIPUNCTURE: CPT

## 2022-12-15 PROCEDURE — 6370000000 HC RX 637 (ALT 250 FOR IP): Performed by: NURSE PRACTITIONER

## 2022-12-15 RX ORDER — DIPHENOXYLATE HYDROCHLORIDE AND ATROPINE SULFATE 2.5; .025 MG/1; MG/1
1 TABLET ORAL 4 TIMES DAILY PRN
Status: DISCONTINUED | OUTPATIENT
Start: 2022-12-15 | End: 2022-12-17 | Stop reason: HOSPADM

## 2022-12-15 RX ADMIN — BUDESONIDE 250 MCG: 0.25 SUSPENSION RESPIRATORY (INHALATION) at 19:35

## 2022-12-15 RX ADMIN — ENOXAPARIN SODIUM 40 MG: 100 INJECTION SUBCUTANEOUS at 08:44

## 2022-12-15 RX ADMIN — DIPHENOXYLATE HYDROCHLORIDE AND ATROPINE SULFATE 1 TABLET: 2.5; .025 TABLET ORAL at 12:16

## 2022-12-15 RX ADMIN — GUAIFENESIN 400 MG: 400 TABLET ORAL at 08:44

## 2022-12-15 RX ADMIN — GUAIFENESIN 400 MG: 400 TABLET ORAL at 22:16

## 2022-12-15 RX ADMIN — ROSUVASTATIN 20 MG: 20 TABLET, FILM COATED ORAL at 22:16

## 2022-12-15 RX ADMIN — Medication 15 G: at 22:15

## 2022-12-15 RX ADMIN — MELATONIN 3 MG ORAL TABLET 3 MG: 3 TABLET ORAL at 22:16

## 2022-12-15 RX ADMIN — MONTELUKAST 10 MG: 10 TABLET, FILM COATED ORAL at 08:44

## 2022-12-15 RX ADMIN — ARFORMOTEROL TARTRATE 15 MCG: 15 SOLUTION RESPIRATORY (INHALATION) at 19:35

## 2022-12-15 RX ADMIN — CLONAZEPAM 0.5 MG: 0.5 TABLET ORAL at 13:48

## 2022-12-15 RX ADMIN — PANTOPRAZOLE SODIUM 40 MG: 40 TABLET, DELAYED RELEASE ORAL at 07:32

## 2022-12-15 RX ADMIN — LEVOTHYROXINE SODIUM 137 MCG: 0.14 TABLET ORAL at 07:32

## 2022-12-15 RX ADMIN — Medication 15 G: at 08:44

## 2022-12-15 RX ADMIN — METOPROLOL SUCCINATE 50 MG: 50 TABLET, EXTENDED RELEASE ORAL at 08:44

## 2022-12-15 RX ADMIN — GUAIFENESIN 400 MG: 400 TABLET ORAL at 13:48

## 2022-12-15 RX ADMIN — FLUTICASONE PROPIONATE 1 SPRAY: 50 SPRAY, METERED NASAL at 08:43

## 2022-12-15 RX ADMIN — Medication 15 G: at 13:49

## 2022-12-15 NOTE — PROGRESS NOTES
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 Full flight of stairs and 2 rails to second floor bed and bath. Pt ambulated without AD prior to admission. OBJECTIVE:   Initial Evaluation  Date: 12/15/22 Treatment Date: Short Term/ Long Term   Goals   AM-PAC 6 Clicks      Was pt agreeable to Eval/treatment? Yes     Does pt have pain? No current complaints of pain     Bed Mobility  Rolling: NT  Supine to sit: NT  Sit to supine: NT  Scooting: NT  Rolling: Independent   Supine to sit: Independent   Sit to supine: Independent   Scooting: Independent    Transfers Sit to stand: Supervision  Stand to sit: Supervision  Stand pivot: NT  Sit to stand: Independent   Stand to sit: Independent   Stand pivot: Independent    Ambulation   Adamantly declined  >200 feet Independent    Stair negotiation: ascended and descended NT  >12 steps with 1 rail Mod Independent    ROM BUE: Refer to OT note  BLE: WFL     Strength BUE: Refer to OT note  BLE: WFL     Balance Sitting EOB: Independent   Dynamic Standing: Supervision  Dynamic Standing: Independent      Pt is A & O x: 4 to person, place, month/year, and situation. Sensation: Pt denies numbness and tingling of extremities. Edema: Unremarkable. Patient education  Pt educated on hand placement during transfers.     Patient response to education:   Pt verbalized understanding Pt demonstrated skill Pt requires further education in this area   Yes With cues Yes     ASSESSMENT:    Conditions Requiring Skilled Therapeutic Intervention:    [x]Decreased strength     []Decreased ROM  [x]Decreased functional mobility  [x]Decreased balance   [x]Decreased endurance   []Decreased posture  []Decreased sensation  []Decreased coordination   []Decreased vision  []Decreased safety awareness   []Increased pain       Comments:    Pt was seated at EOB upon room entry; agreeable to PT evaluation. Pt adamantly declined to ambulate with PT at this time. Pt was only agreeable to stand at EOB. Pt reports she has been ambulating to/from bathroom with nursing without difficulty. Pt reports there will be no PT needs at discharge and plan is home. All questions and concerns were addressed. Pt was left in bed with all needs met at conclusion of session. Treatment:  Patient practiced and was instructed in the following treatment:    Therapeutic activities:  Education: Pt was educated on PT role in acute care setting. Pt's/family goals:  1. To return home. Prognosis is Good for reaching above PT goals. Patient and or family understand(s) diagnosis, prognosis, and plan of care. Yes. PHYSICAL THERAPY PLAN OF CARE:    PT POC is established based on physician order and patient diagnosis     Referring provider/PT Order:    Start   Ordering Provider    12/15/22 0730  PT eval and treat  Start:  12/15/22 0730,   End:  12/15/22 0730,   ONE TIME,   Standing Count:  1 Occurrences,   R         Johny Henriquez Stamp, DO      Diagnosis:  Hyponatremia [E87.1]  Specific instructions for next treatment:  Progress activity.     Current Treatment Recommendations:     [x] Strengthening to improve independence with functional mobility   [] ROM to improve independence with functional mobility   [x] Balance Training to improve static/dynamic balance and to reduce fall risk  [x] Endurance Training to improve activity tolerance during functional mobility   [x] Transfer Training to improve safety and independence with all functional transfers   [x] Gait Training to improve gait mechanics, endurance and assess need for appropriate assistive device  [] Stair Training in preparation for safe discharge home and/or into the community   [] Positioning to prevent skin breakdown and contractures  [x] Safety and Education Training   [] Patient/Caregiver Education   [] HEP  [] Other     PT long term treatment goals are located in above grid    Frequency of treatments: 2-5x/week x 1-2 weeks. Time in  1000  Time out  1010    Total Treatment Time  0 minutes     Evaluation Time includes thorough review of current medical information, gathering information on past medical history/social history and prior level of function, completion of standardized testing/informal observation of tasks, assessment of data and education on plan of care and goals.     CPT codes:  [x] Low Complexity PT evaluation 44099  [] Moderate Complexity PT evaluation 76385  [] High Complexity PT evaluation 92016  [] PT Re-evaluation 12495  [] Gait training 53286 0 minutes  [] Manual therapy 86932 0 minutes  [] Therapeutic activities 38545 0 minutes  [] Therapeutic exercises 95565 0 minutes  [] Neuromuscular reeducation 53827 0 minutes     Christina Christianson, PT, DPT  [id number]

## 2022-12-15 NOTE — CARE COORDINATION
Social Work Discharge Planning:  SW attempted twice to meet with patient for initial assessment, she was sleeping. SW/CM will re-attempt later.   Electronically signed by AYDEN Hameed on 12/14/2022 at 7:19 PM

## 2022-12-15 NOTE — CARE COORDINATION
This CM met with pt at bedside to discuss discharge / transition of care plan. Pt reports from home with daughter; independent of all ADL; denies DME or needs; has 6 steps to enter however, reports no issue with them; ambulating in room per Ns; verified PCP and pharmacy; discharge plan is to return home with no needs, daughter to provide transportation. Chart reviewed, Na+ 123 today, strict I&Os and Fluid Rx; pt questioning why she continues to have this problem with her sodium, and reports taking her medication although several cups of fluid noted on pt's bedside table as well as a couple cans of diet coke; this CM suggested pt watch her fluid intake more carefully.

## 2022-12-15 NOTE — PROGRESS NOTES
Occupational Therapy  OT SESSION ATTEMPT     Date:12/15/2022  Patient Name: Rohini Julian  MRN: [id number]  : 1950  Room: [address] [place] [address]-A     Attempted OT session this date:    [] unavailable due to other medical staff currently with pt   [] on hold per nursing staff   [] on hold per nursing staff secondary to lab / radiology results    [] declined Occupational Therapy  this date due to ___. Benefits of participation in therapy reviewed with pt.    [] off unit   [x] Other: evaluation attempted, pt declined    Will reattempt OT evaluation at a later time.     America Sykes OTR/L #[id number]

## 2022-12-15 NOTE — PROGRESS NOTES
Comprehensive Nutrition Assessment    Type and Reason for Visit:  Initial, Positive Nutrition Screen    Nutrition Recommendations/Plan:     Continue Current Diet, Start Oral Nutrition Supplement (to comply w/ fluid restriction)     Malnutrition Assessment:  Malnutrition Status: Moderate malnutrition (12/15/22 1137)    Context:  Chronic Illness     Findings of the 6 clinical characteristics of malnutrition:  Energy Intake:  75% or less estimated energy requirements for 1 month or longer  Weight Loss:  Unable to assess (+NS for wt loss however appears stable x 5 mon back)     Body Fat Loss:   (Moderate) Orbital   Muscle Mass Loss:   (Moderate) Temples (temporalis), Clavicles (pectoralis & deltoids)  Fluid Accumulation:  No significant fluid accumulation    Strength:  Not Performed    Nutrition Assessment:    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 Noted Lung CA w/ mets to liver on chemo. PMHx CHF. Pt meets criteria for Moderate Malnutrition. Will add Boost Pudding BID & Gelatein Daily for protein variety. Nutrition Related Findings:    Pt alert, fluid bal WNL/ no edema, Abd WDL Wound Type: None       Current Nutrition Intake & Therapies:    Average Meal Intake: 51-75% (average)     ADULT DIET; Regular; No Fluids on Tray    Anthropometric Measures:  Height: 5' 2\" (157.5 cm)  Ideal Body Weight (IBW): 110 lbs (50 kg)    Admission Body Weight: 152 lb (68.9 kg) (12/15 first measured)  Current Body Weight: 152 lb (68.9 kg) (12/15 bedscale), 138.2 % IBW. Current BMI (kg/m2): 27.8  Usual Body Weight: 151 lb (68.5 kg) (measured wt 7/25/22 encounter, no further hx on file)  % Weight Change (Calculated): 0.7                    BMI Categories: Overweight (BMI 25.0-29. 9)    Estimated Daily Nutrient Needs:  Energy Requirements Based On: Formula  Weight Used for Energy Requirements: Current  Energy (kcal/day): MSJ 1163 x 1.3 SF= 1429-1153  Weight Used for Protein Requirements: Ideal  Protein (g/day): 1.5-1.8 g/kg IBW; 75-90  Fluid (ml/day): no fluid on trays    Nutrition Diagnosis:   Moderate malnutrition, In context of chronic illness related to catabolic illness (Lung CA w/ mets on chemo) as evidenced by poor intake prior to admission, moderate loss of subcutaneous fat, moderate muscle loss    Nutrition Interventions:   Nutrition Education/Counseling: Education not appropriate  Coordination of Nutrition Care: Continue to monitor while inpatient       Goals:     Goals: by next RD assessment, PO intake 75% or greater       Nutrition Monitoring and Evaluation:      Food/Nutrient Intake Outcomes: Food and Nutrient Intake, Supplement Intake  Physical Signs/Symptoms Outcomes: Biochemical Data, Nutrition Focused Physical Findings, Skin, Weight, GI Status, Fluid Status or Edema    Discharge Planning:     Too soon to determine     Ezequiel Constantino RD, LD  Contact: Ext [phone]

## 2022-12-15 NOTE — PROGRESS NOTES
[facility]  Nephrology Progress Note    Patient's Name: Justice Burgess    History of Present Illness from 12/13 consult Note:    Myrtle Sylvester is a 81 y.o. 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 She is known to our group from prior admissions. Patient presents to ED with complaints of abnormal labs. According to patient she recently had labs performed 2 days earlier and received a call from her oncologist office indicating that her sodium was low and that she needed to present to ED. She states that she feels well. She has a nonproductive cough. She denies fever or chills. She denies excessive water drinking. Initial vital signs on presentation were stable. Laboratory data was significant for sodium 118, potassium 3.5, chloride 88, CO2 21, BUN 5, creatinine 0.4, calcium 8.3. Patient was admitted here 10/9-10/14 for severe hyponatremia at the time presenting at 111 mEq/L. Sodium on discharge at the time was 128. Medication at discharge included sodium chloride tablets at 2 g 3 times daily. She is being admitted for further management. \"    Subjective:    12/15: Patient was seen and examined. She reports that she feels alright. She denies any nausea. She denies any shortness of breath.     PMH:    Past Medical History:   Diagnosis Date    Bronchitis     Hypertension     Thyroid disease        Patient Active Problem List   Diagnosis    Congestive heart failure of unknown etiology ([address]    Congestive heart failure due to cardiomyopathy ([address]    Hypoxia    Simple chronic bronchitis (HCC)    Hyponatremia    Primary hypertension    Hypothyroidism    GERD (gastroesophageal reflux disease)    Depression    Palliative care by specialist    Goals of care, counseling/discussion    Small cell lung cancer in adult [facility])    Lung mass    Closed fracture of left proximal humerus Moderate protein-calorie malnutrition (HCC)    Tobacco abuse    Shortness of breath       Diet:    ADULT DIET; Regular; No Fluids on Tray    Meds:     urea  15 g Oral TID    fluticasone  1 spray Nasal Daily    guaiFENesin  400 mg Oral TID    levothyroxine  137 mcg Oral Daily    metoprolol succinate  50 mg Oral Daily    montelukast  10 mg Oral QAM    pantoprazole  40 mg Oral QAM AC    rosuvastatin  20 mg Oral Nightly    [Held by provider] sodium chloride  2 g Oral TID WC    enoxaparin  40 mg SubCUTAneous Daily    budesonide  250 mcg Nebulization BID    Arformoterol Tartrate  15 mcg Nebulization BID           Meds prn:     melatonin, clonazePAM, ondansetron **OR** ondansetron, albuterol    Meds prior to admission:     No current facility-administered medications on file prior to encounter.      Current Outpatient Medications on File Prior to Encounter   Medication Sig Dispense Refill    rosuvastatin (CRESTOR) 20 MG tablet Take 1 tablet by mouth nightly 30 tablet 3    metoprolol succinate (TOPROL XL) 50 MG extended release tablet Take 1 tablet by mouth daily 30 tablet 3    guaiFENesin 400 MG tablet Take 1 tablet by mouth in the morning, at noon, and at bedtime 56 tablet 0    vitamin B-6 (B-6) 50 MG tablet Take 1 tablet by mouth daily 30 tablet 3    sodium chloride 1 g tablet Take 2 tablets by mouth 3 times daily (with meals) 90 tablet 3    fluticasone-vilanterol (BREO ELLIPTA) 100-25 MCG/INH AEPB inhaler Inhale 1 puff into the lungs daily      albuterol (PROVENTIL) (2.5 MG/3ML) 0.083% nebulizer solution Take 2.5 mg by nebulization 3 times daily as needed      fluticasone (FLONASE) 50 MCG/ACT nasal spray 1 spray by Nasal route daily      montelukast (SINGULAIR) 10 MG tablet Take 10 mg by mouth every morning      albuterol sulfate  (90 Base) MCG/ACT inhaler Inhale 2 puffs into the lungs every 6 hours as needed for Wheezing 1 each 3    potassium chloride (KLOR-CON M) 20 MEQ extended release tablet Take 1 tablet by mouth daily 60 tablet 3    omeprazole (PRILOSEC) 20 MG delayed release capsule Take 20 mg by mouth daily      clonazePAM (KLONOPIN) 1 MG tablet Take 0.5 mg by mouth daily as needed for Anxiety. levothyroxine (SYNTHROID) 137 MCG tablet Take 137 mcg by mouth Daily          Allergies:    Patient has no known allergies. Social History:     reports that she has been smoking cigarettes. She started smoking about 50 years ago. She has a 50.00 pack-year smoking history. She has never used smokeless tobacco. She reports that she does not drink alcohol and does not use drugs. Family History:     History reviewed. No pertinent family history. Physical Exam:      Patient Vitals for the past 24 hrs:   BP Temp Temp src Pulse Resp SpO2   12/15/22 0732 (!) 119/58 97.9 °F (36.6 °C) -- 78 16 99 %   12/15/22 0230 (!) 93/55 96.9 °F (36.1 °C) Temporal 74 19 96 %   12/14/22 2015 (!) 147/67 97.4 °F (36.3 °C) Temporal 88 20 96 %   12/14/22 1407 (!) 125/58 97.3 °F (36.3 °C) -- 87 21 98 %   12/14/22 1209 -- -- -- 84 24 --           Intake/Output Summary (Last 24 hours) at 12/15/2022 0855  Last data filed at 12/15/2022 0346  Gross per 24 hour   Intake 960 ml   Output 1200 ml   Net -240 ml       General: Awake, alert, no acute distress  Neck: No JVD noted  Lungs: Expiratory wheezing bilaterally upper, diminished to the bases bilaterally. Unlabored  CV: Regular rate and rhythm. No rub  Abd: Soft, nontender, nondistended. Active bowel sounds  Skin: Warm and dry.   No rash on exposed extremities  Ext: No edema   Neuro: Awake, answers questions appropriately    Data:    Recent Labs     12/14/22  0618   WBC 2.1*   HGB 11.0*   HCT 30.9*   MCV 78.0*            Recent Labs     12/14/22  1134 12/14/22  2012 12/15/22  0218   * 123* 123*   K 3.7 3.9 4.1   CL 87* 92* 90*   CO2 23 23 22   CREATININE 0.5 0.6 0.6   BUN 8 10 9   LABGLOM >60 >60 >60   GLUCOSE 113* 108* 83   CALCIUM 8.4* 9.3 8.8   MG  --  1.6  --          Vit D, 25-Hydroxy   Date Value Ref Range Status   09/23/2022 54 30 - 100 ng/mL Final     Comment:     <20 ng/mL. ........... Vinnie Parisian Deficient  20-30 ng/mL. ......... Vinnie Parisian Insufficient   ng/mL. ........ [place] Parisian Sufficient  >100 ng/mL. .......... Vinnie Parisian Toxic         PTH   Date Value Ref Range Status   09/23/2022 40 15 - 65 pg/mL Final       Recent Labs     12/13/22  1243 12/14/22  0618   ALT 8 7   AST 15 15   ALKPHOS 122* 115*   BILITOT 0.3 0.3         Recent Labs     12/13/22  1243 12/14/22  0618   LABALBU 4.0 3.8         No results found for: FERRITIN, IRON, TIBC    Vitamin B-12   Date Value Ref Range Status   09/21/2022 445 211 - 946 pg/mL Final       Folate   Date Value Ref Range Status   09/21/2022 10.4 4.8 - 24.2 ng/mL Final       Lab Results   Component Value Date/Time    COLORU Yellow 12/13/2022 12:43 PM    NITRU Negative 12/13/2022 12:43 PM    GLUCOSEU Negative 12/13/2022 12:43 PM    KETUA Negative 12/13/2022 12:43 PM    UROBILINOGEN 0.2 12/13/2022 12:43 PM    BILIRUBINUR Negative 12/13/2022 12:43 PM       Lab Results   Component Value Date/Time    OSMOU 483 12/13/2022 12:43 PM       No components found for: URIC    No results found for: LIPIDPAN    Assessment and Plans:    Chronic hyponatremia  With known history of SIADH; exacerbated by hypothyroidism  questionable compliance with NaCl tablets as an outpatient (patient reports that she was only taking NaCl tablets when eating resulting in missed doses)  Uric acid 1.7 on 12/13, TSH 18.28, cortisol 4.23   urine sodium 145, urine creatinine 52, urine urea 332, urine osmolality 483  Check serum osmolality  Sodium 117 on 12/14-->123 today  On Ure-na 15 g oral 3 times daily  fluid restriction  S/p 3% sodium chloride solution on 12/13 and 12/14  Urine output 1200 mL   Strict I&O  Monitor BMP every 6 hours    2. Essential hypertension  BP goal<130/80  BP at goal  Monitor on current regimen    3. Lung cancer   Follows with the [facility]    4.   Hypothyroidism  TSH 18.28  On levothyroxine management per primary service    FELIPA Mendoza - CNP    Patient seen and examined all key components of the physical performed independently , case discussed with NP, all pertinent labs and radiologic tests personally reviewed agree with above.       Harper Jonas MD

## 2022-12-15 NOTE — PROGRESS NOTES
Hospitalist Progress Note      PCP: Rayne Sam DO    Date of Admission: 12/13/2022        Hospital Course:  81 y.o. female presented with [address] SHE HAS A KNOWN HISTORY OF  SMALL CELL LUNG CANCER AND SHE IS ON CHEMO, AND SHE HAS SIADH. SHE IS ON CHEMO           Subjective:  CONCERNED ABOUT HOSPITAL BILL           Medications:  Reviewed    Infusion Medications   Scheduled Medications    urea  15 g Oral TID    fluticasone  1 spray Nasal Daily    guaiFENesin  400 mg Oral TID    levothyroxine  137 mcg Oral Daily    metoprolol succinate  50 mg Oral Daily    montelukast  10 mg Oral QAM    pantoprazole  40 mg Oral QAM AC    rosuvastatin  20 mg Oral Nightly    [Held by provider] sodium chloride  2 g Oral TID WC    enoxaparin  40 mg SubCUTAneous Daily    budesonide  250 mcg Nebulization BID    Arformoterol Tartrate  15 mcg Nebulization BID     PRN Meds: diphenoxylate-atropine, melatonin, clonazePAM, ondansetron **OR** ondansetron, albuterol      Intake/Output Summary (Last 24 hours) at 12/15/2022 1501  Last data filed at 12/15/2022 0346  Gross per 24 hour   Intake 720 ml   Output 650 ml   Net 70 ml       Exam:    BP (!) 119/58   Pulse 78   Temp 97.9 °F (36.6 °C)   Resp 16   Ht 5' 2\" (1.575 m)   Wt 152 lb (68.9 kg)   SpO2 99%   BMI 27.80 kg/m²       General appearance:  No apparent distress, appears stated age. HEENT:  Normal cephalic, atraumatic without obvious deformity. Pupils equal, round, and reactive to light. Extra ocular muscles intact. Conjunctivae/corneas clear. Neck: Supple, with full range of motion. No jugular venous distention. Trachea midline. Respiratory:  Normal respiratory effort. DIMINISHED BILATERALLY  Cardiovascular:  RRR  Abdomen: Soft, non-tender, non-distended with normal bowel sounds. Musculoskeletal:  No clubbing, cyanosis or edema bilaterally.     Skin: smooth and dry   Neurologic:   Cranial nerves: II-XII intact, Psychiatric:  Alert and oriented x 3            Labs:   Recent Labs     12/14/22  0618   WBC 2.1*   HGB 11.0*   HCT 30.9*        Recent Labs     12/14/22  2012 12/15/22  0218 12/15/22  1045   * 123* 123*   K 3.9 4.1 4.2   CL 92* 90* 92*   CO2 23 22 22   BUN 10 9 18   CREATININE 0.6 0.6 0.7   CALCIUM 9.3 8.8 9.5     Recent Labs     12/13/22  1243 12/14/22  0618   AST 15 15   ALT 8 7   BILITOT 0.3 0.3   ALKPHOS 122* 115*     No results for input(s): INR in the last 72 hours. No results for input(s): Evonne Jayleen in the last 72 hours. Recent Labs     12/13/22  1243 12/14/22  0618   AST 15 15   ALT 8 7   BILITOT 0.3 0.3   ALKPHOS 122* 115*     No results for input(s): LACTA in the last 72 hours. Lab Results   Component Value Date    LABURIC 1.7 (L) 12/13/2022     No results found for: AMMONIA    Assessment:    Active Hospital Problems    Diagnosis Date Noted    Moderate protein-calorie malnutrition ([facility] [E44.0] 09/24/2022     Priority: Medium    Hyponatremia [E87.1] 09/01/2022     Priority: Medium   SMALL CELL LUNG CANCER WITH METS  SIADH   HYPOTHYROID   HLD  HTN     PLAN:  CRESTOR   NEPHROLOGY   TOPROL           DVT Prophylaxis: LOVENOX  Diet: ADULT DIET; Regular;  No Fluids on Tray  Code Status: Prior     PT/OT Eval Status: ORDERED     Dispo - HOME      Electronically signed by Morgan Thayer DO on 12/15/2022 at 3:01 PM [facility]

## 2022-12-16 LAB
ANION GAP SERPL CALCULATED.3IONS-SCNC: 10 MMOL/L (ref 7–16)
ANION GAP SERPL CALCULATED.3IONS-SCNC: 11 MMOL/L (ref 7–16)
ANION GAP SERPL CALCULATED.3IONS-SCNC: 8 MMOL/L (ref 7–16)
BUN BLDV-MCNC: 23 MG/DL (ref 6–23)
BUN BLDV-MCNC: 39 MG/DL (ref 6–23)
BUN BLDV-MCNC: 45 MG/DL (ref 6–23)
CALCIUM SERPL-MCNC: 8.5 MG/DL (ref 8.6–10.2)
CALCIUM SERPL-MCNC: 9.5 MG/DL (ref 8.6–10.2)
CALCIUM SERPL-MCNC: 9.7 MG/DL (ref 8.6–10.2)
CHLORIDE BLD-SCNC: 92 MMOL/L (ref 98–107)
CHLORIDE BLD-SCNC: 93 MMOL/L (ref 98–107)
CHLORIDE BLD-SCNC: 96 MMOL/L (ref 98–107)
CO2: 20 MMOL/L (ref 22–29)
CO2: 25 MMOL/L (ref 22–29)
CO2: 26 MMOL/L (ref 22–29)
CREAT SERPL-MCNC: 0.5 MG/DL (ref 0.5–1)
CREAT SERPL-MCNC: 0.8 MG/DL (ref 0.5–1)
CREAT SERPL-MCNC: 0.8 MG/DL (ref 0.5–1)
GFR SERPL CREATININE-BSD FRML MDRD: >60 ML/MIN/1.73
GLUCOSE BLD-MCNC: 134 MG/DL (ref 74–99)
GLUCOSE BLD-MCNC: 80 MG/DL (ref 74–99)
GLUCOSE BLD-MCNC: 94 MG/DL (ref 74–99)
POTASSIUM SERPL-SCNC: 3.9 MMOL/L (ref 3.5–5)
POTASSIUM SERPL-SCNC: 3.9 MMOL/L (ref 3.5–5)
POTASSIUM SERPL-SCNC: 4.2 MMOL/L (ref 3.5–5)
SODIUM BLD-SCNC: 126 MMOL/L (ref 132–146)
SODIUM BLD-SCNC: 127 MMOL/L (ref 132–146)
SODIUM BLD-SCNC: 128 MMOL/L (ref 132–146)

## 2022-12-16 PROCEDURE — 36415 COLL VENOUS BLD VENIPUNCTURE: CPT

## 2022-12-16 PROCEDURE — 6370000000 HC RX 637 (ALT 250 FOR IP): Performed by: INTERNAL MEDICINE

## 2022-12-16 PROCEDURE — 2060000000 HC ICU INTERMEDIATE R&B

## 2022-12-16 PROCEDURE — 6370000000 HC RX 637 (ALT 250 FOR IP): Performed by: NURSE PRACTITIONER

## 2022-12-16 PROCEDURE — 6360000002 HC RX W HCPCS: Performed by: FAMILY MEDICINE

## 2022-12-16 PROCEDURE — 6370000000 HC RX 637 (ALT 250 FOR IP): Performed by: FAMILY MEDICINE

## 2022-12-16 PROCEDURE — 80048 BASIC METABOLIC PNL TOTAL CA: CPT

## 2022-12-16 RX ADMIN — METOPROLOL SUCCINATE 50 MG: 50 TABLET, EXTENDED RELEASE ORAL at 09:00

## 2022-12-16 RX ADMIN — DIPHENOXYLATE HYDROCHLORIDE AND ATROPINE SULFATE 1 TABLET: 2.5; .025 TABLET ORAL at 12:06

## 2022-12-16 RX ADMIN — ENOXAPARIN SODIUM 40 MG: 100 INJECTION SUBCUTANEOUS at 09:00

## 2022-12-16 RX ADMIN — Medication 15 G: at 09:01

## 2022-12-16 RX ADMIN — MELATONIN 3 MG ORAL TABLET 3 MG: 3 TABLET ORAL at 21:53

## 2022-12-16 RX ADMIN — MONTELUKAST 10 MG: 10 TABLET, FILM COATED ORAL at 09:00

## 2022-12-16 RX ADMIN — GUAIFENESIN 400 MG: 400 TABLET ORAL at 21:53

## 2022-12-16 RX ADMIN — CLONAZEPAM 0.5 MG: 0.5 TABLET ORAL at 14:33

## 2022-12-16 RX ADMIN — FLUTICASONE PROPIONATE 1 SPRAY: 50 SPRAY, METERED NASAL at 09:02

## 2022-12-16 RX ADMIN — Medication 15 G: at 14:33

## 2022-12-16 RX ADMIN — Medication 15 G: at 21:52

## 2022-12-16 RX ADMIN — PANTOPRAZOLE SODIUM 40 MG: 40 TABLET, DELAYED RELEASE ORAL at 06:51

## 2022-12-16 RX ADMIN — LEVOTHYROXINE SODIUM 137 MCG: 0.14 TABLET ORAL at 06:51

## 2022-12-16 RX ADMIN — ROSUVASTATIN 20 MG: 20 TABLET, FILM COATED ORAL at 21:53

## 2022-12-16 RX ADMIN — GUAIFENESIN 400 MG: 400 TABLET ORAL at 09:00

## 2022-12-16 RX ADMIN — GUAIFENESIN 400 MG: 400 TABLET ORAL at 14:33

## 2022-12-16 NOTE — PLAN OF CARE
Problem: Discharge Planning  Goal: Discharge to home or other facility with appropriate resources  Outcome: Progressing     Problem: Safety - Adult  Goal: Free from fall injury  Outcome: Progressing     Problem: Skin/Tissue Integrity  Goal: Absence of new skin breakdown  Description: 1. Monitor for areas of redness and/or skin breakdown  2. Assess vascular access sites hourly  3. Every 4-6 hours minimum:  Change oxygen saturation probe site  4. Every 4-6 hours:  If on nasal continuous positive airway pressure, respiratory therapy assess nares and determine need for appliance change or resting period.   Outcome: Progressing     Problem: Chronic Conditions and Co-morbidities  Goal: Patient's chronic conditions and co-morbidity symptoms are monitored and maintained or improved  Outcome: Progressing     Problem: Nutrition Deficit:  Goal: Optimize nutritional status  Outcome: Progressing

## 2022-12-16 NOTE — PROGRESS NOTES
[facility]  Nephrology Progress Note    Patient's Name: Miky Cardenas    History of Present Illness from 12/13 consult Note:    Cricket Choi is a 81 y.o. 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 She is known to our group from prior admissions. Patient presents to ED with complaints of abnormal labs. According to patient she recently had labs performed 2 days earlier and received a call from her oncologist office indicating that her sodium was low and that she needed to present to ED. She states that she feels well. She has a nonproductive cough. She denies fever or chills. She denies excessive water drinking. Initial vital signs on presentation were stable. Laboratory data was significant for sodium 118, potassium 3.5, chloride 88, CO2 21, BUN 5, creatinine 0.4, calcium 8.3. Patient was admitted here 10/9-10/14 for severe hyponatremia at the time presenting at 111 mEq/L. Sodium on discharge at the time was 128. Medication at discharge included sodium chloride tablets at 2 g 3 times daily. She is being admitted for further management. \"    Subjective:    12/16: Patient was seen and examined. She reports that she feels alright. She reports that her appetite is good. She denies any chest pain or shortness of breath. She denies any abdominal pain or nausea.     PMH:    Past Medical History:   Diagnosis Date    Bronchitis     Hypertension     Thyroid disease        Patient Active Problem List   Diagnosis    Congestive heart failure of unknown etiology ([address]    Congestive heart failure due to cardiomyopathy ([address]    Hypoxia    Simple chronic bronchitis (HCC)    Hyponatremia    Primary hypertension    Hypothyroidism    GERD (gastroesophageal reflux disease)    Depression    Palliative care by specialist    Goals of care, counseling/discussion    Small cell lung cancer in adult [facility])    Lung mass    Closed fracture of left proximal humerus    Moderate protein-calorie malnutrition (HCC)    Tobacco abuse    Shortness of breath       Diet:    ADULT DIET; Regular; No Fluids on Tray  ADULT ORAL NUTRITION SUPPLEMENT; Breakfast, Dinner; Fortified Pudding Oral Supplement  ADULT ORAL NUTRITION SUPPLEMENT; Lunch; Other Oral Supplement; Gelatein    Meds:     urea  15 g Oral TID    fluticasone  1 spray Nasal Daily    guaiFENesin  400 mg Oral TID    levothyroxine  137 mcg Oral Daily    metoprolol succinate  50 mg Oral Daily    montelukast  10 mg Oral QAM    pantoprazole  40 mg Oral QAM AC    rosuvastatin  20 mg Oral Nightly    [Held by provider] sodium chloride  2 g Oral TID WC    enoxaparin  40 mg SubCUTAneous Daily    budesonide  250 mcg Nebulization BID    Arformoterol Tartrate  15 mcg Nebulization BID           Meds prn:     diphenoxylate-atropine, melatonin, clonazePAM, ondansetron **OR** ondansetron, albuterol    Meds prior to admission:     No current facility-administered medications on file prior to encounter.      Current Outpatient Medications on File Prior to Encounter   Medication Sig Dispense Refill    rosuvastatin (CRESTOR) 20 MG tablet Take 1 tablet by mouth nightly 30 tablet 3    metoprolol succinate (TOPROL XL) 50 MG extended release tablet Take 1 tablet by mouth daily 30 tablet 3    guaiFENesin 400 MG tablet Take 1 tablet by mouth in the morning, at noon, and at bedtime 56 tablet 0    vitamin B-6 (B-6) 50 MG tablet Take 1 tablet by mouth daily 30 tablet 3    sodium chloride 1 g tablet Take 2 tablets by mouth 3 times daily (with meals) 90 tablet 3    fluticasone-vilanterol (BREO ELLIPTA) 100-25 MCG/INH AEPB inhaler Inhale 1 puff into the lungs daily      albuterol (PROVENTIL) (2.5 MG/3ML) 0.083% nebulizer solution Take 2.5 mg by nebulization 3 times daily as needed      fluticasone (FLONASE) 50 MCG/ACT nasal spray 1 spray by Nasal route daily      montelukast (SINGULAIR) 10 MG tablet Take 10 mg by mouth every morning      albuterol sulfate  (90 Base) MCG/ACT inhaler Inhale 2 puffs into the lungs every 6 hours as needed for Wheezing 1 each 3    potassium chloride (KLOR-CON M) 20 MEQ extended release tablet Take 1 tablet by mouth daily 60 tablet 3    omeprazole (PRILOSEC) 20 MG delayed release capsule Take 20 mg by mouth daily      clonazePAM (KLONOPIN) 1 MG tablet Take 0.5 mg by mouth daily as needed for Anxiety. levothyroxine (SYNTHROID) 137 MCG tablet Take 137 mcg by mouth Daily          Allergies:    Patient has no known allergies. Social History:     reports that she has been smoking cigarettes. She started smoking about 50 years ago. She has a 50.00 pack-year smoking history. She has never used smokeless tobacco. She reports that she does not drink alcohol and does not use drugs. Family History:     History reviewed. No pertinent family history. Physical Exam:      Patient Vitals for the past 24 hrs:   BP Temp Temp src Pulse Resp SpO2 Height Weight   12/16/22 0732 (!) 153/69 97.2 °F (36.2 °C) Temporal 71 19 99 % -- --   12/15/22 2315 -- -- -- 74 -- -- -- --   12/15/22 2215 (!) 148/65 (!) 96.5 °F (35.8 °C) Temporal -- 18 98 % -- --   12/15/22 1937 -- -- -- -- -- 94 % -- --   12/15/22 1517 (!) 120/51 97.9 °F (36.6 °C) -- 83 18 97 % -- --   12/15/22 1100 -- -- -- -- -- -- -- 152 lb (68.9 kg)   12/15/22 1047 -- -- -- -- -- -- 5' 2\" (1.575 m) --           Intake/Output Summary (Last 24 hours) at 12/16/2022 0842  Last data filed at 12/16/2022 6584  Gross per 24 hour   Intake --   Output 2100 ml   Net -2100 ml       General: Awake, alert, no acute distress  Neck: No JVD noted  Lungs: clear bilaterally upper, diminished to the bases bilateral.  Unlabored  CV: Regular rate and rhythm. No rub  Abd: Soft, nontender, nondistended. Active bowel sounds  Skin: Warm and dry.   No rash on exposed extremities  Ext: No edema   Neuro: Awake, answers questions appropriately    Data:    Recent Labs     12/14/22  0618   WBC 2.1*   HGB 11.0*   HCT 30.9*   MCV 78.0*            Recent Labs     12/14/22  2012 12/15/22  0218 12/15/22  1045 12/15/22  1530 12/16/22  0703   *   < > 123* 123* 127*   K 3.9   < > 4.2 4.1 3.9   CL 92*   < > 92* 93* 96*   CO2 23   < > 22 23 20*   CREATININE 0.6   < > 0.7 0.7 0.5   BUN 10   < > 18 22 23   LABGLOM >60   < > >60 >60 >60   GLUCOSE 108*   < > 87 127* 80   CALCIUM 9.3   < > 9.5 9.2 8.5*   MG 1.6  --   --   --   --     < > = values in this interval not displayed. Vit D, 25-Hydroxy   Date Value Ref Range Status   09/23/2022 54 30 - 100 ng/mL Final     Comment:     <20 ng/mL. ........... Aaron Mering Deficient  20-30 ng/mL. ......... Aaron Mering Insufficient   ng/mL. ........ Aaron Mering Sufficient  >100 ng/mL. .......... Aaron Mering Toxic         PTH   Date Value Ref Range Status   09/23/2022 40 15 - 65 pg/mL Final       Recent Labs     12/13/22  1243 12/14/22  0618   ALT 8 7   AST 15 15   ALKPHOS 122* 115*   BILITOT 0.3 0.3         Recent Labs     12/13/22  1243 12/14/22  0618   LABALBU 4.0 3.8         No results found for: FERRITIN, IRON, TIBC    Vitamin B-12   Date Value Ref Range Status   09/21/2022 445 211 - 946 pg/mL Final       Folate   Date Value Ref Range Status   09/21/2022 10.4 4.8 - 24.2 ng/mL Final       Lab Results   Component Value Date/Time    COLORU Yellow 12/13/2022 12:43 PM    NITRU Negative 12/13/2022 12:43 PM    GLUCOSEU Negative 12/13/2022 12:43 PM    KETUA Negative 12/13/2022 12:43 PM    UROBILINOGEN 0.2 12/13/2022 12:43 PM    BILIRUBINUR Negative 12/13/2022 12:43 PM       Lab Results   Component Value Date/Time    OSMOU 483 12/13/2022 12:43 PM       No components found for: URIC    No results found for: LIPIDPAN    Assessment and Plans:    Chronic hyponatremia  With known history of SIADH; exacerbated by hypothyroidism  questionable compliance with NaCl tablets as an outpatient (patient reports that she was only taking NaCl tablets when eating resulting in missed doses)  Uric acid 1.7 on 12/13, TSH 18.28, cortisol 4.23   urine sodium 145, urine creatinine 52, urine urea 332, urine osmolality 483  Check serum osmolality  Sodium 117 on 12/14-->127 today  On Ure-na 15 g oral 3 times daily  fluid restriction  S/p 3% sodium chloride solution on 12/13 and 12/14  Urine output 2100 mL   Strict I&O  Monitor BMP every 6 hours    2. Essential hypertension  BP goal<130/80  BP above goal  Monitor on current regimen    3. Lung cancer   [facility]s with [facility]    4. Hypothyroidism  TSH 18.28  On levothyroxine   management per primary service    FELIPA Wong - CNP    Patient seen and examined all key components of the physical performed independently , case discussed with NP, all pertinent labs and radiologic tests personally reviewed agree with above. Sodium level is improving.   Plan discharge next 24 hours on sodium chloride tablets 2 g 3 times a day with close monitoring of her labs    Madelaine Wells MD

## 2022-12-16 NOTE — CARE COORDINATION
Care Coordination  Called outpatient pharmacy here in house for Ure-na 15 g oral 3 times daily and cash price is 364.98 or a 1 month supply. Would need an actual script written to have it run through her insurance. Spoke to the clinical unit pharmacist she is looking in to this as well. Pt [facility] 18/24 and she declined ambulation. The patient declined the Ot eval. The patient was admitted with weakness and fatigue and has hyponatremia and today the Na level is 127 slowly climbing. She also has a history of small cell lung cancer and she's on chemo with has siadh. The patient lives with her daughter and was independent with all adls prior to admission. Her discharge plan is to return home with no needs once she is medically stable. The patients daughter will provide transportation. Per Nephrology she has chronic hyponatremia with questionable compliance with NaCl tablets as an outpatient. The patient remains on a fluid restriction. Her plan is home once medically stable.

## 2022-12-16 NOTE — PROGRESS NOTES
Hospitalist Progress Note      PCP: Perla Hanks DO    Date of Admission: 12/13/2022        Hospital Course:  81 y.o. female presented with [address] SHE HAS A KNOWN HISTORY OF  SMALL CELL LUNG CANCER AND SHE IS ON CHEMO, AND SHE HAS SIADH. SHE IS ON CHEMO        Subjective:  DISCUSSED FLUID RESTRICTION           Medications:  Reviewed    Infusion Medications   Scheduled Medications    urea  15 g Oral TID    fluticasone  1 spray Nasal Daily    guaiFENesin  400 mg Oral TID    levothyroxine  137 mcg Oral Daily    metoprolol succinate  50 mg Oral Daily    montelukast  10 mg Oral QAM    pantoprazole  40 mg Oral QAM AC    rosuvastatin  20 mg Oral Nightly    [Held by provider] sodium chloride  2 g Oral TID WC    enoxaparin  40 mg SubCUTAneous Daily    budesonide  250 mcg Nebulization BID    Arformoterol Tartrate  15 mcg Nebulization BID     PRN Meds: diphenoxylate-atropine, melatonin, clonazePAM, ondansetron **OR** ondansetron, albuterol      Intake/Output Summary (Last 24 hours) at 12/16/2022 1513  Last data filed at 12/16/2022 1222  Gross per 24 hour   Intake 240 ml   Output 2600 ml   Net -2360 ml       Exam:    /82   Pulse 79   Temp 97.2 °F (36.2 °C) (Temporal)   Resp 20   Ht 5' 2\" (1.575 m)   Wt 152 lb (68.9 kg)   SpO2 98%   BMI 27.80 kg/m²       General appearance:  No apparent distress, appears stated age. HEENT:  Normal cephalic, atraumatic without obvious deformity. Pupils equal, round, and reactive to light. Extra ocular muscles intact. Conjunctivae/corneas clear. Neck: Supple, with full range of motion. No jugular venous distention. Trachea midline. Respiratory:  Normal respiratory effort. DIMINISHED BILATERALLY  Cardiovascular:  RRR  Abdomen: Soft, non-tender, non-distended with normal bowel sounds. Musculoskeletal:  No clubbing, cyanosis or edema bilaterally.     Skin: smooth and dry   Neurologic:   Cranial nerves: II-XII intact, Psychiatric:  Alert and oriented x 3               Labs:   Recent Labs     12/14/22  0618   WBC 2.1*   HGB 11.0*   HCT 30.9*        Recent Labs     12/15/22  1530 12/16/22  0703 12/16/22  1218   * 127* 126*   K 4.1 3.9 3.9   CL 93* 96* 92*   CO2 23 20* 26   BUN 22 23 39*   CREATININE 0.7 0.5 0.8   CALCIUM 9.2 8.5* 9.7     Recent Labs     12/14/22  0618   AST 15   ALT 7   BILITOT 0.3   ALKPHOS 115*     No results for input(s): INR in the last 72 hours. No results for input(s): Randene Holes in the last 72 hours. Recent Labs     12/14/22  0618   AST 15   ALT 7   BILITOT 0.3   ALKPHOS 115*     No results for input(s): LACTA in the last 72 hours. Lab Results   Component Value Date    LABURIC 1.7 (L) 12/13/2022     No results found for: AMMONIA    Assessment:    Active Hospital Problems    Diagnosis Date Noted    Moderate protein-calorie malnutrition ([facility] [address] [E44.0] 09/24/2022     Priority: Medium    Hyponatremia [E87.1] 09/01/2022     Priority: Medium   SMALL CELL LUNG CANCER WITH METS  SIADH   HYPOTHYROID   HLD  HTN     PLAN:  CRESTOR   NEPHROLOGY   TOPROL           DVT Prophylaxis: LOVENOX  Diet: ADULT DIET; Regular;  No Fluids on Tray  Code Status: Prior     PT/OT Eval Status: ORDERED     Dispo - HOME    Electronically signed by Daniel Miller DO on 12/16/2022 at 3:13 PM [place]

## 2022-12-17 VITALS
BODY MASS INDEX: 27.97 KG/M2 | RESPIRATION RATE: 18 BRPM | OXYGEN SATURATION: 98 % | DIASTOLIC BLOOD PRESSURE: 64 MMHG | HEART RATE: 82 BPM | WEIGHT: 152 LBS | HEIGHT: 62 IN | SYSTOLIC BLOOD PRESSURE: 149 MMHG | TEMPERATURE: 96.6 F

## 2022-12-17 LAB
ANION GAP SERPL CALCULATED.3IONS-SCNC: 10 MMOL/L (ref 7–16)
ANION GAP SERPL CALCULATED.3IONS-SCNC: 12 MMOL/L (ref 7–16)
BUN BLDV-MCNC: 29 MG/DL (ref 6–23)
BUN BLDV-MCNC: 49 MG/DL (ref 6–23)
CALCIUM SERPL-MCNC: 9.1 MG/DL (ref 8.6–10.2)
CALCIUM SERPL-MCNC: 9.2 MG/DL (ref 8.6–10.2)
CHLORIDE BLD-SCNC: 95 MMOL/L (ref 98–107)
CHLORIDE BLD-SCNC: 96 MMOL/L (ref 98–107)
CO2: 24 MMOL/L (ref 22–29)
CO2: 24 MMOL/L (ref 22–29)
CREAT SERPL-MCNC: 0.6 MG/DL (ref 0.5–1)
CREAT SERPL-MCNC: 0.7 MG/DL (ref 0.5–1)
GFR SERPL CREATININE-BSD FRML MDRD: >60 ML/MIN/1.73
GFR SERPL CREATININE-BSD FRML MDRD: >60 ML/MIN/1.73
GLUCOSE BLD-MCNC: 126 MG/DL (ref 74–99)
GLUCOSE BLD-MCNC: 136 MG/DL (ref 74–99)
POTASSIUM SERPL-SCNC: 3.8 MMOL/L (ref 3.5–5)
POTASSIUM SERPL-SCNC: 3.9 MMOL/L (ref 3.5–5)
SODIUM BLD-SCNC: 129 MMOL/L (ref 132–146)
SODIUM BLD-SCNC: 132 MMOL/L (ref 132–146)

## 2022-12-17 PROCEDURE — 80048 BASIC METABOLIC PNL TOTAL CA: CPT

## 2022-12-17 PROCEDURE — 36415 COLL VENOUS BLD VENIPUNCTURE: CPT

## 2022-12-17 PROCEDURE — 6370000000 HC RX 637 (ALT 250 FOR IP): Performed by: FAMILY MEDICINE

## 2022-12-17 PROCEDURE — 6360000002 HC RX W HCPCS: Performed by: FAMILY MEDICINE

## 2022-12-17 PROCEDURE — 94640 AIRWAY INHALATION TREATMENT: CPT

## 2022-12-17 PROCEDURE — 6370000000 HC RX 637 (ALT 250 FOR IP): Performed by: INTERNAL MEDICINE

## 2022-12-17 RX ORDER — SODIUM CHLORIDE 1000 MG
2 TABLET, SOLUBLE MISCELLANEOUS
Qty: 90 TABLET | Refills: 3 | Status: SHIPPED | OUTPATIENT
Start: 2022-12-17

## 2022-12-17 RX ADMIN — BUDESONIDE 250 MCG: 0.25 SUSPENSION RESPIRATORY (INHALATION) at 08:02

## 2022-12-17 RX ADMIN — Medication 15 G: at 08:18

## 2022-12-17 RX ADMIN — METOPROLOL SUCCINATE 50 MG: 50 TABLET, EXTENDED RELEASE ORAL at 08:18

## 2022-12-17 RX ADMIN — MONTELUKAST 10 MG: 10 TABLET, FILM COATED ORAL at 08:18

## 2022-12-17 RX ADMIN — FLUTICASONE PROPIONATE 1 SPRAY: 50 SPRAY, METERED NASAL at 08:19

## 2022-12-17 RX ADMIN — PANTOPRAZOLE SODIUM 40 MG: 40 TABLET, DELAYED RELEASE ORAL at 06:43

## 2022-12-17 RX ADMIN — GUAIFENESIN 400 MG: 400 TABLET ORAL at 08:18

## 2022-12-17 RX ADMIN — ARFORMOTEROL TARTRATE 15 MCG: 15 SOLUTION RESPIRATORY (INHALATION) at 08:01

## 2022-12-17 RX ADMIN — LEVOTHYROXINE SODIUM 137 MCG: 0.14 TABLET ORAL at 06:43

## 2022-12-17 RX ADMIN — ENOXAPARIN SODIUM 40 MG: 100 INJECTION SUBCUTANEOUS at 08:18

## 2022-12-17 NOTE — PROGRESS NOTES
Reviewed discharge instructions with patient. And medications. Encouraged to follow up as indicated with physicians as well as blood work. Mediport flushed and removed. Dry dressing applied. Transport called to take patient to lobby. Stable at discharge.

## 2022-12-17 NOTE — PROGRESS NOTES
[facility]  Nephrology Progress Note    Patient's Name: Mane Mcmahon    History of Present Illness from 12/13 consult Note:    Loree Navarrete is a 81 y.o. 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 She is known to our group from prior admissions. Patient presents to ED with complaints of abnormal labs. According to patient she recently had labs performed 2 days earlier and received a call from her oncologist office indicating that her sodium was low and that she needed to present to ED. She states that she feels well. She has a nonproductive cough. She denies fever or chills. She denies excessive water drinking. Initial vital signs on presentation were stable. Laboratory data was significant for sodium 118, potassium 3.5, chloride 88, CO2 21, BUN 5, creatinine 0.4, calcium 8.3. Patient was admitted here 10/9-10/14 for severe hyponatremia at the time presenting at 111 mEq/L. Sodium on discharge at the time was 128. Medication at discharge included sodium chloride tablets at 2 g 3 times daily. She is being admitted for further management. \"    Subjective:    12/17 Patient was seen and examined. She reports that she feels alright.   She denies nausea or emesis; no abdominal pain    PMH:    Past Medical History:   Diagnosis Date    Bronchitis     Hypertension     Thyroid disease        Patient Active Problem List   Diagnosis    Congestive heart failure of unknown etiology ([facility] [address]    Congestive heart failure due to cardiomyopathy (HCC)    Hypoxia    Simple chronic bronchitis (HCC)    Hyponatremia    Primary hypertension    Hypothyroidism    GERD (gastroesophageal reflux disease)    Depression    Palliative care by specialist    Goals of care, counseling/discussion    Small cell lung cancer in adult [facility])    Lung mass    Closed fracture of left proximal humerus    Moderate protein-calorie malnutrition ([facility] [address]    Tobacco abuse    Shortness of breath       Diet:    ADULT DIET; Regular; No Fluids on Tray  ADULT ORAL NUTRITION SUPPLEMENT; Breakfast, Dinner; Fortified Pudding Oral Supplement  ADULT ORAL NUTRITION SUPPLEMENT; Lunch; Other Oral Supplement; Gelatein    Meds:     urea  15 g Oral TID    fluticasone  1 spray Nasal Daily    guaiFENesin  400 mg Oral TID    levothyroxine  137 mcg Oral Daily    metoprolol succinate  50 mg Oral Daily    montelukast  10 mg Oral QAM    pantoprazole  40 mg Oral QAM AC    rosuvastatin  20 mg Oral Nightly    [Held by provider] sodium chloride  2 g Oral TID WC    enoxaparin  40 mg SubCUTAneous Daily    budesonide  250 mcg Nebulization BID    Arformoterol Tartrate  15 mcg Nebulization BID           Meds prn:     diphenoxylate-atropine, melatonin, clonazePAM, ondansetron **OR** ondansetron, albuterol    Meds prior to admission:     No current facility-administered medications on file prior to encounter.      Current Outpatient Medications on File Prior to Encounter   Medication Sig Dispense Refill    rosuvastatin (CRESTOR) 20 MG tablet Take 1 tablet by mouth nightly 30 tablet 3    metoprolol succinate (TOPROL XL) 50 MG extended release tablet Take 1 tablet by mouth daily 30 tablet 3    guaiFENesin 400 MG tablet Take 1 tablet by mouth in the morning, at noon, and at bedtime 56 tablet 0    vitamin B-6 (B-6) 50 MG tablet Take 1 tablet by mouth daily 30 tablet 3    sodium chloride 1 g tablet Take 2 tablets by mouth 3 times daily (with meals) 90 tablet 3    fluticasone-vilanterol (BREO ELLIPTA) 100-25 MCG/INH AEPB inhaler Inhale 1 puff into the lungs daily      albuterol (PROVENTIL) (2.5 MG/3ML) 0.083% nebulizer solution Take 2.5 mg by nebulization 3 times daily as needed      fluticasone (FLONASE) 50 MCG/ACT nasal spray 1 spray by Nasal route daily      montelukast (SINGULAIR) 10 MG tablet Take 10 mg by mouth every morning      albuterol sulfate  (90 Base) MCG/ACT inhaler Inhale 2 puffs into the lungs every 6 hours as needed for Wheezing 1 each 3    potassium chloride (KLOR-CON M) 20 MEQ extended release tablet Take 1 tablet by mouth daily 60 tablet 3    omeprazole (PRILOSEC) 20 MG delayed release capsule Take 20 mg by mouth daily      clonazePAM (KLONOPIN) 1 MG tablet Take 0.5 mg by mouth daily as needed for Anxiety. levothyroxine (SYNTHROID) 137 MCG tablet Take 137 mcg by mouth Daily          Allergies:    Patient has no known allergies. Physical Exam:      Patient Vitals for the past 24 hrs:   BP Temp Temp src Pulse Resp SpO2   12/17/22 0822 (!) 149/64 (!) 96.6 °F (35.9 °C) Temporal 82 18 98 %   12/16/22 2145 (!) 127/59 96.8 °F (36 °C) Temporal 73 20 98 %   12/16/22 1420 137/82 97.2 °F (36.2 °C) Temporal 79 20 98 %         Intake/Output Summary (Last 24 hours) at 12/17/2022 1104  Last data filed at 12/17/2022 0844  Gross per 24 hour   Intake 240 ml   Output 500 ml   Net -260 ml     General: Awake, alert, no acute distress  Neck: No JVD noted  Lungs: clear bilaterally upper, diminished to the bases bilateral.  Unlabored  CV: Regular rate and rhythm. No rub  Abd: Soft, nontender, nondistended. Active bowel sounds  Skin: Warm and dry. No rash on exposed extremities  Ext: No edema   Neuro: Awake, answers questions appropriately    Data:    No results for input(s): WBC, HGB, HCT, MCV, PLT in the last 72 hours. Recent Labs     12/14/22  2012 12/15/22  0218 12/16/22  1800 12/17/22  0001 12/17/22  0650   *   < > 128* 129* 132   K 3.9   < > 4.2 3.9 3.8   CL 92*   < > 93* 95* 96*   CO2 23   < > 25 24 24   CREATININE 0.6   < > 0.8 0.7 0.6   BUN 10   < > 45* 49* 29*   LABGLOM >60   < > >60 >60 >60   GLUCOSE 108*   < > 134* 126* 136*   CALCIUM 9.3   < > 9.5 9.2 9.1   MG 1.6  --   --   --   --     < > = values in this interval not displayed.        Vit D, 25-Hydroxy   Date Value Ref Range Status   09/23/2022 54 30 - 100 ng/mL Final     Comment:     <20 ng/mL............ Janelle Ruffing Deficient  20-30 ng/mL. ......... Janelle Ruffing Insufficient   ng/mL. ........ Janelle Ruffing Sufficient  >100 ng/mL. .......... Janelle Ruffing Toxic         PTH   Date Value Ref Range Status   09/23/2022 40 15 - 65 pg/mL Final       No results for input(s): ALT, AST, ALKPHOS, BILITOT, BILIDIR in the last 72 hours. No results for input(s): LABALBU in the last 72 hours. No results found for: FERRITIN, IRON, TIBC    Vitamin B-12   Date Value Ref Range Status   09/21/2022 445 211 - 946 pg/mL Final       Folate   Date Value Ref Range Status   09/21/2022 10.4 4.8 - 24.2 ng/mL Final       Lab Results   Component Value Date/Time    COLORU Yellow 12/13/2022 12:43 PM    NITRU Negative 12/13/2022 12:43 PM    GLUCOSEU Negative 12/13/2022 12:43 PM    KETUA Negative 12/13/2022 12:43 PM    UROBILINOGEN 0.2 12/13/2022 12:43 PM    BILIRUBINUR Negative 12/13/2022 12:43 PM       Lab Results   Component Value Date/Time    OSMOU 483 12/13/2022 12:43 PM       No components found for: URIC    No results found for: LIPIDPAN    Assessment and Plans:    Chronic hyponatremia  With known history of SIADH; exacerbated by hypothyroidism  questionable compliance with NaCl tablets as an outpatient (patient reports that she was only taking NaCl tablets when eating resulting in missed doses)  Sodium level is now 132  Will discharge on sodium chloride tablets 2 g 3 times a day    2. Essential hypertension  BP goal<130/80  BP above goal  Monitor on current regimen    3. Lung cancer   Follows with the [facility]    4.   Hypothyroidism  TSH 18.28  On levothyroxine   management per primary service    Okay to discharge from a renal standpoint  Patient was discharged on sodium chloride tablets 2 g 3 times a day; urea explored with ; found to be due to expensive    Hansel Bautista MD

## 2022-12-17 NOTE — PROGRESS NOTES
Hospitalist Progress Note      PCP: Esmer Tamez DO    Date of Admission: 12/13/2022        Hospital Course:  81 y.o. female presented with [address] SHE HAS A KNOWN HISTORY OF  SMALL CELL LUNG CANCER AND SHE IS ON CHEMO, AND SHE HAS SIADH. SHE IS ON CHEMO        Subjective:   LEFT BEFORE SHE COULD BE SEEN           Medications:  Reviewed    Infusion Medications   Scheduled Medications    urea  15 g Oral TID    fluticasone  1 spray Nasal Daily    guaiFENesin  400 mg Oral TID    levothyroxine  137 mcg Oral Daily    metoprolol succinate  50 mg Oral Daily    montelukast  10 mg Oral QAM    pantoprazole  40 mg Oral QAM AC    rosuvastatin  20 mg Oral Nightly    [Held by provider] sodium chloride  2 g Oral TID WC    enoxaparin  40 mg SubCUTAneous Daily    budesonide  250 mcg Nebulization BID    Arformoterol Tartrate  15 mcg Nebulization BID     PRN Meds: diphenoxylate-atropine, melatonin, clonazePAM, ondansetron **OR** ondansetron, albuterol      Intake/Output Summary (Last 24 hours) at 12/17/2022 1533  Last data filed at 12/17/2022 0844  Gross per 24 hour   Intake 140 ml   Output --   Net 140 ml       Exam:    BP (!) 149/64   Pulse 82   Temp (!) 96.6 °F (35.9 °C) (Temporal)   Resp 18   Ht 5' 2\" (1.575 m)   Wt 152 lb (68.9 kg)   SpO2 98%   BMI 27.80 kg/m²                   Labs:   No results for input(s): WBC, HGB, HCT, PLT in the last 72 hours. Recent Labs     12/16/22  1800 12/17/22  0001 12/17/22  0650   * 129* 132   K 4.2 3.9 3.8   CL 93* 95* 96*   CO2 25 24 24   BUN 45* 49* 29*   CREATININE 0.8 0.7 0.6   CALCIUM 9.5 9.2 9.1     No results for input(s): AST, ALT, BILIDIR, BILITOT, ALKPHOS in the last 72 hours. No results for input(s): INR in the last 72 hours. No results for input(s): Carolyn Edwards in the last 72 hours. No results for input(s): AST, ALT, ALB, BILIDIR, BILITOT, ALKPHOS in the last 72 hours.   No results for input(s): LACTA in the last 72 hours.   Lab Results   Component Value Date    LABURIC 1.7 (L) 12/13/2022     No results found for: AMMONIA    Assessment:    Active Hospital Problems    Diagnosis Date Noted    Moderate protein-calorie malnutrition ([facility] [address] [E44.0] 09/24/2022     Priority: Medium    Hyponatremia [E87.1] 09/01/2022     Priority: Medium   SMALL CELL LUNG CANCER WITH METS  SIADH   HYPOTHYROID   HLD  HTN       Plan: [id number] [facility]  Electronically signed by Virgilio Quiroz DO on 12/17/2022 at 3:33 PM [place] decreased stride length/increased time in double stance/decreased step length/decreased weight-shifting ability/decreased kristie

## 2022-12-17 NOTE — PROGRESS NOTES
Patient ok for discharge per Dr Esequiel Augustin, Dr Aziza Lund notified    Carol Baires RN  12/17/22  11:18 AM

## 2022-12-21 NOTE — DISCHARGE SUMMARY
Hospitalist Discharge Summary    Patient ID: Vasiliy Benavidez   Patient : 1950  Patient's PCP: Juan Daniel Overton DO    Admit Date: 2022   Admitting Physician: Leena Morales DO    Discharge Date:  2022   Discharge Physician: Leena Morales DO   Discharge Condition: Stable  Discharge Disposition: Home      Discharge Diagnoses:   SMALL CELL LUNG CANCER WITH METS  SIADH   HYPOTHYROID   HLD  HTN          Hospital course in brief:     81 y.o. female presented [address],  [address] SHE HAS A KNOWN HISTORY OF  SMALL CELL LUNG CANCER AND SHE IS ON CHEMO, AND SHE HAS SIADH. SHE IS ON CHEMO    PHYSICAL EXAM:    BP (!) 149/64   Pulse 82   Temp (!) 96.6 °F (35.9 °C) (Temporal)   Resp 18   Ht 5' 2\" (1.575 m)   Wt 152 lb (68.9 kg)   SpO2 98%   BMI 27.80 kg/m²     LEFT BEFORE SHE COULD BE SEEN     Prior to Admission medications    Medication Sig Start Date End Date Taking?  Authorizing Provider   sodium chloride 1 g tablet Take 2 tablets by mouth 3 times daily (with meals) 22  Yes Leena Morales DO   rosuvastatin (CRESTOR) 20 MG tablet Take 1 tablet by mouth nightly 22   FELIPA Gill CNP   metoprolol succinate (TOPROL XL) 50 MG extended release tablet Take 1 tablet by mouth daily 22   FELIPA Gill CNP   guaiFENesin 400 MG tablet Take 1 tablet by mouth in the morning, at noon, and at bedtime 22   FELIPA Gill CNP   vitamin B-6 (B-6) 50 MG tablet Take 1 tablet by mouth daily 22   FELIPA Gill CNP   fluticasone-vilanterol (BREO ELLIPTA) 100-25 MCG/INH AEPB inhaler Inhale 1 puff into the lungs daily    Historical Provider, MD   albuterol (PROVENTIL) (2.5 MG/3ML) 0.083% nebulizer solution Take 2.5 mg by nebulization 3 times daily as needed 10/1/21   Historical Provider, MD   fluticasone [facility]) 50 MCG/ACT nasal spray 1 spray by Nasal route daily 21   Historical Provider, MD montelukast (SINGULAIR) 10 MG tablet Take 10 mg by mouth every morning 10/22/21   Historical Provider, MD   albuterol sulfate  (90 Base) MCG/ACT inhaler Inhale 2 puffs into the lungs every 6 hours as needed for Wheezing 11/11/21   Kimberly Chowdhury MD   omeprazole (PRILOSEC) 20 MG delayed release capsule Take 20 mg by mouth daily    Historical Provider, MD   clonazePAM (KLONOPIN) 1 MG tablet Take 0.5 mg by mouth daily as needed for Anxiety. Historical Provider, MD   levothyroxine (SYNTHROID) 137 MCG tablet Take 137 mcg by mouth Daily     Historical Provider, MD       Consults:   IP CONSULT TO NEPHROLOGY  IP CONSULT TO HOSPITALIST  IP CONSULT TO NEPHROLOGY            Discharge Instructions / Follow up:    No future appointments. Continued appropriate risk factor modification of blood pressure, diabetes and serum lipids will remain essential to reducing risk of future atherosclerotic development    Activity: activity as tolerated    Significant labs:  CBC:   No results for input(s): WBC, RBC, HGB, HCT, MCV, RDW, PLT in the last 72 hours. BMP: No results for input(s): NA, K, CL, CO2, BUN, CREATININE, CA, MG, PHOS in the last 72 hours. LFT:  No results for input(s): PROT, ALB, ALKPHOS, ALT, AST, BILITOT, AMYLASE, LIPASE in the last 72 hours. PT/INR: No results for input(s): INR, APTT in the last 72 hours. BNP: No results for input(s): BNP in the last 72 hours.   Hgb A1C:   Lab Results   Component Value Date    LABA1C 5.6 09/21/2022     Folate and B12:   Lab Results   Component Value Date    DAXBSPMX96 910 09/21/2022   ,   Lab Results   Component Value Date    FOLATE 10.4 09/21/2022     Thyroid Studies:   Lab Results   Component Value Date    TSH 18.280 (H) 12/13/2022       Urinalysis:    Lab Results   Component Value Date/Time    NITRU Negative 12/13/2022 12:43 PM    [address] NONE 10/09/2022 03:59 PM    BACTERIA NONE SEEN 10/09/2022 03:59 PM    RBCUA NONE 10/09/2022 03:59 PM    BLOODU Negative 12/13/2022 12:43 PM    SPECGRAV 1.010 12/13/2022 12:43 PM    GLUCOSEU Negative 12/13/2022 12:43 PM       Imaging:  No results found. Discharge Medications:      Medication List        CONTINUE taking these medications      * albuterol (2.5 MG/3ML) 0.083% nebulizer solution  Commonly known as: PROVENTIL     * albuterol sulfate  (90 Base) MCG/ACT inhaler  Commonly known as: PROVENTIL;VENTOLIN;PROAIR  Inhale 2 puffs into the lungs every 6 hours as needed for Wheezing     clonazePAM 1 MG tablet  Commonly known as: KLONOPIN     fluticasone 50 MCG/ACT nasal spray  Commonly known as: FLONASE     fluticasone-vilanterol 100-25 MCG/INH Aepb inhaler  Commonly known as: BREO ELLIPTA     guaiFENesin 400 MG tablet  Take 1 tablet by mouth in the morning, at noon, and at bedtime     levothyroxine 137 MCG tablet  Commonly known as: SYNTHROID     metoprolol succinate 50 MG extended release tablet  Commonly known as: TOPROL XL  Take 1 tablet by mouth daily     montelukast 10 MG tablet  Commonly known as: SINGULAIR     omeprazole 20 MG delayed release capsule  Commonly known as: PRILOSEC     pyridoxine 50 MG tablet  Commonly known as: B-6  Take 1 tablet by mouth daily     rosuvastatin 20 MG tablet  Commonly known as: CRESTOR  Take 1 tablet by mouth nightly     sodium chloride 1 g tablet  Take 2 tablets by mouth 3 times daily (with meals)           * This list has 2 medication(s) that are the same as other medications prescribed for you. Read the directions carefully, and ask your doctor or other care provider to review them with you.                 STOP taking these medications      potassium chloride 20 MEQ extended release tablet  Commonly known as: KLOR-CON M               Where to Get Your Medications        These medications were sent to [facility]pharmacy [address] [phone] [facility] [phone]  [address] [place] [address] [address] [facility] [address]      Phone: [phone]   sodium chloride 1 g tablet Time Spent on discharge is 30 minutes in the review of medications and discharge plan  and  EXAM.    +++++++++++++++++++++++++++++++++++++++++++++++++  Johny Kingston, DO  [address] [place], [place]  +++++++++++++++++++++++++++++++++++++++++++++++++  NOTE: This report was transcribed using voice recognition software. Every effort was made to ensure accuracy; however, inadvertent computerized transcription errors may be present.

## 2023-04-12 NOTE — PROGRESS NOTES
[address] [facility]  Department of Internal Medicine   Internal Medicine Residency   MICU Progress Note    Patient:  Ming Stewart 81 y.o. female  MRN: [id number]     Date of Service: 9/21/2022    Allergy: Patient has no known allergies. HPI:    This is a 70-year-old patient with a past medical history of chronic bronchitis (2L at home and breathing treatment), hypertension, hyperlipidemia, hypothyroidism, diastolic heart failure, depression, tobacco abuse (quit 1 week ago), ethanol abuse (quit 4-6 months ago), and newly diagnosed small cell carcinoma of the lung who is presenting with a chief complaint of dizziness. She was recently discharged from the hospital on 9/7/2022 and has increasingly felt dizzy, but reports no loss of consciousness. She visited her PCP for lab work and was found to have low sodium. In the ED yesterday (9/20), the patient was hemodynamically unstable with a BP of 60/33 mmHg, pulse of 114, respiratory rate of 34 and SpO2 of 94%. Initial workup showed a sodium of 110 and a pH of 7.175, pCO2 of 46.9, PO2 of 223.8, and bicarb of 16.9. Other electrolytes were normal at this time. Repeat CTA of the chest showed a right mediastinal mass with lymphadenopathy, which had worsened compared to CTA on 9/3. A central line was placed due to hypotension and the patient was given cefepime 2g, duo nebs, Solu-Medrol 125mg, 10mcg increased to 15mcg of Levophed, and a sodium bicarb 8.4% injection as well as 1L of normal saline. The patient was subsequently weaned off of pressors due to blood pressure stabilization. Subjective     The patient was examined at the bedside this morning and was resting comfortably. She had no acute complaints but did state that she could not discern if she was still dizzy, as she had not ambulated today. The patient's sodium improved slightly at 112 and her lactic acid was down to 4.2 (was 5.3). White blood cells had risen to 14.8 (up from 8.3).   Urine sodium was less than 20. The patient did know that she had a pervious liver biopsy but was unsure of the results. She was informed previously that she had cancer, but when asked this morning, she stated she did not know how to interpret the biopsy results. She also knew that she needed to reach out to a specialist due to her condition, but had not due to stress. The patient was informed that she had lung cancer at the beside this morning and that she will need treatment, specifically needing to follow with oncology. She expressed frustration at being in the hospital again, due to cost, as well as wanting to be home to see her two grandchildren again. 24h change: No acute overnight events    ROS: Denies Fever/chills/CP/SOB/N/V/D/C/Dysuria/Blood in stool or urine    Objective     VS: /60   Pulse (!) 117   Temp 99.7 °F (37.6 °C) (Bladder)   Resp 17   Ht 5' 2\" (1.575 m)   Wt 155 lb (70.3 kg)   SpO2 93%   BMI 28.35 kg/m²         I & O - 24hr:   Intake/Output Summary (Last 24 hours) at 9/21/2022 1429  Last data filed at 9/21/2022 1200  Gross per 24 hour   Intake 384 ml   Output 457 ml   Net -73 ml       Physical Exam:  General Appearance: alert, appears stated age, and cooperative  Neck: no adenopathy, no carotid bruit, no JVD, supple, symmetrical, trachea midline, thyroid not enlarged, symmetric, no tenderness/mass/nodules, and palpable and moveable right supraclavicular lymph node palpated. Lung: wheezes bilaterally  Heart: regular rate and rhythm, S1, S2 normal, no murmur, click, rub or gallop  Abdomen: soft, non-tender; bowel sounds normal; no masses,  no organomegaly  Extremities:  extremities normal, atraumatic, no cyanosis or edema. Musculoskeletal: No joint swelling, no muscle tenderness. ROM normal in all joints of extremities.    Neurologic: Mental status: AAOx3, no focal deficit present    Lines     site day    Art line   None    TLC None    PICC None    Hemoaccess R Fem 0     Oxygen: nasal canula at 3 L/min    ABG:     Lab Results   Component Value Date/Time    PH 7.448 09/21/2022 10:19 AM    PCO2 35.3 09/21/2022 10:19 AM    PO2 89.1 09/21/2022 10:19 AM    HCO3 23.9 09/21/2022 10:19 AM    BE 0.3 09/21/2022 10:19 AM    THB 13.8 09/21/2022 10:19 AM    O2SAT 97.2 09/21/2022 10:19 AM        Medications     Infusions: (Fluid, Sedation, Vasopressors)  IVF:   NaCl 0.9%: bolus    Vasopressors  No pressors    Nutrition:  Dry mary carmen diet  ATB:   Antibiotics  Days   Vancomycin  0             Skin issues: no current skin issues  Patient currently has   DVT prophylaxis - Lovenox  Palliative care consult     Labs   CBC:   Lab Results   Component Value Date/Time    WBC 14.8 09/21/2022 05:00 AM    RBC 4.97 09/21/2022 05:00 AM    HGB 14.6 09/21/2022 05:00 AM    HCT 40.3 09/21/2022 05:00 AM    MCV 81.1 09/21/2022 05:00 AM    MCH 29.4 09/21/2022 05:00 AM    MCHC 36.2 09/21/2022 05:00 AM    RDW 13.3 09/21/2022 05:00 AM     09/21/2022 05:00 AM    MPV 9.3 09/21/2022 05:00 AM     BMP:    Lab Results   Component Value Date/Time     09/21/2022 12:30 PM    K 3.7 09/21/2022 12:30 PM    CL 82 09/21/2022 12:30 PM    CO2 26 09/21/2022 12:30 PM    BUN 18 09/21/2022 12:30 PM    LABALBU 4.3 09/20/2022 06:43 PM    CREATININE 1.0 09/21/2022 12:30 PM    CALCIUM 8.7 09/21/2022 12:30 PM    GFRAA >60 09/21/2022 12:30 PM    LABGLOM 55 09/21/2022 12:30 PM    GLUCOSE 155 09/21/2022 12:30 PM     Hepatic Function Panel:    Lab Results   Component Value Date/Time    ALKPHOS 137 09/20/2022 06:43 PM    ALT 8 09/20/2022 06:43 PM    AST 15 09/20/2022 06:43 PM    PROT 7.1 09/20/2022 06:43 PM    BILITOT 0.2 09/20/2022 06:43 PM    LABALBU 4.3 09/20/2022 06:43 PM     U/A:    Lab Results   Component Value Date/Time    COLORU Yellow 09/21/2022 06:42 AM    COLORU Yellow 09/21/2022 06:42 AM    PROTEINU TRACE 09/21/2022 06:42 AM    PROTEINU 30 09/21/2022 06:42 AM    PHUR 6.0 09/21/2022 06:42 AM    PHUR 6.0 09/21/2022 06:42 AM    WBCUA NONE 09/21/2022 06:42 AM    WBCUA NONE 09/21/2022 06:42 AM    RBCUA 2-5 09/21/2022 06:42 AM    RBCUA 2-5 09/21/2022 06:42 AM    BACTERIA NONE SEEN 09/21/2022 06:42 AM    BACTERIA NONE SEEN 09/21/2022 06:42 AM    CLARITYU Clear 09/21/2022 06:42 AM    CLARITYU Clear 09/21/2022 06:42 AM    SPECGRAV 1.010 09/21/2022 06:42 AM    SPECGRAV 1.010 09/21/2022 06:42 AM    LEUKOCYTESUR Negative 09/21/2022 06:42 AM    LEUKOCYTESUR Negative 09/21/2022 06:42 AM    UROBILINOGEN 0.2 09/21/2022 06:42 AM    UROBILINOGEN 0.2 09/21/2022 06:42 AM    BILIRUBINUR Negative 09/21/2022 06:42 AM    BILIRUBINUR Negative 09/21/2022 06:42 AM    BLOODU SMALL 09/21/2022 06:42 AM    BLOODU SMALL 09/21/2022 06:42 AM    GLUCOSEU Negative 09/21/2022 06:42 AM    GLUCOSEU Negative 09/21/2022 06:42 AM     ABG:    Lab Results   Component Value Date/Time    PH 7.448 09/21/2022 10:19 AM    PCO2 35.3 09/21/2022 10:19 AM    PO2 89.1 09/21/2022 10:19 AM    HCO3 23.9 09/21/2022 10:19 AM    BE 0.3 09/21/2022 10:19 AM    O2SAT 97.2 09/21/2022 10:19 AM     FLP:    Lab Results   Component Value Date/Time    TRIG 96 09/21/2022 08:15 AM    HDL 69 09/21/2022 08:15 AM    LDLCALC 92 09/21/2022 08:15 AM    LABVLDL 19 09/21/2022 08:15 AM     TSH:    Lab Results   Component Value Date/Time    TSH 3.970 09/21/2022 12:32 AM     VITAMIN B12: No components found for: B12  FOLATE:    Lab Results   Component Value Date/Time    FOLATE 10.4 09/21/2022 12:32 AM       Imaging Studies:     CT HEAD WO CONTRAST     Result Date: 9/1/2022  EXAMINATION: CT OF THE HEAD WITHOUT CONTRAST  9/1/2022 2:28 pm TECHNIQUE: CT of the head was performed without the administration of intravenous contrast. Automated exposure control, iterative reconstruction, and/or weight based adjustment of the mA/kV was utilized to reduce the radiation dose to as low as reasonably achievable. COMPARISON: None.  HISTORY: ORDERING SYSTEM PROVIDED HISTORY: dizzy TECHNOLOGIST PROVIDED HISTORY: Has a \"code stroke\" or \"stroke alert\" been called? ->No Reason for exam:->dizzy Decision Support Exception - unselect if not a suspected or confirmed emergency medical condition->Emergency Medical Condition (MA) What reading provider will be dictating this exam?->CRC FINDINGS: BRAIN/VENTRICLES: There is no acute intracranial hemorrhage, mass effect or midline shift. No abnormal extra-axial fluid collection. The gray-white differentiation is maintained without evidence of an acute infarct. There is no evidence of hydrocephalus. ORBITS: The visualized portion of the orbits demonstrate no acute abnormality. SINUSES: The visualized paranasal sinuses demonstrate no acute abnormality. There is partial opacification of bilateral mastoid air cells. SOFT TISSUES/SKULL:  No acute abnormality of the visualized skull or soft tissues. No acute intracranial abnormality. Periventricular leukomalacia. Suspect bilateral mastoiditis. CT CHEST WO CONTRAST     Result Date: 9/3/2022  EXAMINATION: CT OF THE CHEST WITHOUT CONTRAST 9/3/2022 9:27 am TECHNIQUE: CT of the chest was performed without the administration of intravenous contrast. Multiplanar reformatted images are provided for review. Automated exposure control, iterative reconstruction, and/or weight based adjustment of the mA/kV was utilized to reduce the radiation dose to as low as reasonably achievable. COMPARISON: None. HISTORY: ORDERING SYSTEM PROVIDED HISTORY: Rule out malignancy/mass TECHNOLOGIST PROVIDED HISTORY: Reason for exam:->Rule out malignancy/mass What reading provider will be dictating this exam?->CRC FINDINGS: Mediastinum: There is superior mediastinal adenopathy and a prominent right paratracheal lymph node mass. Right paratracheal lymph node mass measures 2.8 x 1.8 cm. Upper anterior mediastinal lymph nodes measure approximately 7-8 mm in short axis. Thyromegaly is noted with coarse calcification in the enlarged thyroid isthmus. Pulmonary artery is prominent in size.   Aorta is nonaneurysmal.  There is ASVD of the coronary vessels. There is a small pericardial effusion. Lungs/pleura: There is a focal nodular consolidation in the peripheral aspect of the medial right upper lobe measuring 12 x 11 mm. There is slight volume loss extending to the right suprahilar region. There is a small right pleural effusion. No endobronchial masses. No other lung lesions identified. Upper Abdomen: Images of the upper abdomen demonstrate a 2 cm low-density mass in the right hepatic lobe suspicious for metastatic disease. The adrenal glands appear normal.  There is a small left kidney. Soft Tissues/Bones: There appears to be a chronic fracture of the manubrium best seen on sagittal reconstruction views. Degenerative changes are noted in the thoracic and lumbar spine. No lytic or blastic lesions detected. 1.  Right paratracheal lymph node mass measuring 2.8 x 1.8 cm. There is a medial right upper lobe nodular parenchymal lung opacity measuring 12 x 11 mm. There is a small right pleural effusion. Mild upper mediastinal adenopathy. Findings are compatible with malignancy. 2.  Probable metastatic lesion in the right hepatic lobe measuring 2 cm. 3.  Small pericardial effusion. 4.  Small left kidney. 5.  Thyromegaly with nodular isodense calcified lesion in the thyroid isthmus. Lesion measures 14 mm AP.       CT GUIDED NEEDLE PLACEMENT     Result Date: 9/6/2022  PROCEDURE: CT NEEDLE BIOPSY LIVER PERCUTANEOUS; CT GUIDED NDL PLACEMENT MODERATE CONSCIOUS SEDATION 9/6/2022 HISTORY: ORDERING SYSTEM PROVIDED HISTORY: lung and liver nodule TECHNOLOGIST PROVIDED HISTORY: Biopsy of either right lung nodule or liver nodule, whichever is easiest to get to Reason for exam:->lung and liver nodule What reading provider will be dictating this exam?->CRC; ORDERING SYSTEM PROVIDED HISTORY: liver lesion TECHNOLOGIST PROVIDED HISTORY: Biopsy of either right lung nodule or liver nodule, whichever is easiest to get to Reason for exam:->liver lesion What reading provider will be dictating this exam?->CRC TECHNIQUE: Automated exposure control, iterative reconstruction, and/or weight based adjustment of the mA/kV was utilized to reduce the radiation dose to as low as reasonably achievable. CONTRAST: None SEDATION: 1 mgversed and 50 mcg fentanyl were titrated intravenously for moderate sedation monitored under my direction. Total intraservice time of sedation was 20 minutes. The patient's vital signs were monitored throughout the procedure and recorded in the patient's medical record by the nurse. Mallapati score 2 ASA 2 FLUOROSCOPY DOSE AND TYPE OR TIME AND EXPOSURES: CT DESCRIPTION OF PROCEDURE: Informed consent was obtained after a detailed explanation of the procedure including risks, benefits, and alternatives. Universal protocol was observed. Sterile gowns, masks, hats and gloves utilized for maximal sterile barrier. After informed consent patient is placed supine on the table. Liver mass right lobe was localized with CT scanning. Patient prepped draped sterile fashion. 1% lidocaine was infiltrated for local anesthesia. 20 gauge coaxial needle was advanced into the lesion. Multiple 20 gauge cores were obtained and submitted to pathology. Post biopsy imaging shows air within the lesion suggesting good position of the biopsy needle. No immediate postoperative complication was seen. Dressing was applied. Patient was returned to holding stable condition. FINDINGS: Needle tip located in the right lobe liver mass. Successful CT-guided core biopsy right liver mass.       XR CHEST PORTABLE     Result Date: 9/20/2022  EXAMINATION: ONE XRAY VIEW OF THE CHEST 9/20/2022 9:06 pm COMPARISON: 09/01/2022 HISTORY: ORDERING SYSTEM PROVIDED HISTORY: Shortness of breath TECHNOLOGIST PROVIDED HISTORY: Reason for exam:->Shortness of breath What reading provider will be dictating this exam?->CRC FINDINGS: The lungs are without acute focal process. There is no effusion or pneumothorax. The cardiomediastinal silhouette is without acute process. The osseous structures are without acute process. No acute process. XR CHEST PORTABLE     Result Date: 9/1/2022  EXAMINATION: ONE XRAY VIEW OF THE CHEST 9/1/2022 1:04 pm COMPARISON: None. HISTORY: ORDERING SYSTEM PROVIDED HISTORY: Shortness of breath TECHNOLOGIST PROVIDED HISTORY: Reason for exam:->Shortness of breath What reading provider will be dictating this exam?->CRC FINDINGS: Lungs are clear. The heart is mildly enlarged. There is no pneumothorax. This blunting of the right costophrenic angle. Mild scoliosis of the thoracic spine. Mild cardiomegaly. Mild right pleural effusion. CT NEEDLE BIOPSY LIVER PERCUTANEOUS     Result Date: 9/6/2022  PROCEDURE: CT NEEDLE BIOPSY LIVER PERCUTANEOUS; CT GUIDED NDL PLACEMENT MODERATE CONSCIOUS SEDATION 9/6/2022 HISTORY: ORDERING SYSTEM PROVIDED HISTORY: lung and liver nodule TECHNOLOGIST PROVIDED HISTORY: Biopsy of either right lung nodule or liver nodule, whichever is easiest to get to Reason for exam:->lung and liver nodule What reading provider will be dictating this exam?->CRC; ORDERING SYSTEM PROVIDED HISTORY: liver lesion TECHNOLOGIST PROVIDED HISTORY: Biopsy of either right lung nodule or liver nodule, whichever is easiest to get to Reason for exam:->liver lesion What reading provider will be dictating this exam?->CRC TECHNIQUE: Automated exposure control, iterative reconstruction, and/or weight based adjustment of the mA/kV was utilized to reduce the radiation dose to as low as reasonably achievable. CONTRAST: None SEDATION: 1 mgversed and 50 mcg fentanyl were titrated intravenously for moderate sedation monitored under my direction. Total intraservice time of sedation was 20 minutes. The patient's vital signs were monitored throughout the procedure and recorded in the patient's medical record by the nurse.  Mallapati score 2 ASA 2 FLUOROSCOPY DOSE AND TYPE OR TIME AND EXPOSURES: CT DESCRIPTION OF PROCEDURE: Informed consent was obtained after a detailed explanation of the procedure including risks, benefits, and alternatives. Universal protocol was observed. Sterile gowns, masks, hats and gloves utilized for maximal sterile barrier. After informed consent patient is placed supine on the table. Liver mass right lobe was localized with CT scanning. Patient prepped draped sterile fashion. 1% lidocaine was infiltrated for local anesthesia. 20 gauge coaxial needle was advanced into the lesion. Multiple 20 gauge cores were obtained and submitted to pathology. Post biopsy imaging shows air within the lesion suggesting good position of the biopsy needle. No immediate postoperative complication was seen. Dressing was applied. Patient was returned to holding stable condition. FINDINGS: Needle tip located in the right lobe liver mass. Successful CT-guided core biopsy right liver mass. Resident's Assessment and Plan     Ms. Bhumi Martinez is a 27-year-old woman with a PMH of chronic bronchitis, hypertension, hyperlipidemia, hypothyroidism, diastolic heart failure, depression, tobacco abuse, ethanol abuse, and newly diagnosed small cell carcinoma of the lung who is presenting with the following:      Pulmonary    Newly diagnosed small cell lung cancer with metastasis to the liver  9/2 results from CT w/o contrast performed to rule out malignancy; showed focal nodular consolidation in the peripheral right medial RUL measuring [id number] as well as superior mediastinal adenopathy, consistent with malignancy  Liver biopsy results on 9/6 confirmed small cell lung cancer diagnosis  Patient has extensive smoking history  Has lost undisclosed amount of weight within the last few months  PLAN  Consulted hem/onc; follow their recs   Consulted palliative care; follow their recs  Follow MRI results to r/o other metastases    Hx of chronic bronchitis/COPD  Uses 2L of oxygen at home  On breathing treatment, Proventil, Flonase, and Singulair at home as well  Currently does not feel SoB; has significant wheezing BL  PLAN  Continue current treatment  Added decadron 6mg IV daily       Nephrology    Hypotonic hypovolemic hyponatremia 2/2 intravascular volume depletion vs SIADH 2/2 small cell lung carcinoma  Patient with extensive smoking history  Recently diagnosed with small cell lung cancer with metastasis to the liver  Sodium = 117 currently, slowly improving (110 -> 112)  PLAN  Follow BMP Q12  Follow urine studies  Order serum cortisol to rule out adrenal insufficiency   Dry mary carmen diet  Fluid restriction due to possible SIADH  Consulted nephrology, follow recs    Hypokalemia 2/2 poor oral intake  K was 3.3, replaced  Follow BMP  Issued resolved    Lactic acidosis 2/2 intramuscular volume depletion (type A) vs liver metastasis (type B)  Lactic acid = 4.2, trending down from 4.2  Low serum osmolality at 254  Recent hx of liver metastasis  PLAN  Continue to trend lactic acidosis  Consulted nephrology, follow recs      Cardiology    Hx of diastolic heart failure  Most recent echo in March 2021 showed:  Stage I diastolic dysfunction  Mild left concentric left ventricular hypertrophy EF = 50-60%  Patient stated in the past she was on Lasix, but not currently taking  No lower extremity edema noted  PLAN  Monitor BMP and vitals      Endocrine    Hx of hypothyroidism  TSH = 3.970, Free T4 elevated at 2.15  Home medication = levothyroxine 137mcg tablet  Recent CT chest showed thyromegaly with nodular calcified isodense lesion in the thyroid isthmus   PLAN  Continue current levothyroxine dose      Lauren Moya MS-3  Attending physician: Dr. Aaron Bateman  Department of Pulmonary, Critical Care and Sleep Medicine  [address] [facility]  Department of Internal Medicine      During multidisciplinary team rounds Rohini Julian is a 81 y.o. female was seen, examined and discussed. This is confirmation that I have personally seen and examined the patient and that the key elements of the encounter were performed by me (> 85 % time). The medications & laboratory data was discussed and adjusted where necessary. The radiographic images were reviewed or with radiologist or consultant if felt dis-concordant with the exam or history. The above findings were corroborated, plans confirmed and changes made if needed. Family is updated at the bedside as available. Key issues of the case were discussed among consultants. Critical Care time is documented if appropriate.       Obdulia Blake DO, FACP, FCCP, [facility], normal

## 2023-06-05 NOTE — PLAN OF CARE
Problem: Chronic Conditions and Co-morbidities  Goal: Patient's chronic conditions and co-morbidity symptoms are monitored and maintained or improved  9/6/2022 2119 by Abbey Betancur RN  Outcome: Progressing     Problem: Discharge Planning  Goal: Discharge to home or other facility with appropriate resources  9/6/2022 2119 by Abbey Betancur RN  Outcome: Progressing     Problem: Safety - Adult  Goal: Free from fall injury  9/6/2022 2119 by Abbey Betancur RN  Outcome: Progressing     Problem: ABCDS Injury Assessment  Goal: Absence of physical injury  9/6/2022 2119 by Abbey Betancur RN  Outcome: Progressing June 13 @ 9, register in [address], downstairs in this building, then proceed to [address] [place] [address],[address] for the test.    Please do not smoke for 2 hours before this test.  Cut off your unna boot prior to the leg, shower, and place mepilex over the wound and leave in place til your visit. Dressing Orders:  Location of Wound: Left lower leg  Wash with soap and water. Apply  Restore AG to wound bed. Tanda Bun Apply Calamine Coflex Unnaboot  from toes to knee. Change once weekly . Compression Therapy is an important part of your program of care. Compression makes it easier for your body to pump the blood from your legs back to your heart, helping alleviate a condition called chronic venous insufficiency. Chronic venous insufficiency is an underlying cause of your injury, and managing it properly will help you to heal.      Keep the bandage in place at all times unless you experience foot pain or numbness or coolness of the toes. Do not attempt to remove and reapply the bandage system. Give it time. Your bandage may feel tight at first. This is normal. Your bandage will become more comfortable as swelling goes down. Be active, as suggested by your healthcare provider. Daily walks or mild exercise encourages better blood flow to aid healing. Put your feet up when sitting for long periods of time. Keep the bandage dry. Wet compression bandages are more likely to slip down and/or cause damage to good skin. PRECAUTIONS For your safety, compression therapy should be used under the supervision of a healthcare professional. Contact your care provider if you experience any of the following:  Pain that does not go away with elevation. Discoloration or numbness of the toes. The bandage slipping out of place  Fluid leaking through the bandage. Treatment Orders:  Protein rich diet (unless restricted by your physician); Multivitamin daily;  Elevate legs above the level of your heart when sitting 3-4 times daily for at least one hour

## 2023-10-10 NOTE — CONSULTS
Vascular Surgery Consultation Note    Reason for Consult:  Mediport placement    HPI : This is a 81 y.o. female admitted on 9/20/2022  8:02 PM with hyponatremia, dizziness. 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 Vascular surgery is consulted in regards to mediport placement. She denies pacemaker, defibrillator or hx of long term central venous catheter.       ROS : All others Negative if blank [], Positive if [x]  General Urinary   [] Fevers [] Hematuria   [] Chills [] Dysuria   [] Weight Loss Vascular   Skin [] Claudication   [] Tissue Loss [] Rest Pain   Eyes Neurologic   [x] Wears Glasses/Contacts [] Stroke/TIA   [] Vision Changes [] Focal weakness   Respiratory [] Slurred Speech    [x] Shortness of breath ENT   Cardiovascular [] Difficulty swallowing   [] Chest Pain Endocrine    [x] Shortness of breath with exertion [] Increased Thirst   Gastrointestinal    [] Abdominal Pain    [] Melena   [] Hematochezia         Past Medical History:   Diagnosis Date    Bronchitis     Hypertension     Thyroid disease         Past Surgical History:   Procedure Laterality Date    CT NEEDLE BIOPSY LIVER PERCUTANEOUS  9/6/2022    CT NEEDLE BIOPSY LIVER PERCUTANEOUS 9/6/2022 Dajuan Gee MD [facility]    OTHER SURGICAL HISTORY      states had something done right neck area per dr jaeger, might have been an abcess    TYMPANOSTOMY TUBE PLACEMENT       Current Medications:    sodium chloride      dextrose        guaiFENesin, perflutren lipid microspheres, sodium chloride flush, sodium chloride, ondansetron **OR** ondansetron, polyethylene glycol, acetaminophen **OR** acetaminophen, glucose, dextrose bolus **OR** dextrose bolus, glucagon (rDNA), dextrose, hydrOXYzine pamoate, benzocaine-menthol    vitamin B-6  50 mg Oral Daily    methylPREDNISolone  40 mg IntraVENous Q12H    [START ON 9/23/2022] metoprolol succinate  25 mg Oral Daily    piperacillin-tazobactam  4,500 mg IntraVENous Q8H melatonin  5 mg Oral Once    sodium chloride  1 g Oral Once    [START ON 9/23/2022] sodium chloride  1 g Oral TID     sodium chloride flush  5-40 mL IntraVENous 2 times per day    enoxaparin  40 mg SubCUTAneous Daily    levothyroxine  137 mcg Oral Daily    montelukast  10 mg Oral QAM    budesonide  0.5 mg Nebulization BID    ipratropium-albuterol  1 ampule Inhalation Q4H WA      Allergies:  Patient has no known allergies. Social History     Socioeconomic History    Marital status:      Spouse name: Not on file    Number of children: Not on file    Years of education: Not on file    Highest education level: Not on file   Occupational History    Not on file   Tobacco Use    Smoking status: Every Day     Packs/day: 1.00     Years: 50.00     Pack years: 50.00     Types: Cigarettes     Start date: 0    Smokeless tobacco: Never   Vaping Use    Vaping Use: Never used   Substance and Sexual Activity    Alcohol use: No    Drug use: No    Sexual activity: Never   Other Topics Concern    Not on file   Social History Narrative    Not on file     Social Determinants of Health     Financial Resource Strain: Not on file   Food Insecurity: Not on file   Transportation Needs: Not on file   Physical Activity: Not on file   Stress: Not on file   Social Connections: Not on file   Intimate Partner Violence: Not on file   Housing Stability: Not on file     History reviewed. No pertinent family history.   PHYSICAL EXAM:    BP (!) 165/138   Pulse (!) 109   Temp 97.3 °F (36.3 °C) (Axillary)   Resp 14   Ht 5' 2\" (1.575 m)   Wt 158 lb 11.7 oz (72 kg)   SpO2 97%   BMI 29.03 kg/m²   CONSTITUTIONAL:   Awake, alert, cooperative  PSYCHIATRIC :  Oriented to time, place and person      Appropriate insight to disease process  EYES: Lids and lashes normal  ENT:  External ears and nose without lesions   Hearing deficits not noted  NECK: Supple, symmetrical, trachea midline   Thyroid goiter not appreciated  LUNGS:  Increased work of breathing                 Good respiratory excursion  CARDIOVASCULAR: S1S2  ABDOMEN:  soft, non-distended, non-tender   Aorta is not palpable  Lymphatics : Cervical lymphadenopathy noted on right     Femoral lymphadenopathy not onted  SKIN:   Normal skin color   Texture and turgor normal, no induration  EXTREMITIES:   R UE 5/5 strength   No cyanosis noted in nail beds  L UE 5/5 strength   No cyanosis noted in nail beds  R LE Edema absent   Open wounds absent   L LE Edema absent   Open wound absent  R femoral 2+ L femoral 2+     LABS:    Lab Results   Component Value Date    WBC 12.9 (H) 09/22/2022    HGB 11.5 09/22/2022    HCT 32.5 (L) 09/22/2022     09/22/2022    PROTIME 12.1 09/06/2022    INR 1.1 09/06/2022    K 4.6 09/22/2022    BUN 11 09/22/2022    CREATININE 0.6 09/22/2022     Lab Results   Component Value Date    LABA1C 5.6 09/21/2022     No results found for: EAG  Lab Results   Component Value Date    LABALBU 4.3 09/20/2022        Radiology pertinent to vascular surgery evaluation reviewed    Assesment/Plan  Small Cell Lung CA with Mets  Plan for mediport insertion 9/23  I reviewed the procedure with the patient and family as available. I discussed the procedure, risks, benefits, complications, and alternatives of the procedure. They understand and consent.   All questions were answered  We did discuss concerns re her resp status which could affect ability to proceed with surgery     Leanord Sacks, MD Eye Shield Used: No

## 2025-07-15 NOTE — CARE COORDINATION
Patient s/p liver lesion biopsy from yesterday. . Pulse ox 100% on room air. Uses 2 l of O2 at home. Discharge plan remains home with family to transport when medically cleared. CM/SW will continue to follow. "Socially"

## (undated) DEVICE — ELECTRODE PT RET AD L9FT HI MOIST COND ADH HYDRGEL CORDED

## (undated) DEVICE — 3M™ IOBAN™ 2 ANTIMICROBIAL INCISE DRAPE 6640EZ: Brand: IOBAN™ 2

## (undated) DEVICE — SOLUTION IV 500ML 0.9% SOD CHL PH 5 INJ USP VIAFLX PLAS

## (undated) DEVICE — MINOR VASCULAR: Brand: MEDLINE INDUSTRIES, INC.

## (undated) DEVICE — GLOVE ORANGE PI 7 1/2   MSG9075